# Patient Record
Sex: MALE | Race: OTHER | HISPANIC OR LATINO | URBAN - METROPOLITAN AREA
[De-identification: names, ages, dates, MRNs, and addresses within clinical notes are randomized per-mention and may not be internally consistent; named-entity substitution may affect disease eponyms.]

---

## 2019-01-23 ENCOUNTER — INPATIENT (INPATIENT)
Facility: HOSPITAL | Age: 49
LOS: 26 days | Discharge: NURSING, REHAB OR LONG-TERM CARE | End: 2019-02-19
Attending: HOSPITALIST | Admitting: HOSPITALIST
Payer: COMMERCIAL

## 2019-01-23 VITALS
SYSTOLIC BLOOD PRESSURE: 91 MMHG | TEMPERATURE: 99 F | OXYGEN SATURATION: 98 % | RESPIRATION RATE: 18 BRPM | DIASTOLIC BLOOD PRESSURE: 62 MMHG | HEART RATE: 106 BPM

## 2019-01-23 DIAGNOSIS — K70.31 ALCOHOLIC CIRRHOSIS OF LIVER WITH ASCITES: ICD-10-CM

## 2019-01-23 DIAGNOSIS — A41.9 SEPSIS, UNSPECIFIED ORGANISM: ICD-10-CM

## 2019-01-23 DIAGNOSIS — R55 SYNCOPE AND COLLAPSE: ICD-10-CM

## 2019-01-23 DIAGNOSIS — K92.1 MELENA: ICD-10-CM

## 2019-01-23 DIAGNOSIS — F10.920 ALCOHOL USE, UNSPECIFIED WITH INTOXICATION, UNCOMPLICATED: ICD-10-CM

## 2019-01-23 LAB
ABO RH CONFIRMATION: SIGNIFICANT CHANGE UP
ALBUMIN SERPL ELPH-MCNC: 2.1 G/DL — LOW (ref 3.3–5)
ALP SERPL-CCNC: 799 U/L — HIGH (ref 40–120)
ALT FLD-CCNC: 55 U/L — SIGNIFICANT CHANGE UP (ref 12–78)
AMMONIA BLD-MCNC: 64 UMOL/L — HIGH (ref 11–32)
ANION GAP SERPL CALC-SCNC: 20 MMOL/L — HIGH (ref 5–17)
ANISOCYTOSIS BLD QL: SLIGHT — SIGNIFICANT CHANGE UP
APPEARANCE UR: ABNORMAL
APTT BLD: 34.9 SEC — SIGNIFICANT CHANGE UP (ref 27.5–36.3)
AST SERPL-CCNC: 215 U/L — HIGH (ref 15–37)
BACTERIA # UR AUTO: ABNORMAL
BASE EXCESS BLDA CALC-SCNC: 2.6 MMOL/L — HIGH (ref -2–2)
BASOPHILS # BLD AUTO: 0 K/UL — SIGNIFICANT CHANGE UP (ref 0–0.2)
BASOPHILS NFR BLD AUTO: 0 % — SIGNIFICANT CHANGE UP (ref 0–2)
BILIRUB SERPL-MCNC: 6 MG/DL — HIGH (ref 0.2–1.2)
BILIRUB UR-MCNC: ABNORMAL
BLOOD GAS COMMENTS: SIGNIFICANT CHANGE UP
BLOOD GAS COMMENTS: SIGNIFICANT CHANGE UP
BLOOD GAS SOURCE: SIGNIFICANT CHANGE UP
BUN SERPL-MCNC: 26 MG/DL — HIGH (ref 7–23)
CALCIUM SERPL-MCNC: 7.8 MG/DL — LOW (ref 8.5–10.1)
CHLORIDE SERPL-SCNC: 79 MMOL/L — LOW (ref 96–108)
CK MB BLD-MCNC: 1.5 % — SIGNIFICANT CHANGE UP (ref 0–3.5)
CK MB CFR SERPL CALC: 2.6 NG/ML — SIGNIFICANT CHANGE UP (ref 0.5–3.6)
CK SERPL-CCNC: 168 U/L — SIGNIFICANT CHANGE UP (ref 26–308)
CO2 SERPL-SCNC: 24 MMOL/L — SIGNIFICANT CHANGE UP (ref 22–31)
COLOR SPEC: ABNORMAL
CREAT SERPL-MCNC: 2.05 MG/DL — HIGH (ref 0.5–1.3)
DIFF PNL FLD: ABNORMAL
EOSINOPHIL # BLD AUTO: 0 K/UL — SIGNIFICANT CHANGE UP (ref 0–0.5)
EOSINOPHIL NFR BLD AUTO: 0 % — SIGNIFICANT CHANGE UP (ref 0–6)
ETHANOL SERPL-MCNC: 237 MG/DL — HIGH (ref 0–10)
GLUCOSE BLDC GLUCOMTR-MCNC: 187 MG/DL — HIGH (ref 70–99)
GLUCOSE SERPL-MCNC: 148 MG/DL — HIGH (ref 70–99)
GLUCOSE UR QL: NEGATIVE MG/DL — SIGNIFICANT CHANGE UP
HCO3 BLDA-SCNC: 25 MMOL/L — SIGNIFICANT CHANGE UP (ref 21–29)
HCT VFR BLD CALC: 26.8 % — LOW (ref 39–50)
HGB BLD-MCNC: 9.5 G/DL — LOW (ref 13–17)
HOROWITZ INDEX BLDA+IHG-RTO: 32 — SIGNIFICANT CHANGE UP
HYALINE CASTS # UR AUTO: ABNORMAL /LPF
HYPOCHROMIA BLD QL: SLIGHT — SIGNIFICANT CHANGE UP
INR BLD: 2.05 RATIO — HIGH (ref 0.88–1.16)
KETONES UR-MCNC: ABNORMAL
LACTATE SERPL-SCNC: 9.8 MMOL/L — CRITICAL HIGH (ref 0.7–2)
LEUKOCYTE ESTERASE UR-ACNC: ABNORMAL
LYMPHOCYTES # BLD AUTO: 1.61 K/UL — SIGNIFICANT CHANGE UP (ref 1–3.3)
LYMPHOCYTES # BLD AUTO: 7 % — LOW (ref 13–44)
MACROCYTES BLD QL: SIGNIFICANT CHANGE UP
MAGNESIUM SERPL-MCNC: 2.3 MG/DL — SIGNIFICANT CHANGE UP (ref 1.6–2.6)
MANUAL SMEAR VERIFICATION: SIGNIFICANT CHANGE UP
MCHC RBC-ENTMCNC: 35.4 GM/DL — SIGNIFICANT CHANGE UP (ref 32–36)
MCHC RBC-ENTMCNC: 38 PG — HIGH (ref 27–34)
MCV RBC AUTO: 107.2 FL — HIGH (ref 80–100)
MONOCYTES # BLD AUTO: 1.38 K/UL — HIGH (ref 0–0.9)
MONOCYTES NFR BLD AUTO: 6 % — SIGNIFICANT CHANGE UP (ref 2–14)
NEUTROPHILS # BLD AUTO: 20.06 K/UL — HIGH (ref 1.8–7.4)
NEUTROPHILS NFR BLD AUTO: 84 % — HIGH (ref 43–77)
NEUTS BAND # BLD: 3 % — SIGNIFICANT CHANGE UP (ref 0–8)
NEUTS HYPERSEG # BLD: PRESENT — SIGNIFICANT CHANGE UP
NITRITE UR-MCNC: NEGATIVE — SIGNIFICANT CHANGE UP
NRBC # BLD: 0 /100 — SIGNIFICANT CHANGE UP (ref 0–0)
NRBC # BLD: SIGNIFICANT CHANGE UP /100 WBCS (ref 0–0)
OB PNL STL: POSITIVE
PCO2 BLDA: 30 MMHG — LOW (ref 32–46)
PH BLD: 7.53 — HIGH (ref 7.35–7.45)
PH UR: 5 — SIGNIFICANT CHANGE UP (ref 5–8)
PLAT MORPH BLD: NORMAL — SIGNIFICANT CHANGE UP
PLATELET # BLD AUTO: 282 K/UL — SIGNIFICANT CHANGE UP (ref 150–400)
PO2 BLDA: 91 MMHG — SIGNIFICANT CHANGE UP (ref 74–108)
POTASSIUM SERPL-MCNC: 5.3 MMOL/L — SIGNIFICANT CHANGE UP (ref 3.5–5.3)
POTASSIUM SERPL-SCNC: 5.3 MMOL/L — SIGNIFICANT CHANGE UP (ref 3.5–5.3)
PROT SERPL-MCNC: 6.6 GM/DL — SIGNIFICANT CHANGE UP (ref 6–8.3)
PROT UR-MCNC: 15 MG/DL
PROTHROM AB SERPL-ACNC: 23.5 SEC — HIGH (ref 10–12.9)
RBC # BLD: 2.5 M/UL — LOW (ref 4.2–5.8)
RBC # FLD: 14.4 % — SIGNIFICANT CHANGE UP (ref 10.3–14.5)
RBC BLD AUTO: ABNORMAL
RBC CASTS # UR COMP ASSIST: SIGNIFICANT CHANGE UP /HPF (ref 0–4)
SAO2 % BLDA: 97 % — HIGH (ref 92–96)
SODIUM SERPL-SCNC: 123 MMOL/L — LOW (ref 135–145)
SP GR SPEC: 1.02 — SIGNIFICANT CHANGE UP (ref 1.01–1.02)
TARGETS BLD QL SMEAR: SLIGHT — SIGNIFICANT CHANGE UP
TROPONIN I SERPL-MCNC: <.015 NG/ML — SIGNIFICANT CHANGE UP (ref 0.01–0.04)
UROBILINOGEN FLD QL: 8 MG/DL
WBC # BLD: 23.06 K/UL — HIGH (ref 3.8–10.5)
WBC # FLD AUTO: 23.06 K/UL — HIGH (ref 3.8–10.5)
WBC UR QL: ABNORMAL

## 2019-01-23 PROCEDURE — 99285 EMERGENCY DEPT VISIT HI MDM: CPT

## 2019-01-23 PROCEDURE — 71045 X-RAY EXAM CHEST 1 VIEW: CPT | Mod: 26

## 2019-01-23 PROCEDURE — 70450 CT HEAD/BRAIN W/O DYE: CPT | Mod: 26

## 2019-01-23 PROCEDURE — 93010 ELECTROCARDIOGRAM REPORT: CPT

## 2019-01-23 PROCEDURE — 76700 US EXAM ABDOM COMPLETE: CPT | Mod: 26

## 2019-01-23 PROCEDURE — 99291 CRITICAL CARE FIRST HOUR: CPT

## 2019-01-23 PROCEDURE — 74176 CT ABD & PELVIS W/O CONTRAST: CPT | Mod: 26

## 2019-01-23 PROCEDURE — 99223 1ST HOSP IP/OBS HIGH 75: CPT

## 2019-01-23 RX ORDER — VANCOMYCIN HCL 1 G
1000 VIAL (EA) INTRAVENOUS ONCE
Qty: 0 | Refills: 0 | Status: COMPLETED | OUTPATIENT
Start: 2019-01-23 | End: 2019-01-23

## 2019-01-23 RX ORDER — SODIUM CHLORIDE 9 MG/ML
1000 INJECTION, SOLUTION INTRAVENOUS
Qty: 0 | Refills: 0 | Status: DISCONTINUED | OUTPATIENT
Start: 2019-01-23 | End: 2019-01-23

## 2019-01-23 RX ORDER — PANTOPRAZOLE SODIUM 20 MG/1
8 TABLET, DELAYED RELEASE ORAL
Qty: 80 | Refills: 0 | Status: DISCONTINUED | OUTPATIENT
Start: 2019-01-23 | End: 2019-01-27

## 2019-01-23 RX ORDER — SODIUM CHLORIDE 9 MG/ML
1000 INJECTION INTRAMUSCULAR; INTRAVENOUS; SUBCUTANEOUS
Qty: 0 | Refills: 0 | Status: DISCONTINUED | OUTPATIENT
Start: 2019-01-23 | End: 2019-01-24

## 2019-01-23 RX ORDER — SODIUM CHLORIDE 9 MG/ML
1000 INJECTION, SOLUTION INTRAVENOUS
Qty: 0 | Refills: 0 | Status: COMPLETED | OUTPATIENT
Start: 2019-01-23 | End: 2019-01-23

## 2019-01-23 RX ORDER — SODIUM CHLORIDE 9 MG/ML
1000 INJECTION INTRAMUSCULAR; INTRAVENOUS; SUBCUTANEOUS ONCE
Qty: 0 | Refills: 0 | Status: COMPLETED | OUTPATIENT
Start: 2019-01-23 | End: 2019-01-23

## 2019-01-23 RX ORDER — PIPERACILLIN AND TAZOBACTAM 4; .5 G/20ML; G/20ML
3.38 INJECTION, POWDER, LYOPHILIZED, FOR SOLUTION INTRAVENOUS ONCE
Qty: 0 | Refills: 0 | Status: COMPLETED | OUTPATIENT
Start: 2019-01-23 | End: 2019-01-23

## 2019-01-23 RX ORDER — THIAMINE MONONITRATE (VIT B1) 100 MG
100 TABLET ORAL DAILY
Qty: 0 | Refills: 0 | Status: DISCONTINUED | OUTPATIENT
Start: 2019-01-23 | End: 2019-02-19

## 2019-01-23 RX ORDER — FOLIC ACID 0.8 MG
1 TABLET ORAL DAILY
Qty: 0 | Refills: 0 | Status: DISCONTINUED | OUTPATIENT
Start: 2019-01-23 | End: 2019-02-19

## 2019-01-23 RX ORDER — PANTOPRAZOLE SODIUM 20 MG/1
80 TABLET, DELAYED RELEASE ORAL ONCE
Qty: 0 | Refills: 0 | Status: COMPLETED | OUTPATIENT
Start: 2019-01-23 | End: 2019-01-23

## 2019-01-23 RX ORDER — PIPERACILLIN AND TAZOBACTAM 4; .5 G/20ML; G/20ML
3.38 INJECTION, POWDER, LYOPHILIZED, FOR SOLUTION INTRAVENOUS EVERY 8 HOURS
Qty: 0 | Refills: 0 | Status: DISCONTINUED | OUTPATIENT
Start: 2019-01-23 | End: 2019-01-24

## 2019-01-23 RX ORDER — NICOTINE POLACRILEX 2 MG
1 GUM BUCCAL DAILY
Qty: 0 | Refills: 0 | Status: DISCONTINUED | OUTPATIENT
Start: 2019-01-23 | End: 2019-02-19

## 2019-01-23 RX ORDER — SODIUM CHLORIDE 9 MG/ML
2000 INJECTION INTRAMUSCULAR; INTRAVENOUS; SUBCUTANEOUS ONCE
Qty: 0 | Refills: 0 | Status: COMPLETED | OUTPATIENT
Start: 2019-01-23 | End: 2019-01-23

## 2019-01-23 RX ORDER — MIDODRINE HYDROCHLORIDE 2.5 MG/1
5 TABLET ORAL EVERY 8 HOURS
Qty: 0 | Refills: 0 | Status: DISCONTINUED | OUTPATIENT
Start: 2019-01-23 | End: 2019-01-24

## 2019-01-23 RX ADMIN — SODIUM CHLORIDE 2000 MILLILITER(S): 9 INJECTION INTRAMUSCULAR; INTRAVENOUS; SUBCUTANEOUS at 20:00

## 2019-01-23 RX ADMIN — SODIUM CHLORIDE 125 MILLILITER(S): 9 INJECTION, SOLUTION INTRAVENOUS at 20:01

## 2019-01-23 RX ADMIN — MIDODRINE HYDROCHLORIDE 5 MILLIGRAM(S): 2.5 TABLET ORAL at 22:48

## 2019-01-23 RX ADMIN — SODIUM CHLORIDE 2000 MILLILITER(S): 9 INJECTION INTRAMUSCULAR; INTRAVENOUS; SUBCUTANEOUS at 16:46

## 2019-01-23 RX ADMIN — SODIUM CHLORIDE 1000 MILLILITER(S): 9 INJECTION INTRAMUSCULAR; INTRAVENOUS; SUBCUTANEOUS at 20:00

## 2019-01-23 RX ADMIN — PIPERACILLIN AND TAZOBACTAM 200 GRAM(S): 4; .5 INJECTION, POWDER, LYOPHILIZED, FOR SOLUTION INTRAVENOUS at 20:12

## 2019-01-23 RX ADMIN — PANTOPRAZOLE SODIUM 80 MILLIGRAM(S): 20 TABLET, DELAYED RELEASE ORAL at 22:01

## 2019-01-23 RX ADMIN — Medication 250 MILLIGRAM(S): at 20:12

## 2019-01-23 NOTE — H&P ADULT - REASON FOR ADMISSION
Coffee ground emesis, progressive jaundice. Coffee ground emesis, progressive jaundice, syncopal episodes.

## 2019-01-23 NOTE — ED ADULT NURSE NOTE - NSIMPLEMENTINTERV_GEN_ALL_ED
Implemented All Fall Risk Interventions:  [place] to call system. Call bell, personal items and telephone within reach. Instruct patient to call for assistance. Room bathroom lighting operational. Non-slip footwear when patient is off stretcher. Physically safe environment: no spills, clutter or unnecessary equipment. Stretcher in lowest position, wheels locked, appropriate side rails in place. Provide visual cue, wrist band, yellow gown, etc. Monitor gait and stability. Monitor for mental status changes and reorient to person, place, and time. Review medications for side effects contributing to fall risk. Reinforce activity limits and safety measures with patient and family.

## 2019-01-23 NOTE — ED PROVIDER NOTE - OBJECTIVE STATEMENT
48 years old male by ems with wife c/o pt passed out x 3 today. Pt admits to alcohol drinking vodka daily last drink was 11:00 am this morning. Pt also c/o generalized weakness and being yellow for two weeks. Pt denies headache, trauma to head, neck/back pain, dizziness, blurred visions, light sensitivities, cough, sob, chest pain, nausea, vomiting, fever, chills, abd pain, dysuria, or irregular bowel movements.

## 2019-01-23 NOTE — H&P ADULT - NSHPLABSRESULTS_GEN_ALL_CORE
LABS:                        9.5    . )-----------( 282      ( 2019 16:55 )             26.8         123<L>  |  79<L>  |  26<H>  ----------------------------<  148<H>  5.3   |  24  |  2.05<H>    Ca    7.8<L>      2019 16:55  Mg     2.3         TPro  6.6  /  Alb  2.1<L>  /  TBili  6.0<H>  /  DBili  x   /  AST  215<H>  /  ALT  55  /  AlkPhos  799<H>      PT/INR - ( 2019 16:55 )   PT: 23.5 sec;   INR: 2.05 ratio         PTT - ( 2019 16:55 )  PTT:34.9 sec  Urinalysis Basic - ( 2019 20:58 )    Color: Rosalind / Appearance: Slightly Turbid / S.020 / pH: x  Gluc: x / Ketone: Trace  / Bili: Moderate / Urobili: 8 mg/dL   Blood: x / Protein: 15 mg/dL / Nitrite: Negative   Leuk Esterase: Trace / RBC: 0-2 /HPF / WBC 6-10   Sq Epi: x / Non Sq Epi: x / Bacteria: Many      CAPILLARY BLOOD GLUCOSE      POCT Blood Glucose.: 187 mg/dL (2019 15:33)          RADIOLOGY & ADDITIONAL TESTS:  < from: CT Abdomen and Pelvis No Cont (19 @ 18:06) >    1. Diffuse ascites.  2. Hepatomegaly with cirrhotic changes and fatty infiltration.   Heterogeneity within the liver, and tumor/hepatocellular carcinoma cannot   be excluded.  3. Soft tissue thickening and/or edematous changes throughout the   mesentery and omental fat. Omental tumor versus omental edema is within   the differential.  < from: CT Head No Cont (19 @ 18:09) >      HEMISPHERES:  No mass or space occupying lesion.  No acute ischemic   changes or hemorrhagic foci are suggested.  VENTRICLES:  Midline and normal in size.  POSTERIOR FOSSA:  There is a retrocerebellar arachnoid cyst. Brainstem   and cerebellum are otherwise unremarkable.  EXTRACEREBRAL SPACES:  No subdural or epidural collections are noted.  SKULL BASE AND CALVARIUM:  Appears intact.  No fracture or destructive   lesion is identified.  SINUSES AND MASTOIDS:  Clear.  MISCELLANEOUS:  No orbital or suprasellar abnormality noted.    < from: US Abdomen Complete (19 @ 18:33) >    Moderate diffuse ascites with hepatomegaly and cirrhotic changes of the   liver. Possible right liver mass.    Diffusely thickened gallbladder filled with sludge.     < end of copied text >              Imaging Personally Reviewed:  [ ] YES  [ ] NO LABS:                        9.5    . )-----------( 282      ( 2019 16:55 )             26.8         123<L>  |  79<L>  |  26<H>  ----------------------------<  148<H>  5.3   |  24  |  2.05<H>    Ca    7.8<L>      2019 16:55  Mg     2.3         TPro  6.6  /  Alb  2.1<L>  /  TBili  6.0<H>  /  DBili  x   /  AST  215<H>  /  ALT  55  /  AlkPhos  799<H>      PT/INR - ( 2019 16:55 )   PT: 23.5 sec;   INR: 2.05 ratio         PTT - ( 2019 16:55 )  PTT:34.9 sec  Urinalysis Basic - ( 2019 20:58 )    Color: Rosalind / Appearance: Slightly Turbid / S.020 / pH: x  Gluc: x / Ketone: Trace  / Bili: Moderate / Urobili: 8 mg/dL   Blood: x / Protein: 15 mg/dL / Nitrite: Negative   Leuk Esterase: Trace / RBC: 0-2 /HPF / WBC 6-10   Sq Epi: x / Non Sq Epi: x / Bacteria: Many      CAPILLARY BLOOD GLUCOSE      POCT Blood Glucose.: 187 mg/dL (2019 15:33)  Lactate, Blood: 9.8:  mmol/L (19 @ 19:04)  Troponin I, Serum: <.015:  ng/mL (19 @ 16:55)          RADIOLOGY & ADDITIONAL TESTS:  < from: CT Abdomen and Pelvis No Cont (19 @ 18:06) >    1. Diffuse ascites.  2. Hepatomegaly with cirrhotic changes and fatty infiltration.   Heterogeneity within the liver, and tumor/hepatocellular carcinoma cannot   be excluded.  3. Soft tissue thickening and/or edematous changes throughout the   mesentery and omental fat. Omental tumor versus omental edema is within   the differential.  < from: CT Head No Cont (19 @ 18:09) >      HEMISPHERES:  No mass or space occupying lesion.  No acute ischemic   changes or hemorrhagic foci are suggested.  VENTRICLES:  Midline and normal in size.  POSTERIOR FOSSA:  There is a retrocerebellar arachnoid cyst. Brainstem   and cerebellum are otherwise unremarkable.  EXTRACEREBRAL SPACES:  No subdural or epidural collections are noted.  SKULL BASE AND CALVARIUM:  Appears intact.  No fracture or destructive   lesion is identified.  SINUSES AND MASTOIDS:  Clear.  MISCELLANEOUS:  No orbital or suprasellar abnormality noted.    < from: US Abdomen Complete (19 @ 18:33) >    Moderate diffuse ascites with hepatomegaly and cirrhotic changes of the   liver. Possible right liver mass.    Diffusely thickened gallbladder filled with sludge.     < end of copied text >              Imaging Personally Reviewed:  [ ] YES  [ ] NO

## 2019-01-23 NOTE — CONSULT NOTE ADULT - SUBJECTIVE AND OBJECTIVE BOX
HPI:      24 hr events:      ## ROS:  [ ] unable to obtain  CONSTITUTIONAL: No fever, weight loss, or fatigue  EYES: No eye pain, visual disturbances, or discharge  ENMT:  No difficulty hearing, tinnitus, vertigo; No sinus or throat pain  NECK: No pain or stiffness  RESPIRATORY: No cough, wheezing, chills or hemoptysis; No shortness of breath  CARDIOVASCULAR: No chest pain, palpitations, dizziness, or leg swelling  GASTROINTESTINAL: No abdominal or epigastric pain. No nausea, vomiting, or hematemesis; No diarrhea or constipation. No melena or hematochezia.  GENITOURINARY: No dysuria, frequency, hematuria, or incontinence  NEUROLOGICAL: No headaches, memory loss, loss of strength, numbness, or tremors  SKIN: No itching, burning, rashes, or lesions   LYMPH NODES: No enlarged glands  ENDOCRINE: No heat or cold intolerance; No hair loss  MUSCULOSKELETAL: No joint pain or swelling; No muscle, back, or extremity pain  PSYCHIATRIC: No depression, anxiety, mood swings, or difficulty sleeping  HEME/LYMPH: No easy bruising, or bleeding gums  ALLERGY AND IMMUNOLOGIC: No hives or eczema    ## Labs:  CBC:                        9.5    23.06 )-----------( 282      ( 23 Jan 2019 16:55 )             26.8     Chem:  01-23    123<L>  |  79<L>  |  26<H>  ----------------------------<  148<H>  5.3   |  24  |  2.05<H>    Ca    7.8<L>      23 Jan 2019 16:55  Mg     2.3     01-23    TPro  6.6  /  Alb  2.1<L>  /  TBili  6.0<H>  /  DBili  x   /  AST  215<H>  /  ALT  55  /  AlkPhos  799<H>  01-23    Coags:  PT/INR - ( 23 Jan 2019 16:55 )   PT: 23.5 sec;   INR: 2.05 ratio         PTT - ( 23 Jan 2019 16:55 )  PTT:34.9 sec        ## Imaging:    ## Medications:    midodrine 5 milliGRAM(s) Oral every 8 hours          pantoprazole  Injectable 80 milliGRAM(s) IV Push once        ## Vitals:  T(C): 36.7 (01-23-19 @ 20:15), Max: 37.2 (01-23-19 @ 15:29)  HR: 108 (01-23-19 @ 20:45) (100 - 108)  BP: 115/62 (01-23-19 @ 20:45) (91/62 - 115/62)  BP(mean): --  RR: 28 (01-23-19 @ 20:45) (18 - 30)  SpO2: 99% (01-23-19 @ 20:45) (98% - 100%)  Wt(kg): --  Vent:   ABG: ABG - ( 23 Jan 2019 19:09 )  pH, Arterial: x     pH, Blood: 7.53  /  pCO2: 30    /  pO2: 91    / HCO3: 25    / Base Excess: 2.6   /  SaO2: 97                    01-23 @ 07:01  -  01-23 @ 21:27  --------------------------------------------------------  IN: 2600 mL / OUT: 0 mL / NET: 2600 mL          ## P/E:  Gen: lying comfortably in bed in no apparent distress  HEENT: PERRL, EOMI  Resp: CTA B/L no c/r/w  CVS: S1S2 no m/r/g  Abd: soft NT/ND +BS  Ext: no c/c/e  Neuro: A&Ox3    CENTRAL LINE: [ ] YES [ ] NO  LOCATION:   DATE INSERTED:  REMOVE: [ ] YES [ ] NO      GILLESPIE: [ ] YES [ ] NO    DATE INSERTED:  REMOVE:  [ ] YES [ ] NO      A-LINE:  [ ] YES [ ] NO  LOCATION:   DATE INSERTED:  REMOVE:  [ ] YES [ ] NO  EXPLAIN:    GLOBAL ISSUE/BEST PRACTICE:  Analgesia:  Sedation:  HOB elevation: yes  Stress ulcer prophylaxis:  VTE prophylaxis:  Oral Care:  Glycemic control:  Nutrition:    CODE STATUS: [ ] full code  [ ] DNR  [ ] DNI  [ ] MOLST  Goals of care discussion: [ ] yes HPI:  48M PMH HTN (not on medication), chronic alcohol abuse and dependence x 15 years (1/2 pint of vodka with cranberry juice daily), hx of alcohol withdrawal 2013 hospitalized at [facility] after which he went to inpatient rehab at [facility] for syncope. History obtained from ex wife, mother, and patient. Since pt left rehab in 2013 pt has been drinking daily. Last drink 11AM today. Pt state for past one and a half months he has had progressive weight loss with loss of appetite. He has had in increased abdominal distention for the past 1 month and in the past 1 week he has noticed jaundice and scleral icterus. Pt also reports generalized fatigue. Reports coffee ground emesis x1 episode 3 days ago but none since then with melena for the past 3-4 days. Pt states he has 3 bowel movements a day and now has also been seeing "blood when I pee" also within the last week. No abdominal pain. No CP no SOB. No fevers or chills. Today pt called ex wife after he was unsteady with his gait and "fell". Denies trauma to head. Ex wife reports while attempting to get pt to the car she noticed he was "very weak" and witnessed pt to have had 3 syncopal episodes lasting a few minutes each before regaining consciousness. Ex wife state that in the car, pt had a bowel movement that was black and tarry.     In ED pt noted to be hypotensive SBP 80-90s. 2L IVF given with 3rd and 4th infusing. SBP post IVF resuscitation 100s to one teens. On labs pt with leukocytosis with 3% bands, Na 123, Cr 2.05, INR 2.05, T bili 6, , ALT 55, Alk phos 799      ## ROS:  [ ] unable to obtain  CONSTITUTIONAL: No fever, weight loss, or fatigue  EYES: No eye pain, visual disturbances, or discharge  ENMT:  No difficulty hearing, tinnitus, vertigo; No sinus or throat pain  NECK: No pain or stiffness  RESPIRATORY: No cough, wheezing, chills or hemoptysis; No shortness of breath  CARDIOVASCULAR: No chest pain, palpitations, dizziness, or leg swelling  GASTROINTESTINAL: No abdominal or epigastric pain. No nausea, vomiting, or hematemesis; No diarrhea or constipation. No melena or hematochezia.  GENITOURINARY: No dysuria, frequency, hematuria, or incontinence  NEUROLOGICAL: No headaches, memory loss, loss of strength, numbness, or tremors  SKIN: No itching, burning, rashes, or lesions   LYMPH NODES: No enlarged glands  ENDOCRINE: No heat or cold intolerance; No hair loss  MUSCULOSKELETAL: No joint pain or swelling; No muscle, back, or extremity pain  PSYCHIATRIC: No depression, anxiety, mood swings, or difficulty sleeping  HEME/LYMPH: No easy bruising, or bleeding gums  ALLERGY AND IMMUNOLOGIC: No hives or eczema    ## Labs:  CBC:                        9.5    23.06 )-----------( 282      ( 23 Jan 2019 16:55 )             26.8     Chem:  01-23    123<L>  |  79<L>  |  26<H>  ----------------------------<  148<H>  5.3   |  24  |  2.05<H>    Ca    7.8<L>      23 Jan 2019 16:55  Mg     2.3     01-23    TPro  6.6  /  Alb  2.1<L>  /  TBili  6.0<H>  /  DBili  x   /  AST  215<H>  /  ALT  55  /  AlkPhos  799<H>  01-23    Coags:  PT/INR - ( 23 Jan 2019 16:55 )   PT: 23.5 sec;   INR: 2.05 ratio         PTT - ( 23 Jan 2019 16:55 )  PTT:34.9 sec        ## Imaging:    ## Medications:    midodrine 5 milliGRAM(s) Oral every 8 hours          pantoprazole  Injectable 80 milliGRAM(s) IV Push once        ## Vitals:  T(C): 36.7 (01-23-19 @ 20:15), Max: 37.2 (01-23-19 @ 15:29)  HR: 108 (01-23-19 @ 20:45) (100 - 108)  BP: 115/62 (01-23-19 @ 20:45) (91/62 - 115/62)  BP(mean): --  RR: 28 (01-23-19 @ 20:45) (18 - 30)  SpO2: 99% (01-23-19 @ 20:45) (98% - 100%)  Wt(kg): --  Vent:   ABG: ABG - ( 23 Jan 2019 19:09 )  pH, Arterial: x     pH, Blood: 7.53  /  pCO2: 30    /  pO2: 91    / HCO3: 25    / Base Excess: 2.6   /  SaO2: 97                    01-23 @ 07:01  - 01-23 @ 21:27  --------------------------------------------------------  IN: 2600 mL / OUT: 0 mL / NET: 2600 mL          ## P/E:  Gen: lying comfortably in bed in no apparent distress  HEENT: PERRL, EOMI  Resp: CTA B/L no c/r/w  CVS: S1S2 no m/r/g  Abd: soft NT/ND +BS  Ext: no c/c/e  Neuro: A&Ox3    CENTRAL LINE: [ ] YES [ ] NO  LOCATION:   DATE INSERTED:  REMOVE: [ ] YES [ ] NO      GILLESPIE: [ ] YES [ ] NO    DATE INSERTED:  REMOVE:  [ ] YES [ ] NO      A-LINE:  [ ] YES [ ] NO  LOCATION:   DATE INSERTED:  REMOVE:  [ ] YES [ ] NO  EXPLAIN:    GLOBAL ISSUE/BEST PRACTICE:  Analgesia:  Sedation:  HOB elevation: yes  Stress ulcer prophylaxis:  VTE prophylaxis:  Oral Care:  Glycemic control:  Nutrition:    CODE STATUS: [ ] full code  [ ] DNR  [ ] DNI  [ ] [facility]  Goals of care discussion: [ ] yes HPI:  48M PMH HTN (not on medication), chronic alcohol abuse and dependence x 15 years (1/2 pint of vodka with cranberry juice daily), hx of alcohol withdrawal 2013 hospitalized at [facility] after which he went to inpatient rehab at [facility] for syncope. History obtained from ex wife, mother, and patient. Since pt left rehab in 2013 pt has been drinking daily. Last drink 11AM today. Pt state for past one and a half month he has had progressive weight loss with loss of appetite. He has had increased abdominal distention for the past 1 month and in the past 1 week he has noticed jaundice and scleral icterus. Pt also reports generalized fatigue. Reports coffee ground emesis x1 episode 3 days ago but none since then with melena for the past 3-4 days. Pt states he has 3 bowel movements a day and now has also been seeing "blood when I pee" also within the last week. No abdominal pain. No CP no SOB. No fevers or chills. Today pt called ex wife after he was unsteady with his gait and "fell". Denies trauma to head. Ex wife reports while attempting to get pt to the car she noticed he was "very weak" and witnessed pt to have had 3 syncopal episodes lasting a few minutes each before regaining consciousness. Ex wife state that in the car, pt had a bowel movement that was black and tarry. No hx of ascites or paracentesis in past. Has not seen doctor over 3 years per family.     In ED pt noted to be hypotensive SBP 80-90s. 2L IVF given with 3rd and 4th infusing. SBP post IVF resuscitation 100s to one teens. On labs pt with leukocytosis with 3% bands, Na 123, Cr 2.05, INR 2.05, T bili 6, , ALT 55, Alk phos 799, lactate 9.8.       ## ROS:  CONSTITUTIONAL: No fever, no chills, + weight loss + fatigue  EYES: No eye pain, no visual disturbances + scleral icterus  ENMT:  No difficulty hearing,  No sinus or throat pain  NECK: No pain or stiffness  RESPIRATORY: No cough, no wheezing, no hemoptysis; No shortness of breath  CARDIOVASCULAR: No chest pain, no palpitations, no dizziness  GASTROINTESTINAL: No abdominal or epigastric pain. coffee ground emesis 3 days ago x1 episode. + melena no hematochezia  GENITOURINARY: No dysuria, no frequency, reports hematuria  NEUROLOGICAL: No headaches, no dizziness  SKIN: No itching, burning, rashes, or lesions   MUSCULOSKELETAL: + back pain in ED with "laying on stretcher"    ## FAMILY HX:  hx of HTN, CAD, CVA on mother side  cirrhosis from alcohol maternal aunt  father's hx is unclear per pt and pt's mother      ## SOCIAL HX:  + smoker 10 cigarettes a day   + daily alcohol intake 1/2 pint vodka, drinking x15 years, 2013 alcohol rehab but started drinking after leaving rehab  works as superintendent in building    lives alone  denies drug use        ## Labs:  CBC:                        9.5    23.06 )-----------( 282      ( 23 Jan 2019 16:55 )             26.8     Chem:  01-23    123<L>  |  79<L>  |  26<H>  ----------------------------<  148<H>  5.3   |  24  |  2.05<H>    Ca    7.8<L>      23 Jan 2019 16:55  Mg     2.3     01-23    TPro  6.6  /  Alb  2.1<L>  /  TBili  6.0<H>  /  AST  215<H>  /  ALT  55  /  AlkPhos  799<H>  01-23    Coags:  PT/INR - ( 23 Jan 2019 16:55 )   PT: 23.5 sec;   INR: 2.05 ratio    PTT - ( 23 Jan 2019 16:55 )  PTT:34.9 sec    Lactate, Blood (01.23.19 @ 19:04)    Lactate, Blood: 9.8 mmol/L      ## Imaging:  CXR < from: Xray Chest 1 View AP/PA. (01.23.19 @ 16:38) >  Basilar atelectases right greater than left    CT abdomen < from: CT Abdomen and Pelvis No Cont (01.23.19 @ 18:06) >  1. Diffuse ascites.  2. Hepatomegaly with cirrhotic changes and fatty infiltration.   Heterogeneity within the liver, and tumor/hepatocellular carcinoma cannot be excluded.  3. Soft tissue thickening and/or edematous changes throughout the mesentery and omental fat. Omental tumor versus omental edema is within the differential.      ## HOME Medications:  multivitamins  calcium  omega 3      ## ED Medications:  vancomycin 1g IV  Zosyn 3.375g IV    ## Vitals:  T(C): 36.7 (01-23-19 @ 20:15), Max: 37.2 (01-23-19 @ 15:29)  HR: 108 (01-23-19 @ 20:45) (100 - 108)  BP: 115/62 (01-23-19 @ 20:45) (91/62 - 115/62)  RR: 28 (01-23-19 @ 20:45) (18 - 30)  SpO2: 99% (01-23-19 @ 20:45) (98% - 100%)  ABG: ABG - ( 23 Jan 2019 19:09 )  pH, Arterial:   pH, Blood: 7.53  /  pCO2: 30    /  pO2: 91    / HCO3: 25    / Base Excess: 2.6   /  SaO2: 97              01-23 @ 07:01  -  01-23 @ 21:27  --------------------------------------------------------  IN: 2600 mL / OUT: 0 mL / NET: 2600 mL          ## P/E:  Gen: lying comfortably in bed in no apparent distress  HEENT: PERRL, EOMI, + scleral icterus bilaterally   Resp: CTA B/L no wheeze, no rales, no rhonchi  CVS: S1S2 RRR  Abd: distended, non tender, no guarding, no rebound, + BS  Ext: no cyanosis, mild right > left ankle edema  Neuro: A&Ox3, follows commands

## 2019-01-23 NOTE — H&P ADULT - ASSESSMENT
47 y/o male PMH HTN (not on medication), chronic alcohol abuse and dependence x 15 years (1/2 pint of vodka with cranberry juice daily), hx of alcohol withdrawal 2013 hospitalized at [facility] after which he went to inpatient rehab at [facility] presents for syncope and melanotic stools. History obtained from ex wife, mother, and patient. Since pt left rehab in 2013 pt has been drinking daily. Last drink 11AM today. Pt state for past one and a half months he has had progressive weight loss with loss of appetite. He has had in increased abdominal distention for the past 1 month and in the past 1 week he has noticed jaundice and scleral icterus. Pt also reports generalized fatigue. Reports coffee ground emesis x1 episode 3 days ago but none since then with melena for about a week. Pt states he has 3 bowel movements a day and now has also been seeing "blood when I pee" also within the last week. No abdominal pain. No CP no SOB. No fevers or chills. Today pt called ex wife after he was unsteady with his gait and "fell", he denies LOC. Denies trauma to head. Ex wife reports while attempting to get pt to the car she noticed he was "very weak" and witnessed pt to have had 3 syncopal episodes lasting a few minutes each before regaining consciousness, but no seizures. Ex wife state that in the car, pt had a bowel movement that was black and tarry.   In ED pt noted to be hypotensive SBP 80-90s. 2L IVF given with 3rd and 4th infusing. SBP post IVF resuscitation 100s to one teens. On labs pt with leukocytosis with 3% bands, Na 123, Cr 2.05, INR 2.05, T bili 6, , ALT 55, Alk phos 799.  Pt seen by critical care and not felt to be ICU candidate. 47 y/o male PMH HTN (not on medication), chronic alcohol abuse and dependence x 15 years (1/2 pint of vodka with cranberry juice daily), hx of alcohol withdrawal 2013 hospitalized at [facility] after which he went to inpatient rehab at [facility] presents for syncope and melanotic stools. History obtained from ex wife, mother, and patient. Since pt left rehab in 2013 pt has been drinking daily. Last drink 11AM today. Pt state for past one and a half months he has had progressive weight loss with loss of appetite. He has had in increased abdominal distention for the past 1 month and in the past 1 week he has noticed jaundice and scleral icterus. Pt also reports generalized fatigue. Reports coffee ground emesis x1 episode 3 days ago but none since then with melena for about a week. Pt states he has 3 bowel movements a day and now has also been seeing "blood when I pee" also within the last week. No abdominal pain. No CP no SOB. No fevers or chills. Today pt called ex wife after he was unsteady with his gait and "fell", he denies LOC. Denies trauma to head. Ex wife reports while attempting to get pt to the car she noticed he was "very weak" and witnessed pt to have had 3 syncopal episodes lasting a few minutes each before regaining consciousness, but no seizures. Ex wife state that in the car, pt had a bowel movement that was black and tarry.   In ED pt noted to be hypotensive SBP 80-90s. 2L IVF given with 3rd and 4th infusing. SBP post IVF resuscitation 100s to one teens. On labs pt with leukocytosis with 3% bands, Na 123, Cr 2.05, INR 2.05, T bili 6, , ALT 55, Alk phos 799.  Pt seen by critical care and not felt to be ICU candidate. Pt admitted to telemetry, cultured and started on broad spectrum antibiotics, type and screen ordered and to be transfused FFP, PPI drip started. Discussed with GI Dr Sheffield, agrees with antibiotics and FFP, wants HIDA scan.  IMPROVE VTE Individual Risk Assessment          RISK                                                          Points    [  ] Previous VTE                                                3    [  ] Thrombophilia                                             2    [  ] Lower limb paralysis                                    2        (unable to hold up >15 seconds)      [  ] Current Cancer                                             2         (within 6 months)    [  ] Immobilization > 24 hrs                              1    [  ] ICU/CCU stay > 24 hours                            1    [  ] Age > 60                                                    1    IMPROVE VTE Score _____0____

## 2019-01-23 NOTE — H&P ADULT - NSHPREVIEWOFSYSTEMS_GEN_ALL_CORE
REVIEW OF SYSTEMS:  CONSTITUTIONAL: No fever, weight loss, or fatigue  EYES: No eye pain, visual disturbances, or discharge  ENMT:  No difficulty hearing, tinnitus, vertigo; No sinus or throat pain  NECK: No pain or stiffness  RESPIRATORY: No cough, wheezing, chills or hemoptysis; No shortness of breath  CARDIOVASCULAR: No chest pain, palpitations, dizziness, + leg swelling  GASTROINTESTINAL: No abdominal or epigastric pain. No nausea, vomiting, + hematemesis; No diarrhea or constipation. +melena no hematochezia.  GENITOURINARY: No dysuria, frequency, +hematuria, or incontinence  NEUROLOGICAL: No headaches, memory loss, loss of strength, numbness, + tremors  SKIN: No itching, burning, rashes, or lesions   LYMPH NODES: No enlarged glands  ENDOCRINE: No heat or cold intolerance; No hair loss  MUSCULOSKELETAL: No joint pain or swelling; No muscle, back, or extremity pain  PSYCHIATRIC: No depression, anxiety, mood swings, or difficulty sleeping  HEME/LYMPH: No easy bruising, or bleeding gums  ALLERY AND IMMUNOLOGIC: No hives or eczema

## 2019-01-23 NOTE — ED PROVIDER NOTE - CARE PLAN
Principal Discharge DX:	Syncope  Secondary Diagnosis:	Hypotension  Secondary Diagnosis:	Jaundice  Secondary Diagnosis:	Alcohol intoxication Principal Discharge DX:	Syncope  Secondary Diagnosis:	Hypotension  Secondary Diagnosis:	Jaundice  Secondary Diagnosis:	Alcohol intoxication  Secondary Diagnosis:	Sepsis Principal Discharge DX:	Syncope  Secondary Diagnosis:	Hypotension  Secondary Diagnosis:	Jaundice  Secondary Diagnosis:	Alcohol intoxication  Secondary Diagnosis:	Sepsis  Secondary Diagnosis:	GI bleed

## 2019-01-23 NOTE — ED PROVIDER NOTE - ENMT, MLM
Airway patent, Nasal mucosa clear. Mouth with normal mucosa. Throat has no vesicles, no oropharyngeal exudates and uvula is midline. No hematoma in the scalp

## 2019-01-23 NOTE — ED ADULT NURSE NOTE - ED STAT RN HANDOFF DETAILS 2
Report endorsed to ED hold RN. Safety checks compld this shift/Safety rounds completed hourly.  IV sites checked Q2+remains WDL. Meds given as ord with no s/s of adverse RXNs. Fall +skin precs in place. Any issues endorsed to oncoming RN for follow up.

## 2019-01-23 NOTE — H&P ADULT - HISTORY OF PRESENT ILLNESS
47 y/o male PMH HTN (not on medication), chronic alcohol abuse and dependence x 15 years (1/2 pint of vodka with cranberry juice daily), hx of alcohol withdrawal 2013 hospitalized at [facility] after which he went to inpatient rehab at [facility] presents for syncope and melanotic stools. History obtained from ex wife, mother, and patient. Since pt left rehab in 2013 pt has been drinking daily. Last drink 11AM today. Pt state for past one and a half months he has had progressive weight loss with loss of appetite. He has had in increased abdominal distention for the past 1 month and in the past 1 week he has noticed jaundice and scleral icterus. Pt also reports generalized fatigue. Reports coffee ground emesis x1 episode 3 days ago but none since then with melena for about a week. Pt states he has 3 bowel movements a day and now has also been seeing "blood when I pee" also within the last week. No abdominal pain. No CP no SOB. No fevers or chills. Today pt called ex wife after he was unsteady with his gait and "fell", he denies LOC. Denies trauma to head. Ex wife reports while attempting to get pt to the car she noticed he was "very weak" and witnessed pt to have had 3 syncopal episodes lasting a few minutes each before regaining consciousness, but no seizures. Ex wife state that in the car, pt had a bowel movement that was black and tarry.     In ED pt noted to be hypotensive SBP 80-90s. 2L IVF given with 3rd and 4th infusing. SBP post IVF resuscitation 100s to one teens. On labs pt with leukocytosis with 3% bands, Na 123, Cr 2.05, INR 2.05, T bili 6, , ALT 55, Alk phos 799.  Pt seen by critical care and not felt to be ICU candidate.

## 2019-01-23 NOTE — H&P ADULT - NSHPPHYSICALEXAM_GEN_ALL_CORE
T(C): 36.7 (23 Jan 2019 20:15), Max: 37.2 (23 Jan 2019 15:29)  T(F): 98.1 (23 Jan 2019 20:15), Max: 98.9 (23 Jan 2019 15:29)  HR: 108 (23 Jan 2019 20:45) (100 - 108)  BP: 115/62 (23 Jan 2019 20:45) (91/62 - 115/62)  BP(mean): --  RR: 28 (23 Jan 2019 20:45) (18 - 30)  SpO2: 99% (23 Jan 2019 20:45) (98% - 100%)    PHYSICAL EXAM:  GENERAL: NAD, well-groomed, well-developed  HEAD:  Atraumatic, Normocephalic  EYES: EOMI, PERRLA, conjunctiva and sclera clear  ENMT: No tonsillar erythema, exudates, or enlargement; Moist mucous membranes, Good dentition, No lesions  NECK: Supple, No JVD, Normal thyroid  NERVOUS SYSTEM:  Alert & Oriented X3, Good concentration; Motor Strength 5/5 B/L upper and lower extremities; DTRs 2+ intact and symmetric  CHEST/LUNG: Clear to percussion bilaterally; No rales, rhonchi, wheezing, or rubs  HEART: Regular rate and rhythm; No murmurs, rubs, or gallops  ABDOMEN: Soft, Nontender, Nondistended; Bowel sounds present  EXTREMITIES:  2+ Peripheral Pulses, No clubbing, cyanosis, or edema  LYMPH: No lymphadenopathy noted  SKIN: No rashes or lesions T(C): 36.7 (23 Jan 2019 20:15), Max: 37.2 (23 Jan 2019 15:29)  T(F): 98.1 (23 Jan 2019 20:15), Max: 98.9 (23 Jan 2019 15:29)  HR: 108 (23 Jan 2019 20:45) (100 - 108)  BP: 115/62 (23 Jan 2019 20:45) (91/62 - 115/62)  BP(mean): --  RR: 28 (23 Jan 2019 20:45) (18 - 30)  SpO2: 99% (23 Jan 2019 20:45) (98% - 100%)    PHYSICAL EXAM:  GENERAL: NAD, well-groomed, well-developed  HEAD:  Atraumatic, Normocephalic  EYES: EOMI, PERRLA, conjunctiva and sclera jaundiced  ENMT: No tonsillar erythema, exudates, or enlargement; Moist mucous membranes, No lesions  NECK: Supple, No JVD, Normal thyroid  NERVOUS SYSTEM:  Alert & Oriented X3,CN 2-12 intact; no focal deficits, + tremor, no asterixis  CHEST/LUNG: Clear to percussion bilaterally; No rales, rhonchi, wheezing, or rubs  HEART: Regular rate and rhythm; No murmurs, rubs, or gallops  ABDOMEN: Soft, Nontender, distended, + ascites; Bowel sounds present  EXTREMITIES:  + Peripheral Pulses, No clubbing, cyanosis, + edema  LYMPH: No lymphadenopathy noted  SKIN: No rashes or lesions

## 2019-01-23 NOTE — ED ADULT NURSE NOTE - ED STAT RN HANDOFF DETAILS
Report received from RN at 7pm. Assessment available on KB. will continue to monitor. Family at bedside. Jaundice prominent in eyes with severe ascites noted. ICU consult at bedside

## 2019-01-23 NOTE — ED PROVIDER NOTE - NONTENDER LOCATION
left upper quadrant/right upper quadrant/suprapubic/right costovertebral angle/left lower quadrant/right lower quadrant/left costovertebral angle/periumbilical/umbilical

## 2019-01-23 NOTE — ED ADULT TRIAGE NOTE - CHIEF COMPLAINT QUOTE
pt complaining of  weakness, vomiting, urinating blood, distended abdomen and jaundice times 1 week. ex wife states pt passed out 2 times today. history of alcohol abuse. last drink at noon today. fs 187 at triage.

## 2019-01-24 LAB
ALBUMIN SERPL ELPH-MCNC: 2 G/DL — LOW (ref 3.3–5)
ALP SERPL-CCNC: 685 U/L — HIGH (ref 40–120)
ALT FLD-CCNC: 55 U/L — SIGNIFICANT CHANGE UP (ref 12–78)
ANION GAP SERPL CALC-SCNC: 17 MMOL/L — SIGNIFICANT CHANGE UP (ref 5–17)
AST SERPL-CCNC: 304 U/L — HIGH (ref 15–37)
BILIRUB DIRECT SERPL-MCNC: 6.02 MG/DL — HIGH (ref 0.05–0.2)
BILIRUB INDIRECT FLD-MCNC: 1.3 MG/DL — HIGH (ref 0.2–1)
BILIRUB SERPL-MCNC: 7.3 MG/DL — HIGH (ref 0.2–1.2)
BLD GP AB SCN SERPL QL: SIGNIFICANT CHANGE UP
BUN SERPL-MCNC: 32 MG/DL — HIGH (ref 7–23)
CALCIUM SERPL-MCNC: 7.3 MG/DL — LOW (ref 8.5–10.1)
CHLORIDE SERPL-SCNC: 89 MMOL/L — LOW (ref 96–108)
CO2 SERPL-SCNC: 20 MMOL/L — LOW (ref 22–31)
CREAT SERPL-MCNC: 1.76 MG/DL — HIGH (ref 0.5–1.3)
CULTURE RESULTS: NO GROWTH — SIGNIFICANT CHANGE UP
GLUCOSE SERPL-MCNC: 129 MG/DL — HIGH (ref 70–99)
HCT VFR BLD CALC: 23.1 % — LOW (ref 39–50)
HCT VFR BLD CALC: 24.3 % — LOW (ref 39–50)
HCT VFR BLD CALC: 24.4 % — LOW (ref 39–50)
HGB BLD-MCNC: 8.1 G/DL — LOW (ref 13–17)
HGB BLD-MCNC: 8.5 G/DL — LOW (ref 13–17)
HGB BLD-MCNC: 8.6 G/DL — LOW (ref 13–17)
INR BLD: 1.88 RATIO — HIGH (ref 0.88–1.16)
LACTATE SERPL-SCNC: 4.2 MMOL/L — CRITICAL HIGH (ref 0.7–2)
LACTATE SERPL-SCNC: 5.7 MMOL/L — CRITICAL HIGH (ref 0.7–2)
MAGNESIUM SERPL-MCNC: 2.2 MG/DL — SIGNIFICANT CHANGE UP (ref 1.6–2.6)
MCHC RBC-ENTMCNC: 35 GM/DL — SIGNIFICANT CHANGE UP (ref 32–36)
MCHC RBC-ENTMCNC: 35.1 GM/DL — SIGNIFICANT CHANGE UP (ref 32–36)
MCHC RBC-ENTMCNC: 35.2 GM/DL — SIGNIFICANT CHANGE UP (ref 32–36)
MCHC RBC-ENTMCNC: 37.2 PG — HIGH (ref 27–34)
MCHC RBC-ENTMCNC: 37.4 PG — HIGH (ref 27–34)
MCHC RBC-ENTMCNC: 37.9 PG — HIGH (ref 27–34)
MCV RBC AUTO: 106 FL — HIGH (ref 80–100)
MCV RBC AUTO: 107 FL — HIGH (ref 80–100)
MCV RBC AUTO: 107.5 FL — HIGH (ref 80–100)
NRBC # BLD: 0 /100 WBCS — SIGNIFICANT CHANGE UP (ref 0–0)
PHOSPHATE SERPL-MCNC: 2.6 MG/DL — SIGNIFICANT CHANGE UP (ref 2.5–4.5)
PLATELET # BLD AUTO: 232 K/UL — SIGNIFICANT CHANGE UP (ref 150–400)
PLATELET # BLD AUTO: 237 K/UL — SIGNIFICANT CHANGE UP (ref 150–400)
PLATELET # BLD AUTO: 259 K/UL — SIGNIFICANT CHANGE UP (ref 150–400)
POTASSIUM SERPL-MCNC: 3.8 MMOL/L — SIGNIFICANT CHANGE UP (ref 3.5–5.3)
POTASSIUM SERPL-SCNC: 3.8 MMOL/L — SIGNIFICANT CHANGE UP (ref 3.5–5.3)
PROT SERPL-MCNC: 6.2 GM/DL — SIGNIFICANT CHANGE UP (ref 6–8.3)
PROTHROM AB SERPL-ACNC: 21.5 SEC — HIGH (ref 10–12.9)
RBC # BLD: 2.18 M/UL — LOW (ref 4.2–5.8)
RBC # BLD: 2.27 M/UL — LOW (ref 4.2–5.8)
RBC # BLD: 2.27 M/UL — LOW (ref 4.2–5.8)
RBC # FLD: 14.6 % — HIGH (ref 10.3–14.5)
RBC # FLD: 14.6 % — HIGH (ref 10.3–14.5)
RBC # FLD: 14.7 % — HIGH (ref 10.3–14.5)
SODIUM SERPL-SCNC: 126 MMOL/L — LOW (ref 135–145)
SPECIMEN SOURCE: SIGNIFICANT CHANGE UP
WBC # BLD: 24.14 K/UL — HIGH (ref 3.8–10.5)
WBC # BLD: 25.2 K/UL — HIGH (ref 3.8–10.5)
WBC # BLD: 25.76 K/UL — HIGH (ref 3.8–10.5)
WBC # FLD AUTO: 24.14 K/UL — HIGH (ref 3.8–10.5)
WBC # FLD AUTO: 25.2 K/UL — HIGH (ref 3.8–10.5)
WBC # FLD AUTO: 25.76 K/UL — HIGH (ref 3.8–10.5)

## 2019-01-24 PROCEDURE — 99233 SBSQ HOSP IP/OBS HIGH 50: CPT

## 2019-01-24 PROCEDURE — 99231 SBSQ HOSP IP/OBS SF/LOW 25: CPT

## 2019-01-24 PROCEDURE — 99253 IP/OBS CNSLTJ NEW/EST LOW 45: CPT

## 2019-01-24 PROCEDURE — 78226 HEPATOBILIARY SYSTEM IMAGING: CPT | Mod: 26

## 2019-01-24 RX ORDER — MORPHINE SULFATE 50 MG/1
2 CAPSULE, EXTENDED RELEASE ORAL ONCE
Qty: 0 | Refills: 0 | Status: DISCONTINUED | OUTPATIENT
Start: 2019-01-24 | End: 2019-01-24

## 2019-01-24 RX ORDER — SENNA PLUS 8.6 MG/1
2 TABLET ORAL AT BEDTIME
Qty: 0 | Refills: 0 | Status: DISCONTINUED | OUTPATIENT
Start: 2019-01-24 | End: 2019-01-25

## 2019-01-24 RX ORDER — PHYTONADIONE (VIT K1) 5 MG
5 TABLET ORAL ONCE
Qty: 0 | Refills: 0 | Status: COMPLETED | OUTPATIENT
Start: 2019-01-24 | End: 2019-01-24

## 2019-01-24 RX ORDER — MEROPENEM 1 G/30ML
1000 INJECTION INTRAVENOUS EVERY 8 HOURS
Qty: 0 | Refills: 0 | Status: DISCONTINUED | OUTPATIENT
Start: 2019-01-24 | End: 2019-01-28

## 2019-01-24 RX ORDER — SODIUM CHLORIDE 9 MG/ML
1000 INJECTION, SOLUTION INTRAVENOUS
Qty: 0 | Refills: 0 | Status: DISCONTINUED | OUTPATIENT
Start: 2019-01-24 | End: 2019-01-26

## 2019-01-24 RX ADMIN — PANTOPRAZOLE SODIUM 10 MG/HR: 20 TABLET, DELAYED RELEASE ORAL at 12:53

## 2019-01-24 RX ADMIN — SODIUM CHLORIDE 60 MILLILITER(S): 9 INJECTION, SOLUTION INTRAVENOUS at 18:41

## 2019-01-24 RX ADMIN — Medication 1 MILLIGRAM(S): at 12:51

## 2019-01-24 RX ADMIN — Medication 1 PATCH: at 19:32

## 2019-01-24 RX ADMIN — Medication 1 TABLET(S): at 12:51

## 2019-01-24 RX ADMIN — Medication 1 PATCH: at 12:51

## 2019-01-24 RX ADMIN — Medication 100 MILLIGRAM(S): at 12:51

## 2019-01-24 RX ADMIN — Medication 5 MILLIGRAM(S): at 17:45

## 2019-01-24 RX ADMIN — Medication 2 MILLIGRAM(S): at 01:55

## 2019-01-24 RX ADMIN — SENNA PLUS 2 TABLET(S): 8.6 TABLET ORAL at 17:53

## 2019-01-24 RX ADMIN — MEROPENEM 100 MILLIGRAM(S): 1 INJECTION INTRAVENOUS at 22:26

## 2019-01-24 RX ADMIN — PANTOPRAZOLE SODIUM 10 MG/HR: 20 TABLET, DELAYED RELEASE ORAL at 00:20

## 2019-01-24 RX ADMIN — SODIUM CHLORIDE 80 MILLILITER(S): 9 INJECTION INTRAMUSCULAR; INTRAVENOUS; SUBCUTANEOUS at 00:20

## 2019-01-24 RX ADMIN — SODIUM CHLORIDE 80 MILLILITER(S): 9 INJECTION INTRAMUSCULAR; INTRAVENOUS; SUBCUTANEOUS at 12:51

## 2019-01-24 RX ADMIN — PANTOPRAZOLE SODIUM 10 MG/HR: 20 TABLET, DELAYED RELEASE ORAL at 22:26

## 2019-01-24 RX ADMIN — MIDODRINE HYDROCHLORIDE 5 MILLIGRAM(S): 2.5 TABLET ORAL at 07:32

## 2019-01-24 RX ADMIN — Medication 2 MILLIGRAM(S): at 07:31

## 2019-01-24 RX ADMIN — Medication 1 MILLIGRAM(S): at 22:43

## 2019-01-24 RX ADMIN — PIPERACILLIN AND TAZOBACTAM 25 GRAM(S): 4; .5 INJECTION, POWDER, LYOPHILIZED, FOR SOLUTION INTRAVENOUS at 10:05

## 2019-01-24 RX ADMIN — MORPHINE SULFATE 2 MILLIGRAM(S): 50 CAPSULE, EXTENDED RELEASE ORAL at 10:34

## 2019-01-24 NOTE — PROGRESS NOTE ADULT - SUBJECTIVE AND OBJECTIVE BOX
HPI:  47 y/o male PMH HTN (not on medication), chronic alcohol abuse and dependence x 15 years (1/2 pint of vodka with cranberry juice daily), hx of alcohol withdrawal 2013 hospitalized at [facility] after which he went to inpatient rehab at [facility] for syncope and melanotic stools. History obtained from ex wife, mother, and patient. Since pt left rehab in  pt has been drinking daily. Last drink 11AM today. Pt state for past one and a half months he has had progressive weight loss with loss of appetite. He has had in increased abdominal distention for the past 1 month and in the past 1 week he has noticed jaundice and scleral icterus. Pt also reports generalized fatigue. Reports coffee ground emesis x1 episode 3 days ago but none since then with melena for about a week. Pt states he has 3 bowel movements a day and now has also been seeing "blood when I pee" also within the last week. No abdominal pain. No CP no SOB. No fevers or chills. Today pt called ex wife after he was unsteady with his gait and "fell", he denies LOC. Denies trauma to head. Ex wife reports while attempting to get pt to the car she noticed he was "very weak" and witnessed pt to have had 3 syncopal episodes lasting a few minutes each before regaining consciousness, but no seizures. Ex wife state that in the car, pt had a bowel movement that was black and tarry.     In ED pt noted to be hypotensive SBP 80-90s. 2L IVF given with 3rd and 4th infusing. SBP post IVF resuscitation 100s to one teens. On labs pt with leukocytosis with 3% bands, Na 123, Cr 2.05, INR 2.05, T bili 6, , ALT 55, Alk phos 799.  Pt seen by critical care and not felt to be ICU candidate. (2019 22:33)      24 hr events:    ## ROS:  [ ] unable to obtain  CONSTITUTIONAL: No fever, weight loss, or fatigue  EYES: No eye pain, visual disturbances, or discharge  ENMT:  No difficulty hearing, tinnitus, vertigo; No sinus or throat pain  NECK: No pain or stiffness  RESPIRATORY: No cough, wheezing, chills or hemoptysis; No shortness of breath  CARDIOVASCULAR: No chest pain, palpitations, dizziness, or leg swelling  GASTROINTESTINAL: No abdominal or epigastric pain. No nausea, vomiting, or hematemesis; No diarrhea or constipation. No melena or hematochezia.  GENITOURINARY: No dysuria, frequency, hematuria, or incontinence  NEUROLOGICAL: No headaches, memory loss, loss of strength, numbness, or tremors  SKIN: No itching, burning, rashes, or lesions   LYMPH NODES: No enlarged glands  ENDOCRINE: No heat or cold intolerance; No hair loss  MUSCULOSKELETAL: No joint pain or swelling; No muscle, back, or extremity pain  PSYCHIATRIC: No depression, anxiety, mood swings, or difficulty sleeping  HEME/LYMPH: No easy bruising, or bleeding gums  ALLERGY AND IMMUNOLOGIC: No hives or eczema    ## Vitals  ICU Vital Signs Last 24 Hrs  T(C): 36.9 (2019 13:18), Max: 37.3 (2019 04:30)  T(F): 98.4 (2019 13:18), Max: 99.1 (2019 04:30)  HR: 115 (2019 13:18) (100 - 125)  BP: 114/75 (2019 13:18) (91/62 - 118/87)  BP(mean): --  ABP: --  ABP(mean): --  RR: 18 (2019 13:18) (18 - 30)  SpO2: 97% (2019 13:18) (93% - 100%)      ## Physical Exam:  Gen:  HEENT:  Resp:  CV:  Abd:  Ext:  Neuro:    ## Vent Data      ## Labs:  Chem:      126<L>  |  89<L>  |  32<H>  ----------------------------<  129<H>  3.8   |  20<L>  |  1.76<H>    Ca    7.3<L>      2019 08:11  Phos  2.6       Mg     2.2         TPro  6.2  /  Alb  2.0<L>  /  TBili  7.3<H>  /  DBili  6.02<H>  /  AST  304<H>  /  ALT  55  /  AlkPhos  685<H>      LIVER FUNCTIONS - ( 2019 08:11 )  Alb: 2.0 g/dL / Pro: 6.2 gm/dL / ALK PHOS: 685 U/L / ALT: 55 U/L / AST: 304 U/L / GGT: x           CBC:                        8.6    25.20 )-----------( 259      ( 2019 11:11 )             24.4     Coags:  PT/INR - ( 2019 12:45 )   PT: 21.5 sec;   INR: 1.88 ratio         PTT - ( 2019 16:55 )  PTT:34.9 sec    Urinalysis Basic - ( 2019 20:58 )    Color: Rosalind / Appearance: Slightly Turbid / S.020 / pH: x  Gluc: x / Ketone: Trace  / Bili: Moderate / Urobili: 8 mg/dL   Blood: x / Protein: 15 mg/dL / Nitrite: Negative   Leuk Esterase: Trace / RBC: 0-2 /HPF / WBC 6-10   Sq Epi: x / Non Sq Epi: x / Bacteria: Many        ## Cardiac  CARDIAC MARKERS ( 2019 16:55 )  <.015 ng/mL / x     / 168 U/L / x     / 2.6 ng/mL        ## Blood Gas  ABG - ( 2019 19:09 )  pH, Arterial: x     pH, Blood: 7.53  /  pCO2: 30    /  pO2: 91    / HCO3: 25    / Base Excess: 2.6   /  SaO2: 97                  #I/Os  I&O's Detail    2019 07:01  -  2019 07:00  --------------------------------------------------------  IN:    IV PiggyBack: 350 mL    multivitamin/thiamine/folic acid in sodium chloride 0.9%: 250 mL    Sodium Chloride 0.9% IV Bolus: 2000 mL  Total IN: 2600 mL    OUT:    Voided: 250 mL  Total OUT: 250 mL    Total NET: 2350 mL          ## Imaging:    ## Medications:  MEDICATIONS  (STANDING):  folic acid 1 milliGRAM(s) Oral daily  multivitamin 1 Tablet(s) Oral daily  nicotine -  14 mG/24Hr(s) Patch 1 patch Transdermal daily  pantoprazole Infusion 8 mG/Hr (10 mL/Hr) IV Continuous <Continuous>  piperacillin/tazobactam IVPB. 3.375 Gram(s) IV Intermittent every 8 hours  sodium chloride 0.9%. 1000 milliLiter(s) (80 mL/Hr) IV Continuous <Continuous>  thiamine 100 milliGRAM(s) Oral daily    MEDICATIONS  (PRN):  LORazepam   Injectable 1 milliGRAM(s) IV Push every 4 hours PRN for CIWA greater than 8 HPI:  49 y/o male PMH HTN (not on medication), chronic alcohol abuse and dependence x 15 years (1/2 pint of vodka with cranberry juice daily), hx of alcohol withdrawal 2013 hospitalized at [facility] after which he went to inpatient rehab at [facility] for syncope and melanotic stools. History obtained from ex wife, mother, and patient. Since pt left rehab in  pt has been drinking daily. Last drink 11AM today. Pt state for past one and a half months he has had progressive weight loss with loss of appetite. He has had in increased abdominal distention for the past 1 month and in the past 1 week he has noticed jaundice and scleral icterus. Pt also reports generalized fatigue. Reports coffee ground emesis x1 episode 3 days ago but none since then with melena for about a week. Pt states he has 3 bowel movements a day and now has also been seeing "blood when I pee" also within the last week. No abdominal pain. No CP no SOB. No fevers or chills. Today pt called ex wife after he was unsteady with his gait and "fell", he denies LOC. Denies trauma to head. Ex wife reports while attempting to get pt to the car she noticed he was "very weak" and witnessed pt to have had 3 syncopal episodes lasting a few minutes each before regaining consciousness, but no seizures. Ex wife state that in the car, pt had a bowel movement that was black and tarry.     In ED pt noted to be hypotensive SBP 80-90s. 2L IVF given with 3rd and 4th infusing. SBP post IVF resuscitation 100s to one teens. On labs pt with leukocytosis with 3% bands, Na 123, Cr 2.05, INR 2.05, T bili 6, , ALT 55, Alk phos 799.  Pt seen by critical care and not felt to be ICU candidate. (2019 22:33)      24 hr events: No acute events. Had HIDA scan which was negative for acute cholecystitis. Currently patient reports feeling a little better. No chest pain, dyspnea, abdominal pain, nausea, emesis, diarrhea, cough, fever, or chills.    ## ROS:  See above ROS otherwise negative.    ## Vitals  ICU Vital Signs Last 24 Hrs  T(C): 36.9 (2019 13:18), Max: 37.3 (2019 04:30)  T(F): 98.4 (2019 13:18), Max: 99.1 (2019 04:30)  HR: 115 (2019 13:18) (100 - 125)  BP: 114/75 (2019 13:18) (91/62 - 118/87)  BP(mean): --  ABP: --  ABP(mean): --  RR: 18 (2019 13:18) (18 - 30)  SpO2: 97% (2019 13:18) (93% - 100%)      ## Physical Exam:  Gen: Adult male lying in bed, sleeping, easily arousable, jaundiced  HEENT: NC/AT, sclerae icteric  Resp: No increased WOB, CTAB  CV: S1, S2  Abd: Tense, + ascites, + BS  Ext: + edema  Neuro: Sleepy, but arousable, responds appropriately to questions, follows commands    ## Vent Data      ## Labs:  Chem:      126<L>  |  89<L>  |  32<H>  ----------------------------<  129<H>  3.8   |  20<L>  |  1.76<H>    Ca    7.3<L>      2019 08:11  Phos  2.6       Mg     2.2         TPro  6.2  /  Alb  2.0<L>  /  TBili  7.3<H>  /  DBili  6.02<H>  /  AST  304<H>  /  ALT  55  /  AlkPhos  685<H>      LIVER FUNCTIONS - ( 2019 08:11 )  Alb: 2.0 g/dL / Pro: 6.2 gm/dL / ALK PHOS: 685 U/L / ALT: 55 U/L / AST: 304 U/L / GGT: x           CBC:                        8.6    25.20 )-----------( 259      ( 2019 11:11 )             24.4     Coags:  PT/INR - ( 2019 12:45 )   PT: 21.5 sec;   INR: 1.88 ratio         PTT - ( 2019 16:55 )  PTT:34.9 sec    Urinalysis Basic - ( 2019 20:58 )    Color: Rosalind / Appearance: Slightly Turbid / S.020 / pH: x  Gluc: x / Ketone: Trace  / Bili: Moderate / Urobili: 8 mg/dL   Blood: x / Protein: 15 mg/dL / Nitrite: Negative   Leuk Esterase: Trace / RBC: 0-2 /HPF / WBC 6-10   Sq Epi: x / Non Sq Epi: x / Bacteria: Many        ## Cardiac  CARDIAC MARKERS ( 2019 16:55 )  <.015 ng/mL / x     / 168 U/L / x     / 2.6 ng/mL        ## Blood Gas  ABG - ( 2019 19:09 )  pH, Arterial: x     pH, Blood: 7.53  /  pCO2: 30    /  pO2: 91    / HCO3: 25    / Base Excess: 2.6   /  SaO2: 97                  #I/Os  I&O's Detail    2019 07:01  -  2019 07:00  --------------------------------------------------------  IN:    IV PiggyBack: 350 mL    multivitamin/thiamine/folic acid in sodium chloride 0.9%: 250 mL    Sodium Chloride 0.9% IV Bolus: 2000 mL  Total IN: 2600 mL    OUT:    Voided: 250 mL  Total OUT: 250 mL    Total NET: 2350 mL          ## Imaging:    ## Medications:  MEDICATIONS  (STANDING):  folic acid 1 milliGRAM(s) Oral daily  multivitamin 1 Tablet(s) Oral daily  nicotine -  14 mG/24Hr(s) Patch 1 patch Transdermal daily  pantoprazole Infusion 8 mG/Hr (10 mL/Hr) IV Continuous <Continuous>  piperacillin/tazobactam IVPB. 3.375 Gram(s) IV Intermittent every 8 hours  sodium chloride 0.9%. 1000 milliLiter(s) (80 mL/Hr) IV Continuous <Continuous>  thiamine 100 milliGRAM(s) Oral daily    MEDICATIONS  (PRN):  LORazepam   Injectable 1 milliGRAM(s) IV Push every 4 hours PRN for CIWA greater than 8

## 2019-01-24 NOTE — CHART NOTE - NSCHARTNOTEFT_GEN_A_CORE
House- Medicine NP:    Called by RN to obtain consent for blood transfusion.   Patient is a 48y old  Male who presents with a chief complaint of Coffee ground emesis, progressive jaundice, syncopal episodes. (23 Jan 2019 22:33)                          9.5    23.06 )-----------( 282      ( 23 Jan 2019 16:55 )             26.8     Pt is a+o x 4. Discussed with patient regarding the need for blood transfusion. Risk and benefits discussed. Risks including fever, chills/rigors, high or low blood pressure, respiratory distress (wheezing/hypoxia), urticaria/rash/edema, nausea, pain, bleeding, darkened urine, lower back pain, severe allergic reaction and death was discussed. Verbalizes the understanding and consent obtained. Witnessed by Pierre PEREZ.

## 2019-01-24 NOTE — PROGRESS NOTE ADULT - SUBJECTIVE AND OBJECTIVE BOX
CHIEF COMPLAINT/INTERVAL HISTORY:    Patient is a 48y old  Male who presents with a chief complaint of Coffee ground emesis, progressive jaundice, syncopal episodes. (2019 22:33)      HPI:  47 y/o male PMH HTN (not on medication), chronic alcohol abuse and dependence x 15 years (1/2 pint of vodka with cranberry juice daily), hx of alcohol withdrawal 2013 hospitalized at [facility] after which he went to inpatient rehab at [facility] presents for syncope and melanotic stools. History obtained from ex wife, mother, and patient. Since pt left rehab in  pt has been drinking daily. Last drink 11AM today. Pt state for past one and a half months he has had progressive weight loss with loss of appetite. He has had in increased abdominal distention for the past 1 month and in the past 1 week he has noticed jaundice and scleral icterus. Pt also reports generalized fatigue. Reports coffee ground emesis x1 episode 3 days ago but none since then with melena for about a week. Pt states he has 3 bowel movements a day and now has also been seeing "blood when I pee" also within the last week. No abdominal pain. No CP no SOB. No fevers or chills. Today pt called ex wife after he was unsteady with his gait and "fell", he denies LOC. Denies trauma to head. Ex wife reports while attempting to get pt to the car she noticed he was "very weak" and witnessed pt to have had 3 syncopal episodes lasting a few minutes each before regaining consciousness, but no seizures. Ex wife state that in the car, pt had a bowel movement that was black and tarry.     In ED pt noted to be hypotensive SBP 80-90s. 2L IVF given with 3rd and 4th infusing. SBP post IVF resuscitation 100s to one teens. On labs pt with leukocytosis with 3% bands, Na 123, Cr 2.05, INR 2.05, T bili 6, , ALT 55, Alk phos 799.  Pt seen by critical care and not felt to be ICU candidate. (2019 22:33)      SUBJECTIVE & OBJECTIVE: Pt seen and examined at bedside. Drowsy/ lethargic,   Poor historian.      Vital Signs Last 24 Hrs  T(C): 36.9 (2019 13:18), Max: 37.3 (2019 04:30)  T(F): 98.4 (2019 13:18), Max: 99.1 (2019 04:30)  HR: 115 (2019 13:18) (100 - 125)  BP: 114/75 (2019 13:18) (91/62 - 118/87)  BP(mean): --  ABP: --  ABP(mean): --  RR: 18 (2019 13:18) (18 - 30)  SpO2: 97% (2019 13:18) (93% - 100%)        MEDICATIONS  (STANDING):  folic acid 1 milliGRAM(s) Oral daily  LORazepam   Injectable   IV Push   LORazepam   Injectable 2 milliGRAM(s) IV Push every 6 hours  multivitamin 1 Tablet(s) Oral daily  nicotine -  14 mG/24Hr(s) Patch 1 patch Transdermal daily  pantoprazole Infusion 8 mG/Hr (10 mL/Hr) IV Continuous <Continuous>  piperacillin/tazobactam IVPB. 3.375 Gram(s) IV Intermittent every 8 hours  sodium chloride 0.9%. 1000 milliLiter(s) (80 mL/Hr) IV Continuous <Continuous>  thiamine 100 milliGRAM(s) Oral daily    MEDICATIONS  (PRN):        PHYSICAL EXAM:    GENERAL: lethargic/ drowsy, but easily arousable on verbal stimulus.  HEAD:  Atraumatic, Normocephalic  EYES: EOMI, PERRLA, conjunctiva and sclera clear, ++ pallor, + icterus  ENMT: dry mucous membranes  NERVOUS SYSTEM: drowsy/ lethargic,   CHEST/LUNG: Clear to auscultation bilaterally; No rales, rhonchi, wheezing, or rubs  HEART: Regular rate and rhythm  ABDOMEN: ++ distention, tender,   EXTREMITIES:  no cyanosis, or edema    LABS:                        8.6    25.20 )-----------( 259      ( 2019 11:11 )             24.4     01-24    126<L>  |  89<L>  |  32<H>  ----------------------------<  129<H>  3.8   |  20<L>  |  1.76<H>    Ca    7.3<L>      2019 08:11  Phos  2.6     01-24  Mg     2.2         TPro  6.2  /  Alb  2.0<L>  /  TBili  7.3<H>  /  DBili  6.02<H>  /  AST  304<H>  /  ALT  55  /  AlkPhos  685<H>      PT/INR - ( 2019 12:45 )   PT: 21.5 sec;   INR: 1.88 ratio         PTT - ( 2019 16:55 )  PTT:34.9 sec  Urinalysis Basic - ( 2019 20:58 )    Color: Rosalind / Appearance: Slightly Turbid / S.020 / pH: x  Gluc: x / Ketone: Trace  / Bili: Moderate / Urobili: 8 mg/dL   Blood: x / Protein: 15 mg/dL / Nitrite: Negative   Leuk Esterase: Trace / RBC: 0-2 /HPF / WBC 6-10   Sq Epi: x / Non Sq Epi: x / Bacteria: Many        CAPILLARY BLOOD GLUCOSE      POCT Blood Glucose.: 187 mg/dL (2019 15:33)

## 2019-01-24 NOTE — PROGRESS NOTE ADULT - ASSESSMENT
49 y/o M w/ETOH abuse, decompensated ETOH cirrhosis p/w severe sepsis with unclear source, elevated direct bili and alk phos (likely cholestasis of sepsis), likely GIB, PAPO, and coagulopathy likely secondary to cirrhosis. Lactate currently improving. Patient maintaining normal BP, with appropriate mental status.    - Continue antibiotics  - Recommend diagnostic paracentesis  - Would stop standing fluids, bolus as needed  - Continue vitamin K  - PPI gtt, recommend GI consult  - Trend Cr, avoid nephrotoxins  - No current indication for ICU level of care 47 y/o M w/ETOH abuse, decompensated ETOH cirrhosis p/w severe sepsis with unclear source, elevated direct bili and alk phos (likely cholestasis of sepsis), likely GIB, PAPO, and coagulopathy likely secondary to cirrhosis. Lactate currently improving. Patient maintaining normal BP, with appropriate mental status.    - Continue antibiotics  - Recommend diagnostic paracentesis  - Would stop standing fluids, bolus as needed  - Continue vitamin K  - PPI gtt, recommend GI consult  - Trend Cr, avoid nephrotoxins  - Free water restriction for hyponatremia  - No current indication for ICU level of care

## 2019-01-24 NOTE — CONSULT NOTE ADULT - ASSESSMENT
48 yr old alcoholic male seen with
HPI:  49 y/o male PMH HTN (not on medication), chronic alcohol abuse and dependence x 15 years (1/2 pint of vodka with cranberry juice daily), hx of alcohol withdrawal 2013 hospitalized at [facility] after which he went to inpatient rehab at [facility] presents for syncope and melanotic stools. History obtained from ex wife, mother, and patient. Since pt left rehab in 2013 pt has been drinking daily. Last drink 11AM today. Pt state for past one and a half months he has had progressive weight loss with loss of appetite. He has had in increased abdominal distention for the past 1 month and in the past 1 week he has noticed jaundice and scleral icterus. Pt also reports generalized fatigue. Reports coffee ground emesis x1 episode 3 days ago but none since then with melena for about a week. Pt states he has 3 bowel movements a day and now has also been seeing "blood when I pee" also within the last week. No abdominal pain. No CP no SOB. No fevers or chills. Today pt called ex wife after he was unsteady with his gait and "fell", he denies LOC. Denies trauma to head. Ex wife reports while attempting to get pt to the car she noticed he was "very weak" and witnessed pt to have had 3 syncopal episodes lasting a few minutes each before regaining consciousness, but no seizures. Ex wife state that in the car, pt had a bowel movement that was black and tarry.     In ED pt noted to be hypotensive SBP 80-90s. 2L IVF given with 3rd and 4th infusing. SBP post IVF resuscitation 100s to one teens. On labs pt with leukocytosis with 3% bands, Na 123, Cr 2.05, INR 2.05, T bili 6, , ALT 55, Alk phos 799.  Pt seen by critical care and not felt to be ICU candidate. (23 Jan 2019 22:33)  ----------------- As Above ------------------------------------------------------  Patient confused. Left message for family member to contact me   ETOH Abuse (+). Coffee ground emesis x 1, melena.   Eleveated LFTs, INR  See labs, CT Scan    Cirrhosis, ascites, fever - 1) Paracentesis 2) ammonia level  3) f/u LFTs 4) treat for impending Dts  - patient does not fit into the criteria for treatment with steroids.
48M PMH HTN, chronic alcohol abuse and dependence x 15 years (1/2 pint of vodka with cranberry juice daily), hx of alcohol withdrawal presents for syncope x3 with generalized weakness, weight loss, loss of appetite, jaundice, melena, coffee ground emesis. Here with hypotension, lactic acidosis, hyponatremia, elevated Cr, jaundice, hyperbilirubinemia, elevated alk phos and AST, melena, coffee ground emesis, elevated INR, cirrhosis with ascites.     1. CV  - hypotension likely multifactorial: dehydration from poor oral intake, volume loss with 3-4 BM a days and recent emesis, and suspected upper GI bleed with reports of melena  - hypotension responsive to IVF resuscitation  - can start midodrine in attempt to maintain BP   - currently BP has been in the 100s to one teens  - r/o infectious cause for hypotension    2. GI  - suspect upper GI bleed  - check stool guaiac  - monitor serial CBC for Hb trend  - protonix  - recommend GI eval  - pt now with evidence of liver cirrhosis from likely alcohol abuse: INR elevated as well as lactic acidosis may partially be from liver disease  - repeat lactate  - can try vit K for elevated INR in setting of suspected GI bleed  - IVF hydration  - significant ascites noted: likely will benefit from a diagnostic as well as therapeutic paracentesis  - there is concern for underlying malignancy based on symptoms as well as CT abdomen findings   - follow up abdominal US results      3. ID  - infectious work up  - check blood cx, urine cx  - would cover with antibiotics that covers gram negative organisms and possible SBP  - no peritoneal sign on exam    4. RENAL  - ARF likely from dehydration, pre renal azotemia  - would monitor Cr closely    5. NEURO  - currently awake, responsive  - monitor CIWA  - ativan for withdrawal symptoms    At present time BP stable s/p IVF resuscitation. Recommend continued medical work up for underlying liver dz, GI bleed, possible malignancy, and infectious work up. Does not require ICU admission at present time. if condition changes please reconsult.

## 2019-01-24 NOTE — CONSULT NOTE ADULT - SUBJECTIVE AND OBJECTIVE BOX
HPI:  47 y/o male PMH HTN (not on medication), chronic alcohol abuse and dependence x 15 years (1/2 pint of vodka with cranberry juice daily), hx of alcohol withdrawal 2013 hospitalized at [facility] after which he went to inpatient rehab at [facility] presents for syncope and melanotic stools. History obtained from ex wife, mother, and patient. Since pt left rehab in  pt has been drinking daily. Last drink 11AM today. Pt state for past one and a half months he has had progressive weight loss with loss of appetite. He has had in increased abdominal distention for the past 1 month and in the past 1 week he has noticed jaundice and scleral icterus. Pt also reports generalized fatigue. Reports coffee ground emesis x1 episode 3 days ago but none since then with melena for about a week. Pt states he has 3 bowel movements a day and now has also been seeing "blood when I pee" also within the last week. No abdominal pain. No CP no SOB. No fevers or chills. Today pt called ex wife after he was unsteady with his gait and "fell", he denies LOC. Denies trauma to head. Ex wife reports while attempting to get pt to the car she noticed he was "very weak" and witnessed pt to have had 3 syncopal episodes lasting a few minutes each before regaining consciousness, but no seizures. Ex wife state that in the car, pt had a bowel movement that was black and tarry.     In ED pt noted to be hypotensive SBP 80-90s. 2L IVF given with 3rd and 4th infusing. SBP post IVF resuscitation 100s to one teens. On labs pt with leukocytosis with 3% bands, Na 123, Cr 2.05, INR 2.05, T bili 6, , ALT 55, Alk phos 799.  Pt seen by critical care and not felt to be ICU candidate. (2019 22:33)  ----------------- As Above ------------------------------------------------------  Patient confused. Left message for family member to contact me   ETOH Abuse (+). Coffee ground emesis x 1, melena.   Eleveated LFTs, INR  See labs, CT Scan      PAST MEDICAL & SURGICAL HISTORY:  ETOH abuse  Psoriasis  History of hypertension  No significant past surgical history      MEDICATIONS  (STANDING):  folic acid 1 milliGRAM(s) Oral daily  meropenem  IVPB 1000 milliGRAM(s) IV Intermittent every 8 hours  multivitamin 1 Tablet(s) Oral daily  nicotine -  14 mG/24Hr(s) Patch 1 patch Transdermal daily  pantoprazole Infusion 8 mG/Hr (10 mL/Hr) IV Continuous <Continuous>  senna 2 Tablet(s) Oral at bedtime  thiamine 100 milliGRAM(s) Oral daily    MEDICATIONS  (PRN):  LORazepam   Injectable 1 milliGRAM(s) IV Push every 4 hours PRN for CIWA greater than 8      Allergies    No Known Allergies    Intolerances        FAMILY HISTORY:  No pertinent family history in first degree relatives      REVIEW OF SYSTEMS:    CONSTITUTIONAL: No fever, weight loss,   EYES: No eye pain, visual disturbances, or discharge  ENMT:  No difficulty hearing, tinnitus, vertigo; No sinus or throat pain  NECK: No pain or stiffness  BREASTS: No pain, masses, or nipple discharge  RESPIRATORY: No cough, wheezing, chills or hemoptysis; No shortness of breath  CARDIOVASCULAR: No chest pain, palpitations, dizziness, or leg swelling  GASTROINTESTINAL: See above  GENITOURINARY: No dysuria, frequency, hematuria, or incontinence  NEUROLOGICAL: No headaches,  numbness, or tremors  SKIN: No itching, burning, rashes, or lesions   LYMPH NODES: No enlarged glands  ENDOCRINE: No heat or cold intolerance; No hair loss  MUSCULOSKELETAL: No joint pain or swelling; No muscle, back, or extremity pain  PSYCHIATRIC: No depression, anxiety, mood swings, or difficulty sleeping  HEME/LYMPH: No easy bruising, or bleeding gums  ALLERGY AND IMMUNOLOGIC: No hives or eczema          SOCIAL HISTORY:    FAMILY HISTORY:  No pertinent family history in first degree relatives      Vital Signs Last 24 Hrs  T(C): 37 (2019 16:36), Max: 37.3 (2019 04:30)  T(F): 98.6 (2019 16:36), Max: 99.1 (2019 04:30)  HR: 116 (2019 16:36) (100 - 125)  BP: 124/87 (2019 16:36) (92/67 - 124/87)  BP(mean): --  RR: 19 (2019 16:36) (18 - 30)  SpO2: 98% (2019 16:36) (93% - 100%)    PHYSICAL EXAM:    GENERAL: NAD, well-groomed, well-developed  HEAD:  Atraumatic, Normocephalic  EYES: EOMI, PERRLA, conjunctiva and sclera jaundiced  NECK: Supple, No JVD, Normal thyroid  NERVOUS SYSTEM:  Alert but lethargic  CHEST/LUNG: Clear to percussion bilaterally; No rales, rhonchi, wheezing, or rubs  HEART: Regular rate and rhythm; No murmurs, rubs, or gallops  ABDOMEN: Soft, Nontender, Distended ( Ascites ); Bowel sounds present  EXTREMITIES:  2+ Peripheral Pulses, No clubbing, cyanosis,   LYMPH: No lymphadenopathy noted   RECTAL: Deferred   SKIN: No rashes or lesions    LABS:                        8.6    25.20 )-----------( 259      ( 2019 11:11 )             24.4       CBC:   @ 11:11  WBC  25.20  HGB 8.6  HCT 24.4 Plate 259  .5   @ 16:55  WBC  23.06  HGB 9.5  HCT 26.8 Plate 282  .2           2019 08:11    126    |  89     |  32     ----------------------------<  129    3.8     |  20     |  1.76   2019 16:55    123    |  79     |  26     ----------------------------<  148    5.3     |  24     |  2.05     Ca    7.3        2019 08:11  Ca    7.8        2019 16:55  Phos  2.6       2019 08:11  Mg     2.2       2019 08:11  Mg     2.3       2019 16:55    TPro  6.2    /  Alb  2.0    /  TBili  7.3    /  DBili  6.02   /  AST  304    /  ALT  55     /  AlkPhos  685    2019 08:11  TPro  6.6    /  Alb  2.1    /  TBili  6.0    /  DBili  x      /  AST  215    /  ALT  55     /  AlkPhos  799    2019 16:55    PT/INR - ( 2019 12:45 )   PT: 21.5 sec;   INR: 1.88 ratio         PTT - ( 2019 16:55 )  PTT:34.9 sec  Urinalysis Basic - ( 2019 20:58 )    Color: Rosalind / Appearance: Slightly Turbid / S.020 / pH: x  Gluc: x / Ketone: Trace  / Bili: Moderate / Urobili: 8 mg/dL   Blood: x / Protein: 15 mg/dL / Nitrite: Negative   Leuk Esterase: Trace / RBC: 0-2 /HPF / WBC 6-10   Sq Epi: x / Non Sq Epi: x / Bacteria: Many          RADIOLOGY & ADDITIONAL STUDIES: HPI:  49 y/o male PMH HTN (not on medication), chronic alcohol abuse and dependence x 15 years (1/2 pint of vodka with cranberry juice daily), hx of alcohol withdrawal 2013 hospitalized at [facility] after which he went to inpatient rehab at [facility] presents for syncope and melanotic stools. History obtained from ex wife, mother, and patient. Since pt left rehab in  pt has been drinking daily. Last drink 11AM today. Pt state for past one and a half months he has had progressive weight loss with loss of appetite. He has had in increased abdominal distention for the past 1 month and in the past 1 week he has noticed jaundice and scleral icterus. Pt also reports generalized fatigue. Reports coffee ground emesis x1 episode 3 days ago but none since then with melena for about a week. Pt states he has 3 bowel movements a day and now has also been seeing "blood when I pee" also within the last week. No abdominal pain. No CP no SOB. No fevers or chills. Today pt called ex wife after he was unsteady with his gait and "fell", he denies LOC. Denies trauma to head. Ex wife reports while attempting to get pt to the car she noticed he was "very weak" and witnessed pt to have had 3 syncopal episodes lasting a few minutes each before regaining consciousness, but no seizures. Ex wife state that in the car, pt had a bowel movement that was black and tarry.     In ED pt noted to be hypotensive SBP 80-90s. 2L IVF given with 3rd and 4th infusing. SBP post IVF resuscitation 100s to one teens. On labs pt with leukocytosis with 3% bands, Na 123, Cr 2.05, INR 2.05, T bili 6, , ALT 55, Alk phos 799.  Pt seen by critical care and not felt to be ICU candidate. (2019 22:33)  ----------------- As Above ------------------------------------------------------  Patient confused. Left message for family member to contact me   ETOH Abuse (+). Coffee ground emesis x 1, melena.   Eleveated LFTs, INR  See labs, CT Scan      PAST MEDICAL & SURGICAL HISTORY:  ETOH abuse  Psoriasis  History of hypertension  No significant past surgical history      MEDICATIONS  (STANDING):  folic acid 1 milliGRAM(s) Oral daily  meropenem  IVPB 1000 milliGRAM(s) IV Intermittent every 8 hours  multivitamin 1 Tablet(s) Oral daily  nicotine -  14 mG/24Hr(s) Patch 1 patch Transdermal daily  pantoprazole Infusion 8 mG/Hr (10 mL/Hr) IV Continuous <Continuous>  senna 2 Tablet(s) Oral at bedtime  thiamine 100 milliGRAM(s) Oral daily    MEDICATIONS  (PRN):  LORazepam   Injectable 1 milliGRAM(s) IV Push every 4 hours PRN for CIWA greater than 8      Allergies    No Known Allergies    Intolerances        FAMILY HISTORY:  No pertinent family history in first degree relatives      REVIEW OF SYSTEMS:    CONSTITUTIONAL: No fever, weight loss,   EYES: No eye pain, visual disturbances, or discharge  ENMT:  No difficulty hearing, tinnitus, vertigo; No sinus or throat pain  NECK: No pain or stiffness  BREASTS: No pain, masses, or nipple discharge  RESPIRATORY: No cough, wheezing, chills or hemoptysis; No shortness of breath  CARDIOVASCULAR: No chest pain, palpitations, dizziness, or leg swelling  GASTROINTESTINAL: See above  GENITOURINARY: No dysuria, frequency, hematuria, or incontinence  NEUROLOGICAL: No headaches,  numbness, or tremors  SKIN: No itching, burning, rashes, or lesions   LYMPH NODES: No enlarged glands  ENDOCRINE: No heat or cold intolerance; No hair loss  MUSCULOSKELETAL: No joint pain or swelling; No muscle, back, or extremity pain  PSYCHIATRIC: No depression, anxiety, mood swings, or difficulty sleeping  HEME/LYMPH: No easy bruising, or bleeding gums  ALLERGY AND IMMUNOLOGIC: No hives or eczema          SOCIAL HISTORY:    FAMILY HISTORY:  No pertinent family history in first degree relatives      Vital Signs Last 24 Hrs  T(C): 37 (2019 16:36), Max: 37.3 (2019 04:30)  T(F): 98.6 (2019 16:36), Max: 99.1 (2019 04:30)  HR: 116 (2019 16:36) (100 - 125)  BP: 124/87 (2019 16:36) (92/67 - 124/87)  BP(mean): --  RR: 19 (2019 16:36) (18 - 30)  SpO2: 98% (2019 16:36) (93% - 100%)    PHYSICAL EXAM:    GENERAL: NAD, well-groomed, well-developed  HEAD:  Atraumatic, Normocephalic  EYES: EOMI, PERRLA, conjunctiva and sclera jaundiced  NECK: Supple, No JVD, Normal thyroid  NERVOUS SYSTEM:  Alert but lethargic  CHEST/LUNG: Clear to percussion bilaterally; No rales, rhonchi, wheezing, or rubs  HEART: Regular rate and rhythm; No murmurs, rubs, or gallops  ABDOMEN: Soft, Nontender, Distended ( Ascites ); Bowel sounds present  EXTREMITIES:  2+ Peripheral Pulses, No clubbing, cyanosis,   LYMPH: No lymphadenopathy noted   RECTAL: Deferred   SKIN: No rashes or lesions    LABS:                        8.6    25.20 )-----------( 259      ( 2019 11:11 )             24.4       CBC:   @ 11:11  WBC  25.20  HGB 8.6  HCT 24.4 Plate 259  .5   @ 16:55  WBC  23.06  HGB 9.5  HCT 26.8 Plate 282  .2           2019 08:11    126    |  89     |  32     ----------------------------<  129    3.8     |  20     |  1.76   2019 16:55    123    |  79     |  26     ----------------------------<  148    5.3     |  24     |  2.05     Ca    7.3        2019 08:11  Ca    7.8        2019 16:55  Phos  2.6       2019 08:11  Mg     2.2       2019 08:11  Mg     2.3       2019 16:55    TPro  6.2    /  Alb  2.0    /  TBili  7.3    /  DBili  6.02   /  AST  304    /  ALT  55     /  AlkPhos  685    2019 08:11  TPro  6.6    /  Alb  2.1    /  TBili  6.0    /  DBili  x      /  AST  215    /  ALT  55     /  AlkPhos  799    2019 16:55    PT/INR - ( 2019 12:45 )   PT: 21.5 sec;   INR: 1.88 ratio         PTT - ( 2019 16:55 )  PTT:34.9 sec  Urinalysis Basic - ( 2019 20:58 )    Color: Rosalind / Appearance: Slightly Turbid / S.020 / pH: x  Gluc: x / Ketone: Trace  / Bili: Moderate / Urobili: 8 mg/dL   Blood: x / Protein: 15 mg/dL / Nitrite: Negative   Leuk Esterase: Trace / RBC: 0-2 /HPF / WBC 6-10   Sq Epi: x / Non Sq Epi: x / Bacteria: Many          RADIOLOGY & ADDITIONAL STUDIES:  < from: CT Abdomen and Pelvis No Cont (19 @ 18:06) >    EXAM:  CT ABDOMEN AND PELVIS                            PROCEDURE DATE:  2019          INTERPRETATION:  HISTORY:   Abdominal distention  TECHNIQUE:  Sections were obtained from the diaphragm to the pubic   symphysis without oral or intravenous contrast.  reformations and/or   3D/MIP images were obtained.    FINDINGS:      There is a diffuse ascites.    The liver is diffusely enlarged and very heterogeneous. There is   extensive fatty infiltration, with heterogeneity. An underlying mass   cannot be totally excluded. This may be further assessed with MR.    There are multiple areas of stranding and soft tissue thickening within   peritoneal fat. A differential includes omental tumor and omental edema.   There is diffuse thickening of the anterior abdominal wall. Again   differential is between tumor and edematous change.    No small bowel obstruction is noted.    The gallbladder is thickened and filled with internal debris/sludge.    Pancreas is unremarkable.    Kidneys are somewhatatrophic without calculi or hydronephrosis.    Aorta is unremarkable in size.    The osseous structures appear to be intact.    IMPRESSION:       1. Diffuse ascites.  2. Hepatomegaly with cirrhotic changes and fatty infiltration.   Heterogeneity within the liver, and tumor/hepatocellular carcinoma cannot   be excluded.  3. Soft tissue thickening and/or edematous changes throughout the   mesentery and omental fat. Omental tumor versus omental edema is within   the differential.    Follow-up MR imaging of the abdomen and pelvis is recommended for further   assessment.                EDITH PEREZ M.D., ATTENDING RADIOLOGIST  This document has been electronically signed. 2019  7:04PM    < end of copied text >  < from: US Abdomen Complete (19 @ 18:33) >    EXAM:  US ABDOMINAL COMPLETE                            PROCEDURE DATE:  2019          INTERPRETATION:  INDICATION: Jaundice. Abdominal pain.    TECHNIQUE:  Real-time ultrasonography was conducted.    FINDINGS :    GALLBLADDER: Filled with sludge and echogenic debris with a diffuse wall   thickening.   COMMON BILE DUCT: Measures 3 to 4 mm  INTRAHEPATIC DUCTS: Not dilated  LIVER:  There is diffuse hepatomegaly with a right lobe mass. Cirrhotic   changes are noted. The liver measures 20 cm.  PANCREAS: Could not be imaged  AORTA:  Could not be imaged.  SPLEEN:  Homogeneous  KIDNEYS: No calculi or hydronephrosis noted.    There is moderate diffuse ascites.    The study was technically limited by echogenic soft tissue thickening   along the anterior abdominal wall.    IMPRESSION:    Moderate diffuse ascites with hepatomegaly and cirrhotic changes of the   liver. Possible right liver mass.    Diffusely thickened gallbladder filled with sludge.                 EDITH PEREZ M.D., ATTENDINGRADIOLOGIST  This document has been electronically signed. 2019  6:52PM                < end of copied text >

## 2019-01-24 NOTE — PROGRESS NOTE ADULT - ASSESSMENT
49 y/o male PMH HTN (not on medication), chronic alcohol abuse and dependence x 15 years (1/2 pint of vodka with cranberry juice daily), hx of alcohol withdrawal. Pt with chronic heavy daily etoh abuse, last drink at 11 am yesterday, also recent hx weight loss, with  worsening abdominal distention over last one month,   p/w jaundice, fatigue and  coffee ground emesis x1 episode 3 days ago, and with melena x a week. Pt also c/o hematuria now. In ED pt hypotensive SBP 80-90s. received IVF resuscitation,  SBP improved s/p IVF resuscitation . Pt with leukocytosis, severe lactic acidosis, hyponatremia, coagulopathy, elevated LFTs, pt admitted for decompensated cirrhosis/ UGIB and etoh withdrawal.    Hypotension/ lactic acidosis/ leukocytosis/ ?sepsis LA could be due to alcohol intoxication and decreased hepatic clearance.  sec to dehydration, alcohol intoxication (ETOH 237 POA)  and diarrhea/ gi volume loss.  s/p IVF resuscitation  c/w IVF  follow cultures  IV zosyn- ID eval    Liver mass per sono  check AFP  GI eval.    Decompensated cirrhosis/ coagulopathy/ UGIB/ melena  GI eval/ Dr. Nettie MATOS neg for acute candy  PPI gtt  monitor cbc  Vit K  will check with GI for recomm for ?IR guided paarcentesis    PAPO  dehydration from poor oral intake, volume loss  IVF    Acute metabolic encephalopathy sec to hepatic encephalopathy vs ?infection/SBP vs alcohol intoxication vs ativan  will give ativan only PRN for ciwa >8 )will avoid scheduled ativan given AMS.    etoh abuse/ risk for DT/ etoh withdrawal  vit B1, FA, ativan prn (will avoid scheduled ativan given AMS and decreased clearance from liver given cirrhosis).      ICU re eval requested  given worsening AMS, d/w ICU attending Dr. Saini.    Dx , findings and RX plan d/w sister by bed side in detail. All questions answered.

## 2019-01-24 NOTE — CONSULT NOTE ADULT - SUBJECTIVE AND OBJECTIVE BOX
Infectious Diseases - Attending at Dr. Washburn    HPI:  47 y/o male PMH HTN (not on medication), chronic alcohol abuse and dependence x 15 years (1/2 pint of vodka with cranberry juice daily), hx of alcohol withdrawal 2013 hospitalized at [facility] after which he went to inpatient rehab at [facility] for syncope and melanotic stools. History obtained from ex wife, mother, and patient. Since pt left rehab in  pt has been drinking daily. Last drink 11AM today. Pt state for past one and a half months he has had progressive weight loss with loss of appetite. He has had in increased abdominal distention for the past 1 month and in the past 1 week he has noticed jaundice and scleral icterus. Pt also reports generalized fatigue. Reports coffee ground emesis x1 episode 3 days ago but none since then with melena for about a week. Pt states he has 3 bowel movements a day and now has also been seeing "blood when I pee" also within the last week. No abdominal pain. No CP no SOB. No fevers or chills. Today pt called ex wife after he was unsteady with his gait and "fell", he denies LOC. Denies trauma to head. Ex wife reports while attempting to get pt to the car she noticed he was "very weak" and witnessed pt to have had 3 syncopal episodes lasting a few minutes each before regaining consciousness, but no seizures. Ex wife state that in the car, pt had a bowel movement that was black and tarry.     In ED pt noted to be hypotensive SBP 80-90s. 2L IVF given with 3rd and 4th infusing. SBP post IVF resuscitation 100s to one teens. On labs pt with leukocytosis with 3% bands, Na 123, Cr 2.05, INR 2.05, T bili 6, , ALT 55, Alk phos 799.  Pt seen by critical care and not felt to be ICU candidate. (2019 22:33)  ON zosyn for possible infection       PAST MEDICAL & SURGICAL HISTORY:  ETOH abuse  Psoriasis  History of hypertension  No significant past surgical history      Allergies    No Known Allergies    Intolerances        FAMILY HISTORY:  No pertinent family history in first degree relatives      SOCIAL HISTORY: non smoker    REVIEW OF SYSTEMS:    very lethargic  history from chart ,very lethargic  denies dysuria, admits to abdo pain ,no fever or chills or shortness of breath     MEDICATIONS  (STANDING):  folic acid 1 milliGRAM(s) Oral daily  multivitamin 1 Tablet(s) Oral daily  nicotine -  14 mG/24Hr(s) Patch 1 patch Transdermal daily  pantoprazole Infusion 8 mG/Hr (10 mL/Hr) IV Continuous <Continuous>  phytonadione   Solution 5 milliGRAM(s) Oral once  piperacillin/tazobactam IVPB. 3.375 Gram(s) IV Intermittent every 8 hours  thiamine 100 milliGRAM(s) Oral daily    MEDICATIONS  (PRN):  LORazepam   Injectable 1 milliGRAM(s) IV Push every 4 hours PRN for CIWA greater than 8      Vital Signs Last 24 Hrs  T(C): 36.9 (2019 13:18), Max: 37.3 (2019 04:30)  T(F): 98.4 (2019 13:18), Max: 99.1 (2019 04:30)  HR: 115 (2019 13:18) (100 - 125)  BP: 114/75 (2019 13:18) (92/67 - 118/87)  BP(mean): --  RR: 18 (2019 13:18) (18 - 30)  SpO2: 97% (2019 13:18) (93% - 100%)    PHYSICAL EXAM:    Constitutional: NAD, well-groomed, well-developed  HEENT: PERRLA, jaundice  Neck: No LAD, No JVD  Back: Normal spine flexure, No CVA tenderness  Respiratory: CTAB/L  Cardiovascular: S1 and S2, RRR, no M/G/R  Gastrointestinal: BS+, distended ? tenderness  Extremities: No peripheral edema  Vascular: 2+ peripheral pulses  Neurological: A/O x 3, no focal deficits  Skin: No rashes      LABS:                        8.6    25.20 )-----------( 259      ( 2019 11:11 )             24.4         126<L>  |  89<L>  |  32<H>  ----------------------------<  129<H>  3.8   |  20<L>  |  1.76<H>    Ca    7.3<L>      2019 08:11  Phos  2.6       Mg     2.2         TPro  6.2  /  Alb  2.0<L>  /  TBili  7.3<H>  /  DBili  6.02<H>  /  AST  304<H>  /  ALT  55  /  AlkPhos  685<H>      PT/INR - ( 2019 12:45 )   PT: 21.5 sec;   INR: 1.88 ratio         PTT - ( 2019 16:55 )  PTT:34.9 sec  Urinalysis Basic - ( 2019 20:58 )    Color: Rosalind / Appearance: Slightly Turbid / S.020 / pH: x  Gluc: x / Ketone: Trace  / Bili: Moderate / Urobili: 8 mg/dL   Blood: x / Protein: 15 mg/dL / Nitrite: Negative   Leuk Esterase: Trace / RBC: 0-2 /HPF / WBC 6-10   Sq Epi: x / Non Sq Epi: x / Bacteria: Many        MICROBIOLOGY:  RECENT CULTURES:        RADIOLOGY & ADDITIONAL STUDIES:  < from: CT Abdomen and Pelvis No Cont (19 @ 18:06) >    IMPRESSION:       1. Diffuse ascites.  2. Hepatomegaly with cirrhotic changes and fatty infiltration.   Heterogeneity within the liver, and tumor/hepatocellular carcinoma cannot   be excluded.  3. Soft tissue thickening and/or edematous changes throughout the   mesentery and omental fat. Omental tumor versus omental edema is within   the differential.    < end of copied text >

## 2019-01-25 ENCOUNTER — RESULT REVIEW (OUTPATIENT)
Age: 49
End: 2019-01-25

## 2019-01-25 DIAGNOSIS — K92.2 GASTROINTESTINAL HEMORRHAGE, UNSPECIFIED: ICD-10-CM

## 2019-01-25 DIAGNOSIS — K70.11 ALCOHOLIC HEPATITIS WITH ASCITES: ICD-10-CM

## 2019-01-25 LAB
AFP-TM SERPL-MCNC: 3.5 NG/ML — SIGNIFICANT CHANGE UP
ALBUMIN SERPL ELPH-MCNC: 2.2 G/DL — LOW (ref 3.3–5)
ALP SERPL-CCNC: 608 U/L — HIGH (ref 40–120)
ALT FLD-CCNC: 48 U/L — SIGNIFICANT CHANGE UP (ref 12–78)
AMMONIA BLD-MCNC: 59 UMOL/L — HIGH (ref 11–32)
ANION GAP SERPL CALC-SCNC: 12 MMOL/L — SIGNIFICANT CHANGE UP (ref 5–17)
AST SERPL-CCNC: 205 U/L — HIGH (ref 15–37)
B PERT IGG+IGM PNL SER: CLEAR — SIGNIFICANT CHANGE UP
BILIRUB SERPL-MCNC: 8.3 MG/DL — HIGH (ref 0.2–1.2)
BUN SERPL-MCNC: 49 MG/DL — HIGH (ref 7–23)
CALCIUM SERPL-MCNC: 7.7 MG/DL — LOW (ref 8.5–10.1)
CHLORIDE SERPL-SCNC: 91 MMOL/L — LOW (ref 96–108)
CO2 SERPL-SCNC: 27 MMOL/L — SIGNIFICANT CHANGE UP (ref 22–31)
COLOR FLD: YELLOW — SIGNIFICANT CHANGE UP
CREAT SERPL-MCNC: 1.61 MG/DL — HIGH (ref 0.5–1.3)
FLUID INTAKE SUBSTANCE CLASS: SIGNIFICANT CHANGE UP
GLUCOSE SERPL-MCNC: 153 MG/DL — HIGH (ref 70–99)
GRAM STN FLD: SIGNIFICANT CHANGE UP
HCT VFR BLD CALC: 23.8 % — LOW (ref 39–50)
HGB BLD-MCNC: 8.3 G/DL — LOW (ref 13–17)
INR BLD: 1.77 RATIO — HIGH (ref 0.88–1.16)
MAGNESIUM SERPL-MCNC: 2.5 MG/DL — SIGNIFICANT CHANGE UP (ref 1.6–2.6)
MCHC RBC-ENTMCNC: 34.9 GM/DL — SIGNIFICANT CHANGE UP (ref 32–36)
MCHC RBC-ENTMCNC: 37.2 PG — HIGH (ref 27–34)
MCV RBC AUTO: 106.7 FL — HIGH (ref 80–100)
NRBC # BLD: 0 /100 WBCS — SIGNIFICANT CHANGE UP (ref 0–0)
PHOSPHATE SERPL-MCNC: 1.8 MG/DL — LOW (ref 2.5–4.5)
PLATELET # BLD AUTO: 250 K/UL — SIGNIFICANT CHANGE UP (ref 150–400)
POTASSIUM SERPL-MCNC: 3.5 MMOL/L — SIGNIFICANT CHANGE UP (ref 3.5–5.3)
POTASSIUM SERPL-SCNC: 3.5 MMOL/L — SIGNIFICANT CHANGE UP (ref 3.5–5.3)
PROT SERPL-MCNC: 6.6 GM/DL — SIGNIFICANT CHANGE UP (ref 6–8.3)
PROTHROM AB SERPL-ACNC: 20.2 SEC — HIGH (ref 10–12.9)
RBC # BLD: 2.23 M/UL — LOW (ref 4.2–5.8)
RBC # FLD: 14.8 % — HIGH (ref 10.3–14.5)
RCV VOL RI: 48 /UL — HIGH (ref 0–5)
SODIUM SERPL-SCNC: 130 MMOL/L — LOW (ref 135–145)
SPECIMEN SOURCE FLD: SIGNIFICANT CHANGE UP
SPECIMEN SOURCE: SIGNIFICANT CHANGE UP
TOTAL NUCLEATED CELL COUNT, BODY FLUID: 160 /UL — HIGH (ref 0–5)
TUBE TYPE: SIGNIFICANT CHANGE UP
WBC # BLD: 21.76 K/UL — HIGH (ref 3.8–10.5)
WBC # FLD AUTO: 21.76 K/UL — HIGH (ref 3.8–10.5)

## 2019-01-25 PROCEDURE — 88112 CYTOPATH CELL ENHANCE TECH: CPT | Mod: 26

## 2019-01-25 PROCEDURE — 99233 SBSQ HOSP IP/OBS HIGH 50: CPT

## 2019-01-25 PROCEDURE — 49083 ABD PARACENTESIS W/IMAGING: CPT

## 2019-01-25 PROCEDURE — 88305 TISSUE EXAM BY PATHOLOGIST: CPT | Mod: 26

## 2019-01-25 RX ORDER — LACTULOSE 10 G/15ML
10 SOLUTION ORAL
Qty: 0 | Refills: 0 | Status: DISCONTINUED | OUTPATIENT
Start: 2019-01-25 | End: 2019-02-06

## 2019-01-25 RX ORDER — ALBUMIN HUMAN 25 %
50 VIAL (ML) INTRAVENOUS ONCE
Qty: 0 | Refills: 0 | Status: COMPLETED | OUTPATIENT
Start: 2019-01-25 | End: 2019-01-25

## 2019-01-25 RX ORDER — POTASSIUM PHOSPHATE, MONOBASIC POTASSIUM PHOSPHATE, DIBASIC 236; 224 MG/ML; MG/ML
15 INJECTION, SOLUTION INTRAVENOUS ONCE
Qty: 0 | Refills: 0 | Status: COMPLETED | OUTPATIENT
Start: 2019-01-25 | End: 2019-01-25

## 2019-01-25 RX ADMIN — Medication 100 MILLIGRAM(S): at 13:41

## 2019-01-25 RX ADMIN — Medication 1 MILLIGRAM(S): at 06:32

## 2019-01-25 RX ADMIN — MEROPENEM 100 MILLIGRAM(S): 1 INJECTION INTRAVENOUS at 05:07

## 2019-01-25 RX ADMIN — POTASSIUM PHOSPHATE, MONOBASIC POTASSIUM PHOSPHATE, DIBASIC 63.75 MILLIMOLE(S): 236; 224 INJECTION, SOLUTION INTRAVENOUS at 17:02

## 2019-01-25 RX ADMIN — MEROPENEM 100 MILLIGRAM(S): 1 INJECTION INTRAVENOUS at 13:43

## 2019-01-25 RX ADMIN — Medication 50 MILLILITER(S): at 17:02

## 2019-01-25 RX ADMIN — Medication 1 TABLET(S): at 13:40

## 2019-01-25 RX ADMIN — Medication 1 MILLIGRAM(S): at 13:40

## 2019-01-25 RX ADMIN — PANTOPRAZOLE SODIUM 10 MG/HR: 20 TABLET, DELAYED RELEASE ORAL at 13:43

## 2019-01-25 RX ADMIN — Medication 1 PATCH: at 13:43

## 2019-01-25 RX ADMIN — MEROPENEM 100 MILLIGRAM(S): 1 INJECTION INTRAVENOUS at 21:49

## 2019-01-25 RX ADMIN — PANTOPRAZOLE SODIUM 10 MG/HR: 20 TABLET, DELAYED RELEASE ORAL at 20:38

## 2019-01-25 RX ADMIN — Medication 1 PATCH: at 13:44

## 2019-01-25 RX ADMIN — LACTULOSE 10 GRAM(S): 10 SOLUTION ORAL at 17:03

## 2019-01-25 RX ADMIN — Medication 1 PATCH: at 19:57

## 2019-01-25 RX ADMIN — SODIUM CHLORIDE 60 MILLILITER(S): 9 INJECTION, SOLUTION INTRAVENOUS at 22:36

## 2019-01-25 NOTE — PROGRESS NOTE ADULT - ASSESSMENT
47 y/o male PMH HTN (not on medication), chronic alcohol abuse and dependence x 15 years (1/2 pint of vodka with cranberry juice daily), hx of alcohol withdrawal. Pt with chronic heavy daily etoh abuse, last drink at 11 am yesterday, also recent hx weight loss, with  worsening abdominal distention over last one month,   p/w jaundice, fatigue and coffee ground emesis x 1 episode 3 days ago, and with melena x a week. Pt also c/o hematuria now. In ED pt hypotensive SBP 80-90s. received IVF resuscitation,  SBP improved s/p IVF resuscitation . Pt with leukocytosis, severe lactic acidosis, hyponatremia, coagulopathy, elevated LFTs, pt admitted for decompensated cirrhosis/ UGIB and etoh withdrawal.    Hypotension/ lactic acidosis/ leukocytosis/ ?sepsis LA could be due to alcohol intoxication and decreased hepatic clearance.  sec to dehydration, alcohol intoxication (ETOH 237 POA)  and recent hx diarrhea/ gi volume loss.  s/p IVF resuscitation in ed, now BP improved  c/w IVF , will avoid too much  ivf as pt already with severe ascites,   follow cultures, BCx ngtd  ID following- abx changed to Merrem to broaden coverage, follow wbc trend    Liver mass per sono   AFP 3.5  GI eval noted-     Decompensated cirrhosis/ coagulopathy/ UGIB/ melena  GI eval/ Dr. Benson appreciated  HIDA neg for acute candy  PPI gtt  lactulose  monitor cbc- hb stable  s/p Vit K  IR guided para today, check fluid cytology.  CLD, will follow with gi for advancing diet  follow ammonia trend, down trending    PAPO  dehydration from poor oral intake, volume loss  IVF but gentle hydration given severe ascites/ fluid overload.    Acute metabolic encephalopathy sec to hepatic encephalopathy vs ?infection/SBP vs alcohol intoxication vs ativan  ativan PRN for ciwa >8 , will avoid scheduled ativan given AMS.  Mental status better today,    etoh abuse/ risk for DT/ etoh withdrawal  vit B1, FA, ativan prn (will avoid scheduled ativan given AMS and decreased clearance from liver given cirrhosis).    prognosis Guarded.    ICU eval x 2 since admission, not accepted- monitor pt closely as he is at risk for rapid decompensation given comorbidities.    ICD for dvt ppx.

## 2019-01-25 NOTE — PROGRESS NOTE ADULT - SUBJECTIVE AND OBJECTIVE BOX
Patient is a 48y old  Male who presents with a chief complaint of Coffee ground emesis, progressive jaundice, syncopal episodes. (2019 17:52)      HPI:  47 y/o male PMH HTN (not on medication), chronic alcohol abuse and dependence x 15 years (1/2 pint of vodka with cranberry juice daily), hx of alcohol withdrawal 2013 hospitalized at [facility] after which he went to inpatient rehab at [facility] for syncope and melanotic stools. History obtained from ex wife, mother, and patient. Since pt left rehab in  pt has been drinking daily. Last drink 11AM today. Pt state for past one and a half months he has had progressive weight loss with loss of appetite. He has had in increased abdominal distention for the past 1 month and in the past 1 week he has noticed jaundice and scleral icterus. Pt also reports generalized fatigue. Reports coffee ground emesis x1 episode 3 days ago but none since then with melena for about a week. Pt states he has 3 bowel movements a day and now has also been seeing "blood when I pee" also within the last week. No abdominal pain. No CP no SOB. No fevers or chills. Today pt called ex wife after he was unsteady with his gait and "fell", he denies LOC. Denies trauma to head. Ex wife reports while attempting to get pt to the car she noticed he was "very weak" and witnessed pt to have had 3 syncopal episodes lasting a few minutes each before regaining consciousness, but no seizures. Ex wife state that in the car, pt had a bowel movement that was black and tarry.     In ED pt noted to be hypotensive SBP 80-90s. 2L IVF given with 3rd and 4th infusing. SBP post IVF resuscitation 100s to one teens. On labs pt with leukocytosis with 3% bands, Na 123, Cr 2.05, INR 2.05, T bili 6, , ALT 55, Alk phos 799.  Pt seen by critical care and not felt to be ICU candidate. (2019 22:33)      INTERVAL HPI/OVERNIGHT EVENTS:  Seen earlier today. Is now at radiology  Lethargic, slightly better. Denies all GI symptoms.     MEDICATIONS  (STANDING):  dextrose 5% + sodium chloride 0.9%. 1000 milliLiter(s) (60 mL/Hr) IV Continuous <Continuous>  folic acid 1 milliGRAM(s) Oral daily  lactulose Syrup 10 Gram(s) Oral two times a day  meropenem  IVPB 1000 milliGRAM(s) IV Intermittent every 8 hours  multivitamin 1 Tablet(s) Oral daily  nicotine -  14 mG/24Hr(s) Patch 1 patch Transdermal daily  pantoprazole Infusion 8 mG/Hr (10 mL/Hr) IV Continuous <Continuous>  potassium phosphate IVPB 15 milliMole(s) IV Intermittent once  thiamine 100 milliGRAM(s) Oral daily    MEDICATIONS  (PRN):  LORazepam   Injectable 1 milliGRAM(s) IV Push every 4 hours PRN for CIWA greater than 8      FAMILY HISTORY:  No pertinent family history in first degree relatives      Allergies    No Known Allergies    Intolerances        PMH/PSH:  ETOH abuse  Psoriasis  History of hypertension  No significant past surgical history        REVIEW OF SYSTEMS:  CONSTITUTIONAL: No fever, weight loss, or fatigue  EYES: No eye pain, visual disturbances, or discharge  ENMT:  No difficulty hearing, tinnitus, vertigo; No sinus or throat pain  NECK: No pain or stiffness  BREASTS: No pain, masses, or nipple discharge  RESPIRATORY: No cough, wheezing, chills or hemoptysis; No shortness of breath  CARDIOVASCULAR: No chest pain, palpitations, dizziness, or leg swelling  GASTROINTESTINAL: see above  GENITOURINARY: No dysuria, frequency, hematuria, or incontinence  NEUROLOGICAL: No headaches, memory loss, loss of strength, numbness, or tremors  SKIN: No itching, burning, rashes, or lesions   LYMPH NODES: No enlarged glands  ENDOCRINE: No heat or cold intolerance; No hair loss  MUSCULOSKELETAL: No joint pain or swelling; No muscle, back, or extremity pain  PSYCHIATRIC: No depression, anxiety, mood swings, or difficulty sleeping  HEME/LYMPH: No easy bruising, or bleeding gums  ALLERGY AND IMMUNOLOGIC: No hives or eczema    Vital Signs Last 24 Hrs  T(C): 37.6 (2019 11:49), Max: 37.6 (2019 04:48)  T(F): 99.6 (2019 11:49), Max: 99.6 (2019 04:48)  HR: 125 (2019 11:49) (116 - 125)  BP: 112/79 (2019 11:49) (110/80 - 124/87)  BP(mean): --  RR: 18 (2019 11:49) (18 - 19)  SpO2: 96% (2019 11:49) (92% - 98%)    PHYSICAL EXAM:  GENERAL: NAD, well-groomed, well-developed  HEAD:  Atraumatic, Normocephalic  EYES: EOMI, PERRLA, conjunctiva and sclera clear  NECK: Supple, No JVD, Normal thyroid  NERVOUS SYSTEM:  Alert but lethargic. No asterixis   CHEST/LUNG: Clear to percussion bilaterally; No rales, rhonchi, wheezing, or rubs  HEART: Regular rate and rhythm; No murmurs, rubs, or gallops  ABDOMEN: Soft, Nontender, Distended; Bowel sounds present  EXTREMITIES:  2+ Peripheral Pulses, No clubbing, cyanosis, or edema  LYMPH: No lymphadenopathy noted  SKIN: No rashes or lesions    LAB                          8.3    21.76 )-----------( 250      ( 2019 08:43 )             23.8       CBC:   @ 08:43  WBC 21.76   Hgb 8.3   Hct 23.8   Plts 250  .7   @ 23:33  WBC 24.14   Hgb 8.1   Hct 23.1   Plts 232  .0   @ 19:37  WBC 25.76   Hgb 8.5   Hct 24.3   Plts 237  .0   @ 11:11  WBC 25.20   Hgb 8.6   Hct 24.4   Plts 259  .5   @ 16:55  WBC 23.06   Hgb 9.5   Hct 26.8   Plts 282  .2      Chemistry:   @ 08:43  Na+ 130  K+ 3.5  Cl- 91  CO2 27  BUN 49  Cr 1.61      @ 08:11  Na+ 126  K+ 3.8  Cl- 89  CO2 20  BUN 32  Cr 1.76      @ 16:55  Na+ 123  K+ 5.3  Cl- 79  CO2 24  BUN 26  Cr 2.05         Glucose, Serum: 153 mg/dL ( @ 08:43)  Glucose, Serum: 129 mg/dL ( @ 08:11)  Glucose, Serum: 148 mg/dL ( @ 16:55)      2019 08:43    130    |  91     |  49     ----------------------------<  153    3.5     |  27     |  1.61   2019 08:11    126    |  89     |  32     ----------------------------<  129    3.8     |  20     |  1.76   2019 16:55    123    |  79     |  26     ----------------------------<  148    5.3     |  24     |  2.05     Ca    7.7        2019 08:43  Ca    7.3        2019 08:11  Ca    7.8        2019 16:55  Phos  1.8       2019 08:43  Phos  2.6       2019 08:11  Mg     2.5       2019 08:43  Mg     2.2       2019 08:11  Mg     2.3       2019 16:55    TPro  6.6    /  Alb  2.2    /  TBili  8.3    /  DBili  x      /  AST  205    /  ALT  48     /  AlkPhos  608    2019 08:43  TPro  6.2    /  Alb  2.0    /  TBili  7.3    /  DBili  6.02   /  AST  304    /  ALT  55     /  AlkPhos  685    2019 08:11  TPro  6.6    /  Alb  2.1    /  TBili  6.0    /  DBili  x      /  AST  215    /  ALT  55     /  AlkPhos  799    2019 16:55      PT/INR - ( 2019 08:43 )   PT: 20.2 sec;   INR: 1.77 ratio         PTT - ( 2019 16:55 )  PTT:34.9 sec    Urinalysis Basic - ( 2019 20:58 )    Color: Rosalind / Appearance: Slightly Turbid / S.020 / pH: x  Gluc: x / Ketone: Trace  / Bili: Moderate / Urobili: 8 mg/dL   Blood: x / Protein: 15 mg/dL / Nitrite: Negative   Leuk Esterase: Trace / RBC: 0-2 /HPF / WBC 6-10   Sq Epi: x / Non Sq Epi: x / Bacteria: Many        CAPILLARY BLOOD GLUCOSE      POCT Blood Glucose.: 193 mg/dL (2019 22:17)          RADIOLOGY & ADDITIONAL TESTS:    Imaging Personally Reviewed:  [ ] YES  [ ] NO    Consultant(s) Notes Reviewed:  [ ] YES  [ ] NO    Care Discussed with Consultants/Other Providers [ ] YES  [ ] NO

## 2019-01-25 NOTE — PROGRESS NOTE ADULT - ASSESSMENT
HPI:  49 y/o male PMH HTN (not on medication), chronic alcohol abuse and dependence x 15 years (1/2 pint of vodka with cranberry juice daily), hx of alcohol withdrawal 2013 hospitalized at [facility] after which he went to inpatient rehab at [facility] presents for syncope and melanotic stools. History obtained from ex wife, mother, and patient. Since pt left rehab in 2013 pt has been drinking daily. Last drink 11AM today. Pt state for past one and a half months he has had progressive weight loss with loss of appetite. He has had in increased abdominal distention for the past 1 month and in the past 1 week he has noticed jaundice and scleral icterus. Pt also reports generalized fatigue. Reports coffee ground emesis x1 episode 3 days ago but none since then with melena for about a week. Pt states he has 3 bowel movements a day and now has also been seeing "blood when I pee" also within the last week. No abdominal pain. No CP no SOB. No fevers or chills. Today pt called ex wife after he was unsteady with his gait and "fell", he denies LOC. Denies trauma to head. Ex wife reports while attempting to get pt to the car she noticed he was "very weak" and witnessed pt to have had 3 syncopal episodes lasting a few minutes each before regaining consciousness, but no seizures. Ex wife state that in the car, pt had a bowel movement that was black and tarry.     In ED pt noted to be hypotensive SBP 80-90s. 2L IVF given with 3rd and 4th infusing. SBP post IVF resuscitation 100s to one teens. On labs pt with leukocytosis with 3% bands, Na 123, Cr 2.05, INR 2.05, T bili 6, , ALT 55, Alk phos 799.  Pt seen by critical care and not felt to be ICU candidate. (23 Jan 2019 22:33)  ----------------- As Above ------------------------------------------------------  Patient confused. Left message for family member to contact me   ETOH Abuse (+). Coffee ground emesis x 1, melena.   Eleveated LFTs, INR  See labs, CT Scan    Cirrhosis, ascites, fever - 1) Paracentesis 2) ammonia level  3) f/u LFTs 4) treat for impending Dts  - patient does not fit into the criteria for treatment with steroids.

## 2019-01-25 NOTE — PROGRESS NOTE ADULT - SUBJECTIVE AND OBJECTIVE BOX
Patient is a 48y old  Male who presents with a chief complaint of Coffee ground emesis, progressive jaundice, syncopal episodes. (26 Jan 2019 15:04)      INTERVAL HPI / OVERNIGHT EVENTS:    MEDICATIONS  (STANDING):  folic acid 1 milliGRAM(s) Oral daily  lactulose Syrup 10 Gram(s) Oral two times a day  meropenem  IVPB 1000 milliGRAM(s) IV Intermittent every 8 hours  multivitamin 1 Tablet(s) Oral daily  nicotine -  14 mG/24Hr(s) Patch 1 patch Transdermal daily  pantoprazole Infusion 8 mG/Hr (10 mL/Hr) IV Continuous <Continuous>  thiamine 100 milliGRAM(s) Oral daily    MEDICATIONS  (PRN):  LORazepam   Injectable 1 milliGRAM(s) IV Push every 4 hours PRN for CIWA greater than 8      Vital Signs Last 24 Hrs  T(C): 36.6 (26 Jan 2019 16:41), Max: 37.3 (26 Jan 2019 00:30)  T(F): 97.8 (26 Jan 2019 16:41), Max: 99.2 (26 Jan 2019 00:30)  HR: 107 (26 Jan 2019 16:41) (107 - 114)  BP: 94/63 (26 Jan 2019 16:41) (93/61 - 96/57)  BP(mean): --  RR: 17 (26 Jan 2019 16:41) (17 - 18)  SpO2: 94% (26 Jan 2019 11:38) (94% - 98%)    Review of systems:  General : no fever /chills,fatigue  CVS : no chest pain, palpitations  Lungs : no shortness of breath, cough  GI : no abdominal pain,vomiting, diarrhea   : no dysuria,hematuria        PHYSICAL EXAM:  General :NAD  Constitutional:  well-groomed, well-developed  Respiratory: CTAB/L  Cardiovascular: S1 and S2, RRR, no M/G/R  Gastrointestinal: BS+, soft, NT/ND  Extremities: No peripheral edema  Vascular: 2+ peripheral pulses  Skin: No rashes      LABS:                        8.6    26.74 )-----------( 268      ( 26 Jan 2019 08:11 )             24.8     01-26    133<L>  |  96  |  57<H>  ----------------------------<  186<H>  3.7   |  27  |  1.51<H>    Ca    8.0<L>      26 Jan 2019 08:11  Phos  1.3     01-26  Mg     2.5     01-26    TPro  6.0  /  Alb  2.1<L>  /  TBili  7.2<H>  /  DBili  6.30<H>  /  AST  160<H>  /  ALT  44  /  AlkPhos  521<H>  01-26          MICROBIOLOGY:  RECENT CULTURES:  01-25 .Body Fluid ascites XXXX   polymorphonuclear leukocytes seen  No organisms seen  by cytocentrifuge   No growth    01-24 .Blood Blood-Peripheral XXXX XXXX   No growth to date.    01-23 .Urine Clean Catch (Midstream) XXXX XXXX   No growth          RADIOLOGY & ADDITIONAL STUDIES:

## 2019-01-25 NOTE — PROGRESS NOTE ADULT - SUBJECTIVE AND OBJECTIVE BOX
CHIEF COMPLAINT/INTERVAL HISTORY:    Patient is a 48y old  Male who presents with a chief complaint of Coffee ground emesis, progressive jaundice, syncopal episodes. (2019 17:52)      HPI:  49 y/o male PMH HTN (not on medication), chronic alcohol abuse and dependence x 15 years (1/2 pint of vodka with cranberry juice daily), hx of alcohol withdrawal 2013 hospitalized at [facility] after which he went to inpatient rehab at [facility] presents for syncope and melanotic stools. History obtained from ex wife, mother, and patient. Since pt left rehab in  pt has been drinking daily. Last drink 11AM today. Pt state for past one and a half months he has had progressive weight loss with loss of appetite. He has had in increased abdominal distention for the past 1 month and in the past 1 week he has noticed jaundice and scleral icterus. Pt also reports generalized fatigue. Reports coffee ground emesis x1 episode 3 days ago but none since then with melena for about a week. Pt states he has 3 bowel movements a day and now has also been seeing "blood when I pee" also within the last week. No abdominal pain. No CP no SOB. No fevers or chills. Today pt called ex wife after he was unsteady with his gait and "fell", he denies LOC. Denies trauma to head. Ex wife reports while attempting to get pt to the car she noticed he was "very weak" and witnessed pt to have had 3 syncopal episodes lasting a few minutes each before regaining consciousness, but no seizures. Ex wife state that in the car, pt had a bowel movement that was black and tarry.     In ED pt noted to be hypotensive SBP 80-90s. 2L IVF given with 3rd and 4th infusing. SBP post IVF resuscitation 100s to one teens. On labs pt with leukocytosis with 3% bands, Na 123, Cr 2.05, INR 2.05, T bili 6, , ALT 55, Alk phos 799.  Pt seen by critical care and not felt to be ICU candidate. (2019 22:33)      SUBJECTIVE & OBJECTIVE: Pt seen and examined at bedside.   More alert/ awake today, c/o abdominal pain and distention.  Poor oral intake per nursing staff.    Vital Signs Last 24 Hrs  T(C): 37.6 (2019 11:49), Max: 37.6 (2019 04:48)  T(F): 99.6 (2019 11:49), Max: 99.6 (2019 04:48)  HR: 125 (2019 11:49) (116 - 125)  BP: 112/79 (2019 11:49) (110/80 - 124/87)  BP(mean): --  ABP: --  ABP(mean): --  RR: 18 (2019 11:49) (18 - 19)  SpO2: 96% (2019 11:49) (92% - 98%)        MEDICATIONS  (STANDING):  dextrose 5% + sodium chloride 0.9%. 1000 milliLiter(s) (60 mL/Hr) IV Continuous <Continuous>  folic acid 1 milliGRAM(s) Oral daily  meropenem  IVPB 1000 milliGRAM(s) IV Intermittent every 8 hours  multivitamin 1 Tablet(s) Oral daily  nicotine -  14 mG/24Hr(s) Patch 1 patch Transdermal daily  pantoprazole Infusion 8 mG/Hr (10 mL/Hr) IV Continuous <Continuous>  potassium phosphate IVPB 15 milliMole(s) IV Intermittent once  senna 2 Tablet(s) Oral at bedtime  thiamine 100 milliGRAM(s) Oral daily    MEDICATIONS  (PRN):  LORazepam   Injectable 1 milliGRAM(s) IV Push every 4 hours PRN for CIWA greater than 8        PHYSICAL EXAM:    GENERAL: NAD, ill looking.  HEAD:  Atraumatic, Normocephalic  EYES: EOMI, PERRLA, + pallor, + icterus  ENMT: Moist mucous membranes  NECK: Supple, No JVD  NERVOUS SYSTEM:  Alert , awake, normal speech.  CHEST/LUNG: Clear to auscultation bilaterally; No rales, rhonchi, wheezing, or rubs  HEART: Regular rate and rhythm;   ABDOMEN: tense ascites ++, + tenderness.  EXTREMITIES:  no cyanosis, or edema    LABS:                        8.3    21.76 )-----------( 250      ( 2019 08:43 )             23.8     01-25    130<L>  |  91<L>  |  49<H>  ----------------------------<  153<H>  3.5   |  27  |  1.61<H>    Ca    7.7<L>      2019 08:43  Phos  1.8       Mg     2.5         TPro  6.6  /  Alb  2.2<L>  /  TBili  8.3<H>  /  DBili  x   /  AST  205<H>  /  ALT  48  /  AlkPhos  608<H>      PT/INR - ( 2019 08:43 )   PT: 20.2 sec;   INR: 1.77 ratio         PTT - ( 2019 16:55 )  PTT:34.9 sec  Urinalysis Basic - ( 2019 20:58 )    Color: Rosalind / Appearance: Slightly Turbid / S.020 / pH: x  Gluc: x / Ketone: Trace  / Bili: Moderate / Urobili: 8 mg/dL   Blood: x / Protein: 15 mg/dL / Nitrite: Negative   Leuk Esterase: Trace / RBC: 0-2 /HPF / WBC 6-10   Sq Epi: x / Non Sq Epi: x / Bacteria: Many        CAPILLARY BLOOD GLUCOSE      POCT Blood Glucose.: 193 mg/dL (2019 22:17)    < from: NM Hepatobiliary Imaging (19 @ 11:58) >  IMPRESSION: Morphine-augmented hepatobiliary scan demonstrates:    No radionuclide evidence of acute cholecystitis.    Reduced hepatic uptake with heterogeneity, compatible with impaired   hepatic function/cirrhosis.    NOEL OLIVAS M.D., NUCLEAR MEDICINE ATTENDING  This document has been electronically signed. 2019 12:05PM        < end of copied text >

## 2019-01-25 NOTE — PROCEDURE NOTE - ADDITIONAL PROCEDURE DETAILS
pt s/p large volume therapeutic/diagnostic paracentesis with 7000 mLs of serous fluid removed and sent to lab for analysis.  Pt has a h/o ETOH cirrhosis and other pathology seen on imaging.  Hemostasis achieved.  Pt tolerated procedure well.  Albumin ordered  -ETOH cessation  -f/u ascitic fluid study results  - pt s/p large volume therapeutic/diagnostic paracentesis with 7000 mLs of serous fluid removed and sent to lab for analysis.  Pt has a h/o ETOH cirrhosis and other pathology seen on imaging.  Hemostasis achieved.  Pt tolerated procedure well.  Albumin ordered  -ETOH cessation  -Please infuse 25% 50 mLs of albumin ASAP  -f/u ascitic fluid study results  -

## 2019-01-25 NOTE — PROGRESS NOTE ADULT - PROBLEM SELECTOR PLAN 2
Bili baum higher while all other labs improving. Not a candidate for steroids. Awaiting paracentesis

## 2019-01-26 DIAGNOSIS — N39.0 URINARY TRACT INFECTION, SITE NOT SPECIFIED: ICD-10-CM

## 2019-01-26 DIAGNOSIS — K65.2 SPONTANEOUS BACTERIAL PERITONITIS: ICD-10-CM

## 2019-01-26 DIAGNOSIS — K92.2 GASTROINTESTINAL HEMORRHAGE, UNSPECIFIED: ICD-10-CM

## 2019-01-26 LAB
ALBUMIN FLD-MCNC: 0.6 G/DL — SIGNIFICANT CHANGE UP
ALBUMIN SERPL ELPH-MCNC: 2.1 G/DL — LOW (ref 3.3–5)
ALP SERPL-CCNC: 521 U/L — HIGH (ref 40–120)
ALT FLD-CCNC: 44 U/L — SIGNIFICANT CHANGE UP (ref 12–78)
AMMONIA BLD-MCNC: 50 UMOL/L — HIGH (ref 11–32)
ANION GAP SERPL CALC-SCNC: 10 MMOL/L — SIGNIFICANT CHANGE UP (ref 5–17)
AST SERPL-CCNC: 160 U/L — HIGH (ref 15–37)
BILIRUB SERPL-MCNC: 7.2 MG/DL — HIGH (ref 0.2–1.2)
BUN SERPL-MCNC: 57 MG/DL — HIGH (ref 7–23)
CALCIUM SERPL-MCNC: 8 MG/DL — LOW (ref 8.5–10.1)
CHLORIDE SERPL-SCNC: 96 MMOL/L — SIGNIFICANT CHANGE UP (ref 96–108)
CO2 SERPL-SCNC: 27 MMOL/L — SIGNIFICANT CHANGE UP (ref 22–31)
CREAT SERPL-MCNC: 1.51 MG/DL — HIGH (ref 0.5–1.3)
FLUID SEGMENTED GRANULOCYTES: 20 % — SIGNIFICANT CHANGE UP
GLUCOSE FLD-MCNC: 173 MG/DL — SIGNIFICANT CHANGE UP
GLUCOSE SERPL-MCNC: 186 MG/DL — HIGH (ref 70–99)
HCT VFR BLD CALC: 24.8 % — LOW (ref 39–50)
HGB BLD-MCNC: 8.6 G/DL — LOW (ref 13–17)
LDH SERPL L TO P-CCNC: 83 U/L — SIGNIFICANT CHANGE UP
LYMPHOCYTES # FLD: 9 % — SIGNIFICANT CHANGE UP
MAGNESIUM SERPL-MCNC: 2.5 MG/DL — SIGNIFICANT CHANGE UP (ref 1.6–2.6)
MCHC RBC-ENTMCNC: 34.7 GM/DL — SIGNIFICANT CHANGE UP (ref 32–36)
MCHC RBC-ENTMCNC: 37.1 PG — HIGH (ref 27–34)
MCV RBC AUTO: 106.9 FL — HIGH (ref 80–100)
MESOTHL CELL # FLD: 3 % — SIGNIFICANT CHANGE UP
MONOS+MACROS # FLD: 68 % — SIGNIFICANT CHANGE UP
NRBC # BLD: 0 /100 WBCS — SIGNIFICANT CHANGE UP (ref 0–0)
PHOSPHATE SERPL-MCNC: 1.3 MG/DL — LOW (ref 2.5–4.5)
PLATELET # BLD AUTO: 268 K/UL — SIGNIFICANT CHANGE UP (ref 150–400)
POTASSIUM SERPL-MCNC: 3.7 MMOL/L — SIGNIFICANT CHANGE UP (ref 3.5–5.3)
POTASSIUM SERPL-SCNC: 3.7 MMOL/L — SIGNIFICANT CHANGE UP (ref 3.5–5.3)
PROT FLD-MCNC: 1.6 G/DL — SIGNIFICANT CHANGE UP
PROT SERPL-MCNC: 6 GM/DL — SIGNIFICANT CHANGE UP (ref 6–8.3)
RBC # BLD: 2.32 M/UL — LOW (ref 4.2–5.8)
RBC # FLD: 14.6 % — HIGH (ref 10.3–14.5)
SODIUM SERPL-SCNC: 133 MMOL/L — LOW (ref 135–145)
SPECIMEN SOURCE FLD: SIGNIFICANT CHANGE UP
WBC # BLD: 26.74 K/UL — HIGH (ref 3.8–10.5)
WBC # FLD AUTO: 26.74 K/UL — HIGH (ref 3.8–10.5)

## 2019-01-26 PROCEDURE — 99233 SBSQ HOSP IP/OBS HIGH 50: CPT

## 2019-01-26 RX ORDER — SODIUM,POTASSIUM PHOSPHATES 278-250MG
1 POWDER IN PACKET (EA) ORAL ONCE
Qty: 0 | Refills: 0 | Status: COMPLETED | OUTPATIENT
Start: 2019-01-26 | End: 2019-01-26

## 2019-01-26 RX ADMIN — Medication 1 PATCH: at 15:13

## 2019-01-26 RX ADMIN — MEROPENEM 100 MILLIGRAM(S): 1 INJECTION INTRAVENOUS at 21:33

## 2019-01-26 RX ADMIN — Medication 1 PATCH: at 15:26

## 2019-01-26 RX ADMIN — Medication 1 MILLIGRAM(S): at 12:27

## 2019-01-26 RX ADMIN — LACTULOSE 10 GRAM(S): 10 SOLUTION ORAL at 18:21

## 2019-01-26 RX ADMIN — Medication 1 MILLIGRAM(S): at 07:40

## 2019-01-26 RX ADMIN — MEROPENEM 100 MILLIGRAM(S): 1 INJECTION INTRAVENOUS at 15:14

## 2019-01-26 RX ADMIN — PANTOPRAZOLE SODIUM 10 MG/HR: 20 TABLET, DELAYED RELEASE ORAL at 06:01

## 2019-01-26 RX ADMIN — Medication 1 TABLET(S): at 12:27

## 2019-01-26 RX ADMIN — PANTOPRAZOLE SODIUM 10 MG/HR: 20 TABLET, DELAYED RELEASE ORAL at 18:20

## 2019-01-26 RX ADMIN — Medication 100 MILLIGRAM(S): at 15:13

## 2019-01-26 RX ADMIN — Medication 1 PATCH: at 19:00

## 2019-01-26 RX ADMIN — MEROPENEM 100 MILLIGRAM(S): 1 INJECTION INTRAVENOUS at 06:06

## 2019-01-26 RX ADMIN — Medication 1 PACKET(S): at 21:33

## 2019-01-26 NOTE — PROGRESS NOTE ADULT - ASSESSMENT
47 y/o male PMH HTN (not on medication), chronic alcohol abuse and dependence x 15 years (1/2 pint of vodka with cranberry juice daily), hx of alcohol withdrawal. Pt with chronic heavy daily etoh abuse, last drink at 11 am yesterday, also recent hx weight loss, with  worsening abdominal distention over last one month,   p/w jaundice, fatigue and coffee ground emesis x 1 episode 3 days ago, and with melena x a week. Pt also c/o hematuria now. In ED pt hypotensive SBP 80-90s. received IVF resuscitation,  SBP improved s/p IVF resuscitation . Pt with leukocytosis, severe lactic acidosis, hyponatremia, coagulopathy, elevated LFTs, pt admitted for decompensated cirrhosis/ UGIB and etoh withdrawal.    Hypotension/ lactic acidosis/ leukocytosis/ ?sepsis LA could be due to alcohol intoxication and decreased hepatic clearance.  sec to dehydration, alcohol intoxication (ETOH 237 POA)  and recent hx diarrhea/ gi volume loss.  s/p IVF resuscitation in ed, now BP improved  c/w IVF  follow cultures, BCx ngtd  ID following- abx changed to Merrem to broaden coverage, follow wbc trend    Liver mass per sono  AFP 3.5  GI following     Decompensated cirrhosis/ coagulopathy/ UGIB/ melena  GI Dr. Marcelino MATOS neg for acute candy  PPI gtt  lactulose  monitor cbc- hb stable  s/p Vit K  s/p IR guided para, check fluid cytology.  CLD, will follow with gi for advancing diet  follow ammonia trend, down trending    PAPO  dehydration from poor oral intake, volume loss  IVF but gentle hydration given severe ascites/ fluid overload.    Acute hepatic encephalopathy sec to hepatic encephalopathy vs ?infection/SBP vs alcohol intoxication vs ativan  ativan PRN for ciwa >8 , will avoid scheduled ativan given AMS.  Mental status better     ETOH abuse/ risk for DT/ETOH withdrawal  vit B1, FA, ativan prn (will avoid scheduled ativan given AMS and decreased clearance from liver given cirrhosis).    prognosis Guarded.    ICU eval x 2 since admission, not accepted- monitor pt closely as he is at risk for rapid decompensation given comorbidities.    ICD for dvt ppx.

## 2019-01-26 NOTE — PROGRESS NOTE ADULT - SUBJECTIVE AND OBJECTIVE BOX
Patient is a 48y old  Male who presents with a chief complaint of Coffee ground emesis, progressive jaundice, syncopal episodes. (25 Jan 2019 15:04)      OVERNIGHT EVENTS: none     REVIEW OF SYSTEMS: no CP/SOB, n/v, +some diffuse abd pain    MEDICATIONS  (STANDING):  dextrose 5% + sodium chloride 0.9%. 1000 milliLiter(s) (60 mL/Hr) IV Continuous <Continuous>  folic acid 1 milliGRAM(s) Oral daily  lactulose Syrup 10 Gram(s) Oral two times a day  meropenem  IVPB 1000 milliGRAM(s) IV Intermittent every 8 hours  multivitamin 1 Tablet(s) Oral daily  nicotine -  14 mG/24Hr(s) Patch 1 patch Transdermal daily  pantoprazole Infusion 8 mG/Hr (10 mL/Hr) IV Continuous <Continuous>  thiamine 100 milliGRAM(s) Oral daily    MEDICATIONS  (PRN):  LORazepam   Injectable 1 milliGRAM(s) IV Push every 4 hours PRN for CIWA greater than 8      Allergies    No Known Allergies    Intolerances        T(F): 98.3 (01-26-19 @ 11:38), Max: 99.8 (01-25-19 @ 17:03)  HR: 114 (01-26-19 @ 11:38) (113 - 121)  BP: 95/61 (01-26-19 @ 11:38) (93/61 - 101/59)  RR: 18 (01-26-19 @ 11:38) (18 - 20)  SpO2: 94% (01-26-19 @ 11:38) (94% - 98%)  Wt(kg): --    PHYSICAL EXAM:  GENERAL: NAD, ill looking.  HEAD:  Atraumatic, Normocephalic  EYES: EOMI, PERRLA, + pallor, + icterus  ENMT: Moist mucous membranes  NECK: Supple, No JVD  NERVOUS SYSTEM:  Alert, awake, normal speech.  CHEST/LUNG: Clear to auscultation bilaterally; No rales, rhonchi, wheezing, or rubs  HEART: Regular rate and rhythm;   ABDOMEN: tense ascites ++, + tenderness.  EXTREMITIES:  no cyanosis, or edema    LABS:                        8.6    26.74 )-----------( 268      ( 26 Jan 2019 08:11 )             24.8     01-26    133<L>  |  96  |  57<H>  ----------------------------<  186<H>  3.7   |  27  |  1.51<H>    Ca    8.0<L>      26 Jan 2019 08:11  Phos  1.3     01-26  Mg     2.5     01-26    TPro  6.0  /  Alb  2.1<L>  /  TBili  7.2<H>  /  DBili  6.30<H>  /  AST  160<H>  /  ALT  44  /  AlkPhos  521<H>  01-26    PT/INR - ( 25 Jan 2019 08:43 )   PT: 20.2 sec;   INR: 1.77 ratio             Cultures;   CAPILLARY BLOOD GLUCOSE        Lipid panel:           RADIOLOGY & ADDITIONAL TESTS:    Imaging Personally Reviewed:  [ x] YES    < from: CT Abdomen and Pelvis No Cont (01.23.19 @ 18:06) >  1. Diffuse ascites.  2. Hepatomegaly with cirrhotic changes and fatty infiltration.   Heterogeneity within the liver, and tumor/hepatocellular carcinoma cannot   be excluded.  3. Soft tissue thickening and/or edematous changes throughout the   mesentery and omental fat. Omental tumor versus omental edema is within   the differential.    < end of copied text >      Consultant(s) Notes Reviewed:  [x ] YES     Care Discussed with [ x] Consultants [X ] Patient [ ] Family  [x ]    [x ]  Other; RN

## 2019-01-27 LAB
AMMONIA BLD-MCNC: 18 UMOL/L — SIGNIFICANT CHANGE UP (ref 11–32)
ANION GAP SERPL CALC-SCNC: 12 MMOL/L — SIGNIFICANT CHANGE UP (ref 5–17)
APPEARANCE UR: ABNORMAL
BACTERIA # UR AUTO: ABNORMAL
BASE EXCESS BLDA CALC-SCNC: 0.9 MMOL/L — SIGNIFICANT CHANGE UP (ref -2–2)
BILIRUB UR-MCNC: ABNORMAL
BLOOD GAS COMMENTS: SIGNIFICANT CHANGE UP
BLOOD GAS COMMENTS: SIGNIFICANT CHANGE UP
BLOOD GAS SOURCE: SIGNIFICANT CHANGE UP
BUN SERPL-MCNC: 77 MG/DL — HIGH (ref 7–23)
CALCIUM SERPL-MCNC: 8 MG/DL — LOW (ref 8.5–10.1)
CHLORIDE SERPL-SCNC: 95 MMOL/L — LOW (ref 96–108)
CO2 SERPL-SCNC: 25 MMOL/L — SIGNIFICANT CHANGE UP (ref 22–31)
COLOR SPEC: YELLOW — SIGNIFICANT CHANGE UP
CREAT SERPL-MCNC: 2.87 MG/DL — HIGH (ref 0.5–1.3)
DIFF PNL FLD: ABNORMAL
EPI CELLS # UR: SIGNIFICANT CHANGE UP
GLUCOSE BLDC GLUCOMTR-MCNC: 213 MG/DL — HIGH (ref 70–99)
GLUCOSE SERPL-MCNC: 173 MG/DL — HIGH (ref 70–99)
GLUCOSE UR QL: NEGATIVE MG/DL — SIGNIFICANT CHANGE UP
HCO3 BLDA-SCNC: 23 MMOL/L — SIGNIFICANT CHANGE UP (ref 21–29)
HCT VFR BLD CALC: 24.2 % — LOW (ref 39–50)
HGB BLD-MCNC: 8.4 G/DL — LOW (ref 13–17)
HOROWITZ INDEX BLDA+IHG-RTO: 28 — SIGNIFICANT CHANGE UP
KETONES UR-MCNC: ABNORMAL
LEUKOCYTE ESTERASE UR-ACNC: ABNORMAL
MAGNESIUM SERPL-MCNC: 2.4 MG/DL — SIGNIFICANT CHANGE UP (ref 1.6–2.6)
MCHC RBC-ENTMCNC: 34.7 GM/DL — SIGNIFICANT CHANGE UP (ref 32–36)
MCHC RBC-ENTMCNC: 38.4 PG — HIGH (ref 27–34)
MCV RBC AUTO: 110.5 FL — HIGH (ref 80–100)
NITRITE UR-MCNC: POSITIVE
NRBC # BLD: 2 /100 WBCS — HIGH (ref 0–0)
PCO2 BLDA: 27 MMHG — LOW (ref 32–46)
PH BLD: 7.53 — HIGH (ref 7.35–7.45)
PH UR: 5 — SIGNIFICANT CHANGE UP (ref 5–8)
PHOSPHATE SERPL-MCNC: 2.6 MG/DL — SIGNIFICANT CHANGE UP (ref 2.5–4.5)
PLATELET # BLD AUTO: 313 K/UL — SIGNIFICANT CHANGE UP (ref 150–400)
PO2 BLDA: 66 MMHG — LOW (ref 74–108)
POTASSIUM SERPL-MCNC: 4 MMOL/L — SIGNIFICANT CHANGE UP (ref 3.5–5.3)
POTASSIUM SERPL-SCNC: 4 MMOL/L — SIGNIFICANT CHANGE UP (ref 3.5–5.3)
PROT UR-MCNC: 30 MG/DL
RBC # BLD: 2.19 M/UL — LOW (ref 4.2–5.8)
RBC # FLD: 15.9 % — HIGH (ref 10.3–14.5)
RBC CASTS # UR COMP ASSIST: SIGNIFICANT CHANGE UP /HPF (ref 0–4)
SAO2 % BLDA: 93 % — SIGNIFICANT CHANGE UP (ref 92–96)
SODIUM SERPL-SCNC: 132 MMOL/L — LOW (ref 135–145)
SP GR SPEC: 1.02 — SIGNIFICANT CHANGE UP (ref 1.01–1.02)
UROBILINOGEN FLD QL: 4 MG/DL
WBC # BLD: 23.31 K/UL — HIGH (ref 3.8–10.5)
WBC # FLD AUTO: 23.31 K/UL — HIGH (ref 3.8–10.5)
WBC UR QL: ABNORMAL

## 2019-01-27 PROCEDURE — 99233 SBSQ HOSP IP/OBS HIGH 50: CPT

## 2019-01-27 PROCEDURE — 71045 X-RAY EXAM CHEST 1 VIEW: CPT | Mod: 26

## 2019-01-27 RX ORDER — MIDODRINE HYDROCHLORIDE 2.5 MG/1
5 TABLET ORAL ONCE
Qty: 0 | Refills: 0 | Status: COMPLETED | OUTPATIENT
Start: 2019-01-27 | End: 2019-01-27

## 2019-01-27 RX ORDER — ALBUMIN HUMAN 25 %
100 VIAL (ML) INTRAVENOUS
Qty: 0 | Refills: 0 | Status: COMPLETED | OUTPATIENT
Start: 2019-01-27 | End: 2019-01-27

## 2019-01-27 RX ORDER — ALBUMIN HUMAN 25 %
250 VIAL (ML) INTRAVENOUS ONCE
Qty: 0 | Refills: 0 | Status: DISCONTINUED | OUTPATIENT
Start: 2019-01-27 | End: 2019-01-27

## 2019-01-27 RX ORDER — CHLORHEXIDINE GLUCONATE 213 G/1000ML
1 SOLUTION TOPICAL
Qty: 0 | Refills: 0 | Status: DISCONTINUED | OUTPATIENT
Start: 2019-01-27 | End: 2019-02-04

## 2019-01-27 RX ORDER — MIDODRINE HYDROCHLORIDE 2.5 MG/1
10 TABLET ORAL EVERY 8 HOURS
Qty: 0 | Refills: 0 | Status: DISCONTINUED | OUTPATIENT
Start: 2019-01-27 | End: 2019-01-28

## 2019-01-27 RX ORDER — OCTREOTIDE ACETATE 200 UG/ML
100 INJECTION, SOLUTION INTRAVENOUS; SUBCUTANEOUS EVERY 8 HOURS
Qty: 0 | Refills: 0 | Status: DISCONTINUED | OUTPATIENT
Start: 2019-01-27 | End: 2019-02-13

## 2019-01-27 RX ORDER — ALBUMIN HUMAN 25 %
100 VIAL (ML) INTRAVENOUS
Qty: 0 | Refills: 0 | Status: DISCONTINUED | OUTPATIENT
Start: 2019-01-27 | End: 2019-01-27

## 2019-01-27 RX ORDER — ALBUMIN HUMAN 25 %
50 VIAL (ML) INTRAVENOUS ONCE
Qty: 0 | Refills: 0 | Status: COMPLETED | OUTPATIENT
Start: 2019-01-27 | End: 2019-01-27

## 2019-01-27 RX ORDER — PANTOPRAZOLE SODIUM 20 MG/1
40 TABLET, DELAYED RELEASE ORAL
Qty: 0 | Refills: 0 | Status: DISCONTINUED | OUTPATIENT
Start: 2019-01-27 | End: 2019-01-28

## 2019-01-27 RX ADMIN — LACTULOSE 10 GRAM(S): 10 SOLUTION ORAL at 17:22

## 2019-01-27 RX ADMIN — Medication 1 MILLIGRAM(S): at 11:22

## 2019-01-27 RX ADMIN — PANTOPRAZOLE SODIUM 40 MILLIGRAM(S): 20 TABLET, DELAYED RELEASE ORAL at 20:23

## 2019-01-27 RX ADMIN — Medication 100 MILLIGRAM(S): at 11:22

## 2019-01-27 RX ADMIN — MEROPENEM 100 MILLIGRAM(S): 1 INJECTION INTRAVENOUS at 05:21

## 2019-01-27 RX ADMIN — Medication 1 PATCH: at 21:36

## 2019-01-27 RX ADMIN — LACTULOSE 10 GRAM(S): 10 SOLUTION ORAL at 05:20

## 2019-01-27 RX ADMIN — Medication 100 MILLILITER(S): at 20:22

## 2019-01-27 RX ADMIN — Medication 1 TABLET(S): at 11:22

## 2019-01-27 RX ADMIN — MEROPENEM 100 MILLIGRAM(S): 1 INJECTION INTRAVENOUS at 23:03

## 2019-01-27 RX ADMIN — MIDODRINE HYDROCHLORIDE 5 MILLIGRAM(S): 2.5 TABLET ORAL at 17:22

## 2019-01-27 RX ADMIN — CHLORHEXIDINE GLUCONATE 1 APPLICATION(S): 213 SOLUTION TOPICAL at 20:24

## 2019-01-27 RX ADMIN — Medication 1 MILLIGRAM(S): at 05:21

## 2019-01-27 RX ADMIN — Medication 100 MILLILITER(S): at 21:41

## 2019-01-27 RX ADMIN — Medication 100 MILLILITER(S): at 23:02

## 2019-01-27 RX ADMIN — Medication 1 PATCH: at 11:22

## 2019-01-27 RX ADMIN — PANTOPRAZOLE SODIUM 10 MG/HR: 20 TABLET, DELAYED RELEASE ORAL at 05:21

## 2019-01-27 RX ADMIN — OCTREOTIDE ACETATE 100 MICROGRAM(S): 200 INJECTION, SOLUTION INTRAVENOUS; SUBCUTANEOUS at 23:02

## 2019-01-27 RX ADMIN — MIDODRINE HYDROCHLORIDE 10 MILLIGRAM(S): 2.5 TABLET ORAL at 23:01

## 2019-01-27 RX ADMIN — Medication 50 MILLILITER(S): at 17:28

## 2019-01-27 RX ADMIN — MEROPENEM 100 MILLIGRAM(S): 1 INJECTION INTRAVENOUS at 16:56

## 2019-01-27 NOTE — CHART NOTE - NSCHARTNOTEFT_GEN_A_CORE
Upon Nutritional Assessment by the Registered Dietitian your patient was determined to meet criteria / has evidence of the following diagnosis/diagnoses:          [ ]  Mild Protein Calorie Malnutrition        [ ]  Moderate Protein Calorie Malnutrition        [ x ] Severe Protein Calorie Malnutrition        [ ] Unspecified Protein Calorie Malnutrition        [ ] Underweight / BMI <19        [ ] Morbid Obesity / BMI > 40      Findings as based on:  •  Comprehensive nutrition assessment and consultation  •  Calorie counts (nutrient intake analysis)  •  Food acceptance and intake status from observations by staff  •  Follow up  •  Patient education  •  Intervention secondary to interdisciplinary rounds  •   concerns      Treatment:    The following diet has been recommended: Advance diet as medically feasible and as tolerated.      PROVIDER Section:     By signing this assessment you are acknowledging and agree with the diagnosis/diagnoses assigned by the Registered Dietitian    Comments: Upon Nutritional Assessment by the Registered Dietitian your patient was determined to meet criteria / has evidence of the following diagnosis/diagnoses:          [ ]  Mild Protein Calorie Malnutrition        [ ]  Moderate Protein Calorie Malnutrition        [ x ] Severe Protein Calorie Malnutrition        [ ] Unspecified Protein Calorie Malnutrition        [ ] Underweight / BMI <19        [ ] Morbid Obesity / BMI > 40      Findings as based on:  •  Comprehensive nutrition assessment and consultation  •  Calorie counts (nutrient intake analysis)  •  Food acceptance and intake status from observations by staff  •  Follow up  •  Patient education  •  Intervention secondary to interdisciplinary rounds  •   concerns      Treatment:    The following diet has been recommended: Advance diet as medically feasible and as tolerated.  Recommend advance po diet when medically feasible full liquids to Low fiber/ Ensure Enlive 2 x day(700kcal & 40gm protein)    PROVIDER Section:     By signing this assessment you are acknowledging and agree with the diagnosis/diagnoses assigned by the Registered Dietitian    Comments:

## 2019-01-27 NOTE — PROGRESS NOTE ADULT - SUBJECTIVE AND OBJECTIVE BOX
INTERVAL HPI/OVERNIGHT EVENTS:  Pt seen and examined at bedside.     Allergies/Intolerance: No Known Allergies      MEDICATIONS  (STANDING):  folic acid 1 milliGRAM(s) Oral daily  lactulose Syrup 10 Gram(s) Oral two times a day  meropenem  IVPB 1000 milliGRAM(s) IV Intermittent every 8 hours  multivitamin 1 Tablet(s) Oral daily  nicotine -  14 mG/24Hr(s) Patch 1 patch Transdermal daily  pantoprazole Infusion 8 mG/Hr (10 mL/Hr) IV Continuous <Continuous>  thiamine 100 milliGRAM(s) Oral daily    MEDICATIONS  (PRN):  LORazepam   Injectable 1 milliGRAM(s) IV Push every 4 hours PRN for CIWA greater than 8        ROS: all systems reviewed and wnl      PHYSICAL EXAMINATION:  Vital Signs Last 24 Hrs  T(C): 35.7 (27 Jan 2019 04:58), Max: 36.6 (26 Jan 2019 16:41)  T(F): 96.2 (27 Jan 2019 04:58), Max: 97.8 (26 Jan 2019 16:41)  HR: 117 (27 Jan 2019 04:58) (107 - 117)  BP: 100/70 (27 Jan 2019 04:58) (94/63 - 100/70)  BP(mean): --  RR: 16 (27 Jan 2019 04:58) (16 - 18)  SpO2: 95% (27 Jan 2019 04:58) (95% - 96%)  CAPILLARY BLOOD GLUCOSE          01-26 @ 07:01  -  01-27 @ 07:00  --------------------------------------------------------  IN: 410 mL / OUT: 0 mL / NET: 410 mL        GENERAL:   NECK: supple, No JVD  CHEST/LUNG: clear to auscultation bilaterally; no rales, rhonchi, or wheezing b/l  HEART: normal S1, S2  ABDOMEN: BS+, soft, ND, NT   EXTREMITIES:  pulses palpable; no clubbing, cyanosis, or edema b/l LEs  SKIN: no rashes or lesions      LABS:                        8.4    23.31 )-----------( 313      ( 27 Jan 2019 09:56 )             24.2     01-27    132<L>  |  95<L>  |  77<H>  ----------------------------<  173<H>  4.0   |  25  |  2.87<H>    Ca    8.0<L>      27 Jan 2019 09:56  Phos  2.6     01-27  Mg     2.4     01-27    TPro  6.0  /  Alb  2.1<L>  /  TBili  7.2<H>  /  DBili  6.30<H>  /  AST  160<H>  /  ALT  44  /  AlkPhos  521<H>  01-26 INTERVAL HPI/OVERNIGHT EVENTS:  Pt seen and examined at bedside.     Allergies/Intolerance: No Known Allergies      MEDICATIONS  (STANDING):  folic acid 1 milliGRAM(s) Oral daily  lactulose Syrup 10 Gram(s) Oral two times a day  meropenem  IVPB 1000 milliGRAM(s) IV Intermittent every 8 hours  multivitamin 1 Tablet(s) Oral daily  nicotine -  14 mG/24Hr(s) Patch 1 patch Transdermal daily  pantoprazole Infusion 8 mG/Hr (10 mL/Hr) IV Continuous <Continuous>  thiamine 100 milliGRAM(s) Oral daily    MEDICATIONS  (PRN):  LORazepam   Injectable 1 milliGRAM(s) IV Push every 4 hours PRN for CIWA greater than 8        ROS: all systems reviewed and wnl      PHYSICAL EXAMINATION:  Vital Signs Last 24 Hrs  T(C): 35.7 (27 Jan 2019 04:58), Max: 36.6 (26 Jan 2019 16:41)  T(F): 96.2 (27 Jan 2019 04:58), Max: 97.8 (26 Jan 2019 16:41)  HR: 117 (27 Jan 2019 04:58) (107 - 117)  BP: 100/70 (27 Jan 2019 04:58) (94/63 - 100/70)  BP(mean): --  RR: 16 (27 Jan 2019 04:58) (16 - 18)  SpO2: 95% (27 Jan 2019 04:58) (95% - 96%)  CAPILLARY BLOOD GLUCOSE          01-26 @ 07:01  -  01-27 @ 07:00  --------------------------------------------------------  IN: 410 mL / OUT: 0 mL / NET: 410 mL        GENERAL: NAD, family and friends at bedside, no abdominal pain or SOB  NECK: supple, No JVD  CHEST/LUNG: clear to auscultation bilaterally; no rales, rhonchi, or wheezing b/l  HEART: normal S1, S2  ABDOMEN: BS+, soft, NT, distended(marked) from ascites   EXTREMITIES:  pulses palpable; no clubbing, cyanosis, trace edema b/l LEs  SKIN: no rashes or lesions      LABS:                        8.4    23.31 )-----------( 313      ( 27 Jan 2019 09:56 )             24.2     01-27    132<L>  |  95<L>  |  77<H>  ----------------------------<  173<H>  4.0   |  25  |  2.87<H>    Ca    8.0<L>      27 Jan 2019 09:56  Phos  2.6     01-27  Mg     2.4     01-27    TPro  6.0  /  Alb  2.1<L>  /  TBili  7.2<H>  /  DBili  6.30<H>  /  AST  160<H>  /  ALT  44  /  AlkPhos  521<H>  01-26

## 2019-01-27 NOTE — DIETITIAN INITIAL EVALUATION ADULT. - OTHER INFO
Pt seen today for NPO x 4 days. Pt lives alone, does shopping and cooking, and is independent with ADL.  Pt denied any N/V/C/D at this time and with no difficulty chewing/swallowing. Pt with ETOH dependent x 15 years. Pt reported weight loss of ~10-15#, but at the same time stomach got bigger (ascites). S/p paracentesis (01-25) Pt reported poor PO intake x 3 weeks PTA and blood in stool x 3 weeks. Pt reported hematemesis x1 PTA.  Discussed with pt clear liquid precaution until GI MD advances diet.

## 2019-01-27 NOTE — DIETITIAN INITIAL EVALUATION ADULT. - NS AS NUTRI DX NUTRIENT
Malnutrition/Malnutrition in the context of chronic illness Malnutrition/Severe malnutrition in the context of chronic illness & social circumstances

## 2019-01-27 NOTE — PROGRESS NOTE ADULT - ASSESSMENT
49 y/o male PMH HTN (not on medication), chronic alcohol abuse and dependence x 15 years (1/2 pint of vodka with cranberry juice daily), hx of alcohol withdrawal. Pt with chronic heavy daily etoh abuse, last drink at 11 am yesterday, also recent hx weight loss, with  worsening abdominal distention over last one month,   p/w jaundice, fatigue and coffee ground emesis x 1 episode 3 days ago, and with melena x a week. Pt also c/o hematuria now. In ED pt hypotensive SBP 80-90s. received IVF resuscitation,  SBP improved s/p IVF resuscitation . Pt with leukocytosis, severe lactic acidosis, hyponatremia, coagulopathy, elevated LFTs, pt admitted for decompensated cirrhosis/ UGIB and etoh withdrawal.    Hypotension/ lactic acidosis/ leukocytosis/ ?sepsis LA could be due to alcohol intoxication and decreased hepatic clearance.  sec to dehydration, alcohol intoxication (ETOH 237 POA)  and recent hx diarrhea/ gi volume loss.  s/p IVF resuscitation in ed, now BP improved. IVF stopped. follow cultures, BCx ngtd  ID following, abx changed to Merrem to broaden coverage, follow wbc trend, Ascitec fluid 160 WBC, not   consistent with SBP and culture negative so far.     Liver mass per sono, AFP 3.5, GI following     Decompensated cirrhosis/ coagulopathy/ UGIB/ melena  GI Dr. Benson following. HIDA neg for acute candy. PPI gtt  lactulose bid. Advance to full liquid diet today. HGB mostly stable 8.5. Monitor cbc- hb stable  s/p Vit K, s/p IR guided para, check fluid cytology. 7,000 ml of ascitic fluid removed.   CLD, will follow with gi for advancing diet. follow ammonia trend, down trending to 18 recently.     PAPO  dehydration from poor oral intake, volume loss  IVF stopped given severe ascites/ fluid overload.    Acute hepatic encephalopathy sec to hepatic encephalopathy vs ?infection/SBP vs alcohol intoxication vs ativan  ativan PRN for ciwa >8 , will avoid scheduled ativan given AMS.  Mental status better     ETOH abuse/ risk for DT/ETOH withdrawal  vit B1, FA, ativan prn (will avoid scheduled ativan given AMS and decreased clearance from liver given cirrhosis).    prognosis guarded.  May need hepatology consult.

## 2019-01-27 NOTE — DIETITIAN INITIAL EVALUATION ADULT. - SIGNS/SYMPTOMS
13# (8.8%) wt loss x 3 wks; severe fat loss & muscle wasting; <75% est. energy requirements >1month 9% wt loss x 3 wks; severe fat loss & muscle wasting; <75% est. energy requirements >1month

## 2019-01-27 NOTE — CHART NOTE - NSCHARTNOTEFT_GEN_A_CORE
RRT called for hypotension.    On arrival, pt BP 87/57.  Pt was A & O x3 and answering all questions appropriately.  Pt appeared tachypneic but denies SOB.   Pt likely with reaccumulated ascites causing tachypnea.  Will plan for paracentesis.      Pt transferred to CCU.  Pt now lethargic and minimally responsive.  Will check stat ABG to r/o CO2 narcosis.  Pt may need bipap vs intubation.       Renal consult noted and appreciated.

## 2019-01-27 NOTE — DIETITIAN INITIAL EVALUATION ADULT. - PERTINENT LABORATORY DATA
01-27 Na 132 mmol/L<L> Glu 173 mg/dL<H> K+ 4.0 mmol/L Cr  2.87 mg/dL<H> BUN 77 mg/dL<H> Phos 2.6 mg/dL Alb n/a   PAB n/a   Hgb 8.4 g/dL<L> Hct 24.2 %<L>

## 2019-01-27 NOTE — DIETITIAN INITIAL EVALUATION ADULT. - NS FNS REASON FOR WEIGHT CHANG
decreased po intake/fluid loss/fluid retention/decreased PO intake ~3 weeks, unable to assess dry weight loss with ascites.

## 2019-01-27 NOTE — DIETITIAN INITIAL EVALUATION ADULT. - ENERGY NEEDS
Ht (cm): 165.1  Wt (kg): 68.5  BMI: 25.1    IBW: 61.6        %IBW: 111%        UBW:  unable to assess

## 2019-01-27 NOTE — PROVIDER CONTACT NOTE (EICU) - RECOMMENDATIONS
- Admit to ICU  - Midodrine. Will consider to increase dose if hypotension persist.  - Continue octreotide  - If tachypnea persist then consider paracentesis  - Once hypotension resolves, would consider lasix and spironolactone  - Continue Meropenem

## 2019-01-27 NOTE — DIETITIAN INITIAL EVALUATION ADULT. - ETIOLOGY
decreased energy&protein intake in the setting of worsening ETOH dependence x 15 years; Lack of nutrition dense foods with excessive ETOH intake; GI & liver damage related to excessive ETOH intake Inadequate energy & protein intake in the setting of worsening ETOH dependence x 15 years; Lack of nutrition dense foods with excessive ETOH intake 1/2 pint vodka daily

## 2019-01-27 NOTE — DIETITIAN INITIAL EVALUATION ADULT. - NS AS NUTRI INTERV MEALS SNACK
General/healthful diet/Recommend advancing the diet to full liquids then to low fiber then to regular diet as medically feasible/as tolerated

## 2019-01-27 NOTE — DIETITIAN INITIAL EVALUATION ADULT. - PERTINENT MEDS FT
MEDICATIONS  (STANDING):  folic acid 1 milliGRAM(s) Oral daily  lactulose Syrup 10 Gram(s) Oral two times a day  meropenem  IVPB 1000 milliGRAM(s) IV Intermittent every 8 hours  multivitamin 1 Tablet(s) Oral daily  nicotine -  14 mG/24Hr(s) Patch 1 patch Transdermal daily  pantoprazole Infusion 8 mG/Hr (10 mL/Hr) IV Continuous <Continuous>  thiamine 100 milliGRAM(s) Oral daily    MEDICATIONS  (PRN):  LORazepam   Injectable 1 milliGRAM(s) IV Push every 4 hours PRN for CIWA greater than 8

## 2019-01-27 NOTE — DIETITIAN INITIAL EVALUATION ADULT. - PHYSICAL APPEARANCE
well nourished/BMI 25.1; 2+ bilateral foot and leg edema noted; Nutrition focused physical exam conducted:  Subcutaneous fat loss: [ severe ] Orbital fat pads region, [ moderate ]Buccal fat region, [ severe ]Triceps region,  [ moderate ]Ribs region.  Muscle wasting: [ severe ]Temples region, [ severe ]Clavicle region, [moderate ]Shoulder region, [unable ]Scapula region, [ left hand WDL, right hand unable to access ]Interosseous region,  [ severe ]thigh region, [ severe ]Calf region

## 2019-01-27 NOTE — CONSULT NOTE ADULT - SUBJECTIVE AND OBJECTIVE BOX
Information from chart:  "Patient is a 48y old  Male who presents with a chief complaint of Coffee ground emesis, progressive jaundice, syncopal episodes. (2019 12:04)    HPI:  47 y/o male PMH HTN (not on medication), chronic alcohol abuse and dependence x 15 years (1/2 pint of vodka with cranberry juice daily), hx of alcohol withdrawal 2013 hospitalized at [facility] after which he went to inpatient rehab at [facility] presents for syncope and melanotic stools. History obtained from ex wife, mother, and patient. Since pt left rehab in  pt has been drinking daily. Last drink 11AM today. Pt state for past one and a half months he has had progressive weight loss with loss of appetite. He has had in increased abdominal distention for the past 1 month and in the past 1 week he has noticed jaundice and scleral icterus. Pt also reports generalized fatigue. Reports coffee ground emesis x1 episode 3 days ago but none since then with melena for about a week. Pt states he has 3 bowel movements a day and now has also been seeing "blood when I pee" also within the last week. No abdominal pain. No CP no SOB. No fevers or chills. Today pt called ex wife after he was unsteady with his gait and "fell", he denies LOC. Denies trauma to head. Ex wife reports while attempting to get pt to the car she noticed he was "very weak" and witnessed pt to have had 3 syncopal episodes lasting a few minutes each before regaining consciousness, but no seizures. Ex wife state that in the car, pt had a bowel movement that was black and tarry.     In ED pt noted to be hypotensive SBP 80-90s. 2L IVF given with 3rd and 4th infusing. SBP post IVF resuscitation 100s to one teens. On labs pt with leukocytosis with 3% bands, Na 123, Cr 2.05, INR 2.05, T bili 6, , ALT 55, Alk phos 799.  Pt seen by critical care and not felt to be ICU candidate. (2019 22:33)   "      Patient course with IV abx; paracentesis 7 liters;  Low MAP; increasing lethargy;     PAST MEDICAL & SURGICAL HISTORY:  ETOH abuse  Psoriasis  History of hypertension  No significant past surgical history    FAMILY HISTORY:  No pertinent family history in first degree relatives    Allergies    No Known Allergies    Intolerances      Home Medications:    MEDICATIONS  (STANDING):  folic acid 1 milliGRAM(s) Oral daily  lactulose Syrup 10 Gram(s) Oral two times a day  meropenem  IVPB 1000 milliGRAM(s) IV Intermittent every 8 hours  multivitamin 1 Tablet(s) Oral daily  nicotine -  14 mG/24Hr(s) Patch 1 patch Transdermal daily  pantoprazole    Tablet 40 milliGRAM(s) Oral before breakfast  thiamine 100 milliGRAM(s) Oral daily    MEDICATIONS  (PRN):  LORazepam   Injectable 1 milliGRAM(s) IV Push every 4 hours PRN for CIWA greater than 8    Vital Signs Last 24 Hrs  T(C): 36.9 (2019 16:45), Max: 36.9 (2019 16:45)  T(F): 98.5 (2019 16:45), Max: 98.5 (2019 16:45)  HR: 118 (2019 16:45) (107 - 118)  BP: 87/557 (2019 16:45) (87/557 - 100/70)  BP(mean): --  RR: 17 (2019 16:45) (16 - 18)  SpO2: 95% (2019 16:45) (92% - 96%)    Daily     Daily Weight in k.6 (2019 10:42)    19 @ 07:01  -  19 @ 07:00  --------------------------------------------------------  IN: 410 mL / OUT: 0 mL / NET: 410 mL      CAPILLARY BLOOD GLUCOSE      POCT Blood Glucose.: 213 mg/dL (2019 16:51)    PHYSICAL EXAM:      T(C): 36.9 (19 @ 16:45), Max: 36.9 (19 @ 16:45)  HR: 118 (19 @ 16:45) (107 - 118)  BP: 87/557 (19 @ 16:45) (87/557 - 100/70)  RR: 17 (19 @ 16:45) (16 - 18)  SpO2: 95% (19 @ 16:45) (92% - 96%)  Wt(kg): --  Respiratory: clear anteriorly, decreased BS at bases  Cardiovascular: S1 S2  Gastrointestinal:  distended, tense; +BS  Extremities:  1 edema                  132<L>  |  95<L>  |  77<H>  ----------------------------<  173<H>  4.0   |  25  |  2.87<H>    Ca    8.0<L>      2019 09:56  Phos  2.6       Mg     2.4         TPro  6.0  /  Alb  2.1<L>  /  TBili  7.2<H>  /  DBili  6.30<H>  /  AST  160<H>  /  ALT  44  /  AlkPhos  521<H>                            8.4    23.31 )-----------( 313      ( 2019 09:56 )             24.2       Assessment and Plan    PAPO, CKD degree unclear; pre renal azotemia; severe sepsis, SBP; ischemic ATN,  risk for HRS type 2 to 1;   Agree with SPA/ Midodrine or pressor support, allow MAP > 65;   Add Octreotide;   UA micro; FeNa;   Romero, obtain bladder pressure r/o IAH;   Prognosis is guarded, discussed with family;

## 2019-01-27 NOTE — CHART NOTE - NSCHARTNOTEFT_GEN_A_CORE
RR was called for low BP.  Pt hypotensive at baseline.  Cirrhosis with ascites.  Now with worsening PAPO.  Possibly HRS  Recommend albumin/ midodrine  Renal eval.  ICU transfer.

## 2019-01-28 LAB
ALBUMIN SERPL ELPH-MCNC: 3.4 G/DL — SIGNIFICANT CHANGE UP (ref 3.3–5)
ALP SERPL-CCNC: 396 U/L — HIGH (ref 40–120)
ALT FLD-CCNC: 39 U/L — SIGNIFICANT CHANGE UP (ref 12–78)
AMMONIA BLD-MCNC: 47 UMOL/L — HIGH (ref 11–32)
ANION GAP SERPL CALC-SCNC: 13 MMOL/L — SIGNIFICANT CHANGE UP (ref 5–17)
APTT BLD: 32.6 SEC — SIGNIFICANT CHANGE UP (ref 27.5–36.3)
AST SERPL-CCNC: 116 U/L — HIGH (ref 15–37)
BILIRUB DIRECT SERPL-MCNC: 6.07 MG/DL — HIGH (ref 0.05–0.2)
BILIRUB INDIRECT FLD-MCNC: 1.6 MG/DL — HIGH (ref 0.2–1)
BILIRUB SERPL-MCNC: 7.7 MG/DL — HIGH (ref 0.2–1.2)
BLD GP AB SCN SERPL QL: SIGNIFICANT CHANGE UP
BUN SERPL-MCNC: 88 MG/DL — HIGH (ref 7–23)
CALCIUM SERPL-MCNC: 8.3 MG/DL — LOW (ref 8.5–10.1)
CHLORIDE SERPL-SCNC: 94 MMOL/L — LOW (ref 96–108)
CO2 SERPL-SCNC: 25 MMOL/L — SIGNIFICANT CHANGE UP (ref 22–31)
CREAT ?TM UR-MCNC: 199 MG/DL — SIGNIFICANT CHANGE UP
CREAT SERPL-MCNC: 4.1 MG/DL — HIGH (ref 0.5–1.3)
GLUCOSE SERPL-MCNC: 106 MG/DL — HIGH (ref 70–99)
HCT VFR BLD CALC: 19.7 % — CRITICAL LOW (ref 39–50)
HCT VFR BLD CALC: 21.8 % — LOW (ref 39–50)
HGB BLD-MCNC: 6.8 G/DL — CRITICAL LOW (ref 13–17)
HGB BLD-MCNC: 7.5 G/DL — LOW (ref 13–17)
INR BLD: 1.78 RATIO — HIGH (ref 0.88–1.16)
MAGNESIUM SERPL-MCNC: 2.7 MG/DL — HIGH (ref 1.6–2.6)
MCHC RBC-ENTMCNC: 34.4 GM/DL — SIGNIFICANT CHANGE UP (ref 32–36)
MCHC RBC-ENTMCNC: 34.5 GM/DL — SIGNIFICANT CHANGE UP (ref 32–36)
MCHC RBC-ENTMCNC: 38.9 PG — HIGH (ref 27–34)
MCHC RBC-ENTMCNC: 39.1 PG — HIGH (ref 27–34)
MCV RBC AUTO: 113 FL — HIGH (ref 80–100)
MCV RBC AUTO: 113.2 FL — HIGH (ref 80–100)
NRBC # BLD: 0 /100 WBCS — SIGNIFICANT CHANGE UP (ref 0–0)
NRBC # BLD: 2 /100 WBCS — HIGH (ref 0–0)
PHOSPHATE SERPL-MCNC: 4.5 MG/DL — SIGNIFICANT CHANGE UP (ref 2.5–4.5)
PLATELET # BLD AUTO: 236 K/UL — SIGNIFICANT CHANGE UP (ref 150–400)
PLATELET # BLD AUTO: 258 K/UL — SIGNIFICANT CHANGE UP (ref 150–400)
POTASSIUM SERPL-MCNC: 4.3 MMOL/L — SIGNIFICANT CHANGE UP (ref 3.5–5.3)
POTASSIUM SERPL-SCNC: 4.3 MMOL/L — SIGNIFICANT CHANGE UP (ref 3.5–5.3)
PROT SERPL-MCNC: 6.8 GM/DL — SIGNIFICANT CHANGE UP (ref 6–8.3)
PROTHROM AB SERPL-ACNC: 20.3 SEC — HIGH (ref 10–12.9)
RBC # BLD: 1.74 M/UL — LOW (ref 4.2–5.8)
RBC # BLD: 1.93 M/UL — LOW (ref 4.2–5.8)
RBC # FLD: 17.4 % — HIGH (ref 10.3–14.5)
RBC # FLD: 18.4 % — HIGH (ref 10.3–14.5)
SODIUM SERPL-SCNC: 132 MMOL/L — LOW (ref 135–145)
SODIUM UR-SCNC: <20 MMOL/L — SIGNIFICANT CHANGE UP
WBC # BLD: 19.63 K/UL — HIGH (ref 3.8–10.5)
WBC # BLD: 21.9 K/UL — HIGH (ref 3.8–10.5)
WBC # FLD AUTO: 19.63 K/UL — HIGH (ref 3.8–10.5)
WBC # FLD AUTO: 21.9 K/UL — HIGH (ref 3.8–10.5)

## 2019-01-28 PROCEDURE — 99233 SBSQ HOSP IP/OBS HIGH 50: CPT

## 2019-01-28 PROCEDURE — 99291 CRITICAL CARE FIRST HOUR: CPT

## 2019-01-28 RX ORDER — PANTOPRAZOLE SODIUM 20 MG/1
8 TABLET, DELAYED RELEASE ORAL
Qty: 80 | Refills: 0 | Status: DISCONTINUED | OUTPATIENT
Start: 2019-01-28 | End: 2019-01-29

## 2019-01-28 RX ORDER — SUCRALFATE 1 G
1 TABLET ORAL
Qty: 0 | Refills: 0 | Status: COMPLETED | OUTPATIENT
Start: 2019-01-28 | End: 2019-01-28

## 2019-01-28 RX ORDER — NOREPINEPHRINE BITARTRATE/D5W 8 MG/250ML
0.05 PLASTIC BAG, INJECTION (ML) INTRAVENOUS
Qty: 8 | Refills: 0 | Status: DISCONTINUED | OUTPATIENT
Start: 2019-01-28 | End: 2019-01-30

## 2019-01-28 RX ORDER — SUCRALFATE 1 G
1 TABLET ORAL
Qty: 0 | Refills: 0 | Status: DISCONTINUED | OUTPATIENT
Start: 2019-01-28 | End: 2019-01-28

## 2019-01-28 RX ORDER — MEROPENEM 1 G/30ML
500 INJECTION INTRAVENOUS EVERY 12 HOURS
Qty: 0 | Refills: 0 | Status: DISCONTINUED | OUTPATIENT
Start: 2019-01-28 | End: 2019-01-30

## 2019-01-28 RX ORDER — HEPARIN SODIUM 5000 [USP'U]/ML
5000 INJECTION INTRAVENOUS; SUBCUTANEOUS EVERY 12 HOURS
Qty: 0 | Refills: 0 | Status: DISCONTINUED | OUTPATIENT
Start: 2019-01-28 | End: 2019-02-09

## 2019-01-28 RX ORDER — ALBUMIN HUMAN 25 %
50 VIAL (ML) INTRAVENOUS EVERY 6 HOURS
Qty: 0 | Refills: 0 | Status: DISCONTINUED | OUTPATIENT
Start: 2019-01-28 | End: 2019-01-28

## 2019-01-28 RX ORDER — MIDODRINE HYDROCHLORIDE 2.5 MG/1
15 TABLET ORAL EVERY 8 HOURS
Qty: 0 | Refills: 0 | Status: DISCONTINUED | OUTPATIENT
Start: 2019-01-28 | End: 2019-01-30

## 2019-01-28 RX ORDER — PANTOPRAZOLE SODIUM 20 MG/1
40 TABLET, DELAYED RELEASE ORAL EVERY 12 HOURS
Qty: 0 | Refills: 0 | Status: DISCONTINUED | OUTPATIENT
Start: 2019-01-28 | End: 2019-01-28

## 2019-01-28 RX ORDER — ALBUMIN HUMAN 25 %
100 VIAL (ML) INTRAVENOUS
Qty: 0 | Refills: 0 | Status: COMPLETED | OUTPATIENT
Start: 2019-01-28 | End: 2019-01-28

## 2019-01-28 RX ADMIN — Medication 50 MILLILITER(S): at 11:14

## 2019-01-28 RX ADMIN — MIDODRINE HYDROCHLORIDE 15 MILLIGRAM(S): 2.5 TABLET ORAL at 22:26

## 2019-01-28 RX ADMIN — Medication 1 PATCH: at 12:28

## 2019-01-28 RX ADMIN — OCTREOTIDE ACETATE 100 MICROGRAM(S): 200 INJECTION, SOLUTION INTRAVENOUS; SUBCUTANEOUS at 05:52

## 2019-01-28 RX ADMIN — Medication 100 MILLILITER(S): at 13:12

## 2019-01-28 RX ADMIN — MEROPENEM 100 MILLIGRAM(S): 1 INJECTION INTRAVENOUS at 18:13

## 2019-01-28 RX ADMIN — HEPARIN SODIUM 5000 UNIT(S): 5000 INJECTION INTRAVENOUS; SUBCUTANEOUS at 18:13

## 2019-01-28 RX ADMIN — PANTOPRAZOLE SODIUM 40 MILLIGRAM(S): 20 TABLET, DELAYED RELEASE ORAL at 06:55

## 2019-01-28 RX ADMIN — Medication 1 MILLIGRAM(S): at 11:13

## 2019-01-28 RX ADMIN — Medication 100 MILLILITER(S): at 15:06

## 2019-01-28 RX ADMIN — CHLORHEXIDINE GLUCONATE 1 APPLICATION(S): 213 SOLUTION TOPICAL at 05:50

## 2019-01-28 RX ADMIN — Medication 1 TABLET(S): at 11:13

## 2019-01-28 RX ADMIN — Medication 100 MILLILITER(S): at 12:54

## 2019-01-28 RX ADMIN — MEROPENEM 100 MILLIGRAM(S): 1 INJECTION INTRAVENOUS at 05:52

## 2019-01-28 RX ADMIN — Medication 1 PATCH: at 06:35

## 2019-01-28 RX ADMIN — OCTREOTIDE ACETATE 100 MICROGRAM(S): 200 INJECTION, SOLUTION INTRAVENOUS; SUBCUTANEOUS at 22:27

## 2019-01-28 RX ADMIN — LACTULOSE 10 GRAM(S): 10 SOLUTION ORAL at 05:51

## 2019-01-28 RX ADMIN — PANTOPRAZOLE SODIUM 10 MG/HR: 20 TABLET, DELAYED RELEASE ORAL at 11:14

## 2019-01-28 RX ADMIN — OCTREOTIDE ACETATE 100 MICROGRAM(S): 200 INJECTION, SOLUTION INTRAVENOUS; SUBCUTANEOUS at 15:07

## 2019-01-28 RX ADMIN — PANTOPRAZOLE SODIUM 10 MG/HR: 20 TABLET, DELAYED RELEASE ORAL at 22:27

## 2019-01-28 RX ADMIN — Medication 6.81 MICROGRAM(S)/KG/MIN: at 13:05

## 2019-01-28 RX ADMIN — Medication 1 GRAM(S): at 11:18

## 2019-01-28 RX ADMIN — MIDODRINE HYDROCHLORIDE 10 MILLIGRAM(S): 2.5 TABLET ORAL at 11:13

## 2019-01-28 RX ADMIN — Medication 1 PATCH: at 19:15

## 2019-01-28 RX ADMIN — Medication 100 MILLIGRAM(S): at 11:18

## 2019-01-28 NOTE — PROVIDER CONTACT NOTE (EICU) - SITUATION
I was called ICU provider for MICU admission.
pt with hypotension, bedside team requesting norepinephrine.     order placed in scm
request type and screen for transfusion

## 2019-01-28 NOTE — PROVIDER CONTACT NOTE (CRITICAL VALUE NOTIFICATION) - ACTION/TREATMENT ORDERED:
Transfused 2 unit PRBC.
continue with fluid
No new orders at this time. Pt on IVF 0.45 NaCl at 80ml/hr. Pt in no distress

## 2019-01-28 NOTE — PROGRESS NOTE ADULT - ASSESSMENT
HPI:  49 y/o male PMH HTN (not on medication), chronic alcohol abuse and dependence x 15 years (1/2 pint of vodka with cranberry juice daily), hx of alcohol withdrawal 2013 hospitalized at [facility] after which he went to inpatient rehab at [facility] presents for syncope and melanotic stools. History obtained from ex wife, mother, and patient. Since pt left rehab in 2013 pt has been drinking daily. Last drink 11AM today. Pt state for past one and a half months he has had progressive weight loss with loss of appetite. He has had in increased abdominal distention for the past 1 month and in the past 1 week he has noticed jaundice and scleral icterus. Pt also reports generalized fatigue. Reports coffee ground emesis x1 episode 3 days ago but none since then with melena for about a week. Pt states he has 3 bowel movements a day and now has also been seeing "blood when I pee" also within the last week. No abdominal pain. No CP no SOB. No fevers or chills. Today pt called ex wife after he was unsteady with his gait and "fell", he denies LOC. Denies trauma to head. Ex wife reports while attempting to get pt to the car she noticed he was "very weak" and witnessed pt to have had 3 syncopal episodes lasting a few minutes each before regaining consciousness, but no seizures. Ex wife state that in the car, pt had a bowel movement that was black and tarry.     In ED pt noted to be hypotensive SBP 80-90s. 2L IVF given with 3rd and 4th infusing. SBP post IVF resuscitation 100s to one teens. On labs pt with leukocytosis with 3% bands, Na 123, Cr 2.05, INR 2.05, T bili 6, , ALT 55, Alk phos 799.  Pt seen by critical care and not felt to be ICU candidate. (23 Jan 2019 22:33)  ----------------- As Above ------------------------------------------------------  Patient confused. Left message for family member to contact me   ETOH Abuse (+). Coffee ground emesis x 1, melena.   Eleveated LFTs, INR  See labs, CT Scan    Cirrhosis, ascites, fever - 1) Paracentesis 2) ammonia level  3) f/u LFTs 4) treat for impending Dts  - patient does not fit into the criteria for treatment with steroids.

## 2019-01-28 NOTE — PROGRESS NOTE ADULT - SUBJECTIVE AND OBJECTIVE BOX
Patient lethargic, weak    MEDICATIONS  (STANDING):  albumin human 25% IVPB 100 milliLiter(s) IV Intermittent every 1 hour  chlorhexidine 4% Liquid 1 Application(s) Topical <User Schedule>  folic acid 1 milliGRAM(s) Oral daily  heparin  Injectable 5000 Unit(s) SubCutaneous every 12 hours  lactulose Syrup 10 Gram(s) Oral two times a day  meropenem  IVPB 1000 milliGRAM(s) IV Intermittent every 8 hours  midodrine 15 milliGRAM(s) Oral every 8 hours  multivitamin 1 Tablet(s) Oral daily  nicotine -  14 mG/24Hr(s) Patch 1 patch Transdermal daily  octreotide  Injectable 100 MICROGram(s) SubCutaneous every 8 hours  pantoprazole Infusion 8 mG/Hr (10 mL/Hr) IV Continuous <Continuous>  sucralfate 1 Gram(s) Oral four times a day  thiamine 100 milliGRAM(s) Oral daily    MEDICATIONS  (PRN):  LORazepam   Injectable 1 milliGRAM(s) IV Push every 4 hours PRN for CIWA greater than 8      01-27-19 @ 07:01  -  01-28-19 @ 07:00  --------------------------------------------------------  IN: 350 mL / OUT: 40 mL / NET: 310 mL    01-28-19 @ 07:01  -  01-28-19 @ 12:07  --------------------------------------------------------  IN: 0 mL / OUT: 35 mL / NET: -35 mL      PHYSICAL EXAM:      T(C): 36.1 (01-28-19 @ 11:00), Max: 38 (01-27-19 @ 18:35)  HR: 81 (01-28-19 @ 11:00) (74 - 118)  BP: 78/56 (01-28-19 @ 10:00) (78/56 - 97/73)  RR: 21 (01-28-19 @ 11:00) (16 - 33)  SpO2: 99% (01-28-19 @ 11:00) (92% - 100%)  Wt(kg): --  Respiratory: clear anteriorly, decreased BS at bases  Cardiovascular: S1 S2  Gastrointestinal: soft severe distention +BS  Extremities:   1 edema                                    7.5    19.63 )-----------( 258      ( 28 Jan 2019 04:20 )             21.8     01-28    132<L>  |  94<L>  |  88<H>  ----------------------------<  106<H>  4.3   |  25  |  4.10<H>    Ca    8.3<L>      28 Jan 2019 04:20  Phos  4.5     01-28  Mg     2.7     01-28    TPro  6.8  /  Alb  3.4  /  TBili  7.7<H>  /  DBili  6.07<H>  /  AST  116<H>  /  ALT  39  /  AlkPhos  396<H>  01-28    ABG - ( 27 Jan 2019 18:56 )  pH, Arterial: x     pH, Blood: 7.53  /  pCO2: 27    /  pO2: 66    / HCO3: 23    / Base Excess: 0.9   /  SaO2: 93                LIVER FUNCTIONS - ( 28 Jan 2019 04:20 )  Alb: 3.4 g/dL / Pro: 6.8 gm/dL / ALK PHOS: 396 U/L / ALT: 39 U/L / AST: 116 U/L / GGT: x             Assessment and Plan:    PAPO, CKD degree unclear; pre renal azotemia; severe sepsis, SBP; ischemic ATN,  risk for HRS type 2 to 1;   Agree with SPA/ Midodrine or pressor support, allow MAP > 65;   Add Octreotide;   UA micro; FeNa;   Romero, obtain bladder pressure r/o IAH;   Dose meds CrCl < 20 ml min   Prognosis is poor; discussed with family;

## 2019-01-28 NOTE — PROVIDER CONTACT NOTE (EICU) - BACKGROUND
48 year old male with ETOH abuse with liver cirrhosis with PAPO, CKD degree with; severe sepsis, SBP with hepatorenal syndrome. RRT was called today. Patient was found to be hypotensive (SBP 88 systolic) along with tachypnea and abdominal distention.
multiorgan failure, concerns for GI bleeding, liver failure

## 2019-01-28 NOTE — PROGRESS NOTE ADULT - ASSESSMENT
47 y/o M w/ ETOH abuse p/w decompensated ETOH cirrhosis, UGIB and PAPO.     Neuro  -lethargic from ? uremia vs HE  -cont lactulose    Pulm  -pt tachypneic but oxygenating and ventilating well    Renal  -PAPO seems likely from HRS given Milagros but poss ATN  f/u Renal reccs  -cont octreotide, midodrine and albumin    CVS  now in shock from worsening cirrhosis vs sepsis vs GI bleed  started on levophed       GI  cont abx for empiric sepsis and also for UGIB in cirrhotics  - ascites fluid negative for SBP  - hb decreasing -tx prbc today  -cont ppi and octreotide  - f/u GI possible EGD     Critical Care Time 45min 47 y/o M w/ ETOH abuse p/w decompensated ETOH cirrhosis, UGIB and PAPO. Overall condition is deteriorating and had GOC d/w family regarding prognosis and they are considering options     Neuro  -lethargic from ? uremia vs HE  -cont lactulose    Pulm  -pt tachypneic but oxygenating and ventilating well    Renal  -PAPO seems likely from HRS given Milagros but poss ATN  f/u Renal reccs  -cont octreotide, midodrine and albumin    CVS  now in shock from worsening cirrhosis vs sepsis vs GI bleed  started on levophed       GI  cont abx for empiric sepsis and also for UGIB in cirrhotics  - ascites fluid negative for SBP  - hb decreasing -tx prbc today  -cont ppi and octreotide  - f/u GI possible EGD   -? omental mass vs edema - no workup yet    Critical Care Time 45min

## 2019-01-28 NOTE — PROGRESS NOTE ADULT - SUBJECTIVE AND OBJECTIVE BOX
Patient is a 48y old  Male who presents with a chief complaint of Coffee ground emesis, progressive jaundice, syncopal episodes. (2019 13:55)      HPI:  47 y/o male PMH HTN (not on medication), chronic alcohol abuse and dependence x 15 years (1/2 pint of vodka with cranberry juice daily), hx of alcohol withdrawal 2013 hospitalized at [facility] after which he went to inpatient rehab at [facility] for syncope and melanotic stools. History obtained from ex wife, mother, and patient. Since pt left rehab in  pt has been drinking daily. Last drink 11AM today. Pt state for past one and a half months he has had progressive weight loss with loss of appetite. He has had in increased abdominal distention for the past 1 month and in the past 1 week he has noticed jaundice and scleral icterus. Pt also reports generalized fatigue. Reports coffee ground emesis x1 episode 3 days ago but none since then with melena for about a week. Pt states he has 3 bowel movements a day and now has also been seeing "blood when I pee" also within the last week. No abdominal pain. No CP no SOB. No fevers or chills. Today pt called ex wife after he was unsteady with his gait and "fell", he denies LOC. Denies trauma to head. Ex wife reports while attempting to get pt to the car she noticed he was "very weak" and witnessed pt to have had 3 syncopal episodes lasting a few minutes each before regaining consciousness, but no seizures. Ex wife state that in the car, pt had a bowel movement that was black and tarry.     In ED pt noted to be hypotensive SBP 80-90s. 2L IVF given with 3rd and 4th infusing. SBP post IVF resuscitation 100s to one teens. On labs pt with leukocytosis with 3% bands, Na 123, Cr 2.05, INR 2.05, T bili 6, , ALT 55, Alk phos 799.  Pt seen by critical care and not felt to be ICU candidate. (2019 22:33)      INTERVAL HPI/OVERNIGHT EVENTS:  Patient without any c/o. Had melena last night    MEDICATIONS  (STANDING):  chlorhexidine 4% Liquid 1 Application(s) Topical <User Schedule>  folic acid 1 milliGRAM(s) Oral daily  heparin  Injectable 5000 Unit(s) SubCutaneous every 12 hours  lactulose Syrup 10 Gram(s) Oral two times a day  meropenem  IVPB 500 milliGRAM(s) IV Intermittent every 12 hours  midodrine 15 milliGRAM(s) Oral every 8 hours  multivitamin 1 Tablet(s) Oral daily  nicotine -  14 mG/24Hr(s) Patch 1 patch Transdermal daily  norepinephrine Infusion 0.05 MICROgram(s)/kG/Min (6.806 mL/Hr) IV Continuous <Continuous>  octreotide  Injectable 100 MICROGram(s) SubCutaneous every 8 hours  pantoprazole Infusion 8 mG/Hr (10 mL/Hr) IV Continuous <Continuous>  thiamine 100 milliGRAM(s) Oral daily    MEDICATIONS  (PRN):  LORazepam   Injectable 1 milliGRAM(s) IV Push every 4 hours PRN for CIWA greater than 8      FAMILY HISTORY:  No pertinent family history in first degree relatives      Allergies    No Known Allergies    Intolerances        PMH/PSH:  ETOH abuse  Psoriasis  History of hypertension  No significant past surgical history        REVIEW OF SYSTEMS:  CONSTITUTIONAL: No fever, weight loss, or fatigue  EYES: No eye pain, visual disturbances, or discharge  ENMT:  No difficulty hearing, tinnitus, vertigo; No sinus or throat pain  NECK: No pain or stiffness  BREASTS: No pain, masses, or nipple discharge  RESPIRATORY: No cough, wheezing, chills or hemoptysis; No shortness of breath  CARDIOVASCULAR: No chest pain, palpitations, dizziness, or leg swelling  GASTROINTESTINAL: See above  GENITOURINARY: No dysuria, frequency, hematuria, or incontinence  NEUROLOGICAL: No headaches, , numbness, or tremors  SKIN: No itching, burning, rashes, or lesions   LYMPH NODES: No enlarged glands  ENDOCRINE: No heat or cold intolerance; No hair loss  MUSCULOSKELETAL: No joint pain or swelling; No muscle, back, or extremity pain  PSYCHIATRIC: No depression, anxiety, mood swings, or difficulty sleeping  HEME/LYMPH: No easy bruising, or bleeding gums  ALLERGY AND IMMUNOLOGIC: No hives or eczema    Vital Signs Last 24 Hrs  T(C): 37.1 (2019 16:30), Max: 37.3 (2019 23:00)  T(F): 98.8 (2019 16:30), Max: 99.1 (2019 23:00)  HR: 68 (2019 18:00) (65 - 112)  BP: 105/67 (2019 18:00) (76/55 - 114/79)  BP(mean): 79 (2019 18:00) (62 - 91)  RR: 16 (2019 18:00) (15 - 33)  SpO2: 99% (2019 18:00) (96% - 100%)    PHYSICAL EXAM:  GENERAL: NAD, well-groomed, well-developed  HEAD:  Atraumatic, Normocephalic  EYES: EOMI, PERRLA, conjunctiva and sclera clear  NECK: Supple, No JVD, Normal thyroid  NERVOUS SYSTEM:  Alert but confused.  CHEST/LUNG: Clear to percussion bilaterally; No rales, rhonchi, wheezing, or rubs  HEART: Regular rate and rhythm; No murmurs, rubs, or gallops  ABDOMEN: Soft, Nontender, Nondistended; Bowel sounds present  EXTREMITIES:  2+ Peripheral Pulses, No clubbing, cyanosis, or edema  LYMPH: No lymphadenopathy noted  SKIN: No rashes or lesions    LAB                          6.8    21.90 )-----------( 236      ( 2019 12:29 )             19.7       CBC:   @ 12:29  WBC 21.90   Hgb 6.8   Hct 19.7   Plts 236  .2   @ 04:20  WBC 19.63   Hgb 7.5   Hct 21.8   Plts 258  .0   @ 09:56  WBC 23.31   Hgb 8.4   Hct 24.2   Plts 313  .5   @ 08:11  WBC 26.74   Hgb 8.6   Hct 24.8   Plts 268  .9   @ 08:43  WBC 21.76   Hgb 8.3   Hct 23.8   Plts 250  .7   @ 23:33  WBC 24.14   Hgb 8.1   Hct 23.1   Plts 232  .0   @ 19:37  WBC 25.76   Hgb 8.5   Hct 24.3   Plts 237  .0   @ 11:11  WBC 25.20   Hgb 8.6   Hct 24.4   Plts 259  .5   @ 16:55  WBC 23.06   Hgb 9.5   Hct 26.8   Plts 282  .2      Chemistry:   @ 04:20  Na+ 132  K+ 4.3  Cl- 94  CO2 25  BUN 88  Cr 4.10      @ 09:56  Na+ 132  K+ 4.0  Cl- 95  CO2 25  BUN 77  Cr 2.87      @ 08:11  Na+ 133  K+ 3.7  Cl- 96  CO2 27  BUN 57  Cr 1.51      @ 08:43  Na+ 130  K+ 3.5  Cl- 91  CO2 27  BUN 49  Cr 1.61      @ 08:11  Na+ 126  K+ 3.8  Cl- 89  CO2 20  BUN 32  Cr 1.76      @ 16:55  Na+ 123  K+ 5.3  Cl- 79  CO2 24  BUN 26  Cr 2.05         Glucose, Serum: 106 mg/dL ( @ 04:20)  Glucose, Serum: 173 mg/dL ( @ 09:56)  Glucose, Serum: 186 mg/dL ( @ 08:11)  Glucose, Serum: 153 mg/dL ( @ 08:43)  Glucose, Serum: 129 mg/dL ( @ 08:11)  Glucose, Serum: 148 mg/dL ( @ 16:55)      2019 04:20    132    |  94     |  88     ----------------------------<  106    4.3     |  25     |  4.10   2019 09:56    132    |  95     |  77     ----------------------------<  173    4.0     |  25     |  2.87   2019 08:11    133    |  96     |  57     ----------------------------<  186    3.7     |  27     |  1.51   2019 08:43    130    |  91     |  49     ----------------------------<  153    3.5     |  27     |  1.61   2019 08:11    126    |  89     |  32     ----------------------------<  129    3.8     |  20     |  1.76   2019 16:55    123    |  79     |  26     ----------------------------<  148    5.3     |  24     |  2.05     Ca    8.3        2019 04:20  Ca    8.0        2019 09:56  Ca    8.0        2019 08:11  Ca    7.7        2019 08:43  Ca    7.3        2019 08:11  Ca    7.8        2019 16:55  Phos  4.5       2019 04:20  Phos  2.6       2019 09:56  Phos  1.3       2019 08:11  Phos  1.8       2019 08:43  Phos  2.6       2019 08:11  Mg     2.7       2019 04:20  Mg     2.4       2019 09:56  Mg     2.5       2019 08:11  Mg     2.5       2019 08:43  Mg     2.2       2019 08:11  Mg     2.3       2019 16:55    TPro  6.8    /  Alb  3.4    /  TBili  7.7    /  DBili  6.07   /  AST  116    /  ALT  39     /  AlkPhos  396    2019 04:20  TPro  6.0    /  Alb  2.1    /  TBili  7.2    /  DBili  6.30   /  AST  160    /  ALT  44     /  AlkPhos  521    2019 08:11  TPro  6.6    /  Alb  2.2    /  TBili  8.3    /  DBili  x      /  AST  205    /  ALT  48     /  AlkPhos  608    2019 08:43  TPro  6.2    /  Alb  2.0    /  TBili  7.3    /  DBili  6.02   /  AST  304    /  ALT  55     /  AlkPhos  685    2019 08:11  TPro  6.6    /  Alb  2.1    /  TBili  6.0    /  DBili  x      /  AST  215    /  ALT  55     /  AlkPhos  799    2019 16:55      PT/INR - ( 2019 04:20 )   PT: 20.3 sec;   INR: 1.78 ratio         PTT - ( 2019 04:20 )  PTT:32.6 sec    Urinalysis Basic - ( 2019 22:45 )    Color: Yellow / Appearance: Slightly Turbid / S.020 / pH: x  Gluc: x / Ketone: Small  / Bili: Moderate / Urobili: 4 mg/dL   Blood: x / Protein: 30 mg/dL / Nitrite: Positive   Leuk Esterase: Small / RBC: 0-2 /HPF / WBC 6-10   Sq Epi: x / Non Sq Epi: Few / Bacteria: Many        CAPILLARY BLOOD GLUCOSE              RADIOLOGY & ADDITIONAL TESTS:    Imaging Personally Reviewed:  [ ] YES  [ ] NO    Consultant(s) Notes Reviewed:  [ ] YES  [ ] NO    Care Discussed with Consultants/Other Providers [ ] YES  [ ] NO

## 2019-01-28 NOTE — PROGRESS NOTE ADULT - SUBJECTIVE AND OBJECTIVE BOX
HPI:  49 y/o male PMH HTN (not on medication), chronic alcohol abuse and dependence x 15 years (1/2 pint of vodka with cranberry juice daily), hx of alcohol withdrawal 2013 hospitalized at [facility] after which he went to inpatient rehab at [facility] for syncope and melanotic stools. History obtained from ex wife, mother, and patient. Since pt left rehab in 2013 pt has been drinking daily. Last drink 11AM today. Pt state for past one and a half months he has had progressive weight loss with loss of appetite. He has had in increased abdominal distention for the past 1 month and in the past 1 week he has noticed jaundice and scleral icterus. Pt also reports generalized fatigue. Reports coffee ground emesis x1 episode 3 days ago but none since then with melena for about a week. Pt states he has 3 bowel movements a day and now has also been seeing "blood when I pee" also within the last week. No abdominal pain. No CP no SOB. No fevers or chills. Today pt called ex wife after he was unsteady with his gait and "fell", he denies LOC. Denies trauma to head. Ex wife reports while attempting to get pt to the car she noticed he was "very weak" and witnessed pt to have had 3 syncopal episodes lasting a few minutes each before regaining consciousness, but no seizures. Ex wife state that in the car, pt had a bowel movement that was black and tarry.     24 hr events: No acute events.   Pt with minimal UO and rapidly inc Cr.  BP decreased and started on levophed.   Lethargic and confused      ## ROS:  Unable to answer due to confusion    ICU Vital Signs Last 24 Hrs  T(C): 36.1 (28 Jan 2019 11:00), Max: 38 (27 Jan 2019 18:35)  T(F): 97 (28 Jan 2019 11:00), Max: 100.4 (27 Jan 2019 18:35)  HR: 66 (28 Jan 2019 13:30) (66 - 118)  BP: 85/63 (28 Jan 2019 13:30) (76/55 - 97/73)  BP(mean): 71 (28 Jan 2019 13:30) (62 - 82)  ABP: --  ABP(mean): --  RR: 17 (28 Jan 2019 13:30) (16 - 33)  SpO2: 100% (28 Jan 2019 13:30) (95% - 100%)      ## Physical Exam:  Gen: Adult male lying in bed, sleeping, easily arousable, jaundiced  HEENT: NC/AT, sclerae icteric  Resp: tachypneic but cta  CV: S1, S2 no murmur  Abd:  distended but non tender, + ascites, + BS  Ext: + edema  Neuro: Sleepy, but arousable, confused but following commands, moving all extremities    MEDICATIONS  (STANDING):  albumin human 25% IVPB 100 milliLiter(s) IV Intermittent every 1 hour  chlorhexidine 4% Liquid 1 Application(s) Topical <User Schedule>  folic acid 1 milliGRAM(s) Oral daily  heparin  Injectable 5000 Unit(s) SubCutaneous every 12 hours  lactulose Syrup 10 Gram(s) Oral two times a day  meropenem  IVPB 500 milliGRAM(s) IV Intermittent every 12 hours  midodrine 15 milliGRAM(s) Oral every 8 hours  multivitamin 1 Tablet(s) Oral daily  nicotine -  14 mG/24Hr(s) Patch 1 patch Transdermal daily  norepinephrine Infusion 0.05 MICROgram(s)/kG/Min (6.806 mL/Hr) IV Continuous <Continuous>  octreotide  Injectable 100 MICROGram(s) SubCutaneous every 8 hours  pantoprazole Infusion 8 mG/Hr (10 mL/Hr) IV Continuous <Continuous>  sucralfate 1 Gram(s) Oral four times a day  thiamine 100 milliGRAM(s) Oral daily                          6.8    21.90 )-----------( 236      ( 28 Jan 2019 12:29 )             19.7   01-28    132<L>  |  94<L>  |  88<H>  ----------------------------<  106<H>  4.3   |  25  |  4.10<H>    Ca    8.3<L>      28 Jan 2019 04:20  Phos  4.5     01-28  Mg     2.7     01-28    TPro  6.8  /  Alb  3.4  /  TBili  7.7<H>  /  DBili  6.07<H>  /  AST  116<H>  /  ALT  39  /  AlkPhos  396<H>  01-28

## 2019-01-28 NOTE — PROGRESS NOTE ADULT - PROBLEM SELECTOR PLAN 2
Bili baum higher while all other labs improving. Not a candidate for steroids. See paracentesis results. 7.7 / 115 / 58 / 395

## 2019-01-28 NOTE — PROGRESS NOTE ADULT - SUBJECTIVE AND OBJECTIVE BOX
Patient is a 48y old  Male who presents with a chief complaint of Coffee ground emesis, progressive jaundice, syncopal episodes. (2019 18:48)      INTERVAL HPI / OVERNIGHT EVENTS:    MEDICATIONS  (STANDING):  chlorhexidine 4% Liquid 1 Application(s) Topical <User Schedule>  folic acid 1 milliGRAM(s) Oral daily  heparin  Injectable 5000 Unit(s) SubCutaneous every 12 hours  lactulose Syrup 10 Gram(s) Oral two times a day  meropenem  IVPB 500 milliGRAM(s) IV Intermittent every 12 hours  midodrine 15 milliGRAM(s) Oral every 8 hours  multivitamin 1 Tablet(s) Oral daily  nicotine -  14 mG/24Hr(s) Patch 1 patch Transdermal daily  norepinephrine Infusion 0.05 MICROgram(s)/kG/Min (6.806 mL/Hr) IV Continuous <Continuous>  octreotide  Injectable 100 MICROGram(s) SubCutaneous every 8 hours  pantoprazole Infusion 8 mG/Hr (10 mL/Hr) IV Continuous <Continuous>  thiamine 100 milliGRAM(s) Oral daily    MEDICATIONS  (PRN):  LORazepam   Injectable 1 milliGRAM(s) IV Push every 4 hours PRN for CIWA greater than 8      Vital Signs Last 24 Hrs  T(C): 36.4 (2019 19:22), Max: 37.3 (2019 23:00)  T(F): 97.6 (2019 19:22), Max: 99.1 (2019 23:00)  HR: 70 (2019 20:00) (65 - 102)  BP: 111/75 (2019 20:00) (76/55 - 114/79)  BP(mean): 87 (2019 20:00) (62 - 91)  RR: 23 (2019 20:00) (15 - 30)  SpO2: 100% (2019 20:00) (96% - 100%)    Review of systems:  General : no fever /chills,fatigue  CVS : no chest pain, palpitations  Lungs : no shortness of breath, cough  GI : no abdominal pain,vomiting, diarrhea   : no dysuria,hematuria        PHYSICAL EXAM:  General :NAD  Constitutional:  well-groomed, well-developed  Respiratory: CTAB/L  Cardiovascular: S1 and S2, RRR, no M/G/R  Gastrointestinal: BS+, soft, NT/ND  Extremities: No peripheral edema  Vascular: 2+ peripheral pulses  Skin: No rashes      LABS:                        6.8    21.90 )-----------( 236      ( 2019 12:29 )             19.7         132<L>  |  94<L>  |  88<H>  ----------------------------<  106<H>  4.3   |  25  |  4.10<H>    Ca    8.3<L>      2019 04:20  Phos  4.5       Mg     2.7         TPro  6.8  /  Alb  3.4  /  TBili  7.7<H>  /  DBili  6.07<H>  /  AST  116<H>  /  ALT  39  /  AlkPhos  396<H>      Urinalysis Basic - ( 2019 22:45 )    Color: Yellow / Appearance: Slightly Turbid / S.020 / pH: x  Gluc: x / Ketone: Small  / Bili: Moderate / Urobili: 4 mg/dL   Blood: x / Protein: 30 mg/dL / Nitrite: Positive   Leuk Esterase: Small / RBC: 0-2 /HPF / WBC 6-10   Sq Epi: x / Non Sq Epi: Few / Bacteria: Many          MICROBIOLOGY:  RECENT CULTURES:   .Body Fluid ascites XXXX   polymorphonuclear leukocytes seen  No organisms seen  by cytocentrifuge   No growth     .Blood Blood-Peripheral XXXX XXXX   No growth to date.     .Urine Clean Catch (Midstream) XXXX XXXX   No growth          RADIOLOGY & ADDITIONAL STUDIES: Patient is a 48y old  Male who presents with a chief complaint of Coffee ground emesis, progressive jaundice, syncopal episodes. (2019 18:48)      INTERVAL HPI / OVERNIGHT EVENTS: very Lethargic    MEDICATIONS  (STANDING):  chlorhexidine 4% Liquid 1 Application(s) Topical <User Schedule>  folic acid 1 milliGRAM(s) Oral daily  heparin  Injectable 5000 Unit(s) SubCutaneous every 12 hours  lactulose Syrup 10 Gram(s) Oral two times a day  meropenem  IVPB 500 milliGRAM(s) IV Intermittent every 12 hours  midodrine 15 milliGRAM(s) Oral every 8 hours  multivitamin 1 Tablet(s) Oral daily  nicotine -  14 mG/24Hr(s) Patch 1 patch Transdermal daily  norepinephrine Infusion 0.05 MICROgram(s)/kG/Min (6.806 mL/Hr) IV Continuous <Continuous>  octreotide  Injectable 100 MICROGram(s) SubCutaneous every 8 hours  pantoprazole Infusion 8 mG/Hr (10 mL/Hr) IV Continuous <Continuous>  thiamine 100 milliGRAM(s) Oral daily    MEDICATIONS  (PRN):  LORazepam   Injectable 1 milliGRAM(s) IV Push every 4 hours PRN for CIWA greater than 8      Vital Signs Last 24 Hrs  T(C): 36.4 (2019 19:22), Max: 37.3 (2019 23:00)  T(F): 97.6 (2019 19:22), Max: 99.1 (2019 23:00)  HR: 70 (2019 20:00) (65 - 102)  BP: 111/75 (2019 20:00) (76/55 - 114/79)  BP(mean): 87 (2019 20:00) (62 - 91)  RR: 23 (2019 20:00) (15 - 30)  SpO2: 100% (2019 20:00) (96% - 100%)    Review of systems:  very lethargic so cant be review of system         PHYSICAL EXAM:  General :NAD, jaundiced  Constitutional: well-groomed, well-developed  Respiratory: CTAB/L   Cardiovascular: S1 and S2, RRR, no M/G/R  Gastrointestinal: BS+, distended+  Extremities: No peripheral edema  Vascular: 2+ peripheral pulses  Skin: + rashes      LABS:                        6.8    21.90 )-----------( 236      ( 2019 12:29 )             19.7         132<L>  |  94<L>  |  88<H>  ----------------------------<  106<H>  4.3   |  25  |  4.10<H>    Ca    8.3<L>      2019 04:20  Phos  4.5       Mg     2.7         TPro  6.8  /  Alb  3.4  /  TBili  7.7<H>  /  DBili  6.07<H>  /  AST  116<H>  /  ALT  39  /  AlkPhos  396<H>      Urinalysis Basic - ( 2019 22:45 )    Color: Yellow / Appearance: Slightly Turbid / S.020 / pH: x  Gluc: x / Ketone: Small  / Bili: Moderate / Urobili: 4 mg/dL   Blood: x / Protein: 30 mg/dL / Nitrite: Positive   Leuk Esterase: Small / RBC: 0-2 /HPF / WBC 6-10   Sq Epi: x / Non Sq Epi: Few / Bacteria: Many          MICROBIOLOGY:  RECENT CULTURES:   .Body Fluid ascites XXXX   polymorphonuclear leukocytes seen  No organisms seen  by cytocentrifuge   No growth     .Blood Blood-Peripheral XXXX XXXX   No growth to date.     .Urine Clean Catch (Midstream) XXXX XXXX   No growth          RADIOLOGY & ADDITIONAL STUDIES:      CT abdomen and pelvis:  IMPRESSION:       1. Diffuse ascites.  2. Hepatomegaly with cirrhotic changes and fatty infiltration.   Heterogeneity within the liver, and tumor/hepatocellular carcinoma cannot   be excluded.  3. Soft tissue thickening and/or edematous changes throughout the   mesentery and omental fat. Omental tumor versus omental edema is within   the differential.

## 2019-01-29 DIAGNOSIS — N17.9 ACUTE KIDNEY FAILURE, UNSPECIFIED: ICD-10-CM

## 2019-01-29 LAB
ALBUMIN SERPL ELPH-MCNC: 3.8 G/DL — SIGNIFICANT CHANGE UP (ref 3.3–5)
ALP SERPL-CCNC: 403 U/L — HIGH (ref 40–120)
ALT FLD-CCNC: 37 U/L — SIGNIFICANT CHANGE UP (ref 12–78)
AMMONIA BLD-MCNC: 44 UMOL/L — HIGH (ref 11–32)
ANION GAP SERPL CALC-SCNC: 15 MMOL/L — SIGNIFICANT CHANGE UP (ref 5–17)
AST SERPL-CCNC: 132 U/L — HIGH (ref 15–37)
BILIRUB DIRECT SERPL-MCNC: 8.34 MG/DL — HIGH (ref 0.05–0.2)
BILIRUB INDIRECT FLD-MCNC: 2.9 MG/DL — HIGH (ref 0.2–1)
BILIRUB SERPL-MCNC: 11.2 MG/DL — HIGH (ref 0.2–1.2)
BUN SERPL-MCNC: 110 MG/DL — HIGH (ref 7–23)
CALCIUM SERPL-MCNC: 8.2 MG/DL — LOW (ref 8.5–10.1)
CHLORIDE SERPL-SCNC: 97 MMOL/L — SIGNIFICANT CHANGE UP (ref 96–108)
CO2 SERPL-SCNC: 21 MMOL/L — LOW (ref 22–31)
CREAT SERPL-MCNC: 5.12 MG/DL — HIGH (ref 0.5–1.3)
CULTURE RESULTS: NO GROWTH — SIGNIFICANT CHANGE UP
CULTURE RESULTS: SIGNIFICANT CHANGE UP
CULTURE RESULTS: SIGNIFICANT CHANGE UP
GLUCOSE SERPL-MCNC: 71 MG/DL — SIGNIFICANT CHANGE UP (ref 70–99)
HCT VFR BLD CALC: 29.8 % — LOW (ref 39–50)
HCT VFR BLD CALC: 31.6 % — LOW (ref 39–50)
HGB BLD-MCNC: 10.5 G/DL — LOW (ref 13–17)
HGB BLD-MCNC: 10.8 G/DL — LOW (ref 13–17)
MAGNESIUM SERPL-MCNC: 3 MG/DL — HIGH (ref 1.6–2.6)
MCHC RBC-ENTMCNC: 34.2 GM/DL — SIGNIFICANT CHANGE UP (ref 32–36)
MCHC RBC-ENTMCNC: 35 PG — HIGH (ref 27–34)
MCHC RBC-ENTMCNC: 35.2 GM/DL — SIGNIFICANT CHANGE UP (ref 32–36)
MCHC RBC-ENTMCNC: 35.6 PG — HIGH (ref 27–34)
MCV RBC AUTO: 101 FL — HIGH (ref 80–100)
MCV RBC AUTO: 102.3 FL — HIGH (ref 80–100)
NRBC # BLD: 0 /100 WBCS — SIGNIFICANT CHANGE UP (ref 0–0)
NRBC # BLD: 1 /100 WBCS — HIGH (ref 0–0)
PHOSPHATE SERPL-MCNC: 5.8 MG/DL — HIGH (ref 2.5–4.5)
PLATELET # BLD AUTO: 297 K/UL — SIGNIFICANT CHANGE UP (ref 150–400)
PLATELET # BLD AUTO: 302 K/UL — SIGNIFICANT CHANGE UP (ref 150–400)
POTASSIUM SERPL-MCNC: 4.2 MMOL/L — SIGNIFICANT CHANGE UP (ref 3.5–5.3)
POTASSIUM SERPL-SCNC: 4.2 MMOL/L — SIGNIFICANT CHANGE UP (ref 3.5–5.3)
PROT SERPL-MCNC: 7 GM/DL — SIGNIFICANT CHANGE UP (ref 6–8.3)
RBC # BLD: 2.95 M/UL — LOW (ref 4.2–5.8)
RBC # BLD: 3.09 M/UL — LOW (ref 4.2–5.8)
RBC # FLD: 24.3 % — HIGH (ref 10.3–14.5)
RBC # FLD: 24.7 % — HIGH (ref 10.3–14.5)
SODIUM SERPL-SCNC: 133 MMOL/L — LOW (ref 135–145)
SPECIMEN SOURCE: SIGNIFICANT CHANGE UP
WBC # BLD: 23.35 K/UL — HIGH (ref 3.8–10.5)
WBC # BLD: 28.92 K/UL — HIGH (ref 3.8–10.5)
WBC # FLD AUTO: 23.35 K/UL — HIGH (ref 3.8–10.5)
WBC # FLD AUTO: 28.92 K/UL — HIGH (ref 3.8–10.5)

## 2019-01-29 PROCEDURE — 99291 CRITICAL CARE FIRST HOUR: CPT

## 2019-01-29 RX ORDER — SODIUM CHLORIDE 9 MG/ML
1000 INJECTION, SOLUTION INTRAVENOUS ONCE
Qty: 0 | Refills: 0 | Status: COMPLETED | OUTPATIENT
Start: 2019-01-29 | End: 2019-01-29

## 2019-01-29 RX ORDER — ALBUMIN HUMAN 25 %
50 VIAL (ML) INTRAVENOUS EVERY 24 HOURS
Qty: 0 | Refills: 0 | Status: DISCONTINUED | OUTPATIENT
Start: 2019-01-29 | End: 2019-02-04

## 2019-01-29 RX ORDER — PANTOPRAZOLE SODIUM 20 MG/1
40 TABLET, DELAYED RELEASE ORAL EVERY 12 HOURS
Qty: 0 | Refills: 0 | Status: DISCONTINUED | OUTPATIENT
Start: 2019-01-29 | End: 2019-02-16

## 2019-01-29 RX ADMIN — Medication 1 MILLIGRAM(S): at 22:41

## 2019-01-29 RX ADMIN — HEPARIN SODIUM 5000 UNIT(S): 5000 INJECTION INTRAVENOUS; SUBCUTANEOUS at 17:37

## 2019-01-29 RX ADMIN — Medication 1 TABLET(S): at 12:41

## 2019-01-29 RX ADMIN — MIDODRINE HYDROCHLORIDE 15 MILLIGRAM(S): 2.5 TABLET ORAL at 06:21

## 2019-01-29 RX ADMIN — Medication 50 MILLILITER(S): at 12:43

## 2019-01-29 RX ADMIN — CHLORHEXIDINE GLUCONATE 1 APPLICATION(S): 213 SOLUTION TOPICAL at 06:21

## 2019-01-29 RX ADMIN — SODIUM CHLORIDE 4000 MILLILITER(S): 9 INJECTION, SOLUTION INTRAVENOUS at 10:19

## 2019-01-29 RX ADMIN — OCTREOTIDE ACETATE 100 MICROGRAM(S): 200 INJECTION, SOLUTION INTRAVENOUS; SUBCUTANEOUS at 15:09

## 2019-01-29 RX ADMIN — MEROPENEM 100 MILLIGRAM(S): 1 INJECTION INTRAVENOUS at 06:20

## 2019-01-29 RX ADMIN — MEROPENEM 100 MILLIGRAM(S): 1 INJECTION INTRAVENOUS at 17:37

## 2019-01-29 RX ADMIN — Medication 1 PATCH: at 22:45

## 2019-01-29 RX ADMIN — OCTREOTIDE ACETATE 100 MICROGRAM(S): 200 INJECTION, SOLUTION INTRAVENOUS; SUBCUTANEOUS at 06:23

## 2019-01-29 RX ADMIN — OCTREOTIDE ACETATE 100 MICROGRAM(S): 200 INJECTION, SOLUTION INTRAVENOUS; SUBCUTANEOUS at 22:37

## 2019-01-29 RX ADMIN — MIDODRINE HYDROCHLORIDE 15 MILLIGRAM(S): 2.5 TABLET ORAL at 22:37

## 2019-01-29 RX ADMIN — Medication 1 MILLIGRAM(S): at 12:41

## 2019-01-29 RX ADMIN — Medication 6.81 MICROGRAM(S)/KG/MIN: at 02:57

## 2019-01-29 RX ADMIN — Medication 1 PATCH: at 12:42

## 2019-01-29 RX ADMIN — MIDODRINE HYDROCHLORIDE 15 MILLIGRAM(S): 2.5 TABLET ORAL at 13:06

## 2019-01-29 RX ADMIN — Medication 100 MILLIGRAM(S): at 12:41

## 2019-01-29 RX ADMIN — Medication 1 PATCH: at 06:23

## 2019-01-29 RX ADMIN — PANTOPRAZOLE SODIUM 10 MG/HR: 20 TABLET, DELAYED RELEASE ORAL at 08:21

## 2019-01-29 RX ADMIN — PANTOPRAZOLE SODIUM 40 MILLIGRAM(S): 20 TABLET, DELAYED RELEASE ORAL at 17:38

## 2019-01-29 RX ADMIN — HEPARIN SODIUM 5000 UNIT(S): 5000 INJECTION INTRAVENOUS; SUBCUTANEOUS at 06:21

## 2019-01-29 NOTE — PROGRESS NOTE ADULT - SUBJECTIVE AND OBJECTIVE BOX
Patient is a 48y old  Male who presents with a chief complaint of Coffee ground emesis, progressive jaundice, syncopal episodes. (2019 18:02)      HPI:  49 y/o male PMH HTN (not on medication), chronic alcohol abuse and dependence x 15 years (1/2 pint of vodka with cranberry juice daily), hx of alcohol withdrawal 2013 hospitalized at [facility] after which he went to inpatient rehab at [facility] for syncope and melanotic stools. History obtained from ex wife, mother, and patient. Since pt left rehab in  pt has been drinking daily. Last drink 11AM today. Pt state for past one and a half months he has had progressive weight loss with loss of appetite. He has had in increased abdominal distention for the past 1 month and in the past 1 week he has noticed jaundice and scleral icterus. Pt also reports generalized fatigue. Reports coffee ground emesis x1 episode 3 days ago but none since then with melena for about a week. Pt states he has 3 bowel movements a day and now has also been seeing "blood when I pee" also within the last week. No abdominal pain. No CP no SOB. No fevers or chills. Today pt called ex wife after he was unsteady with his gait and "fell", he denies LOC. Denies trauma to head. Ex wife reports while attempting to get pt to the car she noticed he was "very weak" and witnessed pt to have had 3 syncopal episodes lasting a few minutes each before regaining consciousness, but no seizures. Ex wife state that in the car, pt had a bowel movement that was black and tarry.     In ED pt noted to be hypotensive SBP 80-90s. 2L IVF given with 3rd and 4th infusing. SBP post IVF resuscitation 100s to one teens. On labs pt with leukocytosis with 3% bands, Na 123, Cr 2.05, INR 2.05, T bili 6, , ALT 55, Alk phos 799.  Pt seen by critical care and not felt to be ICU candidate. (2019 22:33)      INTERVAL HPI/OVERNIGHT EVENTS:  Patient seen earlier today.  Lethargic. s/p blood transfusions. HGB 10.8    MEDICATIONS  (STANDING):  albumin human 25% IVPB 50 milliLiter(s) IV Intermittent every 24 hours  chlorhexidine 4% Liquid 1 Application(s) Topical <User Schedule>  folic acid 1 milliGRAM(s) Oral daily  heparin  Injectable 5000 Unit(s) SubCutaneous every 12 hours  lactulose Syrup 10 Gram(s) Oral two times a day  meropenem  IVPB 500 milliGRAM(s) IV Intermittent every 12 hours  midodrine 15 milliGRAM(s) Oral every 8 hours  multivitamin 1 Tablet(s) Oral daily  nicotine -  14 mG/24Hr(s) Patch 1 patch Transdermal daily  norepinephrine Infusion 0.05 MICROgram(s)/kG/Min (6.806 mL/Hr) IV Continuous <Continuous>  octreotide  Injectable 100 MICROGram(s) SubCutaneous every 8 hours  pantoprazole  Injectable 40 milliGRAM(s) IV Push every 12 hours  thiamine 100 milliGRAM(s) Oral daily    MEDICATIONS  (PRN):  LORazepam   Injectable 1 milliGRAM(s) IV Push every 4 hours PRN for CIWA greater than 8      FAMILY HISTORY:  No pertinent family history in first degree relatives      Allergies    No Known Allergies    Intolerances        PMH/PSH:  ETOH abuse  Psoriasis  History of hypertension  No significant past surgical history        REVIEW OF SYSTEMS:  CONSTITUTIONAL: No fever, weight loss,   EYES: No eye pain, visual disturbances, or discharge  ENMT:  No difficulty hearing, tinnitus, vertigo; No sinus or throat pain  NECK: No pain or stiffness  BREASTS: No pain, masses, or nipple discharge  RESPIRATORY: No cough, wheezing, chills or hemoptysis; No shortness of breath  CARDIOVASCULAR: No chest pain, palpitations, dizziness, or leg swelling  GASTROINTESTINAL: See above  GENITOURINARY: No dysuria, frequency, hematuria, or incontinence  NEUROLOGICAL: No headaches,  numbness, or tremors  SKIN: No itching, burning, rashes, or lesions   LYMPH NODES: No enlarged glands  ENDOCRINE: No heat or cold intolerance; No hair loss  MUSCULOSKELETAL: No joint pain or swelling; No muscle, back, or extremity pain  PSYCHIATRIC: No depression, anxiety, mood swings, or difficulty sleeping  HEME/LYMPH: No easy bruising, or bleeding gums  ALLERGY AND IMMUNOLOGIC: No hives or eczema    Vital Signs Last 24 Hrs  T(C): 36.9 (2019 19:04), Max: 37 (2019 16:01)  T(F): 98.4 (2019 19:04), Max: 98.6 (2019 16:01)  HR: 85 (2019 18:00) (67 - 89)  BP: 105/74 (2019 18:00) (100/66 - 130/84)  BP(mean): 82 (2019 18:00) (78 - 103)  RR: 26 (2019 18:00) (17 - 28)  SpO2: 91% (2019 18:00) (91% - 100%)    PHYSICAL EXAM:  GENERAL: NAD, well-groomed, well-developed  HEAD:  Atraumatic, Normocephalic  EYES: EOMI, PERRLA, conjunctiva and sclera clear  ENMT: No tonsillar erythema, exudates, or enlargement; Moist mucous membranes, Good dentition, No lesions  NECK: Supple, No JVD, Normal thyroid  NERVOUS SYSTEM:  Alert but lethargic, no asterixis  CHEST/LUNG: Clear to percussion bilaterally; No rales, rhonchi, wheezing, or rubs  HEART: Regular rate and rhythm; No murmurs, rubs, or gallops  ABDOMEN: Soft, Nontender, Nondistended; Bowel sounds present  EXTREMITIES:  2+ Peripheral Pulses, No clubbing, cyanosis, or edema  LYMPH: No lymphadenopathy noted  SKIN: No rashes or lesions    LAB                          10.5   28.92 )-----------( 297      ( 2019 16:48 )             29.8       CBC:   @ 16:48  WBC 28.92   Hgb 10.5   Hct 29.8   Plts 297  .0   @ 04:09  WBC 23.35   Hgb 10.8   Hct 31.6   Plts 302  .3   @ 12:29  WBC 21.90   Hgb 6.8   Hct 19.7   Plts 236  .2   @ 04:20  WBC 19.63   Hgb 7.5   Hct 21.8   Plts 258  .0   @ 09:56  WBC 23.31   Hgb 8.4   Hct 24.2   Plts 313  .5   @ 08:11  WBC 26.74   Hgb 8.6   Hct 24.8   Plts 268  .9   @ 08:43  WBC 21.76   Hgb 8.3   Hct 23.8   Plts 250  .7   @ 23:33  WBC 24.14   Hgb 8.1   Hct 23.1   Plts 232  .0   19:37  WBC 25.76   Hgb 8.5   Hct 24.3   Plts 237  .0   @ 11:11  WBC 25.20   Hgb 8.6   Hct 24.4   Plts 259  .5      Chemistry:   @ 04:09  Na+ 133  K+ 4.2  Cl- 97  CO2 21    Cr 5.12      @ 04:20  Na+ 132  K+ 4.3  Cl- 94  CO2 25  BUN 88  Cr 4.10      09:56  Na+ 132  K+ 4.0  Cl- 95  CO2 25  BUN 77  Cr 2.87      @ 08:11  Na+ 133  K+ 3.7  Cl- 96  CO2 27  BUN 57  Cr 1.51      @ 08:43  Na+ 130  K+ 3.5  Cl- 91  CO2 27  BUN 49  Cr 1.61      @ 08:11  Na+ 126  K+ 3.8  Cl- 89  CO2 20  BUN 32  Cr 1.76      @ 16:55  Na+ 123  K+ 5.3  Cl- 79  CO2 24  BUN 26  Cr 2.05         Glucose, Serum: 71 mg/dL ( @ 04:09)  Glucose, Serum: 106 mg/dL ( @ 04:20)  Glucose, Serum: 173 mg/dL ( @ 09:56)  Glucose, Serum: 186 mg/dL ( @ 08:11)  Glucose, Serum: 153 mg/dL ( @ 08:43)  Glucose, Serum: 129 mg/dL ( @ 08:11)  Glucose, Serum: 148 mg/dL ( @ 16:55)      2019 04:09    133    |  97     |  110    ----------------------------<  71     4.2     |  21     |  5.12   2019 04:20    132    |  94     |  88     ----------------------------<  106    4.3     |  25     |  4.10   2019 09:56    132    |  95     |  77     ----------------------------<  173    4.0     |  25     |  2.87   2019 08:11    133    |  96     |  57     ----------------------------<  186    3.7     |  27     |  1.51   2019 08:43    130    |  91     |  49     ----------------------------<  153    3.5     |  27     |  1.61   2019 08:11    126    |  89     |  32     ----------------------------<  129    3.8     |  20     |  1.76   2019 16:55    123    |  79     |  26     ----------------------------<  148    5.3     |  24     |  2.05     Ca    8.2        2019 04:09  Ca    8.3        2019 04:20  Ca    8.0        2019 09:56  Ca    8.0        2019 08:11  Ca    7.7        2019 08:43  Ca    7.3        2019 08:11  Ca    7.8        2019 16:55  Phos  5.8       2019 04:09  Phos  4.5       2019 04:20  Phos  2.6       2019 09:56  Phos  1.3       2019 08:11  Phos  1.8       2019 08:43  Phos  2.6       2019 08:11  Mg     3.0       2019 04:09  Mg     2.7       2019 04:20  Mg     2.4       2019 09:56  Mg     2.5       2019 08:11  Mg     2.5       2019 08:43  Mg     2.2       2019 08:11  Mg     2.3       2019 16:55    TPro  7.0    /  Alb  3.8    /  TBili  11.2   /  DBili  8.34   /  AST  132    /  ALT  37     /  AlkPhos  403    2019 04:09  TPro  6.8    /  Alb  3.4    /  TBili  7.7    /  DBili  6.07   /  AST  116    /  ALT  39     /  AlkPhos  396    2019 04:20  TPro  6.0    /  Alb  2.1    /  TBili  7.2    /  DBili  6.30   /  AST  160    /  ALT  44     /  AlkPhos  521    2019 08:11  TPro  6.6    /  Alb  2.2    /  TBili  8.3    /  DBili  x      /  AST  205    /  ALT  48     /  AlkPhos  608    2019 08:43  TPro  6.2    /  Alb  2.0    /  TBili  7.3    /  DBili  6.02   /  AST  304    /  ALT  55     /  AlkPhos  685    2019 08:11  TPro  6.6    /  Alb  2.1    /  TBili  6.0    /  DBili  x      /  AST  215    /  ALT  55     /  AlkPhos  799    2019 16:55      PT/INR - ( 2019 04:20 )   PT: 20.3 sec;   INR: 1.78 ratio         PTT - ( 2019 04:20 )  PTT:32.6 sec    Urinalysis Basic - ( 2019 22:45 )    Color: Yellow / Appearance: Slightly Turbid / S.020 / pH: x  Gluc: x / Ketone: Small  / Bili: Moderate / Urobili: 4 mg/dL   Blood: x / Protein: 30 mg/dL / Nitrite: Positive   Leuk Esterase: Small / RBC: 0-2 /HPF / WBC 6-10   Sq Epi: x / Non Sq Epi: Few / Bacteria: Many        CAPILLARY BLOOD GLUCOSE              RADIOLOGY & ADDITIONAL TESTS:    Imaging Personally Reviewed:  [ ] YES  [ ] NO    Consultant(s) Notes Reviewed:  [ ] YES  [ ] NO    Care Discussed with Consultants/Other Providers [ ] YES  [ ] NO

## 2019-01-29 NOTE — PROGRESS NOTE ADULT - ASSESSMENT
HPI:  47 y/o male PMH HTN (not on medication), chronic alcohol abuse and dependence x 15 years (1/2 pint of vodka with cranberry juice daily), hx of alcohol withdrawal 2013 hospitalized at [facility] after which he went to inpatient rehab at [facility] presents for syncope and melanotic stools. History obtained from ex wife, mother, and patient. Since pt left rehab in 2013 pt has been drinking daily. Last drink 11AM today. Pt state for past one and a half months he has had progressive weight loss with loss of appetite. He has had in increased abdominal distention for the past 1 month and in the past 1 week he has noticed jaundice and scleral icterus. Pt also reports generalized fatigue. Reports coffee ground emesis x1 episode 3 days ago but none since then with melena for about a week. Pt states he has 3 bowel movements a day and now has also been seeing "blood when I pee" also within the last week. No abdominal pain. No CP no SOB. No fevers or chills. Today pt called ex wife after he was unsteady with his gait and "fell", he denies LOC. Denies trauma to head. Ex wife reports while attempting to get pt to the car she noticed he was "very weak" and witnessed pt to have had 3 syncopal episodes lasting a few minutes each before regaining consciousness, but no seizures. Ex wife state that in the car, pt had a bowel movement that was black and tarry.     In ED pt noted to be hypotensive SBP 80-90s. 2L IVF given with 3rd and 4th infusing. SBP post IVF resuscitation 100s to one teens. On labs pt with leukocytosis with 3% bands, Na 123, Cr 2.05, INR 2.05, T bili 6, , ALT 55, Alk phos 799.  Pt seen by critical care and not felt to be ICU candidate. (23 Jan 2019 22:33)  ----------------- As Above ------------------------------------------------------  Patient confused. Left message for family member to contact me   ETOH Abuse (+). Coffee ground emesis x 1, melena.   Eleveated LFTs, INR  See labs, CT Scan    Cirrhosis, ascites, fever - 1) Paracentesis 2) ammonia level  3) f/u LFTs 4) treat for impending Dts  - patient does not fit into the criteria for treatment with steroids.

## 2019-01-29 NOTE — PROGRESS NOTE ADULT - PROBLEM SELECTOR PLAN 2
LFTs worse. Not a candidate for steroids. See paracentesis results. 11.2 / 132 / 137 / 403. prognosis poor

## 2019-01-29 NOTE — PROGRESS NOTE ADULT - SUBJECTIVE AND OBJECTIVE BOX
Overnight events: S/p 2 U pRBCs yesterday with good response. Family agreed to make patient DNR, decided to hold off on central line placement. Pt denies pain, dyspnea, fevers, chills, n/v/d. Remains on levophed for BP support. Cr worsening with decreased UO.     ROS:  CONSTITUTIONAL: No weakness, fevers or chills  EYES/ENT: No visual changes;  No vertigo or throat pain   NECK: No pain or stiffness  RESPIRATORY: No cough, wheezing, hemoptysis; No shortness of breath  CARDIOVASCULAR: No chest pain or palpitations  GASTROINTESTINAL: No abdominal or epigastric pain. No nausea, vomiting, or hematemesis; No diarrhea or constipation. No melena or hematochezia.  GENITOURINARY: No dysuria, frequency or hematuria  NEUROLOGICAL: No numbness or weakness  SKIN: No itching, rashes    ICU Vital Signs Last 24 Hrs  Vital Signs Last 24 Hrs  T(C): 36.3 (29 Jan 2019 07:16), Max: 37.1 (28 Jan 2019 16:30)  T(F): 97.3 (29 Jan 2019 07:16), Max: 98.8 (28 Jan 2019 16:30)  HR: 79 (29 Jan 2019 07:00) (65 - 83)  BP: 119/88 (29 Jan 2019 07:00) (76/55 - 130/84)  BP(mean): 98 (29 Jan 2019 07:00) (63 - 103)  RR: 20 (29 Jan 2019 07:00) (15 - 26)  SpO2: 100% (29 Jan 2019 07:00) (97% - 100%)      ## Physical Exam:  Gen: No acute distress, lying in bed. Eyes open, answers questions.  Neuro: A&Ox2 (could not name year), no asterixis. Moving all extremities, following commands.   HEENT: +jaundice  Respiratory: tachypnea, clear bs b/l  CV: S1/S2, no murmurs rubs or gallops  Abd: +fluid wave, +BS  Extremities: +pitting edema up to mid-shin      MEDICATIONS  (STANDING):  MEDICATIONS  (STANDING):  chlorhexidine 4% Liquid 1 Application(s) Topical <User Schedule>  folic acid 1 milliGRAM(s) Oral daily  heparin  Injectable 5000 Unit(s) SubCutaneous every 12 hours  lactulose Syrup 10 Gram(s) Oral two times a day  meropenem  IVPB 500 milliGRAM(s) IV Intermittent every 12 hours  midodrine 15 milliGRAM(s) Oral every 8 hours  multivitamin 1 Tablet(s) Oral daily  nicotine -  14 mG/24Hr(s) Patch 1 patch Transdermal daily  norepinephrine Infusion 0.05 MICROgram(s)/kG/Min (6.806 mL/Hr) IV Continuous <Continuous>  octreotide  Injectable 100 MICROGram(s) SubCutaneous every 8 hours  pantoprazole Infusion 8 mG/Hr (10 mL/Hr) IV Continuous <Continuous>  thiamine 100 milliGRAM(s) Oral daily    MEDICATIONS  (PRN):  LORazepam   Injectable 1 milliGRAM(s) IV Push every 4 hours PRN for CIWA greater than 8      01-29    133<L>  |  97  |  110<H>  ----------------------------<  71  4.2   |  21<L>  |  5.12<H>    Ca    8.2<L>      29 Jan 2019 04:09  Phos  5.8     01-29  Mg     3.0     01-29    TPro  7.0  /  Alb  3.8  /  TBili  11.2<H>  /  DBili  8.34<H>  /  AST  132<H>  /  ALT  37  /  AlkPhos  403<H>  01-29

## 2019-01-29 NOTE — PROGRESS NOTE ADULT - ASSESSMENT
47 y/o M w/ ETOH abuse and HTN who presents with UGIB and PAPO in context of decompensated cirrhosis with worsening renal and hepatic function. Family is aware of poor prognosis and patient is now DNR.     Neuro  - con't lactulose and titrate to 3BM/day for HE  - Mental status slightly improved from yesterday    Pulm  - Pt tachypnic but saturating well overnight on minimal supplemental O2  - ABG (01/27): 7.53/27/66/23  - Con't to monitor oxygen saturation    Renal  - Worsening renal function (Cr 5.12<-4.1)   - Renal consult yesterday, likely prerenal vs ischemic vs HRS  - Urine Na < 20, Urine Cr 199  - Con't octreotide, midodrine, albumin    CV  - BP stable in 's overnight.  - Con't levo to maintain MAP     GI  - s/p 2U pRBCs w/ good response in Hb  - Con't to monitor serial Hb, active type and screen  - Con't IV PPI, octreotide  - Hold off EGD given critical condition  - ? omental mass vs edema - no workup yet    ID  - Con't empiric meropenem  - Appreciate ID recs    PPX:  - Con't DVT PPX    Psych:  - CIWA protocol for hx of alcoholism

## 2019-01-29 NOTE — CHART NOTE - NSCHARTNOTEFT_GEN_A_CORE
Assessment: Pt seen for CCU admission & continues chronic severe malnutrition. Pt with s/p coffee ground emesis on admission with progressive jaundice. Pt with past 1 2/2months of decreased po intake & appetite & wt loss due to chronic ETOHA x 15 years. Pt with cirrhosis, ascites & alcoholic hepatitis. S/p paracentesis 1/25 with 7 liters removed. Pending EGD.     Factors impacting intake: [ ] none [ ] nausea  [ ] vomiting [ ] diarrhea [ ] constipation  [ ]chewing problems [ ] swallowing issues  [x ] other: ascites/distended abdomen & gastro intestinal hemorrhage    Diet Prescription: Diet, Full Liquid (01-29-19 @ 08:51)    Intake: Pt NPO/Clear liquids x 5 days. Pt's diet advanced today. Pt consumed 25% for lunch today. Pt consumed 100% ice cream. Pt states he would enjoy ice cream & OJ with meals & agrees to drink strawberry Ensure Enlive with meals. Pt with poor appetite & po intake due to ascites & liver cirrhosis.    Current Weight: Weight (kg): Wt=86.8kg(1/28), Wt=77kg(1/24)  % Weight Change  9.8kg wt gain x 4 days    Physical appearance: +2 generalized edema    Pertinent Medications: MEDICATIONS  (STANDING):  albumin human 25% IVPB 50 milliLiter(s) IV Intermittent every 24 hours  chlorhexidine 4% Liquid 1 Application(s) Topical <User Schedule>  folic acid 1 milliGRAM(s) Oral daily  heparin  Injectable 5000 Unit(s) SubCutaneous every 12 hours  lactulose Syrup 10 Gram(s) Oral two times a day  meropenem  IVPB 500 milliGRAM(s) IV Intermittent every 12 hours  midodrine 15 milliGRAM(s) Oral every 8 hours  multivitamin 1 Tablet(s) Oral daily  nicotine -  14 mG/24Hr(s) Patch 1 patch Transdermal daily  norepinephrine Infusion 0.05 MICROgram(s)/kG/Min (6.806 mL/Hr) IV Continuous <Continuous>  octreotide  Injectable 100 MICROGram(s) SubCutaneous every 8 hours  pantoprazole Infusion 8 mG/Hr (10 mL/Hr) IV Continuous <Continuous>  thiamine 100 milliGRAM(s) Oral daily    MEDICATIONS  (PRN):  LORazepam   Injectable 1 milliGRAM(s) IV Push every 4 hours PRN for CIWA greater than 8    Pertinent Labs: 01-29 Na 133 mmol/L<L> Glu 71 mg/dL K+ 4.2 mmol/L Cr 5.12 mg/dL<H>  mg/dL<H> Phos 5.8 mg/dL<H> Alb 3.8 g/dL PAB n/a   Hgb 10.8 g/dL<L> Hct 31.6 %<L> HgA1C n/a    Glucose, Serum: 71 mg/dL   24Hr FS:  Skin: intact    Estimated Needs:   [ ] no change since previous assessment  [x ] recalculated: energy needs 1900-2200kcal (30-35kcal/kg IBW) & protein needs 75-85gm protein (1.2-1.4gm/kg IBW) & fluid needs 1600ml (25ml/kg IBW)    Previous Nutrition Diagnosis:   [ ] Inadequate Energy Intake [ ]Inadequate Oral Intake [ ] Excessive Energy Intake   [ ] Underweight [ ] Increased Nutrient Needs [ ] Overweight/Obesity   [ ] Altered GI Function [ ] Unintended Weight Loss [ ] Food & Nutrition Related Knowledge Deficit [x ]Chronic severe Malnutrition [ ] moderate malnutrition    Nutrition Diagnosis is [x ] ongoing  [ ] resolved  [ ] improved  [ ] not applicable   Previous Goal: Pt to tolerate po diet without GI distress; met  Pt to consume >75% energy & protein needs; not met    New Nutrition Diagnosis: [ x] not applicable       Interventions:   Recommend  [x ] Continue: Full liquids & provide OJ & Ice cream with meals  [ ] Change Diet To:  [x ] Nutrition Supplement:  Strawberry Ensure Enlive 3 x day(350kcal & 20gm protein)  [ ] Nutrition Support:  [x ] Other: Cater to food preferences    Monitoring and Evaluation:   [x ] PO intake [ x ] Tolerance to diet prescription [ x ] weights [ x ] labs[ x ] follow up per protocol  [ ] other:

## 2019-01-30 LAB
ALBUMIN SERPL ELPH-MCNC: 3 G/DL — LOW (ref 3.3–5)
ALP SERPL-CCNC: 411 U/L — HIGH (ref 40–120)
ALT FLD-CCNC: 37 U/L — SIGNIFICANT CHANGE UP (ref 12–78)
AMMONIA BLD-MCNC: 60 UMOL/L — HIGH (ref 11–32)
ANION GAP SERPL CALC-SCNC: 15 MMOL/L — SIGNIFICANT CHANGE UP (ref 5–17)
AST SERPL-CCNC: 139 U/L — HIGH (ref 15–37)
BASOPHILS # BLD AUTO: 0.12 K/UL — SIGNIFICANT CHANGE UP (ref 0–0.2)
BASOPHILS NFR BLD AUTO: 0.4 % — SIGNIFICANT CHANGE UP (ref 0–2)
BILIRUB DIRECT SERPL-MCNC: 7.14 MG/DL — HIGH (ref 0.05–0.2)
BILIRUB INDIRECT FLD-MCNC: 2.3 MG/DL — HIGH (ref 0.2–1)
BILIRUB SERPL-MCNC: 9.4 MG/DL — HIGH (ref 0.2–1.2)
BUN SERPL-MCNC: 114 MG/DL — HIGH (ref 7–23)
CALCIUM SERPL-MCNC: 7.7 MG/DL — LOW (ref 8.5–10.1)
CHLORIDE SERPL-SCNC: 100 MMOL/L — SIGNIFICANT CHANGE UP (ref 96–108)
CO2 SERPL-SCNC: 18 MMOL/L — LOW (ref 22–31)
CREAT SERPL-MCNC: 5.61 MG/DL — HIGH (ref 0.5–1.3)
CULTURE RESULTS: SIGNIFICANT CHANGE UP
EOSINOPHIL # BLD AUTO: 0.27 K/UL — SIGNIFICANT CHANGE UP (ref 0–0.5)
EOSINOPHIL NFR BLD AUTO: 1 % — SIGNIFICANT CHANGE UP (ref 0–6)
GLUCOSE SERPL-MCNC: 139 MG/DL — HIGH (ref 70–99)
GRAM STN FLD: SIGNIFICANT CHANGE UP
HCT VFR BLD CALC: 29.8 % — LOW (ref 39–50)
HGB BLD-MCNC: 10.4 G/DL — LOW (ref 13–17)
HYPOCHROMIA BLD QL: SLIGHT — SIGNIFICANT CHANGE UP
IMM GRANULOCYTES NFR BLD AUTO: 3.7 % — HIGH (ref 0–1.5)
LYMPHOCYTES # BLD AUTO: 2.2 K/UL — SIGNIFICANT CHANGE UP (ref 1–3.3)
LYMPHOCYTES # BLD AUTO: 8.1 % — LOW (ref 13–44)
MACROCYTES BLD QL: SIGNIFICANT CHANGE UP
MAGNESIUM SERPL-MCNC: 2.9 MG/DL — HIGH (ref 1.6–2.6)
MANUAL SMEAR VERIFICATION: SIGNIFICANT CHANGE UP
MCHC RBC-ENTMCNC: 34.9 GM/DL — SIGNIFICANT CHANGE UP (ref 32–36)
MCHC RBC-ENTMCNC: 35.4 PG — HIGH (ref 27–34)
MCV RBC AUTO: 101.4 FL — HIGH (ref 80–100)
MONOCYTES # BLD AUTO: 2.49 K/UL — HIGH (ref 0–0.9)
MONOCYTES NFR BLD AUTO: 9.2 % — SIGNIFICANT CHANGE UP (ref 2–14)
NEUTROPHILS # BLD AUTO: 21.08 K/UL — HIGH (ref 1.8–7.4)
NEUTROPHILS NFR BLD AUTO: 77.6 % — HIGH (ref 43–77)
NON-GYNECOLOGICAL CYTOLOGY STUDY: SIGNIFICANT CHANGE UP
NRBC # BLD: 0 /100 WBCS — SIGNIFICANT CHANGE UP (ref 0–0)
PHOSPHATE SERPL-MCNC: 5.7 MG/DL — HIGH (ref 2.5–4.5)
PLAT MORPH BLD: NORMAL — SIGNIFICANT CHANGE UP
PLATELET # BLD AUTO: 282 K/UL — SIGNIFICANT CHANGE UP (ref 150–400)
POLYCHROMASIA BLD QL SMEAR: SLIGHT — SIGNIFICANT CHANGE UP
POTASSIUM SERPL-MCNC: 4.3 MMOL/L — SIGNIFICANT CHANGE UP (ref 3.5–5.3)
POTASSIUM SERPL-SCNC: 4.3 MMOL/L — SIGNIFICANT CHANGE UP (ref 3.5–5.3)
PROT SERPL-MCNC: 6.2 GM/DL — SIGNIFICANT CHANGE UP (ref 6–8.3)
RBC # BLD: 2.94 M/UL — LOW (ref 4.2–5.8)
RBC # FLD: 23.8 % — HIGH (ref 10.3–14.5)
RBC BLD AUTO: ABNORMAL
SMUDGE CELLS # BLD: PRESENT — SIGNIFICANT CHANGE UP
SODIUM SERPL-SCNC: 133 MMOL/L — LOW (ref 135–145)
SPECIMEN SOURCE: SIGNIFICANT CHANGE UP
TOXIC GRANULES BLD QL SMEAR: PRESENT — SIGNIFICANT CHANGE UP
WBC # BLD: 27.16 K/UL — HIGH (ref 3.8–10.5)
WBC # FLD AUTO: 27.16 K/UL — HIGH (ref 3.8–10.5)

## 2019-01-30 PROCEDURE — 99291 CRITICAL CARE FIRST HOUR: CPT

## 2019-01-30 RX ORDER — DEXMEDETOMIDINE HYDROCHLORIDE IN 0.9% SODIUM CHLORIDE 4 UG/ML
0.2 INJECTION INTRAVENOUS
Qty: 200 | Refills: 0 | Status: DISCONTINUED | OUTPATIENT
Start: 2019-01-30 | End: 2019-01-30

## 2019-01-30 RX ORDER — MIDODRINE HYDROCHLORIDE 2.5 MG/1
20 TABLET ORAL EVERY 8 HOURS
Qty: 0 | Refills: 0 | Status: DISCONTINUED | OUTPATIENT
Start: 2019-01-30 | End: 2019-02-08

## 2019-01-30 RX ADMIN — PANTOPRAZOLE SODIUM 40 MILLIGRAM(S): 20 TABLET, DELAYED RELEASE ORAL at 06:34

## 2019-01-30 RX ADMIN — OCTREOTIDE ACETATE 100 MICROGRAM(S): 200 INJECTION, SOLUTION INTRAVENOUS; SUBCUTANEOUS at 06:34

## 2019-01-30 RX ADMIN — Medication 100 MILLIGRAM(S): at 11:19

## 2019-01-30 RX ADMIN — OCTREOTIDE ACETATE 100 MICROGRAM(S): 200 INJECTION, SOLUTION INTRAVENOUS; SUBCUTANEOUS at 22:54

## 2019-01-30 RX ADMIN — HEPARIN SODIUM 5000 UNIT(S): 5000 INJECTION INTRAVENOUS; SUBCUTANEOUS at 06:34

## 2019-01-30 RX ADMIN — Medication 1 TABLET(S): at 11:19

## 2019-01-30 RX ADMIN — Medication 1 PATCH: at 11:44

## 2019-01-30 RX ADMIN — LACTULOSE 10 GRAM(S): 10 SOLUTION ORAL at 17:26

## 2019-01-30 RX ADMIN — PANTOPRAZOLE SODIUM 40 MILLIGRAM(S): 20 TABLET, DELAYED RELEASE ORAL at 17:24

## 2019-01-30 RX ADMIN — CHLORHEXIDINE GLUCONATE 1 APPLICATION(S): 213 SOLUTION TOPICAL at 06:35

## 2019-01-30 RX ADMIN — DEXMEDETOMIDINE HYDROCHLORIDE IN 0.9% SODIUM CHLORIDE 3.63 MICROGRAM(S)/KG/HR: 4 INJECTION INTRAVENOUS at 04:00

## 2019-01-30 RX ADMIN — Medication 50 MILLILITER(S): at 11:43

## 2019-01-30 RX ADMIN — MIDODRINE HYDROCHLORIDE 20 MILLIGRAM(S): 2.5 TABLET ORAL at 11:19

## 2019-01-30 RX ADMIN — MIDODRINE HYDROCHLORIDE 20 MILLIGRAM(S): 2.5 TABLET ORAL at 17:26

## 2019-01-30 RX ADMIN — HEPARIN SODIUM 5000 UNIT(S): 5000 INJECTION INTRAVENOUS; SUBCUTANEOUS at 17:25

## 2019-01-30 RX ADMIN — Medication 1 PATCH: at 08:00

## 2019-01-30 RX ADMIN — Medication 1 PATCH: at 11:53

## 2019-01-30 RX ADMIN — Medication 1 MILLIGRAM(S): at 11:19

## 2019-01-30 RX ADMIN — OCTREOTIDE ACETATE 100 MICROGRAM(S): 200 INJECTION, SOLUTION INTRAVENOUS; SUBCUTANEOUS at 14:17

## 2019-01-30 RX ADMIN — MEROPENEM 100 MILLIGRAM(S): 1 INJECTION INTRAVENOUS at 06:34

## 2019-01-30 NOTE — PROGRESS NOTE ADULT - ATTENDING COMMENTS
49 y/o M w/ ETOH abuse p/w decompensated ETOH cirrhosis, UGIB and PAPO. Overall condition is deteriorating and had GOC d/w family regarding prognosis and they are pursuing DNR/DNI but will not withdraw management. Pt w/ rapidly worsening renal fxn likely from HRS. No improvement w/ volume challenge with albumin or prbc yesterday. Cont HRS tx. No further GI bleeding but cont PPI and octerotide. Finish course of abx after 5 days. Pt still in shock on levophed.
49 y/o M w/ ETOH abuse p/w decompensated ETOH cirrhosis, UGIB and PAPO. Overall condition is poor and patient DNR/DNI but will not withdraw management. Pt w/ cont worsening renal fxn likely from HRS. Cont HRS tx. No further GI bleeding but cont PPI and octreotide. Finish course of abx today. Pt out of shock this AM and off levophed but on midodrine. Pt still w/ fluctuating MS due to encephalopathy from HE, uremia. Started on rifaximin for worsening MS earlier today but now is more alert and awake and comfortable.
pneumonia vs sbp   cont meropenem   follow up on the peritoneal fluid c/s and fluid analysis ,planned for paracentesis today

## 2019-01-30 NOTE — PROGRESS NOTE ADULT - PROBLEM SELECTOR PLAN 2
LFTs better. Being treated for hepaorenal. Not a candidate for steroids. See paracentesis results. 11.2 / 132 / 137 / 403.  - 9.4 / 139 / 37 / 411 prognosis poor

## 2019-01-30 NOTE — PROGRESS NOTE ADULT - ASSESSMENT
HPI:  49 y/o male PMH HTN (not on medication), chronic alcohol abuse and dependence x 15 years (1/2 pint of vodka with cranberry juice daily), hx of alcohol withdrawal 2013 hospitalized at [facility] after which he went to inpatient rehab at [facility] presents for syncope and melanotic stools. History obtained from ex wife, mother, and patient. Since pt left rehab in 2013 pt has been drinking daily. Last drink 11AM today. Pt state for past one and a half months he has had progressive weight loss with loss of appetite. He has had in increased abdominal distention for the past 1 month and in the past 1 week he has noticed jaundice and scleral icterus. Pt also reports generalized fatigue. Reports coffee ground emesis x1 episode 3 days ago but none since then with melena for about a week. Pt states he has 3 bowel movements a day and now has also been seeing "blood when I pee" also within the last week. No abdominal pain. No CP no SOB. No fevers or chills. Today pt called ex wife after he was unsteady with his gait and "fell", he denies LOC. Denies trauma to head. Ex wife reports while attempting to get pt to the car she noticed he was "very weak" and witnessed pt to have had 3 syncopal episodes lasting a few minutes each before regaining consciousness, but no seizures. Ex wife state that in the car, pt had a bowel movement that was black and tarry.     In ED pt noted to be hypotensive SBP 80-90s. 2L IVF given with 3rd and 4th infusing. SBP post IVF resuscitation 100s to one teens. On labs pt with leukocytosis with 3% bands, Na 123, Cr 2.05, INR 2.05, T bili 6, , ALT 55, Alk phos 799.  Pt seen by critical care and not felt to be ICU candidate. (23 Jan 2019 22:33)  ----------------- As Above ------------------------------------------------------  Patient confused. Left message for family member to contact me   ETOH Abuse (+). Coffee ground emesis x 1, melena.   Eleveated LFTs, INR  See labs, CT Scan    Cirrhosis, ascites, fever - 1) Paracentesis 2) ammonia level  3) f/u LFTs 4) treat for impending Dts  - patient does not fit into the criteria for treatment with steroids.

## 2019-01-30 NOTE — PROGRESS NOTE ADULT - ASSESSMENT
49 y/o M w/ ETOH abuse and HTN who presents with UGIB and PAPO in context of decompensated cirrhosis with worsening renal function and lethargy. Family is aware of poor prognosis and patient is DNR.     Neuro  - con't lactulose and titrate to 3BM/day for HE  - Ammonia level of 60  - Worsening mental status and lethargy  - Con't rifaximin     Pulm  - Pt tachypnic but saturating well overnight on minimal supplemental O2  - ABG (01/27): 7.53/27/66/23  - Con't to monitor oxygen saturation    Renal  - Worsening renal function (Cr 5.61<-5.12<-4.1)   - Renal consult service following  - Likely etiology is HRS given fluid resuscitation with no improvement in renal fx   - Urine Na < 20, Urine Cr 199  - Con't octreotide, midodrine, albumin    CV  - BP stable in 's overnight.  - Levo weaned off overnight, will con't to monitor SBP    GI  - s/p 2U pRBCs (01/29) w/ stable Hb at this time  - Con't to monitor serial Hb, active type and screen  - Con't IV PPI, octreotide  - Hold off EGD given critical condition  - ? omental mass vs edema - no workup yet    ID  - Con't empiric meropenem  - Appreciate ID recs    PPX:  - Con't DVT PPX    Psych:  - CIWA protocol for hx of alcoholism 47 y/o M w/ ETOH abuse and HTN who presents with UGIB and PAPO in context of decompensated cirrhosis with worsening renal function and lethargy. Family is aware of poor prognosis and patient is DNR.     Neuro  - con't lactulose and titrate to 3BM/day for HE  - Ammonia level of 60  - Worsening mental status and lethargy  - Con't rifaximin     Pulm  - Pt tachypnic but saturating well overnight on minimal supplemental O2  - ABG (01/27): 7.53/27/66/23  - Con't to monitor oxygen saturation    Renal  - Worsening renal function (Cr 5.61<-5.12<-4.1)   - Renal consult service following  - Likely etiology is HRS given fluid resuscitation with no improvement in renal fx   - Urine Na < 20, Urine Cr 199  - Con't octreotide, midodrine, albumin    CV  - BP stable in 's overnight.  - Levo weaned off overnight, will con't to monitor SBP    GI  - s/p 2U pRBCs (01/29) w/ stable Hb at this time  - Con't to monitor serial Hb, active type and screen  - Con't IV PPI, octreotide  - Hold off EGD given critical condition  - ? omental mass vs edema - no workup yet    ID  - D/C meropenem  - Appreciate ID recs    PPX:  - Con't DVT PPX    Psych:  - [place] protocol for hx of alcoholism

## 2019-01-30 NOTE — CHART NOTE - NSCHARTNOTEFT_GEN_A_CORE
48M PMH HTN (not on medication), chronic alcohol abuse and dependence x 15 years (1/2 pint of vodka with cranberry juice daily), hx of alcohol withdrawal, last drink on day of admission, admitted w/ syncope. In ED pt noted to be hypotensive w/ SBP 80-90s. despite 3-4L in ED. On labs pt with leukocytosis with 3% bands, Na 123, Cr 2.05, INR 2.05, T bili 6, , ALT 55, Alk phos 799, lactate 9.8. Pt admitted to ICU w/ for syncope x3 with generalized weakness, weight loss, loss of appetite, jaundice, melena, coffee ground emesis, hypotension, lactic acidosis, hyponatremia, elevated Cr, jaundice, hyperbilirubinemia, elevated alk phos and AST, melena, coffee ground emesis, elevated INR, cirrhosis with ascites. Pt required pressor support, presently on midodrine 20. Pt made DNR/DNI, off IV pressors. Pt stable from ICU standpoint for transfer to medical floor. 48M PMH HTN (not on medication), chronic alcohol abuse and dependence x 15 years (1/2 pint of vodka with cranberry juice daily), hx of alcohol withdrawal, last drink on day of admission, admitted w/ syncope. In ED pt noted to be hypotensive w/ SBP 80-90s. despite 3-4L in ED. On labs pt with leukocytosis with 3% bands, Na 123, Cr 2.05, INR 2.05, T bili 6, , ALT 55, Alk phos 799, lactate 9.8. Pt admitted to ICU w/ for syncope x3 with generalized weakness, weight loss, loss of appetite, jaundice, melena, coffee ground emesis, hypotension, lactic acidosis, hyponatremia, elevated Cr, jaundice, hyperbilirubinemia, elevated alk phos and AST, melena, coffee ground emesis, elevated INR, cirrhosis with ascites (s/p paracentesis with 7000 mLs of serous fluid removed by IR 1/25). Pt required pressor support in ICU, presently on midodrine 20. Pt made DNR/DNI, off IV pressors. Pt stable from ICU standpoint for transfer to medical floor. 48M PMH HTN (not on medication), chronic alcohol abuse and dependence x 15 years (1/2 pint of vodka with cranberry juice daily), hx of alcohol withdrawal, last drink on day of admission, admitted w/ syncope. In ED pt noted to be hypotensive w/ SBP 80-90s. despite 3-4L in ED. On labs pt with leukocytosis with 3% bands, Na 123, Cr 2.05, INR 2.05, T bili 6, , ALT 55, Alk phos 799, lactate 9.8. Pt admitted to ICU w/ for syncope x3 with generalized weakness, weight loss, loss of appetite, jaundice, melena, coffee ground emesis, hypotension, lactic acidosis, hyponatremia, elevated Cr, jaundice, hyperbilirubinemia, elevated alk phos and AST, melena, coffee ground emesis, elevated INR, cirrhosis with ascites (s/p paracentesis with 7000 mLs of serous fluid removed by IR 1/25). Pt required pressor support in ICU, presently on midodrine 20. Pt made DNR/DNI, off IV pressors. Pt stable from ICU standpoint for transfer to medical floor. Pt signed out to Hospitalist Attending. 48M PMH HTN (not on medication), chronic alcohol abuse and dependence x 15 years (1/2 pint of vodka with cranberry juice daily), hx of alcohol withdrawal, last drink on day of admission, admitted w/ syncope. In ED pt noted to be hypotensive w/ SBP 80-90s. despite 3-4L in ED. On labs pt with leukocytosis with 3% bands, Na 123, Cr 2.05, INR 2.05, T bili 6, , ALT 55, Alk phos 799, lactate 9.8. Pt admitted to ICU w/ for syncope x3 with generalized weakness, weight loss, loss of appetite, jaundice, melena, coffee ground emesis, hypotension, lactic acidosis, hyponatremia, elevated Cr, jaundice, hyperbilirubinemia, elevated alk phos and AST, melena, coffee ground emesis, elevated INR, cirrhosis with ascites (s/p paracentesis with 7000 mLs of serous fluid removed by IR 1/25). Pt required pressor support in ICU, presently on midodrine 20. Pt made DNR/DNI, off IV pressors. Pt stable from ICU standpoint for transfer to medical floor. Pt signed out to Hospitalist Attending

## 2019-01-30 NOTE — PROGRESS NOTE ADULT - SUBJECTIVE AND OBJECTIVE BOX
Patient is a 48y old  Male who presents with a chief complaint of Coffee ground emesis, progressive jaundice, syncopal episodes. (30 Jan 2019 08:01)      HPI:  49 y/o male PMH HTN (not on medication), chronic alcohol abuse and dependence x 15 years (1/2 pint of vodka with cranberry juice daily), hx of alcohol withdrawal 2013 hospitalized at [facility] after which he went to inpatient rehab at [facility] for syncope and melanotic stools. History obtained from ex wife, mother, and patient. Since pt left rehab in 2013 pt has been drinking daily. Last drink 11AM today. Pt state for past one and a half months he has had progressive weight loss with loss of appetite. He has had in increased abdominal distention for the past 1 month and in the past 1 week he has noticed jaundice and scleral icterus. Pt also reports generalized fatigue. Reports coffee ground emesis x1 episode 3 days ago but none since then with melena for about a week. Pt states he has 3 bowel movements a day and now has also been seeing "blood when I pee" also within the last week. No abdominal pain. No CP no SOB. No fevers or chills. Today pt called ex wife after he was unsteady with his gait and "fell", he denies LOC. Denies trauma to head. Ex wife reports while attempting to get pt to the car she noticed he was "very weak" and witnessed pt to have had 3 syncopal episodes lasting a few minutes each before regaining consciousness, but no seizures. Ex wife state that in the car, pt had a bowel movement that was black and tarry.     In ED pt noted to be hypotensive SBP 80-90s. 2L IVF given with 3rd and 4th infusing. SBP post IVF resuscitation 100s to one teens. On labs pt with leukocytosis with 3% bands, Na 123, Cr 2.05, INR 2.05, T bili 6, , ALT 55, Alk phos 799.  Pt seen by critical care and not felt to be ICU candidate. (23 Jan 2019 22:33)      INTERVAL HPI/OVERNIGHT EVENTS:  Lethargic but communicative. No further melena, has green BMs. No abdominal pain    MEDICATIONS  (STANDING):  albumin human 25% IVPB 50 milliLiter(s) IV Intermittent every 24 hours  chlorhexidine 4% Liquid 1 Application(s) Topical <User Schedule>  dexmedetomidine Infusion 0.2 MICROgram(s)/kG/Hr (3.63 mL/Hr) IV Continuous <Continuous>  folic acid 1 milliGRAM(s) Oral daily  heparin  Injectable 5000 Unit(s) SubCutaneous every 12 hours  lactulose Syrup 10 Gram(s) Oral two times a day  midodrine 20 milliGRAM(s) Oral every 8 hours  multivitamin 1 Tablet(s) Oral daily  nicotine -  14 mG/24Hr(s) Patch 1 patch Transdermal daily  norepinephrine Infusion 0.05 MICROgram(s)/kG/Min (6.806 mL/Hr) IV Continuous <Continuous>  octreotide  Injectable 100 MICROGram(s) SubCutaneous every 8 hours  pantoprazole  Injectable 40 milliGRAM(s) IV Push every 12 hours  rifaximin 550 milliGRAM(s) Oral two times a day  thiamine 100 milliGRAM(s) Oral daily    MEDICATIONS  (PRN):  LORazepam   Injectable 1 milliGRAM(s) IV Push every 4 hours PRN for CIWA greater than 8      FAMILY HISTORY:  No pertinent family history in first degree relatives      Allergies    No Known Allergies    Intolerances        PMH/PSH:  ETOH abuse  Psoriasis  History of hypertension  No significant past surgical history        REVIEW OF SYSTEMS:  Lethargic  CONSTITUTIONAL: No fever, weight loss, or fatigue  EYES: No eye pain, visual disturbances, or discharge  ENMT:  No difficulty hearing, tinnitus, vertigo; No sinus or throat pain  NECK: No pain or stiffness  BREASTS: No pain, masses, or nipple discharge  RESPIRATORY: No cough, wheezing, chills or hemoptysis; No shortness of breath  CARDIOVASCULAR: No chest pain, palpitations, dizziness, or leg swelling  GASTROINTESTINAL: See above  GENITOURINARY: No dysuria, frequency, hematuria, or incontinence  NEUROLOGICAL: No headaches, numbness, or tremors  SKIN: No itching, burning, rashes, or lesions   LYMPH NODES: No enlarged glands  ENDOCRINE: No heat or cold intolerance; No hair loss  MUSCULOSKELETAL: No joint pain or swelling; No muscle, back, or extremity pain  PSYCHIATRIC: No depression, anxiety, mood swings, or difficulty sleeping  HEME/LYMPH: No easy bruising, or bleeding gums  ALLERGY AND IMMUNOLOGIC: No hives or eczema    Vital Signs Last 24 Hrs  T(C): 36.4 (30 Jan 2019 08:46), Max: 37 (29 Jan 2019 16:01)  T(F): 97.5 (30 Jan 2019 08:46), Max: 98.6 (29 Jan 2019 16:01)  HR: 73 (30 Jan 2019 11:00) (70 - 93)  BP: 87/55 (30 Jan 2019 11:00) (87/55 - 119/86)  BP(mean): 66 (30 Jan 2019 11:00) (66 - 96)  RR: 19 (30 Jan 2019 11:00) (14 - 27)  SpO2: 100% (30 Jan 2019 11:00) (91% - 100%)    PHYSICAL EXAM:  GENERAL: NAD, well-groomed, well-developed  HEAD:  Atraumatic, Normocephalic  EYES: EOMI, PERRLA, conjunctiva and sclera jaundiced  NECK: Supple, No JVD, Normal thyroid  NERVOUS SYSTEM:  Alert but confused. Asterixix (?)  CHEST/LUNG: Clear to percussion bilaterally; No rales, rhonchi, wheezing, or rubs  HEART: Regular rate and rhythm; No murmurs, rubs, or gallops  ABDOMEN: Soft, Nontender, Nondistended; Bowel sounds present  EXTREMITIES:  2+ Peripheral Pulses, No clubbing, cyanosis, or edema  LYMPH: No lymphadenopathy noted  SKIN: No rashes or lesions    LAB                          10.4   27.16 )-----------( 282      ( 30 Jan 2019 04:21 )             29.8       CBC:  01-30 @ 04:21  WBC 27.16   Hgb 10.4   Hct 29.8   Plts 282  .4  01-29 @ 16:48  WBC 28.92   Hgb 10.5   Hct 29.8   Plts 297  .0  01-29 @ 04:09  WBC 23.35   Hgb 10.8   Hct 31.6   Plts 302  .3  01-28 @ 12:29  WBC 21.90   Hgb 6.8   Hct 19.7   Plts 236  .2  01-28 @ 04:20  WBC 19.63   Hgb 7.5   Hct 21.8   Plts 258  .0  01-27 @ 09:56  WBC 23.31   Hgb 8.4   Hct 24.2   Plts 313  .5  01-26 @ 08:11  WBC 26.74   Hgb 8.6   Hct 24.8   Plts 268  .9  01-25 @ 08:43  WBC 21.76   Hgb 8.3   Hct 23.8   Plts 250  .7  01-24 @ 23:33  WBC 24.14   Hgb 8.1   Hct 23.1   Plts 232  .0  01-24 @ 19:37  WBC 25.76   Hgb 8.5   Hct 24.3   Plts 237  .0      Chemistry:  01-30 @ 04:21  Na+ 133  K+ 4.3  Cl- 100  CO2 18    Cr 5.61     01-29 @ 04:09  Na+ 133  K+ 4.2  Cl- 97  CO2 21    Cr 5.12     01-28 @ 04:20  Na+ 132  K+ 4.3  Cl- 94  CO2 25  BUN 88  Cr 4.10     01-27 @ 09:56  Na+ 132  K+ 4.0  Cl- 95  CO2 25  BUN 77  Cr 2.87     01-26 @ 08:11  Na+ 133  K+ 3.7  Cl- 96  CO2 27  BUN 57  Cr 1.51     01-25 @ 08:43  Na+ 130  K+ 3.5  Cl- 91  CO2 27  BUN 49  Cr 1.61     01-24 @ 08:11  Na+ 126  K+ 3.8  Cl- 89  CO2 20  BUN 32  Cr 1.76     01-23 @ 16:55  Na+ 123  K+ 5.3  Cl- 79  CO2 24  BUN 26  Cr 2.05         Glucose, Serum: 139 mg/dL (01-30 @ 04:21)  Glucose, Serum: 71 mg/dL (01-29 @ 04:09)  Glucose, Serum: 106 mg/dL (01-28 @ 04:20)  Glucose, Serum: 173 mg/dL (01-27 @ 09:56)  Glucose, Serum: 186 mg/dL (01-26 @ 08:11)  Glucose, Serum: 153 mg/dL (01-25 @ 08:43)  Glucose, Serum: 129 mg/dL (01-24 @ 08:11)  Glucose, Serum: 148 mg/dL (01-23 @ 16:55)      30 Jan 2019 04:21    133    |  100    |  114    ----------------------------<  139    4.3     |  18     |  5.61   29 Jan 2019 04:09    133    |  97     |  110    ----------------------------<  71     4.2     |  21     |  5.12   28 Jan 2019 04:20    132    |  94     |  88     ----------------------------<  106    4.3     |  25     |  4.10   27 Jan 2019 09:56    132    |  95     |  77     ----------------------------<  173    4.0     |  25     |  2.87   26 Jan 2019 08:11    133    |  96     |  57     ----------------------------<  186    3.7     |  27     |  1.51   25 Jan 2019 08:43    130    |  91     |  49     ----------------------------<  153    3.5     |  27     |  1.61   24 Jan 2019 08:11    126    |  89     |  32     ----------------------------<  129    3.8     |  20     |  1.76     Ca    7.7        30 Jan 2019 04:21  Ca    8.2        29 Jan 2019 04:09  Ca    8.3        28 Jan 2019 04:20  Ca    8.0        27 Jan 2019 09:56  Ca    8.0        26 Jan 2019 08:11  Ca    7.7        25 Jan 2019 08:43  Ca    7.3        24 Jan 2019 08:11  Phos  5.7       30 Jan 2019 04:21  Phos  5.8       29 Jan 2019 04:09  Phos  4.5       28 Jan 2019 04:20  Phos  2.6       27 Jan 2019 09:56  Phos  1.3       26 Jan 2019 08:11  Phos  1.8       25 Jan 2019 08:43  Phos  2.6       24 Jan 2019 08:11  Mg     2.9       30 Jan 2019 04:21  Mg     3.0       29 Jan 2019 04:09  Mg     2.7       28 Jan 2019 04:20  Mg     2.4       27 Jan 2019 09:56  Mg     2.5       26 Jan 2019 08:11  Mg     2.5       25 Jan 2019 08:43  Mg     2.2       24 Jan 2019 08:11    TPro  6.2    /  Alb  3.0    /  TBili  9.4    /  DBili  7.14   /  AST  139    /  ALT  37     /  AlkPhos  411    30 Jan 2019 04:21  TPro  7.0    /  Alb  3.8    /  TBili  11.2   /  DBili  8.34   /  AST  132    /  ALT  37     /  AlkPhos  403    29 Jan 2019 04:09  TPro  6.8    /  Alb  3.4    /  TBili  7.7    /  DBili  6.07   /  AST  116    /  ALT  39     /  AlkPhos  396    28 Jan 2019 04:20  TPro  6.0    /  Alb  2.1    /  TBili  7.2    /  DBili  6.30   /  AST  160    /  ALT  44     /  AlkPhos  521    26 Jan 2019 08:11  TPro  6.6    /  Alb  2.2    /  TBili  8.3    /  DBili  x      /  AST  205    /  ALT  48     /  AlkPhos  608    25 Jan 2019 08:43  TPro  6.2    /  Alb  2.0    /  TBili  7.3    /  DBili  6.02   /  AST  304    /  ALT  55     /  AlkPhos  685    24 Jan 2019 08:11  TPro  6.6    /  Alb  2.1    /  TBili  6.0    /  DBili  x      /  AST  215    /  ALT  55     /  AlkPhos  799    23 Jan 2019 16:55              CAPILLARY BLOOD GLUCOSE              RADIOLOGY & ADDITIONAL TESTS:    Imaging Personally Reviewed:  [ ] YES  [ ] NO    Consultant(s) Notes Reviewed:  [ ] YES  [ ] NO    Care Discussed with Consultants/Other Providers [ ] YES  [ ] NO

## 2019-01-30 NOTE — PROGRESS NOTE ADULT - SUBJECTIVE AND OBJECTIVE BOX
Overnight events: Per family at bedside, pt worse today. More lethargic today, could not obtain ROS from patient. Family has had discussion about GOC but are still unsure. Diet was advanced yesterday, patient tolerated it well. Rifaximin started for worsening mental status.     ROS: Patient denies dyspnea or pain. Could not obtain further ROS due to clinical condition.     ICU Vital Signs Last 24 Hrs  T(C): 37 (30 Jan 2019 05:37), Max: 37 (29 Jan 2019 16:01)  T(F): 98.6 (30 Jan 2019 05:37), Max: 98.6 (29 Jan 2019 16:01)  HR: 72 (30 Jan 2019 07:30) (72 - 93)  BP: 88/58 (30 Jan 2019 07:00) (88/58 - 121/82)  BP(mean): 68 (30 Jan 2019 07:00) (68 - 96)  ABP: --  ABP(mean): --  RR: 16 (30 Jan 2019 07:30) (15 - 27)  SpO2: 96% (30 Jan 2019 07:30) (91% - 100%)    I&O's Detail    29 Jan 2019 07:01  -  30 Jan 2019 07:00  --------------------------------------------------------  IN:    Albumin 25%: 50 mL    dexmedetomidine Infusion: 10.8 mL    IV PiggyBack: 50 mL    Lactated Ringers IV Bolus: 1000 mL    norepinephrine Infusion: 102.9 mL    Oral Fluid: 720 mL    pantoprazole Infusion: 90 mL  Total IN: 2023.7 mL    OUT:    Indwelling Catheter - Urethral: 205 mL    Rectal Tube: 2000 mL  Total OUT: 2205 mL    Total NET: -181.3 mL    ## Physical Exam:  Gen: Lethargic, worsening mental status.  Neuro: A&Ox0, follows rudimentary commands.   HEENT: +jaundice  Respiratory: tachypnic and labored, clear bs b/l  CV: S1/S2, no murmurs rubs or gallops  Abd: +fluid wave, +BS, nontender  Extremities: +pitting edema up to mid-shin    MEDICATIONS  (STANDING):  albumin human 25% IVPB 50 milliLiter(s) IV Intermittent every 24 hours  chlorhexidine 4% Liquid 1 Application(s) Topical <User Schedule>  dexmedetomidine Infusion 0.2 MICROgram(s)/kG/Hr (3.63 mL/Hr) IV Continuous <Continuous>  folic acid 1 milliGRAM(s) Oral daily  heparin  Injectable 5000 Unit(s) SubCutaneous every 12 hours  lactulose Syrup 10 Gram(s) Oral two times a day  meropenem  IVPB 500 milliGRAM(s) IV Intermittent every 12 hours  midodrine 15 milliGRAM(s) Oral every 8 hours  multivitamin 1 Tablet(s) Oral daily  nicotine -  14 mG/24Hr(s) Patch 1 patch Transdermal daily  norepinephrine Infusion 0.05 MICROgram(s)/kG/Min (6.806 mL/Hr) IV Continuous <Continuous>  octreotide  Injectable 100 MICROGram(s) SubCutaneous every 8 hours  pantoprazole  Injectable 40 milliGRAM(s) IV Push every 12 hours  rifaximin 550 milliGRAM(s) Oral two times a day  thiamine 100 milliGRAM(s) Oral daily    MEDICATIONS  (PRN):  LORazepam   Injectable 1 milliGRAM(s) IV Push every 4 hours PRN for CIWA greater than 8                            10.4   27.16 )-----------( 282      ( 30 Jan 2019 04:21 )             29.8       01-30    133<L>  |  100  |  114<H>  ----------------------------<  139<H>  4.3   |  18<L>  |  5.61<H>    Ca    7.7<L>      30 Jan 2019 04:21  Phos  5.7     01-30  Mg     2.9     01-30    TPro  6.2  /  Alb  3.0<L>  /  TBili  9.4<H>  /  DBili  7.14<H>  /  AST  139<H>  /  ALT  37  /  AlkPhos  411<H>  01-30      Ammonia, Serum (01.30.19 @ 04:21)    Ammonia, Serum: 60 umol/L

## 2019-01-30 NOTE — PROGRESS NOTE ADULT - SUBJECTIVE AND OBJECTIVE BOX
Patient lethargic no complaints today.    MEDICATIONS  (STANDING):  albumin human 25% IVPB 50 milliLiter(s) IV Intermittent every 24 hours  chlorhexidine 4% Liquid 1 Application(s) Topical <User Schedule>  dexmedetomidine Infusion 0.2 MICROgram(s)/kG/Hr (3.63 mL/Hr) IV Continuous <Continuous>  folic acid 1 milliGRAM(s) Oral daily  heparin  Injectable 5000 Unit(s) SubCutaneous every 12 hours  lactulose Syrup 10 Gram(s) Oral two times a day  midodrine 20 milliGRAM(s) Oral every 8 hours  multivitamin 1 Tablet(s) Oral daily  nicotine -  14 mG/24Hr(s) Patch 1 patch Transdermal daily  norepinephrine Infusion 0.05 MICROgram(s)/kG/Min (6.806 mL/Hr) IV Continuous <Continuous>  octreotide  Injectable 100 MICROGram(s) SubCutaneous every 8 hours  pantoprazole  Injectable 40 milliGRAM(s) IV Push every 12 hours  rifaximin 550 milliGRAM(s) Oral two times a day  thiamine 100 milliGRAM(s) Oral daily    MEDICATIONS  (PRN):  LORazepam   Injectable 1 milliGRAM(s) IV Push every 4 hours PRN for CIWA greater than 8      01-29-19 @ 07:01  -  01-30-19 @ 07:00  --------------------------------------------------------  IN: 2023.7 mL / OUT: 2205 mL / NET: -181.3 mL    01-30-19 @ 07:01  -  01-30-19 @ 17:21  --------------------------------------------------------  IN: 150 mL / OUT: 395 mL / NET: -245 mL      PHYSICAL EXAM:      T(C): 36.3 (01-30-19 @ 15:51), Max: 37 (01-30-19 @ 05:37)  HR: 69 (01-30-19 @ 16:00) (69 - 93)  BP: 88/64 (01-30-19 @ 16:00) (87/55 - 112/81)  RR: 21 (01-30-19 @ 16:00) (14 - 26)  SpO2: 99% (01-30-19 @ 16:00) (91% - 100%)  Wt(kg): --  Respiratory: clear anteriorly, decreased BS at bases  Cardiovascular: S1 S2  Gastrointestinal:  NT moderate distention  +BS  Extremities:   1 edema                                    10.4   27.16 )-----------( 282      ( 30 Jan 2019 04:21 )             29.8     01-30    133<L>  |  100  |  114<H>  ----------------------------<  139<H>  4.3   |  18<L>  |  5.61<H>    Ca    7.7<L>      30 Jan 2019 04:21  Phos  5.7     01-30  Mg     2.9     01-30    TPro  6.2  /  Alb  3.0<L>  /  TBili  9.4<H>  /  DBili  7.14<H>  /  AST  139<H>  /  ALT  37  /  AlkPhos  411<H>  01-30      LIVER FUNCTIONS - ( 30 Jan 2019 04:21 )  Alb: 3.0 g/dL / Pro: 6.2 gm/dL / ALK PHOS: 411 U/L / ALT: 37 U/L / AST: 139 U/L / GGT: x             Assessment and Plan:  PAPO, CKD degree unclear; pre renal azotemia; severe sepsis, SBP; ischemic ATN,  risk for HRS type 2 to 1;   Agree with SPA/ Midodrine or pressor support, allow MAP > 65;   Urine output better.  Prognosis is poor; discussed with family; DNR/ DNI; no aggressive measures.

## 2019-01-31 DIAGNOSIS — F10.231 ALCOHOL DEPENDENCE WITH WITHDRAWAL DELIRIUM: ICD-10-CM

## 2019-01-31 DIAGNOSIS — N17.9 ACUTE KIDNEY FAILURE, UNSPECIFIED: ICD-10-CM

## 2019-01-31 DIAGNOSIS — K72.00 ACUTE AND SUBACUTE HEPATIC FAILURE WITHOUT COMA: ICD-10-CM

## 2019-01-31 DIAGNOSIS — E86.0 DEHYDRATION: ICD-10-CM

## 2019-01-31 DIAGNOSIS — F10.239 ALCOHOL DEPENDENCE WITH WITHDRAWAL, UNSPECIFIED: ICD-10-CM

## 2019-01-31 LAB
ALBUMIN SERPL ELPH-MCNC: 2.7 G/DL — LOW (ref 3.3–5)
ALP SERPL-CCNC: 397 U/L — HIGH (ref 40–120)
ALT FLD-CCNC: 27 U/L — SIGNIFICANT CHANGE UP (ref 12–78)
ANION GAP SERPL CALC-SCNC: 12 MMOL/L — SIGNIFICANT CHANGE UP (ref 5–17)
ANISOCYTOSIS BLD QL: SIGNIFICANT CHANGE UP
AST SERPL-CCNC: 114 U/L — HIGH (ref 15–37)
BASO STIPL BLD QL SMEAR: PRESENT — SIGNIFICANT CHANGE UP
BASOPHILS # BLD AUTO: 0 K/UL — SIGNIFICANT CHANGE UP (ref 0–0.2)
BASOPHILS NFR BLD AUTO: 0 % — SIGNIFICANT CHANGE UP (ref 0–2)
BILIRUB SERPL-MCNC: 9.1 MG/DL — HIGH (ref 0.2–1.2)
BUN SERPL-MCNC: 121 MG/DL — HIGH (ref 7–23)
CALCIUM SERPL-MCNC: 7.9 MG/DL — LOW (ref 8.5–10.1)
CHLORIDE SERPL-SCNC: 99 MMOL/L — SIGNIFICANT CHANGE UP (ref 96–108)
CO2 SERPL-SCNC: 20 MMOL/L — LOW (ref 22–31)
CREAT SERPL-MCNC: 6.31 MG/DL — HIGH (ref 0.5–1.3)
DOHLE BOD BLD QL SMEAR: PRESENT — SIGNIFICANT CHANGE UP
EOSINOPHIL # BLD AUTO: 0.6 K/UL — HIGH (ref 0–0.5)
EOSINOPHIL NFR BLD AUTO: 2 % — SIGNIFICANT CHANGE UP (ref 0–6)
GLUCOSE SERPL-MCNC: 104 MG/DL — HIGH (ref 70–99)
HCT VFR BLD CALC: 29.8 % — LOW (ref 39–50)
HGB BLD-MCNC: 10.3 G/DL — LOW (ref 13–17)
HYPOCHROMIA BLD QL: SLIGHT — SIGNIFICANT CHANGE UP
LG PLATELETS BLD QL AUTO: SLIGHT — SIGNIFICANT CHANGE UP
LYMPHOCYTES # BLD AUTO: 1.81 K/UL — SIGNIFICANT CHANGE UP (ref 1–3.3)
LYMPHOCYTES # BLD AUTO: 6 % — LOW (ref 13–44)
MACROCYTES BLD QL: SIGNIFICANT CHANGE UP
MAGNESIUM SERPL-MCNC: 2.6 MG/DL — SIGNIFICANT CHANGE UP (ref 1.6–2.6)
MANUAL SMEAR VERIFICATION: SIGNIFICANT CHANGE UP
MCHC RBC-ENTMCNC: 34.6 GM/DL — SIGNIFICANT CHANGE UP (ref 32–36)
MCHC RBC-ENTMCNC: 34.9 PG — HIGH (ref 27–34)
MCV RBC AUTO: 101 FL — HIGH (ref 80–100)
METAMYELOCYTES # FLD: 2 % — HIGH (ref 0–0)
MONOCYTES # BLD AUTO: 1.81 K/UL — HIGH (ref 0–0.9)
MONOCYTES NFR BLD AUTO: 6 % — SIGNIFICANT CHANGE UP (ref 2–14)
MYELOCYTES NFR BLD: 1 % — HIGH (ref 0–0)
NEUTROPHILS # BLD AUTO: 24.4 K/UL — HIGH (ref 1.8–7.4)
NEUTROPHILS NFR BLD AUTO: 72 % — SIGNIFICANT CHANGE UP (ref 43–77)
NEUTS BAND # BLD: 9 % — HIGH (ref 0–8)
NRBC # BLD: 1 /100 — HIGH (ref 0–0)
NRBC # BLD: SIGNIFICANT CHANGE UP /100 WBCS (ref 0–0)
OVALOCYTES BLD QL SMEAR: SLIGHT — SIGNIFICANT CHANGE UP
PHOSPHATE SERPL-MCNC: 5.9 MG/DL — HIGH (ref 2.5–4.5)
PLAT MORPH BLD: ABNORMAL
PLATELET # BLD AUTO: 259 K/UL — SIGNIFICANT CHANGE UP (ref 150–400)
PLATELET CLUMP BLD QL SMEAR: ABNORMAL
PLATELET COUNT - ESTIMATE: NORMAL — SIGNIFICANT CHANGE UP
POIKILOCYTOSIS BLD QL AUTO: SLIGHT — SIGNIFICANT CHANGE UP
POLYCHROMASIA BLD QL SMEAR: SLIGHT — SIGNIFICANT CHANGE UP
POTASSIUM SERPL-MCNC: 3.7 MMOL/L — SIGNIFICANT CHANGE UP (ref 3.5–5.3)
POTASSIUM SERPL-SCNC: 3.7 MMOL/L — SIGNIFICANT CHANGE UP (ref 3.5–5.3)
PROMYELOCYTES # FLD: 2 % — HIGH (ref 0–0)
PROT SERPL-MCNC: 6 GM/DL — SIGNIFICANT CHANGE UP (ref 6–8.3)
RBC # BLD: 2.95 M/UL — LOW (ref 4.2–5.8)
RBC # FLD: 21.9 % — HIGH (ref 10.3–14.5)
RBC BLD AUTO: ABNORMAL
SCHISTOCYTES BLD QL AUTO: SLIGHT — SIGNIFICANT CHANGE UP
SMUDGE CELLS # BLD: PRESENT — SIGNIFICANT CHANGE UP
SODIUM SERPL-SCNC: 131 MMOL/L — LOW (ref 135–145)
TARGETS BLD QL SMEAR: SLIGHT — SIGNIFICANT CHANGE UP
TOXIC GRANULES BLD QL SMEAR: PRESENT — SIGNIFICANT CHANGE UP
WBC # BLD: 30.12 K/UL — HIGH (ref 3.8–10.5)
WBC # FLD AUTO: 30.12 K/UL — HIGH (ref 3.8–10.5)

## 2019-01-31 PROCEDURE — 90792 PSYCH DIAG EVAL W/MED SRVCS: CPT

## 2019-01-31 PROCEDURE — 99233 SBSQ HOSP IP/OBS HIGH 50: CPT

## 2019-01-31 RX ADMIN — CHLORHEXIDINE GLUCONATE 1 APPLICATION(S): 213 SOLUTION TOPICAL at 05:28

## 2019-01-31 RX ADMIN — Medication 1 MILLIGRAM(S): at 12:40

## 2019-01-31 RX ADMIN — PANTOPRAZOLE SODIUM 40 MILLIGRAM(S): 20 TABLET, DELAYED RELEASE ORAL at 18:00

## 2019-01-31 RX ADMIN — OCTREOTIDE ACETATE 100 MICROGRAM(S): 200 INJECTION, SOLUTION INTRAVENOUS; SUBCUTANEOUS at 17:52

## 2019-01-31 RX ADMIN — OCTREOTIDE ACETATE 100 MICROGRAM(S): 200 INJECTION, SOLUTION INTRAVENOUS; SUBCUTANEOUS at 05:28

## 2019-01-31 RX ADMIN — MIDODRINE HYDROCHLORIDE 20 MILLIGRAM(S): 2.5 TABLET ORAL at 12:40

## 2019-01-31 RX ADMIN — HEPARIN SODIUM 5000 UNIT(S): 5000 INJECTION INTRAVENOUS; SUBCUTANEOUS at 05:28

## 2019-01-31 RX ADMIN — MIDODRINE HYDROCHLORIDE 20 MILLIGRAM(S): 2.5 TABLET ORAL at 17:53

## 2019-01-31 RX ADMIN — Medication 1 PATCH: at 12:40

## 2019-01-31 RX ADMIN — Medication 1 PATCH: at 19:45

## 2019-01-31 RX ADMIN — PANTOPRAZOLE SODIUM 40 MILLIGRAM(S): 20 TABLET, DELAYED RELEASE ORAL at 05:28

## 2019-01-31 RX ADMIN — HEPARIN SODIUM 5000 UNIT(S): 5000 INJECTION INTRAVENOUS; SUBCUTANEOUS at 17:48

## 2019-01-31 RX ADMIN — Medication 50 MILLILITER(S): at 12:41

## 2019-01-31 RX ADMIN — MIDODRINE HYDROCHLORIDE 20 MILLIGRAM(S): 2.5 TABLET ORAL at 02:18

## 2019-01-31 RX ADMIN — Medication 1 TABLET(S): at 12:40

## 2019-01-31 RX ADMIN — OCTREOTIDE ACETATE 100 MICROGRAM(S): 200 INJECTION, SOLUTION INTRAVENOUS; SUBCUTANEOUS at 23:00

## 2019-01-31 RX ADMIN — LACTULOSE 10 GRAM(S): 10 SOLUTION ORAL at 05:28

## 2019-01-31 RX ADMIN — Medication 100 MILLIGRAM(S): at 12:40

## 2019-01-31 NOTE — BEHAVIORAL HEALTH ASSESSMENT NOTE - NSBHCHARTREVIEWVS_PSY_A_CORE FT
Vital Signs Last 24 Hrs  T(C): 36.6 (31 Jan 2019 11:39), Max: 36.6 (31 Jan 2019 11:39)  T(F): 97.9 (31 Jan 2019 11:39), Max: 97.9 (31 Jan 2019 11:39)  HR: 79 (31 Jan 2019 11:39) (69 - 79)  BP: 102/58 (31 Jan 2019 11:39) (83/65 - 102/58)  BP(mean): 70 (30 Jan 2019 20:00) (70 - 76)  RR: 19 (31 Jan 2019 11:39) (17 - 25)  SpO2: 99% (31 Jan 2019 11:39) (95% - 100%)

## 2019-01-31 NOTE — PROGRESS NOTE ADULT - SUBJECTIVE AND OBJECTIVE BOX
Patient is a 48y old  Male who presents with a chief complaint of Coffee ground emesis, progressive jaundice, syncopal episodes. (31 Jan 2019 09:49)      OVERNIGHT EVENTS: none     REVIEW OF SYSTEMS: denies chest pain/SOB, diaphoresis, no F/C, cough, dizziness, headache, blurry vision, nausea, vomiting, abdominal pain. All others review of systems negative     MEDICATIONS  (STANDING):  albumin human 25% IVPB 50 milliLiter(s) IV Intermittent every 24 hours  chlorhexidine 4% Liquid 1 Application(s) Topical <User Schedule>  folic acid 1 milliGRAM(s) Oral daily  heparin  Injectable 5000 Unit(s) SubCutaneous every 12 hours  lactulose Syrup 10 Gram(s) Oral two times a day  midodrine 20 milliGRAM(s) Oral every 8 hours  multivitamin 1 Tablet(s) Oral daily  nicotine -  14 mG/24Hr(s) Patch 1 patch Transdermal daily  octreotide  Injectable 100 MICROGram(s) SubCutaneous every 8 hours  pantoprazole  Injectable 40 milliGRAM(s) IV Push every 12 hours  rifaximin 550 milliGRAM(s) Oral two times a day  thiamine 100 milliGRAM(s) Oral daily    MEDICATIONS  (PRN):  LORazepam   Injectable 1 milliGRAM(s) IV Push every 4 hours PRN for CIWA greater than 8      Allergies    No Known Allergies    Intolerances        T(F): 97.9 (01-31-19 @ 11:39), Max: 97.9 (01-31-19 @ 11:39)  HR: 79 (01-31-19 @ 11:39) (69 - 79)  BP: 102/58 (01-31-19 @ 11:39) (83/65 - 102/58)  RR: 19 (01-31-19 @ 11:39) (17 - 25)  SpO2: 99% (01-31-19 @ 11:39) (95% - 100%)  Wt(kg): --    PHYSICAL EXAM:  GENERAL: NAD, well-groomed, well-developed, chronically ill looking   HEAD:  Atraumatic, Normocephalic  EYES: PERRLA, jaundice  ENMT:  Moist mucous membranes  NECK: Supple, No JVD, Normal thyroid  NERVOUS SYSTEM:  Alert; Motor Strength 4/5 B/L upper and lower extremities; DTRs 2+ intact and symmetric  CHEST/LUNG: decreased BS bilaterally;  HEART: Regular rate and rhythm; No murmurs, rubs, or gallops  ABDOMEN: Soft, Nontender, Nondistended; Bowel sounds present,  dressing in RLQ  EXTREMITIES:  trace Peripheral edema BL LE  LYMPH: No lymphadenopathy noted  SKIN: No rashes or lesions    LABS:                        10.3   30.12 )-----------( 259      ( 31 Jan 2019 07:28 )             29.8     01-31    131<L>  |  99  |  121<H>  ----------------------------<  104<H>  3.7   |  20<L>  |  6.31<H>    Ca    7.9<L>      31 Jan 2019 07:28  Phos  5.9     01-31  Mg     2.6     01-31    TPro  6.0  /  Alb  2.7<L>  /  TBili  9.1<H>  /  DBili  x   /  AST  114<H>  /  ALT  27  /  AlkPhos  397<H>  01-31        Cultures;   CAPILLARY BLOOD GLUCOSE        Lipid panel:           RADIOLOGY & ADDITIONAL TESTS:    Imaging Personally Reviewed:  [ x] YES    < from: CT Abdomen and Pelvis No Cont (01.23.19 @ 18:06) >  1. Diffuse ascites.  2. Hepatomegaly with cirrhotic changes and fatty infiltration.   Heterogeneity within the liver, and tumor/hepatocellular carcinoma cannot   be excluded.  3. Soft tissue thickening and/or edematous changes throughout the   mesentery and omental fat. Omental tumor versus omental edema is within   the differential.    < from: US Abdomen Complete (01.23.19 @ 18:33) >  Moderate diffuse ascites with hepatomegaly and cirrhotic changes of the   liver. Possible right liver mass.    < end of copied text >      Consultant(s) Notes Reviewed:  [x ] YES     Care Discussed with [x ] Consultants [X ] Patient [ ] Family  [x ]    [x ]  Other; RN

## 2019-01-31 NOTE — PROGRESS NOTE ADULT - ASSESSMENT
HPI:  49 y/o male PMH HTN (not on medication), chronic alcohol abuse and dependence x 15 years (1/2 pint of vodka with cranberry juice daily), hx of alcohol withdrawal 2013 hospitalized at [facility] after which he went to inpatient rehab at [facility] presents for syncope and melanotic stools. History obtained from ex wife, mother, and patient. Since pt left rehab in 2013 pt has been drinking daily. Last drink 11AM today. Pt state for past one and a half months he has had progressive weight loss with loss of appetite. He has had in increased abdominal distention for the past 1 month and in the past 1 week he has noticed jaundice and scleral icterus. Pt also reports generalized fatigue. Reports coffee ground emesis x1 episode 3 days ago but none since then with melena for about a week. Pt states he has 3 bowel movements a day and now has also been seeing "blood when I pee" also within the last week. No abdominal pain. No CP no SOB. No fevers or chills. Today pt called ex wife after he was unsteady with his gait and "fell", he denies LOC. Denies trauma to head. Ex wife reports while attempting to get pt to the car she noticed he was "very weak" and witnessed pt to have had 3 syncopal episodes lasting a few minutes each before regaining consciousness, but no seizures. Ex wife state that in the car, pt had a bowel movement that was black and tarry.     In ED pt noted to be hypotensive SBP 80-90s. 2L IVF given with 3rd and 4th infusing. SBP post IVF resuscitation 100s to one teens. On labs pt with leukocytosis with 3% bands, Na 123, Cr 2.05, INR 2.05, T bili 6, , ALT 55, Alk phos 799.  Pt seen by critical care and not felt to be ICU candidate. (23 Jan 2019 22:33)  ----------------- As Above ------------------------------------------------------  Patient confused. Left message for family member to contact me   ETOH Abuse (+). Coffee ground emesis x 1, melena.   Eleveated LFTs, INR  See labs, CT Scan    Cirrhosis, ascites, fever - 1) Paracentesis 2) ammonia level  3) f/u LFTs 4) treat for impending Dts  - patient does not fit into the criteria for treatment with steroids.

## 2019-01-31 NOTE — PROGRESS NOTE ADULT - ASSESSMENT
48M PMH HTN, chronic alcohol abuse and dependence x 15 years (1/2 pint of vodka with cranberry juice daily), hx of alcohol withdrawal presents for syncope x3 with generalized weakness, weight loss, loss of appetite, jaundice, melena, coffee ground emesis. Here with hypotension, lactic acidosis, hyponatremia, elevated Cr, jaundice, hyperbilirubinemia, elevated alk phos and AST, melena, coffee ground emesis, elevated INR, cirrhosis with ascites.

## 2019-01-31 NOTE — PROGRESS NOTE ADULT - PROBLEM SELECTOR PLAN 1
HGB 10.3 , no signs of gi bleeding. PPI BID Will arrange for EGD once patient is stable off pressors.

## 2019-01-31 NOTE — PROGRESS NOTE ADULT - PROBLEM SELECTOR PLAN 8
- IVF hydration  - significant ascites noted: s/p therapeutic paracentesis  - there is concern for underlying malignancy based on symptoms as well as CT abdomen findings

## 2019-01-31 NOTE — PROGRESS NOTE ADULT - SUBJECTIVE AND OBJECTIVE BOX
Patient is a 48y old  Male who presents with a chief complaint of Coffee ground emesis, progressive jaundice, syncopal episodes. (31 Jan 2019 16:38)      HPI:  47 y/o male PMH HTN (not on medication), chronic alcohol abuse and dependence x 15 years (1/2 pint of vodka with cranberry juice daily), hx of alcohol withdrawal 2013 hospitalized at [facility] after which he went to inpatient rehab at [facility] for syncope and melanotic stools. History obtained from ex wife, mother, and patient. Since pt left rehab in 2013 pt has been drinking daily. Last drink 11AM today. Pt state for past one and a half months he has had progressive weight loss with loss of appetite. He has had in increased abdominal distention for the past 1 month and in the past 1 week he has noticed jaundice and scleral icterus. Pt also reports generalized fatigue. Reports coffee ground emesis x1 episode 3 days ago but none since then with melena for about a week. Pt states he has 3 bowel movements a day and now has also been seeing "blood when I pee" also within the last week. No abdominal pain. No CP no SOB. No fevers or chills. Today pt called ex wife after he was unsteady with his gait and "fell", he denies LOC. Denies trauma to head. Ex wife reports while attempting to get pt to the car she noticed he was "very weak" and witnessed pt to have had 3 syncopal episodes lasting a few minutes each before regaining consciousness, but no seizures. Ex wife state that in the car, pt had a bowel movement that was black and tarry.     In ED pt noted to be hypotensive SBP 80-90s. 2L IVF given with 3rd and 4th infusing. SBP post IVF resuscitation 100s to one teens. On labs pt with leukocytosis with 3% bands, Na 123, Cr 2.05, INR 2.05, T bili 6, , ALT 55, Alk phos 799.  Pt seen by critical care and not felt to be ICU candidate. (23 Jan 2019 22:33)      INTERVAL HPI/OVERNIGHT EVENTS: Patient seen earlier  MS slightly better. The patient denies melena, hematochezia, hematemesis, nausea, vomiting, abdominal pain, constipation,  or change in bowel movements Diarrhea (+)      MEDICATIONS  (STANDING):  albumin human 25% IVPB 50 milliLiter(s) IV Intermittent every 24 hours  chlorhexidine 4% Liquid 1 Application(s) Topical <User Schedule>  folic acid 1 milliGRAM(s) Oral daily  heparin  Injectable 5000 Unit(s) SubCutaneous every 12 hours  lactulose Syrup 10 Gram(s) Oral two times a day  midodrine 20 milliGRAM(s) Oral every 8 hours  multivitamin 1 Tablet(s) Oral daily  nicotine -  14 mG/24Hr(s) Patch 1 patch Transdermal daily  octreotide  Injectable 100 MICROGram(s) SubCutaneous every 8 hours  pantoprazole  Injectable 40 milliGRAM(s) IV Push every 12 hours  rifaximin 550 milliGRAM(s) Oral two times a day  thiamine 100 milliGRAM(s) Oral daily    MEDICATIONS  (PRN):  LORazepam   Injectable 1 milliGRAM(s) IV Push every 4 hours PRN for CIWA greater than 8      FAMILY HISTORY:  No pertinent family history in first degree relatives      Allergies    No Known Allergies    Intolerances        PMH/PSH:  ETOH abuse  Psoriasis  History of hypertension  No significant past surgical history        REVIEW OF SYSTEMS:  CONSTITUTIONAL: No fever, weight loss, or fatigue  EYES: No eye pain, visual disturbances, or discharge  ENMT:  No difficulty hearing, tinnitus, vertigo; No sinus or throat pain  NECK: No pain or stiffness  BREASTS: No pain, masses, or nipple discharge  RESPIRATORY: No cough, wheezing, chills or hemoptysis; No shortness of breath  CARDIOVASCULAR: No chest pain, palpitations, dizziness, or leg swelling  GASTROINTESTINAL: See above  GENITOURINARY: No dysuria, frequency, hematuria, or incontinence  NEUROLOGICAL: No headaches, memory loss, loss of strength, numbness, or tremors  SKIN: No itching, burning, rashes, or lesions   LYMPH NODES: No enlarged glands  ENDOCRINE: No heat or cold intolerance; No hair loss  MUSCULOSKELETAL: No joint pain or swelling; No muscle, back, or extremity pain  PSYCHIATRIC: No depression, anxiety, mood swings, or difficulty sleeping  HEME/LYMPH: No easy bruising, or bleeding gums  ALLERGY AND IMMUNOLOGIC: No hives or eczema    Vital Signs Last 24 Hrs  T(C): 36.4 (31 Jan 2019 17:42), Max: 36.6 (31 Jan 2019 11:39)  T(F): 97.5 (31 Jan 2019 17:42), Max: 97.9 (31 Jan 2019 11:39)  HR: 73 (31 Jan 2019 17:42) (72 - 79)  BP: 93/57 (31 Jan 2019 17:42) (93/57 - 102/58)  BP(mean): --  RR: 17 (31 Jan 2019 17:42) (17 - 20)  SpO2: 97% (31 Jan 2019 17:42) (95% - 100%)    PHYSICAL EXAM:  GENERAL: NAD, well-groomed, well-developed  HEAD:  Atraumatic, Normocephalic  EYES: EOMI, PERRLA, conjunctiva and sclera clear  NECK: Supple, No JVD, Normal thyroid  NERVOUS SYSTEM:  Alert & Oriented X1 Fair concentration;   CHEST/LUNG: Clear to percussion bilaterally; No rales, rhonchi, wheezing, or rubs  HEART: Regular rate and rhythm; No murmurs, rubs, or gallops  ABDOMEN: Soft, Nontender, Nondistended; Bowel sounds present  EXTREMITIES:  2+ Peripheral Pulses, No clubbing, cyanosis, or edema  LYMPH: No lymphadenopathy noted  SKIN: No rashes or lesions    LAB                          10.3   30.12 )-----------( 259      ( 31 Jan 2019 07:28 )             29.8       CBC:  01-31 @ 07:28  WBC 30.12   Hgb 10.3   Hct 29.8   Plts 259  .0  01-30 @ 04:21  WBC 27.16   Hgb 10.4   Hct 29.8   Plts 282  .4  01-29 @ 16:48  WBC 28.92   Hgb 10.5   Hct 29.8   Plts 297  .0  01-29 @ 04:09  WBC 23.35   Hgb 10.8   Hct 31.6   Plts 302  .3  01-28 @ 12:29  WBC 21.90   Hgb 6.8   Hct 19.7   Plts 236  .2  01-28 @ 04:20  WBC 19.63   Hgb 7.5   Hct 21.8   Plts 258  .0  01-27 @ 09:56  WBC 23.31   Hgb 8.4   Hct 24.2   Plts 313  .5  01-26 @ 08:11  WBC 26.74   Hgb 8.6   Hct 24.8   Plts 268  .9  01-25 @ 08:43  WBC 21.76   Hgb 8.3   Hct 23.8   Plts 250  .7  01-24 @ 23:33  WBC 24.14   Hgb 8.1   Hct 23.1   Plts 232  .0      Chemistry:  01-31 @ 07:28  Na+ 131  K+ 3.7  Cl- 99  CO2 20    Cr 6.31     01-30 @ 04:21  Na+ 133  K+ 4.3  Cl- 100  CO2 18    Cr 5.61     01-29 @ 04:09  Na+ 133  K+ 4.2  Cl- 97  CO2 21    Cr 5.12     01-28 @ 04:20  Na+ 132  K+ 4.3  Cl- 94  CO2 25  BUN 88  Cr 4.10     01-27 @ 09:56  Na+ 132  K+ 4.0  Cl- 95  CO2 25  BUN 77  Cr 2.87     01-26 @ 08:11  Na+ 133  K+ 3.7  Cl- 96  CO2 27  BUN 57  Cr 1.51     01-25 @ 08:43  Na+ 130  K+ 3.5  Cl- 91  CO2 27  BUN 49  Cr 1.61         Glucose, Serum: 104 mg/dL (01-31 @ 07:28)  Glucose, Serum: 139 mg/dL (01-30 @ 04:21)  Glucose, Serum: 71 mg/dL (01-29 @ 04:09)  Glucose, Serum: 106 mg/dL (01-28 @ 04:20)  Glucose, Serum: 173 mg/dL (01-27 @ 09:56)  Glucose, Serum: 186 mg/dL (01-26 @ 08:11)  Glucose, Serum: 153 mg/dL (01-25 @ 08:43)      31 Jan 2019 07:28    131    |  99     |  121    ----------------------------<  104    3.7     |  20     |  6.31   30 Jan 2019 04:21    133    |  100    |  114    ----------------------------<  139    4.3     |  18     |  5.61   29 Jan 2019 04:09    133    |  97     |  110    ----------------------------<  71     4.2     |  21     |  5.12   28 Jan 2019 04:20    132    |  94     |  88     ----------------------------<  106    4.3     |  25     |  4.10   27 Jan 2019 09:56    132    |  95     |  77     ----------------------------<  173    4.0     |  25     |  2.87   26 Jan 2019 08:11    133    |  96     |  57     ----------------------------<  186    3.7     |  27     |  1.51   25 Jan 2019 08:43    130    |  91     |  49     ----------------------------<  153    3.5     |  27     |  1.61     Ca    7.9        31 Jan 2019 07:28  Ca    7.7        30 Jan 2019 04:21  Ca    8.2        29 Jan 2019 04:09  Ca    8.3        28 Jan 2019 04:20  Ca    8.0        27 Jan 2019 09:56  Ca    8.0        26 Jan 2019 08:11  Ca    7.7        25 Jan 2019 08:43  Phos  5.9       31 Jan 2019 07:28  Phos  5.7       30 Jan 2019 04:21  Phos  5.8       29 Jan 2019 04:09  Phos  4.5       28 Jan 2019 04:20  Phos  2.6       27 Jan 2019 09:56  Phos  1.3       26 Jan 2019 08:11  Phos  1.8       25 Jan 2019 08:43  Mg     2.6       31 Jan 2019 07:28  Mg     2.9       30 Jan 2019 04:21  Mg     3.0       29 Jan 2019 04:09  Mg     2.7       28 Jan 2019 04:20  Mg     2.4       27 Jan 2019 09:56  Mg     2.5       26 Jan 2019 08:11  Mg     2.5       25 Jan 2019 08:43    TPro  6.0    /  Alb  2.7    /  TBili  9.1    /  DBili  x      /  AST  114    /  ALT  27     /  AlkPhos  397    31 Jan 2019 07:28  TPro  6.2    /  Alb  3.0    /  TBili  9.4    /  DBili  7.14   /  AST  139    /  ALT  37     /  AlkPhos  411    30 Jan 2019 04:21  TPro  7.0    /  Alb  3.8    /  TBili  11.2   /  DBili  8.34   /  AST  132    /  ALT  37     /  AlkPhos  403    29 Jan 2019 04:09  TPro  6.8    /  Alb  3.4    /  TBili  7.7    /  DBili  6.07   /  AST  116    /  ALT  39     /  AlkPhos  396    28 Jan 2019 04:20  TPro  6.0    /  Alb  2.1    /  TBili  7.2    /  DBili  6.30   /  AST  160    /  ALT  44     /  AlkPhos  521    26 Jan 2019 08:11  TPro  6.6    /  Alb  2.2    /  TBili  8.3    /  DBili  x      /  AST  205    /  ALT  48     /  AlkPhos  608    25 Jan 2019 08:43              CAPILLARY BLOOD GLUCOSE              RADIOLOGY & ADDITIONAL TESTS:    Imaging Personally Reviewed:  [ ] YES  [ ] NO    Consultant(s) Notes Reviewed:  [ ] YES  [ ] NO    Care Discussed with Consultants/Other Providers [ ] YES  [ ] NO

## 2019-01-31 NOTE — PROGRESS NOTE ADULT - SUBJECTIVE AND OBJECTIVE BOX
Subjective: awake, feels "better". C/o L lower rib pain.       MEDICATIONS  (STANDING):  albumin human 25% IVPB 50 milliLiter(s) IV Intermittent every 24 hours  chlorhexidine 4% Liquid 1 Application(s) Topical <User Schedule>  folic acid 1 milliGRAM(s) Oral daily  heparin  Injectable 5000 Unit(s) SubCutaneous every 12 hours  lactulose Syrup 10 Gram(s) Oral two times a day  midodrine 20 milliGRAM(s) Oral every 8 hours  multivitamin 1 Tablet(s) Oral daily  nicotine -  14 mG/24Hr(s) Patch 1 patch Transdermal daily  octreotide  Injectable 100 MICROGram(s) SubCutaneous every 8 hours  pantoprazole  Injectable 40 milliGRAM(s) IV Push every 12 hours  rifaximin 550 milliGRAM(s) Oral two times a day  thiamine 100 milliGRAM(s) Oral daily    MEDICATIONS  (PRN):  LORazepam   Injectable 1 milliGRAM(s) IV Push every 4 hours PRN for CIWA greater than 8          T(C): 36.2 (01-31-19 @ 05:20), Max: 36.3 (01-30-19 @ 15:51)  HR: 75 (01-31-19 @ 05:20) (69 - 76)  BP: 95/61 (01-31-19 @ 05:20) (83/65 - 96/66)  RR: 20 (01-31-19 @ 05:20) (15 - 25)  SpO2: 95% (01-31-19 @ 05:20) (95% - 100%)  Wt(kg): --        I&O's Detail    30 Jan 2019 07:01  -  31 Jan 2019 07:00  --------------------------------------------------------  IN:    Albumin 25%: 50 mL    Oral Fluid: 350 mL  Total IN: 400 mL    OUT:    Indwelling Catheter - Urethral: 745 mL  Total OUT: 745 mL    Total NET: -345 mL               PHYSICAL EXAM:    GENERAL: jaundice  EYES: EOMI, PERRLA, icteric sclera  NECK: Supple, no inc in JVP  CHEST/LUNG: dec BS  HEART: S1S2  ABDOMEN: tensely distended, large absorbable dressing in RLQ  EXTREMITIES:  min edema  NEURO: no asterixis      LABS:  CBC Full  -  ( 31 Jan 2019 07:28 )  WBC Count : 30.12 K/uL  Hemoglobin : 10.3 g/dL  Hematocrit : 29.8 %  Platelet Count - Automated : 259 K/uL  Mean Cell Volume : 101.0 fl  Mean Cell Hemoglobin : 34.9 pg  Mean Cell Hemoglobin Concentration : 34.6 gm/dL  Auto Neutrophil # : x  Auto Lymphocyte # : x  Auto Monocyte # : x  Auto Eosinophil # : x  Auto Basophil # : x  Auto Neutrophil % : x  Auto Lymphocyte % : x  Auto Monocyte % : x  Auto Eosinophil % : x  Auto Basophil % : x    01-31    131<L>  |  99  |  121<H>  ----------------------------<  104<H>  3.7   |  20<L>  |  6.31<H>    Ca    7.9<L>      31 Jan 2019 07:28  Phos  5.9     01-31  Mg     2.6     01-31    TPro  6.0  /  Alb  2.7<L>  /  TBili  9.1<H>  /  DBili  x   /  AST  114<H>  /  ALT  27  /  AlkPhos  397<H>  01-31        Culture Results:   No growth to date. (01-28 @ 10:42)  Culture Results:   No growth (01-28 @ 09:59)      Impression:  * PAPO -- ATN vs Type 1 HRS  * Alcoholic hepatitis  * GI bleed.     Recommendations:   * Cont supportive care  * Cont HR protocol  * Not a candidate for HD  * CrCl < 10cc/min  * DNR/I

## 2019-01-31 NOTE — BEHAVIORAL HEALTH ASSESSMENT NOTE - DETAILS
has had passive thoughts but nothing while in the hospital father drank but quit weak jaundice ascites

## 2019-01-31 NOTE — BEHAVIORAL HEALTH ASSESSMENT NOTE - NS ED BHA ALCOHOL
Yes 1. I was told the name of the doctor(s) who took care of my child while in the hospital.    2. I have been told about any important findings on my child's plan of care.    3. The doctor clearly explained my child's diagnosis and other possible diagnoses that were considered.    4. My child's doctor explained all the tests that were done and their results (if available). I understand that some of the test results may not be ready before we go home and I was told how I can get these results. I understand that a summary of my child's hospitalization and important test results will be shared with my child's outpatient doctor.    5. My child's doctor talked to me about what I need to do when we go home.    6. I understand what signs and symptoms to watch for. I understand what symptoms I would need to call my doctor for and/or return to the hospital.    7. I have the phone number to call the hospital for results and/or questions after I leave the hospital.

## 2019-01-31 NOTE — PROGRESS NOTE ADULT - PROBLEM SELECTOR PLAN 2
LFTs better. Being treated for hepaorenal. Not a candidate for steroids. See paracentesis results. 11.2 / 132 / 137 / 403.  - 9.4 / 139 / 37 / 411 - 9.1/114/27/397   nh4 60 prognosis poor

## 2019-02-01 DIAGNOSIS — F10.239 ALCOHOL DEPENDENCE WITH WITHDRAWAL, UNSPECIFIED: ICD-10-CM

## 2019-02-01 DIAGNOSIS — F10.20 ALCOHOL DEPENDENCE, UNCOMPLICATED: ICD-10-CM

## 2019-02-01 LAB
ALBUMIN SERPL ELPH-MCNC: 2.6 G/DL — LOW (ref 3.3–5)
ALP SERPL-CCNC: 371 U/L — HIGH (ref 40–120)
ALT FLD-CCNC: 23 U/L — SIGNIFICANT CHANGE UP (ref 12–78)
ANION GAP SERPL CALC-SCNC: 13 MMOL/L — SIGNIFICANT CHANGE UP (ref 5–17)
AST SERPL-CCNC: 90 U/L — HIGH (ref 15–37)
BILIRUB SERPL-MCNC: 9.7 MG/DL — HIGH (ref 0.2–1.2)
BUN SERPL-MCNC: 128 MG/DL — HIGH (ref 7–23)
CALCIUM SERPL-MCNC: 8.4 MG/DL — LOW (ref 8.5–10.1)
CHLORIDE SERPL-SCNC: 99 MMOL/L — SIGNIFICANT CHANGE UP (ref 96–108)
CO2 SERPL-SCNC: 20 MMOL/L — LOW (ref 22–31)
CREAT SERPL-MCNC: 6.54 MG/DL — HIGH (ref 0.5–1.3)
GLUCOSE SERPL-MCNC: 70 MG/DL — SIGNIFICANT CHANGE UP (ref 70–99)
HCT VFR BLD CALC: 30.2 % — LOW (ref 39–50)
HGB BLD-MCNC: 10.5 G/DL — LOW (ref 13–17)
MCHC RBC-ENTMCNC: 34.8 GM/DL — SIGNIFICANT CHANGE UP (ref 32–36)
MCHC RBC-ENTMCNC: 35.4 PG — HIGH (ref 27–34)
MCV RBC AUTO: 101.7 FL — HIGH (ref 80–100)
NRBC # BLD: 0 /100 WBCS — SIGNIFICANT CHANGE UP (ref 0–0)
PLATELET # BLD AUTO: 271 K/UL — SIGNIFICANT CHANGE UP (ref 150–400)
POTASSIUM SERPL-MCNC: 3.5 MMOL/L — SIGNIFICANT CHANGE UP (ref 3.5–5.3)
POTASSIUM SERPL-SCNC: 3.5 MMOL/L — SIGNIFICANT CHANGE UP (ref 3.5–5.3)
PROT SERPL-MCNC: 6 GM/DL — SIGNIFICANT CHANGE UP (ref 6–8.3)
RBC # BLD: 2.97 M/UL — LOW (ref 4.2–5.8)
RBC # FLD: 21.2 % — HIGH (ref 10.3–14.5)
SODIUM SERPL-SCNC: 132 MMOL/L — LOW (ref 135–145)
WBC # BLD: 34.23 K/UL — HIGH (ref 3.8–10.5)
WBC # FLD AUTO: 34.23 K/UL — HIGH (ref 3.8–10.5)

## 2019-02-01 PROCEDURE — 99233 SBSQ HOSP IP/OBS HIGH 50: CPT

## 2019-02-01 PROCEDURE — 76705 ECHO EXAM OF ABDOMEN: CPT | Mod: 26

## 2019-02-01 PROCEDURE — 99232 SBSQ HOSP IP/OBS MODERATE 35: CPT

## 2019-02-01 PROCEDURE — 49083 ABD PARACENTESIS W/IMAGING: CPT

## 2019-02-01 RX ORDER — ALBUMIN HUMAN 25 %
50 VIAL (ML) INTRAVENOUS ONCE
Qty: 0 | Refills: 0 | Status: COMPLETED | OUTPATIENT
Start: 2019-02-01 | End: 2019-02-01

## 2019-02-01 RX ORDER — MORPHINE SULFATE 50 MG/1
1 CAPSULE, EXTENDED RELEASE ORAL EVERY 6 HOURS
Qty: 0 | Refills: 0 | Status: DISCONTINUED | OUTPATIENT
Start: 2019-02-01 | End: 2019-02-03

## 2019-02-01 RX ORDER — SODIUM CHLORIDE 9 MG/ML
2 INJECTION INTRAMUSCULAR; INTRAVENOUS; SUBCUTANEOUS ONCE
Qty: 0 | Refills: 0 | Status: COMPLETED | OUTPATIENT
Start: 2019-02-01 | End: 2019-02-01

## 2019-02-01 RX ADMIN — SODIUM CHLORIDE 2 GRAM(S): 9 INJECTION INTRAMUSCULAR; INTRAVENOUS; SUBCUTANEOUS at 13:34

## 2019-02-01 RX ADMIN — Medication 50 MILLILITER(S): at 13:36

## 2019-02-01 RX ADMIN — MIDODRINE HYDROCHLORIDE 20 MILLIGRAM(S): 2.5 TABLET ORAL at 13:34

## 2019-02-01 RX ADMIN — MORPHINE SULFATE 1 MILLIGRAM(S): 50 CAPSULE, EXTENDED RELEASE ORAL at 14:35

## 2019-02-01 RX ADMIN — PANTOPRAZOLE SODIUM 40 MILLIGRAM(S): 20 TABLET, DELAYED RELEASE ORAL at 06:06

## 2019-02-01 RX ADMIN — MORPHINE SULFATE 1 MILLIGRAM(S): 50 CAPSULE, EXTENDED RELEASE ORAL at 14:20

## 2019-02-01 RX ADMIN — Medication 1 MILLIGRAM(S): at 13:35

## 2019-02-01 RX ADMIN — Medication 1 PATCH: at 19:03

## 2019-02-01 RX ADMIN — HEPARIN SODIUM 5000 UNIT(S): 5000 INJECTION INTRAVENOUS; SUBCUTANEOUS at 17:20

## 2019-02-01 RX ADMIN — MIDODRINE HYDROCHLORIDE 20 MILLIGRAM(S): 2.5 TABLET ORAL at 17:20

## 2019-02-01 RX ADMIN — Medication 1 PATCH: at 13:35

## 2019-02-01 RX ADMIN — MIDODRINE HYDROCHLORIDE 20 MILLIGRAM(S): 2.5 TABLET ORAL at 02:52

## 2019-02-01 RX ADMIN — Medication 100 MILLIGRAM(S): at 13:33

## 2019-02-01 RX ADMIN — PANTOPRAZOLE SODIUM 40 MILLIGRAM(S): 20 TABLET, DELAYED RELEASE ORAL at 17:20

## 2019-02-01 RX ADMIN — CHLORHEXIDINE GLUCONATE 1 APPLICATION(S): 213 SOLUTION TOPICAL at 06:08

## 2019-02-01 RX ADMIN — OCTREOTIDE ACETATE 100 MICROGRAM(S): 200 INJECTION, SOLUTION INTRAVENOUS; SUBCUTANEOUS at 06:05

## 2019-02-01 RX ADMIN — HEPARIN SODIUM 5000 UNIT(S): 5000 INJECTION INTRAVENOUS; SUBCUTANEOUS at 06:06

## 2019-02-01 RX ADMIN — OCTREOTIDE ACETATE 100 MICROGRAM(S): 200 INJECTION, SOLUTION INTRAVENOUS; SUBCUTANEOUS at 13:35

## 2019-02-01 RX ADMIN — Medication 1 PATCH: at 13:26

## 2019-02-01 RX ADMIN — Medication 1 TABLET(S): at 13:34

## 2019-02-01 RX ADMIN — Medication 50 MILLILITER(S): at 17:20

## 2019-02-01 RX ADMIN — OCTREOTIDE ACETATE 100 MICROGRAM(S): 200 INJECTION, SOLUTION INTRAVENOUS; SUBCUTANEOUS at 22:53

## 2019-02-01 NOTE — PROGRESS NOTE ADULT - ASSESSMENT
HPI:  47 y/o male PMH HTN (not on medication), chronic alcohol abuse and dependence x 15 years (1/2 pint of vodka with cranberry juice daily), hx of alcohol withdrawal 2013 hospitalized at [facility] after which he went to inpatient rehab at [facility] presents for syncope and melanotic stools. History obtained from ex wife, mother, and patient. Since pt left rehab in 2013 pt has been drinking daily. Last drink 11AM today. Pt state for past one and a half months he has had progressive weight loss with loss of appetite. He has had in increased abdominal distention for the past 1 month and in the past 1 week he has noticed jaundice and scleral icterus. Pt also reports generalized fatigue. Reports coffee ground emesis x1 episode 3 days ago but none since then with melena for about a week. Pt states he has 3 bowel movements a day and now has also been seeing "blood when I pee" also within the last week. No abdominal pain. No CP no SOB. No fevers or chills. Today pt called ex wife after he was unsteady with his gait and "fell", he denies LOC. Denies trauma to head. Ex wife reports while attempting to get pt to the car she noticed he was "very weak" and witnessed pt to have had 3 syncopal episodes lasting a few minutes each before regaining consciousness, but no seizures. Ex wife state that in the car, pt had a bowel movement that was black and tarry.     In ED pt noted to be hypotensive SBP 80-90s. 2L IVF given with 3rd and 4th infusing. SBP post IVF resuscitation 100s to one teens. On labs pt with leukocytosis with 3% bands, Na 123, Cr 2.05, INR 2.05, T bili 6, , ALT 55, Alk phos 799.  Pt seen by critical care and not felt to be ICU candidate. (23 Jan 2019 22:33)  ----------------- As Above ------------------------------------------------------  Patient confused. Left message for family member to contact me   ETOH Abuse (+). Coffee ground emesis x 1, melena.   Eleveated LFTs, INR  See labs, CT Scan    Cirrhosis, ascites, fever - 1) Paracentesis 2) ammonia level  3) f/u LFTs 4) treat for impending Dts  - patient does not fit into the criteria for treatment with steroids.

## 2019-02-01 NOTE — PROGRESS NOTE ADULT - SUBJECTIVE AND OBJECTIVE BOX
Patient is a 48y old  Male who presents with a chief complaint of Coffee ground emesis, progressive jaundice, syncopal episodes. (31 Jan 2019 20:59)      OVERNIGHT EVENTS:  none    REVIEW OF SYSTEMS: denies chest pain/SOB, diaphoresis, no F/C, cough, dizziness, headache, blurry vision, nausea, vomiting, abdominal pain. All others review of systems negative     MEDICATIONS  (STANDING):  albumin human 25% IVPB 50 milliLiter(s) IV Intermittent every 24 hours  chlorhexidine 4% Liquid 1 Application(s) Topical <User Schedule>  folic acid 1 milliGRAM(s) Oral daily  heparin  Injectable 5000 Unit(s) SubCutaneous every 12 hours  lactulose Syrup 10 Gram(s) Oral two times a day  midodrine 20 milliGRAM(s) Oral every 8 hours  multivitamin 1 Tablet(s) Oral daily  nicotine -  14 mG/24Hr(s) Patch 1 patch Transdermal daily  octreotide  Injectable 100 MICROGram(s) SubCutaneous every 8 hours  pantoprazole  Injectable 40 milliGRAM(s) IV Push every 12 hours  rifaximin 550 milliGRAM(s) Oral two times a day  thiamine 100 milliGRAM(s) Oral daily    MEDICATIONS  (PRN):  morphine  - Injectable 1 milliGRAM(s) IV Push every 6 hours PRN Moderate Pain (4 - 6)      Allergies    No Known Allergies    Intolerances        T(F): 98.2 (02-01-19 @ 13:27), Max: 98.2 (02-01-19 @ 13:27)  HR: 73 (02-01-19 @ 13:27) (65 - 73)  BP: 104/68 (02-01-19 @ 13:27) (93/57 - 104/68)  RR: 19 (02-01-19 @ 13:27) (17 - 21)  SpO2: 100% (02-01-19 @ 13:27) (95% - 100%)  Wt(kg): --    PHYSICAL EXAM:  GENERAL: NAD, well-groomed, well-developed, chronically ill looking   HEAD:  Atraumatic, Normocephalic  EYES: EOMI, PERRLA, conjunctiva and sclera jaundiced  ENMT: No tonsillar erythema, exudates, or enlargement; Moist mucous membranes  NECK: Supple, No JVD, Normal thyroid  NERVOUS SYSTEM:  Alert & Oriented X3, Good concentration; Motor Strength 4/5 B/L upper and lower extremities; DTRs 2+ intact and symmetric  CHEST/LUNG: decrease BS bilaterally;   HEART: Regular rate and rhythm; No murmurs, rubs, or gallops  ABDOMEN: Soft, Nontender, diffusely distended; Bowel sounds distant   EXTREMITIES:  2+ Peripheral Pulses, No clubbing, cyanosis, or edema BL LE  LYMPH: No lymphadenopathy noted  SKIN: yellow tinged     LABS:                        10.5   34.23 )-----------( 271      ( 01 Feb 2019 08:11 )             30.2     02-01    132<L>  |  99  |  128<H>  ----------------------------<  70  3.5   |  20<L>  |  6.54<H>    Ca    8.4<L>      01 Feb 2019 08:11  Phos  5.9     01-31  Mg     2.6     01-31    TPro  6.0  /  Alb  2.6<L>  /  TBili  9.7<H>  /  DBili  x   /  AST  90<H>  /  ALT  23  /  AlkPhos  371<H>  02-01        Cultures;   CAPILLARY BLOOD GLUCOSE      RADIOLOGY & ADDITIONAL TESTS:    Imaging Personally Reviewed:  [ x] YES    < from: CT Abdomen and Pelvis No Cont (01.23.19 @ 18:06) >  1. Diffuse ascites.  2. Hepatomegaly with cirrhotic changes and fatty infiltration.   Heterogeneity within the liver, and tumor/hepatocellular carcinoma cannot   be excluded.  3. Soft tissue thickening and/or edematous changes throughout the   mesentery and omental fat. Omental tumor versus omental edema is within   the differential.    < from: US Abdomen Complete (01.23.19 @ 18:33) >  Moderate diffuse ascites with hepatomegaly and cirrhotic changes of the   liver. Possible right liver mass.    < end of copied text >      Consultant(s) Notes Reviewed:  [x ] YES     Care Discussed with [x ] Consultants [X ] Patient [x ] Family; 2 brothers, 1 sister  [x ]    [x ]  Other; RN

## 2019-02-01 NOTE — CHART NOTE - NSCHARTNOTEFT_GEN_A_CORE
Pt c alcoholic hepatitis; cirrhosis c recurrent ascites;; possible underlying malignancy based on abdominal CT scan. Per nephology note, not a candidate for HD tx.;   Paracentesis performed (1/25) 7000 ml fluid removed; pt scheduled for 2nd paracentesis today.  Pt DNR status; hospice referral pending.     Factors impacting intake: [ ] none [ ] nausea  [ ] vomiting [ ] diarrhea [ ] constipation  [ ]chewing problems [ ] swallowing issues  [X ] other: pt not feeling well; early satiety due to recurrent ascites    Diet Prescription: Diet, Full Liquid:   Supplement Feeding Modality:  Oral  Ensure Enlive Cans or Servings Per Day:  1       Frequency:  Three Times a day (01-29-19 @ 13:01)    Intake: poor PO intake continues;  < 50% most meals; drinking most of supplement    Current Weight: 65.8 kg (2/1); previous wt 68 kg (1/27); admission wt 77 kg (1/24)  % Weight Change: not appropriate measure of nutritional status as pt c recurrent ascites    Nutrition focused physical exam conducted:    Subcutaneous fat loss: [ ] Orbital fat pads region, [ ]Buccal fat region, [ ]Triceps region,  [ ]Ribs region.   Muscle wasting: [ ]Temples region, [ ]Clavicle region, [ ]Shoulder region, [ ]Scapula region, [ ]Interosseous region,  [ ]thigh region, [ ]Calf region    Physical Appearance:  remains at 2+ generalized edema    Pertinent Medications: MEDICATIONS  (STANDING):  albumin human 25% IVPB 50 milliLiter(s) IV Intermittent every 24 hours  chlorhexidine 4% Liquid 1 Application(s) Topical <User Schedule>  folic acid 1 milliGRAM(s) Oral daily  heparin  Injectable 5000 Unit(s) SubCutaneous every 12 hours  lactulose Syrup 10 Gram(s) Oral two times a day  midodrine 20 milliGRAM(s) Oral every 8 hours  multivitamin 1 Tablet(s) Oral daily  nicotine -  14 mG/24Hr(s) Patch 1 patch Transdermal daily  octreotide  Injectable 100 MICROGram(s) SubCutaneous every 8 hours  pantoprazole  Injectable 40 milliGRAM(s) IV Push every 12 hours  rifaximin 550 milliGRAM(s) Oral two times a day  thiamine 100 milliGRAM(s) Oral daily    MEDICATIONS  (PRN):  morphine  - Injectable 1 milliGRAM(s) IV Push every 6 hours PRN Moderate Pain (4 - 6)    Pertinent Labs: 02-01 Na132 mmol/L<L> Glu 70 mg/dL K+ 3.5 mmol/L Cr  6.54 mg/dL<H>  mg/dL<H> 01-31 Phos 5.9 mg/dL<H> 02-01 Alb 2.6 g/dL<L>    Skin: WDL    Estimated Needs:   [X ] no change since previous assessment (1/29 follow-up chart note)  [ ] recalculated:     Previous Nutrition Diagnosis:   [ ] Inadequate Energy Intake [ ]Inadequate Oral Intake [ ] Excessive Energy Intake   [ ] Underweight [ ] Increased Nutrient Needs [ ] Overweight/Obesity   [ ] Altered GI Function [ ] Unintended Weight Loss [ ] Food & Nutrition Related Knowledge Deficit   [X] Severe Malnutrition - chronic      Nutrition Diagnosis is [X ] ongoing  [ ] resolved [ ] not applicable     GOAL: Pt to tolerate PO diet without GI distress - met; pt to consume > 75% energy/protein needs - not met    New Nutrition Diagnosis: [X ] not applicable    Interventions: continue current diet rx as noted  Recommend  [ ] Change Diet To:  [ ] Nutrition Supplement  [ ] Nutrition Support  [ X] Other:  continue to cater to food preferences when appropriate    Monitoring and Evaluation:   [ X] PO intake [ x ] Tolerance to diet prescription [ x ] weights [ x ] labs[ x ] follow up per protocol  [ ] other: Pt c alcoholic hepatitis; cirrhosis c recurrent ascites;; possible underlying malignancy based on abdominal CT scan. Per nephology note, not a candidate for HD tx.;   Paracentesis performed (1/25) 7000 ml fluid removed; pt scheduled for 2nd paracentesis today.  Pt DNR status; hospice referral pending.     Factors impacting intake: [ ] none [ ] nausea  [ ] vomiting [ ] diarrhea [ ] constipation  [ ]chewing problems [ ] swallowing issues  [X ] other: pt not feeling well; early satiety due to recurrent ascites    Diet Prescription: Diet, Full Liquid:   Supplement Feeding Modality:  Oral  Ensure Enlive Cans or Servings Per Day:  1       Frequency:  Three Times a day (01-29-19 @ 13:01)    Intake: poor PO intake continues;  < 50% most meals; drinking most of supplement    Current Weight: 65.8 kg (2/1); previous wt 68 kg (1/27); admission wt 77 kg (1/24)  % Weight Change: not appropriate measure of nutritional status as pt c recurrent ascites    Nutrition focused physical exam conducted:    Subcutaneous fat loss: [severe ] Orbital fat pads region, [moderate ]Buccal fat region, [severe ]Triceps region,  [unable - distended due to ascites ]Ribs region.   Muscle wasting: [severe ]Temples region, [severe ]Clavicle region, [moderate ]Shoulder region, [unable ]Scapula region, [mild ]Interosseous region,  [severe ]thigh region, [severe ]Calf region    Physical Appearance:  remains at 2+ generalized edema    Pertinent Medications: MEDICATIONS  (STANDING):  albumin human 25% IVPB 50 milliLiter(s) IV Intermittent every 24 hours  chlorhexidine 4% Liquid 1 Application(s) Topical <User Schedule>  folic acid 1 milliGRAM(s) Oral daily  heparin  Injectable 5000 Unit(s) SubCutaneous every 12 hours  lactulose Syrup 10 Gram(s) Oral two times a day  midodrine 20 milliGRAM(s) Oral every 8 hours  multivitamin 1 Tablet(s) Oral daily  nicotine -  14 mG/24Hr(s) Patch 1 patch Transdermal daily  octreotide  Injectable 100 MICROGram(s) SubCutaneous every 8 hours  pantoprazole  Injectable 40 milliGRAM(s) IV Push every 12 hours  rifaximin 550 milliGRAM(s) Oral two times a day  thiamine 100 milliGRAM(s) Oral daily    MEDICATIONS  (PRN):  morphine  - Injectable 1 milliGRAM(s) IV Push every 6 hours PRN Moderate Pain (4 - 6)    Pertinent Labs: 02-01 Na132 mmol/L<L> Glu 70 mg/dL K+ 3.5 mmol/L Cr  6.54 mg/dL<H>  mg/dL<H> 01-31 Phos 5.9 mg/dL<H> 02-01 Alb 2.6 g/dL<L>    Skin: WDL    Estimated Needs:   [X ] no change since previous assessment (1/29 follow-up chart note)  [ ] recalculated:     Previous Nutrition Diagnosis:   [ ] Inadequate Energy Intake [ ]Inadequate Oral Intake [ ] Excessive Energy Intake   [ ] Underweight [ ] Increased Nutrient Needs [ ] Overweight/Obesity   [ ] Altered GI Function [ ] Unintended Weight Loss [ ] Food & Nutrition Related Knowledge Deficit   [X] Severe Malnutrition - chronic      Nutrition Diagnosis is [X ] ongoing  [ ] resolved [ ] not applicable     GOAL: Pt to tolerate PO diet without GI distress - met; pt to consume > 75% energy/protein needs - not met    New Nutrition Diagnosis: [X ] not applicable    Interventions: continue current diet rx as noted  Recommend  [ ] Change Diet To:  [ ] Nutrition Supplement  [ ] Nutrition Support  [ X] Other:  continue to cater to food preferences when appropriate    Monitoring and Evaluation:   [ X] PO intake [ x ] Tolerance to diet prescription [ x ] weights [ x ] labs[ x ] follow up per protocol  [ ] other:

## 2019-02-01 NOTE — PROGRESS NOTE ADULT - ASSESSMENT
48M PMH HTN, chronic alcohol abuse and dependence x 15 years (1/2 pint of vodka with cranberry juice daily), hx of alcohol withdrawal presents for syncope x3 with generalized weakness, weight loss, loss of appetite, jaundice, melena, coffee ground emesis. Here with hypotension, lactic acidosis, hyponatremia, elevated Cr, jaundice, hyperbilirubinemia, elevated alk phos and AST, melena, coffee ground emesis, elevated INR, cirrhosis with ascites. < from: US Abdomen Limited (02.01.19 @ 12:45) >here is a moderate to large amount of ascites seen in all 4 quadrants.

## 2019-02-01 NOTE — PROGRESS NOTE ADULT - PROBLEM SELECTOR PLAN 2
LFTs better. Being treated for hepaorenal. Not a candidate for steroids. See paracentesis results. 11.2 / 132 / 137 / 403.  - 9.4 / 139 / 37 / 411 - 9.1/114/27/397   nh4 ? prognosis poor

## 2019-02-01 NOTE — PROGRESS NOTE ADULT - SUBJECTIVE AND OBJECTIVE BOX
Patient more alert; no distress;      MEDICATIONS  (STANDING):  albumin human 25% IVPB 50 milliLiter(s) IV Intermittent every 24 hours  chlorhexidine 4% Liquid 1 Application(s) Topical <User Schedule>  folic acid 1 milliGRAM(s) Oral daily  heparin  Injectable 5000 Unit(s) SubCutaneous every 12 hours  lactulose Syrup 10 Gram(s) Oral two times a day  midodrine 20 milliGRAM(s) Oral every 8 hours  multivitamin 1 Tablet(s) Oral daily  nicotine -  14 mG/24Hr(s) Patch 1 patch Transdermal daily  octreotide  Injectable 100 MICROGram(s) SubCutaneous every 8 hours  pantoprazole  Injectable 40 milliGRAM(s) IV Push every 12 hours  rifaximin 550 milliGRAM(s) Oral two times a day  thiamine 100 milliGRAM(s) Oral daily    MEDICATIONS  (PRN):  morphine  - Injectable 1 milliGRAM(s) IV Push every 6 hours PRN Moderate Pain (4 - 6)      01-31-19 @ 07:01  -  02-01-19 @ 07:00  --------------------------------------------------------  IN: 0 mL / OUT: 2450 mL / NET: -2450 mL    02-01-19 @ 07:01  -  02-01-19 @ 18:14  --------------------------------------------------------  IN: 560 mL / OUT: 0 mL / NET: 560 mL      PHYSICAL EXAM:      T(C): 36.8 (02-01-19 @ 16:43), Max: 36.8 (02-01-19 @ 13:27)  HR: 68 (02-01-19 @ 16:43) (65 - 73)  BP: 96/58 (02-01-19 @ 16:43) (93/57 - 104/68)  RR: 20 (02-01-19 @ 16:43) (18 - 21)  SpO2: 100% (02-01-19 @ 16:43) (95% - 100%)  Wt(kg): --  Respiratory: clear anteriorly, decreased BS at bases  Cardiovascular: S1 S2  Gastrointestinal: soft NT mild distention  +BS  Extremities:   1 edema                                    10.5   34.23 )-----------( 271      ( 01 Feb 2019 08:11 )             30.2     02-01    132<L>  |  99  |  128<H>  ----------------------------<  70  3.5   |  20<L>  |  6.54<H>    Ca    8.4<L>      01 Feb 2019 08:11  Phos  5.9     01-31  Mg     2.6     01-31    TPro  6.0  /  Alb  2.6<L>  /  TBili  9.7<H>  /  DBili  x   /  AST  90<H>  /  ALT  23  /  AlkPhos  371<H>  02-01      LIVER FUNCTIONS - ( 01 Feb 2019 08:11 )  Alb: 2.6 g/dL / Pro: 6.0 gm/dL / ALK PHOS: 371 U/L / ALT: 23 U/L / AST: 90 U/L / GGT: x             Assessment and Plan:  PAPO, CKD degree unclear; pre renal azotemia; severe sepsis, SBP; ischemic ATN,  risk for HRS type 2 to 1  Continue SPA; Octreo and Midodrine for now;   Supportive care; Creatinine slowly plateauing;

## 2019-02-01 NOTE — PROGRESS NOTE ADULT - PROBLEM SELECTOR PLAN 1
HGB 10.5 , no signs of gi bleeding. PPI BID Will arrange for EGD once patient is stable off pressors.

## 2019-02-01 NOTE — PROGRESS NOTE ADULT - SUBJECTIVE AND OBJECTIVE BOX
Patient is a 48y old  Male who presents with a chief complaint of Coffee ground emesis, progressive jaundice, syncopal episodes. (01 Feb 2019 14:34)      HPI:  49 y/o male PMH HTN (not on medication), chronic alcohol abuse and dependence x 15 years (1/2 pint of vodka with cranberry juice daily), hx of alcohol withdrawal 2013 hospitalized at [facility] after which he went to inpatient rehab at [facility] for syncope and melanotic stools. History obtained from ex wife, mother, and patient. Since pt left rehab in 2013 pt has been drinking daily. Last drink 11AM today. Pt state for past one and a half months he has had progressive weight loss with loss of appetite. He has had in increased abdominal distention for the past 1 month and in the past 1 week he has noticed jaundice and scleral icterus. Pt also reports generalized fatigue. Reports coffee ground emesis x1 episode 3 days ago but none since then with melena for about a week. Pt states he has 3 bowel movements a day and now has also been seeing "blood when I pee" also within the last week. No abdominal pain. No CP no SOB. No fevers or chills. Today pt called ex wife after he was unsteady with his gait and "fell", he denies LOC. Denies trauma to head. Ex wife reports while attempting to get pt to the car she noticed he was "very weak" and witnessed pt to have had 3 syncopal episodes lasting a few minutes each before regaining consciousness, but no seizures. Ex wife state that in the car, pt had a bowel movement that was black and tarry.     In ED pt noted to be hypotensive SBP 80-90s. 2L IVF given with 3rd and 4th infusing. SBP post IVF resuscitation 100s to one teens. On labs pt with leukocytosis with 3% bands, Na 123, Cr 2.05, INR 2.05, T bili 6, , ALT 55, Alk phos 799.  Pt seen by critical care and not felt to be ICU candidate. (23 Jan 2019 22:33)      INTERVAL HPI/OVERNIGHT EVENTS:  Worsening renal function.  WBC rising. Lethargic. 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, hematochezia, hematemesis, nausea, vomiting, abdominal pain, constipation,      MEDICATIONS  (STANDING):  albumin human 25% IVPB 50 milliLiter(s) IV Intermittent every 24 hours  chlorhexidine 4% Liquid 1 Application(s) Topical <User Schedule>  folic acid 1 milliGRAM(s) Oral daily  heparin  Injectable 5000 Unit(s) SubCutaneous every 12 hours  lactulose Syrup 10 Gram(s) Oral two times a day  midodrine 20 milliGRAM(s) Oral every 8 hours  multivitamin 1 Tablet(s) Oral daily  nicotine -  14 mG/24Hr(s) Patch 1 patch Transdermal daily  octreotide  Injectable 100 MICROGram(s) SubCutaneous every 8 hours  pantoprazole  Injectable 40 milliGRAM(s) IV Push every 12 hours  rifaximin 550 milliGRAM(s) Oral two times a day  thiamine 100 milliGRAM(s) Oral daily    MEDICATIONS  (PRN):  morphine  - Injectable 1 milliGRAM(s) IV Push every 6 hours PRN Moderate Pain (4 - 6)      FAMILY HISTORY:  No pertinent family history in first degree relatives      Allergies    No Known Allergies    Intolerances        PMH/PSH:  ETOH abuse  Psoriasis  History of hypertension  No significant past surgical history        REVIEW OF SYSTEMS:  CONSTITUTIONAL: No fever, weight loss, or fatigue  EYES: No eye pain, visual disturbances, or discharge  ENMT:  No difficulty hearing, tinnitus, vertigo; No sinus or throat pain  NECK: No pain or stiffness  BREASTS: No pain, masses, or nipple discharge  RESPIRATORY: No cough, wheezing, chills or hemoptysis; No shortness of breath  CARDIOVASCULAR: No chest pain, palpitations, dizziness, or leg swelling  GASTROINTESTINAL: See above  GENITOURINARY: No dysuria, frequency, hematuria, or incontinence  NEUROLOGICAL: No headaches,, numbness, or tremors  SKIN: No itching, burning, rashes, or lesions   LYMPH NODES: No enlarged glands  ENDOCRINE: No heat or cold intolerance; No hair loss  MUSCULOSKELETAL: No joint pain or swelling; No muscle, back, or extremity pain  PSYCHIATRIC: No depression, anxiety, mood swings, or difficulty sleeping  HEME/LYMPH: No easy bruising, or bleeding gums  ALLERGY AND IMMUNOLOGIC: No hives or eczema    Vital Signs Last 24 Hrs  T(C): 36.8 (01 Feb 2019 13:27), Max: 36.8 (01 Feb 2019 13:27)  T(F): 98.2 (01 Feb 2019 13:27), Max: 98.2 (01 Feb 2019 13:27)  HR: 73 (01 Feb 2019 13:27) (65 - 73)  BP: 104/68 (01 Feb 2019 13:27) (93/57 - 104/68)  BP(mean): --  RR: 19 (01 Feb 2019 13:27) (17 - 21)  SpO2: 100% (01 Feb 2019 13:27) (95% - 100%)    PHYSICAL EXAM:  GENERAL: NAD, well-groomed, well-developed  HEAD:  Atraumatic, Normocephalic  EYES: EOMI, PERRLA, conjunctiva and sclera jaundiced  NECK: Supple, No JVD, Normal thyroid  NERVOUS SYSTEM:  Alert but lethargic  CHEST/LUNG: Clear to percussion bilaterally; No rales, rhonchi, wheezing, or rubs  HEART: Regular rate and rhythm; No murmurs, rubs, or gallops  ABDOMEN: Soft, Nontender, Distended; Bowel sounds present  EXTREMITIES:  2+ Peripheral Pulses, No clubbing, cyanosis,   LYMPH: No lymphadenopathy noted  SKIN: No rashes or lesions    LAB                          10.5   34.23 )-----------( 271      ( 01 Feb 2019 08:11 )             30.2       CBC:  02-01 @ 08:11  WBC 34.23   Hgb 10.5   Hct 30.2   Plts 271  .7  01-31 @ 07:28  WBC 30.12   Hgb 10.3   Hct 29.8   Plts 259  .0  01-30 @ 04:21  WBC 27.16   Hgb 10.4   Hct 29.8   Plts 282  .4  01-29 @ 16:48  WBC 28.92   Hgb 10.5   Hct 29.8   Plts 297  .0  01-29 @ 04:09  WBC 23.35   Hgb 10.8   Hct 31.6   Plts 302  .3  01-28 @ 12:29  WBC 21.90   Hgb 6.8   Hct 19.7   Plts 236  .2  01-28 @ 04:20  WBC 19.63   Hgb 7.5   Hct 21.8   Plts 258  .0  01-27 @ 09:56  WBC 23.31   Hgb 8.4   Hct 24.2   Plts 313  .5  01-26 @ 08:11  WBC 26.74   Hgb 8.6   Hct 24.8   Plts 268  .9      Chemistry:  02-01 @ 08:11  Na+ 132  K+ 3.5  Cl- 99  CO2 20    Cr 6.54     01-31 @ 07:28  Na+ 131  K+ 3.7  Cl- 99  CO2 20    Cr 6.31     01-30 @ 04:21  Na+ 133  K+ 4.3  Cl- 100  CO2 18    Cr 5.61     01-29 @ 04:09  Na+ 133  K+ 4.2  Cl- 97  CO2 21    Cr 5.12     01-28 @ 04:20  Na+ 132  K+ 4.3  Cl- 94  CO2 25  BUN 88  Cr 4.10     01-27 @ 09:56  Na+ 132  K+ 4.0  Cl- 95  CO2 25  BUN 77  Cr 2.87     01-26 @ 08:11  Na+ 133  K+ 3.7  Cl- 96  CO2 27  BUN 57  Cr 1.51         Glucose, Serum: 70 mg/dL (02-01 @ 08:11)  Glucose, Serum: 104 mg/dL (01-31 @ 07:28)  Glucose, Serum: 139 mg/dL (01-30 @ 04:21)  Glucose, Serum: 71 mg/dL (01-29 @ 04:09)  Glucose, Serum: 106 mg/dL (01-28 @ 04:20)  Glucose, Serum: 173 mg/dL (01-27 @ 09:56)  Glucose, Serum: 186 mg/dL (01-26 @ 08:11)      01 Feb 2019 08:11    132    |  99     |  128    ----------------------------<  70     3.5     |  20     |  6.54   31 Jan 2019 07:28    131    |  99     |  121    ----------------------------<  104    3.7     |  20     |  6.31   30 Jan 2019 04:21    133    |  100    |  114    ----------------------------<  139    4.3     |  18     |  5.61   29 Jan 2019 04:09    133    |  97     |  110    ----------------------------<  71     4.2     |  21     |  5.12   28 Jan 2019 04:20    132    |  94     |  88     ----------------------------<  106    4.3     |  25     |  4.10   27 Jan 2019 09:56    132    |  95     |  77     ----------------------------<  173    4.0     |  25     |  2.87   26 Jan 2019 08:11    133    |  96     |  57     ----------------------------<  186    3.7     |  27     |  1.51     Ca    8.4        01 Feb 2019 08:11  Ca    7.9        31 Jan 2019 07:28  Ca    7.7        30 Jan 2019 04:21  Ca    8.2        29 Jan 2019 04:09  Ca    8.3        28 Jan 2019 04:20  Ca    8.0        27 Jan 2019 09:56  Ca    8.0        26 Jan 2019 08:11  Phos  5.9       31 Jan 2019 07:28  Phos  5.7       30 Jan 2019 04:21  Phos  5.8       29 Jan 2019 04:09  Phos  4.5       28 Jan 2019 04:20  Phos  2.6       27 Jan 2019 09:56  Phos  1.3       26 Jan 2019 08:11  Mg     2.6       31 Jan 2019 07:28  Mg     2.9       30 Jan 2019 04:21  Mg     3.0       29 Jan 2019 04:09  Mg     2.7       28 Jan 2019 04:20  Mg     2.4       27 Jan 2019 09:56  Mg     2.5       26 Jan 2019 08:11    TPro  6.0    /  Alb  2.6    /  TBili  9.7    /  DBili  x      /  AST  90     /  ALT  23     /  AlkPhos  371    01 Feb 2019 08:11  TPro  6.0    /  Alb  2.7    /  TBili  9.1    /  DBili  x      /  AST  114    /  ALT  27     /  AlkPhos  397    31 Jan 2019 07:28  TPro  6.2    /  Alb  3.0    /  TBili  9.4    /  DBili  7.14   /  AST  139    /  ALT  37     /  AlkPhos  411    30 Jan 2019 04:21  TPro  7.0    /  Alb  3.8    /  TBili  11.2   /  DBili  8.34   /  AST  132    /  ALT  37     /  AlkPhos  403    29 Jan 2019 04:09  TPro  6.8    /  Alb  3.4    /  TBili  7.7    /  DBili  6.07   /  AST  116    /  ALT  39     /  AlkPhos  396    28 Jan 2019 04:20  TPro  6.0    /  Alb  2.1    /  TBili  7.2    /  DBili  6.30   /  AST  160    /  ALT  44     /  AlkPhos  521    26 Jan 2019 08:11              CAPILLARY BLOOD GLUCOSE              RADIOLOGY & ADDITIONAL TESTS:    Imaging Personally Reviewed:  [ ] YES  [ ] NO    Consultant(s) Notes Reviewed:  [ ] YES  [ ] NO    Care Discussed with Consultants/Other Providers [ ] YES  [ ] NO

## 2019-02-01 NOTE — PROGRESS NOTE ADULT - PROBLEM SELECTOR PLAN 7
- albumin   - significant ascites noted: s/p therapeutic paracentesis  - there is concern for underlying malignancy based on symptoms as well as CT abdomen findings

## 2019-02-02 ENCOUNTER — TRANSCRIPTION ENCOUNTER (OUTPATIENT)
Age: 49
End: 2019-02-02

## 2019-02-02 DIAGNOSIS — K72.90 HEPATIC FAILURE, UNSPECIFIED WITHOUT COMA: ICD-10-CM

## 2019-02-02 DIAGNOSIS — E43 UNSPECIFIED SEVERE PROTEIN-CALORIE MALNUTRITION: ICD-10-CM

## 2019-02-02 LAB
ALBUMIN SERPL ELPH-MCNC: 2.7 G/DL — LOW (ref 3.3–5)
ALP SERPL-CCNC: 356 U/L — HIGH (ref 40–120)
ALT FLD-CCNC: 21 U/L — SIGNIFICANT CHANGE UP (ref 12–78)
AMMONIA BLD-MCNC: 16 UMOL/L — SIGNIFICANT CHANGE UP (ref 11–32)
ANION GAP SERPL CALC-SCNC: 11 MMOL/L — SIGNIFICANT CHANGE UP (ref 5–17)
AST SERPL-CCNC: 80 U/L — HIGH (ref 15–37)
BILIRUB SERPL-MCNC: 9.8 MG/DL — HIGH (ref 0.2–1.2)
BUN SERPL-MCNC: 125 MG/DL — HIGH (ref 7–23)
CALCIUM SERPL-MCNC: 8.4 MG/DL — LOW (ref 8.5–10.1)
CHLORIDE SERPL-SCNC: 102 MMOL/L — SIGNIFICANT CHANGE UP (ref 96–108)
CO2 SERPL-SCNC: 21 MMOL/L — LOW (ref 22–31)
CREAT SERPL-MCNC: 6.22 MG/DL — HIGH (ref 0.5–1.3)
CULTURE RESULTS: SIGNIFICANT CHANGE UP
GLUCOSE SERPL-MCNC: 121 MG/DL — HIGH (ref 70–99)
POTASSIUM SERPL-MCNC: 3.5 MMOL/L — SIGNIFICANT CHANGE UP (ref 3.5–5.3)
POTASSIUM SERPL-SCNC: 3.5 MMOL/L — SIGNIFICANT CHANGE UP (ref 3.5–5.3)
PROT SERPL-MCNC: 6.1 GM/DL — SIGNIFICANT CHANGE UP (ref 6–8.3)
SODIUM SERPL-SCNC: 134 MMOL/L — LOW (ref 135–145)
SPECIMEN SOURCE: SIGNIFICANT CHANGE UP

## 2019-02-02 PROCEDURE — 99233 SBSQ HOSP IP/OBS HIGH 50: CPT

## 2019-02-02 RX ADMIN — Medication 1 PATCH: at 07:44

## 2019-02-02 RX ADMIN — HEPARIN SODIUM 5000 UNIT(S): 5000 INJECTION INTRAVENOUS; SUBCUTANEOUS at 17:13

## 2019-02-02 RX ADMIN — OCTREOTIDE ACETATE 100 MICROGRAM(S): 200 INJECTION, SOLUTION INTRAVENOUS; SUBCUTANEOUS at 13:39

## 2019-02-02 RX ADMIN — MIDODRINE HYDROCHLORIDE 20 MILLIGRAM(S): 2.5 TABLET ORAL at 10:04

## 2019-02-02 RX ADMIN — OCTREOTIDE ACETATE 100 MICROGRAM(S): 200 INJECTION, SOLUTION INTRAVENOUS; SUBCUTANEOUS at 05:55

## 2019-02-02 RX ADMIN — Medication 1 MILLIGRAM(S): at 11:44

## 2019-02-02 RX ADMIN — Medication 1 PATCH: at 19:31

## 2019-02-02 RX ADMIN — Medication 100 MILLIGRAM(S): at 11:44

## 2019-02-02 RX ADMIN — MIDODRINE HYDROCHLORIDE 20 MILLIGRAM(S): 2.5 TABLET ORAL at 01:50

## 2019-02-02 RX ADMIN — MIDODRINE HYDROCHLORIDE 20 MILLIGRAM(S): 2.5 TABLET ORAL at 17:14

## 2019-02-02 RX ADMIN — Medication 1 PATCH: at 12:30

## 2019-02-02 RX ADMIN — Medication 1 TABLET(S): at 11:44

## 2019-02-02 RX ADMIN — LACTULOSE 10 GRAM(S): 10 SOLUTION ORAL at 05:49

## 2019-02-02 RX ADMIN — HEPARIN SODIUM 5000 UNIT(S): 5000 INJECTION INTRAVENOUS; SUBCUTANEOUS at 05:48

## 2019-02-02 RX ADMIN — LACTULOSE 10 GRAM(S): 10 SOLUTION ORAL at 17:13

## 2019-02-02 RX ADMIN — Medication 1 PATCH: at 11:44

## 2019-02-02 RX ADMIN — OCTREOTIDE ACETATE 100 MICROGRAM(S): 200 INJECTION, SOLUTION INTRAVENOUS; SUBCUTANEOUS at 23:06

## 2019-02-02 RX ADMIN — Medication 50 MILLILITER(S): at 11:45

## 2019-02-02 RX ADMIN — PANTOPRAZOLE SODIUM 40 MILLIGRAM(S): 20 TABLET, DELAYED RELEASE ORAL at 05:48

## 2019-02-02 RX ADMIN — PANTOPRAZOLE SODIUM 40 MILLIGRAM(S): 20 TABLET, DELAYED RELEASE ORAL at 17:12

## 2019-02-02 NOTE — DISCHARGE NOTE ADULT - CARE PROVIDERS DIRECT ADDRESSES
,[email or web address]

## 2019-02-02 NOTE — DISCHARGE NOTE ADULT - CARE PROVIDER_API CALL
Joshua Hernandez)  Internal Medicine  [address], [address]  [place] [address]  Phone: [phone]  Fax: [phone]  Follow Up Time: 1 week    Leandro Sheffield)  Medicine  [address] [place] [address]  Phone: [phone]  Fax: [phone]  Follow Up Time: 1 week    Coy Cruz)  Internal Medicine; Nephrology  [address] [facility] [facility], [address] [place] [id number]  Phone: [phone]  Fax: [phone]  Follow Up Time: 2 weeks

## 2019-02-02 NOTE — DISCHARGE NOTE ADULT - PROVIDER TOKENS
PROVIDER:[TOKEN:[[id number]],FOLLOWUP:[1 week]],PROVIDER:[TOKEN:[[id number]],FOLLOWUP:[1 week]],PROVIDER:[TOKEN:[[id number]],FOLLOWUP:[2 weeks]]

## 2019-02-02 NOTE — DISCHARGE NOTE ADULT - HOSPITAL COURSE
48M PMH HTN, chronic alcohol abuse and dependence x 15 years (1/2 pint of vodka with cranberry juice daily), hx of alcohol withdrawal presents for syncope x3 with generalized weakness, weight loss, loss of appetite, jaundice, melena, coffee ground emesis. Here with hypotension, lactic acidosis, hyponatremia, elevated Cr, jaundice, hyperbilirubinemia, elevated alk phos and AST, melena, coffee ground emesis, elevated INR, cirrhosis with ascites. < from: US Abdomen Limited (02.01.19 @ 12:45) >here is a moderate to large amount of ascites seen in all 4 quadrants. s/p paracetesis on 2/1-700 ml fluid removed. Octreo and Midodrine for now; PHYSICAL EXAM:  GENERAL: NADHEAD:  Atraumatic, Normocephalic  EYES:  conjunctiva and icteric scleraENMT: No tonsillar erythema, exudates, or enlargement; Moist mucous membranes, Good dentition, No lesions  NECK: Supple, No JVD, Normal thyroid  NERVOUS SYSTEM:  Alert & Oriented X3, Good concentration; Motor Strength 5/5 B/L upper and lower extremities; DTRs 2+ intact and symmetric  CHEST/LUNG: Clear to percussion bilaterally; No rales, rhonchi, wheezing, or rubs  HEART: Regular rate and rhythm; No murmurs, rubs, or gallops  ABDOMEN: Soft,distended EXTREMITIES:  2+ Peripheral Pulses, No clubbing, cyanosis, or edema  LYMPH: No lymphadenopathy noted  SKIN: No rashes or lesions  48M PMH HTN, chronic alcohol abuse and dependence x 15 years (1/2 pint of vodka with cranberry juice daily), hx of alcohol withdrawal presents for syncope x3 with generalized weakness, weight loss, loss of appetite, jaundice, melena, coffee ground emesis. Here with hypotension, lactic acidosis, hyponatremia, elevated Cr, jaundice, hyperbilirubinemia, elevated alk phos and AST, melena, coffee ground emesis, elevated INR, cirrhosis with ascites. < from: US Abdomen Limited (02.01.19 @ 12:45) >here is a moderate to large amount of ascites seen in all 4 quadrants. s/p paracetesis on 2/1-700 ml fluid removed. Octreo and Midodrine for now;   Albumin restarted on abx repeat paracentesis denied long discussion with family and patient about prognosis but optimistic about patient being stable enogh for physical rehab will get hematology consult as well for leukcytosis s/p tap  2/11/19  Problem/Plan - 1:  ·  Problem: Hepatorenal syndrome.  Plan: slowly improving with midodrine, off of octreotide and albumin  tapering  GI following.  Renal.      Problem/Plan - 2:  ·  Problem: Sepsis, due to unspecified organism.  Plan: -resolving, persistent leukocytosis, improved  from yesterday.   - patient afebrile.    - Meropenem completed  on rifaximin  ID following.      Problem/Plan - 3:  ·  Problem: Acute hepatic encephalopathy.  Plan: resolved, patient AA0 x 3.      Problem/Plan - 4:  ·  Problem: Acute GI bleeding.  Plan: - protonix  - GI on board.      Problem/Plan - 5:  ·  Problem: PAPO (acute kidney injury).  Plan: -resolving, ARF likely from dehydration, pre renal azotemia in the setting of Hepatorenal syndrome.   - would monitor Cr closelyin rehab      Problem/Plan - 6:  Problem: Alcohol withdrawal delirium, acute, hypoactive. Plan resolved    Problem/Plan - 7:  ·  Problem: Alcoholic cirrhosis of liver with ascites.  Plan: - albumin, AFP 3.5  - significant ascites noted: s/p therapeutic paracentesis x 2 .   will need follow up with GI and MUST abstain  from drinking for at least 6 months for consideration for liver transplant      Problem/Plan - 8:  ·  Problem: Severe protein-calorie malnutrition.  Plan: dietician following, o ensure supplement.      Problem/Plan - 9:  ·  Problem: Dysuria.  Plan: pyridium.      Problem/Plan - 10:  Problem: Hyponatremia. Plan; fluid restriction.    Attending Attestation:   55 minutes spent on total discharge more than 50% of the visit was spent counseling and/or coordinating care by the attending physician.

## 2019-02-02 NOTE — PROGRESS NOTE ADULT - ASSESSMENT
47y/o male With Alcoholic Liver Cirrhosis with Ascites, Coffee ground emesis, S/P IR Paracentesis 2/1/19 5 liters removed

## 2019-02-02 NOTE — PROGRESS NOTE ADULT - SUBJECTIVE AND OBJECTIVE BOX
Patient is a 48y old  Male who presents with a chief complaint of Coffee ground emesis, progressive jaundice, syncopal episodes. (01 Feb 2019 18:14)      OVERNIGHT EVENTS: none     REVIEW OF SYSTEMS: denies chest pain/SOB, diaphoresis, no F/C, cough, dizziness, headache, blurry vision, nausea, vomiting, abdominal pain. All others review of systems negative     MEDICATIONS  (STANDING):  albumin human 25% IVPB 50 milliLiter(s) IV Intermittent every 24 hours  chlorhexidine 4% Liquid 1 Application(s) Topical <User Schedule>  folic acid 1 milliGRAM(s) Oral daily  heparin  Injectable 5000 Unit(s) SubCutaneous every 12 hours  lactulose Syrup 10 Gram(s) Oral two times a day  midodrine 20 milliGRAM(s) Oral every 8 hours  multivitamin 1 Tablet(s) Oral daily  nicotine -  14 mG/24Hr(s) Patch 1 patch Transdermal daily  octreotide  Injectable 100 MICROGram(s) SubCutaneous every 8 hours  pantoprazole  Injectable 40 milliGRAM(s) IV Push every 12 hours  rifaximin 550 milliGRAM(s) Oral two times a day  thiamine 100 milliGRAM(s) Oral daily    MEDICATIONS  (PRN):  morphine  - Injectable 1 milliGRAM(s) IV Push every 6 hours PRN Moderate Pain (4 - 6)      Allergies    No Known Allergies    Intolerances        T(F): 97.6 (02-02-19 @ 05:15), Max: 98.2 (02-01-19 @ 13:27)  HR: 59 (02-02-19 @ 05:15) (59 - 73)  BP: 96/59 (02-02-19 @ 05:15) (96/58 - 104/68)  RR: 19 (02-02-19 @ 05:15) (19 - 20)  SpO2: 100% (02-02-19 @ 05:15) (100% - 100%)  Wt(kg): --    PHYSICAL EXAM:  GENERAL: NAD, well-groomed, well-developed, chronically ill looking   HEAD:  Atraumatic, Normocephalic  EYES: EOMI, PERRLA, conjunctiva and sclera jaundiced  ENMT: No tonsillar erythema, exudates, or enlargement; Moist mucous membranes  NECK: Supple, No JVD, Normal thyroid  NERVOUS SYSTEM:  Alert & Oriented X3, Good concentration; Motor Strength 4/5 B/L upper and lower extremities; DTRs 2+ intact and symmetric  CHEST/LUNG: decrease BS bilaterally;   HEART: Regular rate and rhythm; No murmurs, rubs, or gallops  ABDOMEN: Soft, Nontender, diffusely distended; Bowel sounds distant   EXTREMITIES:  2+ Peripheral Pulses, No clubbing, cyanosis, or edema BL LE  LYMPH: No lymphadenopathy noted  SKIN: yellow tinged       LABS:                        10.5   34.23 )-----------( 271      ( 01 Feb 2019 08:11 )             30.2     02-02    134<L>  |  102  |  125<H>  ----------------------------<  121<H>  3.5   |  21<L>  |  6.22<H>    Ca    8.4<L>      02 Feb 2019 07:27    TPro  6.1  /  Alb  2.7<L>  /  TBili  9.8<H>  /  DBili  x   /  AST  80<H>  /  ALT  21  /  AlkPhos  356<H>  02-02        Cultures;   CAPILLARY BLOOD GLUCOSE      RADIOLOGY & ADDITIONAL TESTS:    Imaging Personally Reviewed:  [ x] YES    < from: CT Abdomen and Pelvis No Cont (01.23.19 @ 18:06) >  1. Diffuse ascites.  2. Hepatomegaly with cirrhotic changes and fatty infiltration.   Heterogeneity within the liver, and tumor/hepatocellular carcinoma cannot   be excluded.  3. Soft tissue thickening and/or edematous changes throughout the   mesentery and omental fat. Omental tumor versus omental edema is within   the differential.    < from: US Abdomen Complete (01.23.19 @ 18:33) >  Moderate diffuse ascites with hepatomegaly and cirrhotic changes of the   liver. Possible right liver mass.    < end of copied text >      Consultant(s) Notes Reviewed:  [x ] YES     Care Discussed with [x ] Consultants [X ] Patient [x ] Family; 2 brothers, 1 sister  [x ]    [x ]  Other; RN

## 2019-02-02 NOTE — DISCHARGE NOTE ADULT - PLAN OF CARE
Completed Ativan taper AVOID ALCOHOL  Follow up with drug rehab 2' to liver failure  Bleeding resolved  H/H Stable  Follow up with GI as outpatient s/p paracentesis  Follow up with GI as outpatient  Reeval for possible diuresis if kidney function improved Improving  Continue to trend labs as outpatient  Follow up with GI, nephrologist Complete ABX Encourage PO intake  Supplements Continue Midodrine and reeval for tapering

## 2019-02-02 NOTE — DISCHARGE NOTE ADULT - PATIENT PORTAL LINK FT
You can access the [facility] Patient Portal, offered by [facility], by registering with the following website: [email or web address]

## 2019-02-02 NOTE — DISCHARGE NOTE ADULT - MEDICATION SUMMARY - MEDICATIONS TO TAKE
I will START or STAY ON the medications listed below when I get home from the hospital:    hydrOXYzine hydrochloride 25 mg oral tablet  -- 1 tab(s) by mouth every 6 hours, As needed, Itching  -- Indication: For Alcoholic cirrhosis of liver with ascites    hydrocortisone 1% topical ointment  -- 1 application on skin 2 times a day  -- Indication: For Alcoholic cirrhosis of liver with ascites    zinc oxide 40% topical ointment  -- 1 application on skin every 8 hours, As needed, rash  -- Indication: For Alcoholic cirrhosis of liver with ascites    rifAXIMin 550 mg oral tablet  -- 1 tab(s) by mouth 2 times a day  -- Indication: For Acute hepatic encephalopathy    midodrine 5 mg oral tablet  -- 1 tab(s) by mouth every 8 hours  -- Indication: For Alcoholic cirrhosis of liver with ascites    Protonix 40 mg oral delayed release tablet  -- 1 tab(s) by mouth once a day   -- It is very important that you take or use this exactly as directed.  Do not skip doses or discontinue unless directed by your doctor.  Obtain medical advice before taking any non-prescription drugs as some may affect the action of this medication.  Swallow whole.  Do not crush.    -- Indication: For Acute GI bleeding    nicotine 14 mg/24 hr transdermal film, extended release  --  by transdermal patch   -- Indication: For tobacco abuse     Multiple Vitamins oral tablet  -- 1 tab(s) by mouth once a day  -- Indication: For Alcohol dependence with withdrawal with complication    folic acid 1 mg oral tablet  -- 1 tab(s) by mouth once a day  -- Indication: For Alcohol dependence with withdrawal with complication    thiamine 100 mg oral tablet  -- 1 tab(s) by mouth once a day  -- Indication: For Alcohol dependence

## 2019-02-02 NOTE — PROGRESS NOTE ADULT - SUBJECTIVE AND OBJECTIVE BOX
Gastroenterology coverage for Dr Sheffield  Patient seen and examined bedside resting comfortably.  No complaints offered. Abdominal pain is well controlled.  Denies nausea and vomiting. Tolerating diet.  Normal flatus/BM.     T(F): 97.5 (02-02-19 @ 13:48), Max: 98.2 (02-01-19 @ 16:43)  HR: 71 (02-02-19 @ 13:48) (59 - 71)  BP: 87/52 (02-02-19 @ 13:48) (87/52 - 97/62)  RR: 18 (02-02-19 @ 13:48) (18 - 20)  SpO2: 98% (02-02-19 @ 13:48) (98% - 100%)  Wt(kg): --  CAPILLARY BLOOD GLUCOSE          PHYSICAL EXAM:  General: NAD, WDWN.   Neuro:  Alert & responsive  HEENT: NCAT, EOMI, Icteric  CV: +S1+S2 regular rate and rhythm  Lung: clear to ausculation bilaterally, respirations nonlabored, good inspiratory effort  Abdomen: soft, Non tender, No Distension. Normal active BS  Extremities: no pedal edema or calf tenderness noted     LABS:                        10.5   34.23 )-----------( 271      ( 01 Feb 2019 08:11 )             30.2     02-02    134<L>  |  102  |  125<H>  ----------------------------<  121<H>  3.5   |  21<L>  |  6.22<H>    Ca    8.4<L>      02 Feb 2019 07:27    TPro  6.1  /  Alb  2.7<L>  /  TBili  9.8<H>  /  DBili  x   /  AST  80<H>  /  ALT  21  /  AlkPhos  356<H>  02-02    LIVER FUNCTIONS - ( 02 Feb 2019 07:27 )  Alb: 2.7 g/dL / Pro: 6.1 gm/dL / ALK PHOS: 356 U/L / ALT: 21 U/L / AST: 80 U/L / GGT: x             I&O's Detail    01 Feb 2019 07:01  -  02 Feb 2019 07:00  --------------------------------------------------------  IN:    Oral Fluid: 880 mL  Total IN: 880 mL    OUT:    Rectal Tube: 2800 mL    Voided: 400 mL  Total OUT: 3200 mL    Total NET: -2320 mL      02 Feb 2019 07:01  -  02 Feb 2019 14:27  --------------------------------------------------------  IN:    Albumin 25%: 50 mL  Total IN: 50 mL    OUT:  Total OUT: 0 mL    Total NET: 50 mL

## 2019-02-02 NOTE — PROGRESS NOTE ADULT - ASSESSMENT
47 y/o male PMH HTN, chronic alcohol abuse and dependence x 15 years now admitted with hepatic encephalopathy.   PAPO but the renal indices are now stabilizing.   will follow closely at this time and watch for HRS. Will follow.

## 2019-02-02 NOTE — PROGRESS NOTE ADULT - SUBJECTIVE AND OBJECTIVE BOX
SCOTT RUIZ  48y  Male    Patient is a 48y old  Male who presents with a chief complaint of Coffee ground emesis, progressive jaundice, syncopal episodes. (02 Feb 2019 14:27)    comfortable today.   appetite is better      PAST MEDICAL & SURGICAL HISTORY:  ETOH abuse  Psoriasis  History of hypertension  No significant past surgical history      PHYSICAL EXAM:    T(C): 36.6 (02-02-19 @ 17:00), Max: 36.7 (02-02-19 @ 00:44)  HR: 72 (02-02-19 @ 17:00) (59 - 72)  BP: 102/67 (02-02-19 @ 17:00) (87/52 - 102/67)  RR: 17 (02-02-19 @ 17:00) (17 - 19)  SpO2: 100% (02-02-19 @ 17:00) (98% - 100%)  Wt(kg): --    I&O's Detail    01 Feb 2019 07:01  -  02 Feb 2019 07:00  --------------------------------------------------------  IN:    Oral Fluid: 880 mL  Total IN: 880 mL    OUT:    Rectal Tube: 2800 mL    Voided: 400 mL  Total OUT: 3200 mL    Total NET: -2320 mL      02 Feb 2019 07:01  -  02 Feb 2019 18:17  --------------------------------------------------------  IN:    Albumin 25%: 50 mL    Oral Fluid: 230 mL  Total IN: 280 mL    OUT:    Voided: 450 mL  Total OUT: 450 mL    Total NET: -170 mL      Respiratory: clear anteriorly, decreased BS at bases  Cardiovascular: S1 S2  Gastrointestinal: soft NT ND +BS  Extremities: one plus edema   Neuro: Awake and alert    MEDICATIONS  (STANDING):  albumin human 25% IVPB 50 milliLiter(s) IV Intermittent every 24 hours  chlorhexidine 4% Liquid 1 Application(s) Topical <User Schedule>  folic acid 1 milliGRAM(s) Oral daily  heparin  Injectable 5000 Unit(s) SubCutaneous every 12 hours  lactulose Syrup 10 Gram(s) Oral two times a day  midodrine 20 milliGRAM(s) Oral every 8 hours  multivitamin 1 Tablet(s) Oral daily  nicotine -  14 mG/24Hr(s) Patch 1 patch Transdermal daily  octreotide  Injectable 100 MICROGram(s) SubCutaneous every 8 hours  pantoprazole  Injectable 40 milliGRAM(s) IV Push every 12 hours  rifaximin 550 milliGRAM(s) Oral two times a day  thiamine 100 milliGRAM(s) Oral daily    MEDICATIONS  (PRN):  morphine  - Injectable 1 milliGRAM(s) IV Push every 6 hours PRN Moderate Pain (4 - 6)                            10.5   34.23 )-----------( 271      ( 01 Feb 2019 08:11 )             30.2       02-02    134<L>  |  102  |  125<H>  ----------------------------<  121<H>  3.5   |  21<L>  |  6.22<H>    Ca    8.4<L>      02 Feb 2019 07:27    TPro  6.1  /  Alb  2.7<L>  /  TBili  9.8<H>  /  DBili  x   /  AST  80<H>  /  ALT  21  /  AlkPhos  356<H>  02-02

## 2019-02-02 NOTE — PROGRESS NOTE ADULT - ASSESSMENT
48M PMH HTN, chronic alcohol abuse and dependence x 15 years (1/2 pint of vodka with cranberry juice daily), hx of alcohol withdrawal presents for syncope x3 with generalized weakness, weight loss, loss of appetite, jaundice, melena, coffee ground emesis. Here with hypotension, lactic acidosis, hyponatremia, elevated Cr, jaundice, hyperbilirubinemia, elevated alk phos and AST, melena, coffee ground emesis, elevated INR, cirrhosis with ascites. < from: US Abdomen Limited (02.01.19 @ 12:45) >here is a moderate to large amount of ascites seen in all 4 quadrants. s/p paracetesis on 2/1-700 ml fluid removed. Octreo and Midodrine for now;

## 2019-02-02 NOTE — DISCHARGE NOTE ADULT - CARE PLAN
Principal Discharge DX:	Alcohol withdrawal delirium, acute, hypoactive  Goal:	Completed Ativan taper  Assessment and plan of treatment:	AVOID ALCOHOL  Follow up with drug rehab  Secondary Diagnosis:	Gastrointestinal hemorrhage, unspecified gastrointestinal hemorrhage type  Assessment and plan of treatment:	2' to liver failure  Bleeding resolved  H/H Stable  Follow up with GI as outpatient  Secondary Diagnosis:	Alcoholic cirrhosis of liver with ascites  Assessment and plan of treatment:	s/p paracentesis  Follow up with GI as outpatient  Reeval for possible diuresis if kidney function improved  Secondary Diagnosis:	Hepatorenal syndrome  Assessment and plan of treatment:	Improving  Continue to trend labs as outpatient  Follow up with GI, nephrologist  Secondary Diagnosis:	SBP (spontaneous bacterial peritonitis)  Assessment and plan of treatment:	Complete ABX  Secondary Diagnosis:	Severe protein-calorie malnutrition  Assessment and plan of treatment:	Encourage PO intake  Supplements  Secondary Diagnosis:	Hypotension  Assessment and plan of treatment:	Continue Midodrine and reeval for tapering

## 2019-02-02 NOTE — DISCHARGE NOTE ADULT - SECONDARY DIAGNOSIS.
Gastrointestinal hemorrhage, unspecified gastrointestinal hemorrhage type Alcoholic cirrhosis of liver with ascites Hepatorenal syndrome SBP (spontaneous bacterial peritonitis) Severe protein-calorie malnutrition Hypotension

## 2019-02-03 DIAGNOSIS — R30.0 DYSURIA: ICD-10-CM

## 2019-02-03 LAB
ALBUMIN SERPL ELPH-MCNC: 2.7 G/DL — LOW (ref 3.3–5)
ALP SERPL-CCNC: 366 U/L — HIGH (ref 40–120)
ALT FLD-CCNC: 23 U/L — SIGNIFICANT CHANGE UP (ref 12–78)
AMMONIA BLD-MCNC: 18 UMOL/L — SIGNIFICANT CHANGE UP (ref 11–32)
ANION GAP SERPL CALC-SCNC: 9 MMOL/L — SIGNIFICANT CHANGE UP (ref 5–17)
AST SERPL-CCNC: 89 U/L — HIGH (ref 15–37)
BILIRUB SERPL-MCNC: 10.8 MG/DL — HIGH (ref 0.2–1.2)
BUN SERPL-MCNC: 125 MG/DL — HIGH (ref 7–23)
CALCIUM SERPL-MCNC: 8.4 MG/DL — LOW (ref 8.5–10.1)
CHLORIDE SERPL-SCNC: 105 MMOL/L — SIGNIFICANT CHANGE UP (ref 96–108)
CO2 SERPL-SCNC: 21 MMOL/L — LOW (ref 22–31)
CREAT SERPL-MCNC: 6.21 MG/DL — HIGH (ref 0.5–1.3)
GLUCOSE SERPL-MCNC: 119 MG/DL — HIGH (ref 70–99)
POTASSIUM SERPL-MCNC: 3.6 MMOL/L — SIGNIFICANT CHANGE UP (ref 3.5–5.3)
POTASSIUM SERPL-SCNC: 3.6 MMOL/L — SIGNIFICANT CHANGE UP (ref 3.5–5.3)
PROT SERPL-MCNC: 6.6 GM/DL — SIGNIFICANT CHANGE UP (ref 6–8.3)
SODIUM SERPL-SCNC: 135 MMOL/L — SIGNIFICANT CHANGE UP (ref 135–145)

## 2019-02-03 PROCEDURE — 99233 SBSQ HOSP IP/OBS HIGH 50: CPT

## 2019-02-03 RX ORDER — OXYCODONE HYDROCHLORIDE 5 MG/1
5 TABLET ORAL EVERY 6 HOURS
Qty: 0 | Refills: 0 | Status: DISCONTINUED | OUTPATIENT
Start: 2019-02-03 | End: 2019-02-03

## 2019-02-03 RX ORDER — PHENAZOPYRIDINE HCL 100 MG
100 TABLET ORAL EVERY 8 HOURS
Qty: 0 | Refills: 0 | Status: COMPLETED | OUTPATIENT
Start: 2019-02-03 | End: 2019-02-05

## 2019-02-03 RX ADMIN — Medication 100 MILLIGRAM(S): at 14:08

## 2019-02-03 RX ADMIN — MIDODRINE HYDROCHLORIDE 20 MILLIGRAM(S): 2.5 TABLET ORAL at 10:19

## 2019-02-03 RX ADMIN — MIDODRINE HYDROCHLORIDE 20 MILLIGRAM(S): 2.5 TABLET ORAL at 18:46

## 2019-02-03 RX ADMIN — Medication 1 PATCH: at 14:08

## 2019-02-03 RX ADMIN — LACTULOSE 10 GRAM(S): 10 SOLUTION ORAL at 18:46

## 2019-02-03 RX ADMIN — PANTOPRAZOLE SODIUM 40 MILLIGRAM(S): 20 TABLET, DELAYED RELEASE ORAL at 06:26

## 2019-02-03 RX ADMIN — MIDODRINE HYDROCHLORIDE 20 MILLIGRAM(S): 2.5 TABLET ORAL at 02:50

## 2019-02-03 RX ADMIN — OCTREOTIDE ACETATE 100 MICROGRAM(S): 200 INJECTION, SOLUTION INTRAVENOUS; SUBCUTANEOUS at 14:08

## 2019-02-03 RX ADMIN — Medication 1 PATCH: at 20:15

## 2019-02-03 RX ADMIN — Medication 1 MILLIGRAM(S): at 14:08

## 2019-02-03 RX ADMIN — HEPARIN SODIUM 5000 UNIT(S): 5000 INJECTION INTRAVENOUS; SUBCUTANEOUS at 06:25

## 2019-02-03 RX ADMIN — Medication 1 TABLET(S): at 14:08

## 2019-02-03 RX ADMIN — Medication 100 MILLIGRAM(S): at 23:51

## 2019-02-03 RX ADMIN — MORPHINE SULFATE 1 MILLIGRAM(S): 50 CAPSULE, EXTENDED RELEASE ORAL at 10:26

## 2019-02-03 RX ADMIN — HEPARIN SODIUM 5000 UNIT(S): 5000 INJECTION INTRAVENOUS; SUBCUTANEOUS at 18:46

## 2019-02-03 RX ADMIN — MORPHINE SULFATE 1 MILLIGRAM(S): 50 CAPSULE, EXTENDED RELEASE ORAL at 10:45

## 2019-02-03 RX ADMIN — MORPHINE SULFATE 1 MILLIGRAM(S): 50 CAPSULE, EXTENDED RELEASE ORAL at 23:51

## 2019-02-03 RX ADMIN — Medication 50 MILLILITER(S): at 14:12

## 2019-02-03 RX ADMIN — PANTOPRAZOLE SODIUM 40 MILLIGRAM(S): 20 TABLET, DELAYED RELEASE ORAL at 18:46

## 2019-02-03 RX ADMIN — LACTULOSE 10 GRAM(S): 10 SOLUTION ORAL at 06:26

## 2019-02-03 RX ADMIN — Medication 1 PATCH: at 07:45

## 2019-02-03 RX ADMIN — OCTREOTIDE ACETATE 100 MICROGRAM(S): 200 INJECTION, SOLUTION INTRAVENOUS; SUBCUTANEOUS at 23:50

## 2019-02-03 RX ADMIN — OCTREOTIDE ACETATE 100 MICROGRAM(S): 200 INJECTION, SOLUTION INTRAVENOUS; SUBCUTANEOUS at 06:26

## 2019-02-03 NOTE — PROGRESS NOTE ADULT - SUBJECTIVE AND OBJECTIVE BOX
Gastroenterology coverage for Dr Sheffield  Patient seen and examined bedside resting comfortably.  No complaints offered. Abdominal pain is well controlled.  Denies nausea and vomiting. Tolerating diet.  Normal flatus/BM.     T(F): 96.8 (02-03-19 @ 12:26), Max: 97.9 (02-02-19 @ 17:00)  HR: 76 (02-03-19 @ 12:26) (58 - 76)  BP: 99/61 (02-03-19 @ 12:26) (99/61 - 119/77)  RR: 16 (02-03-19 @ 12:26) (14 - 17)  SpO2: 100% (02-03-19 @ 12:26) (97% - 100%)  Wt(kg): --  CAPILLARY BLOOD GLUCOSE          PHYSICAL EXAM:  General: NAD, WDWN.   Neuro:  Alert & responsive  HEENT: NCAT, EOMI, conjunctiva clear  CV: +S1+S2 regular rate and rhythm  Lung: clear to ausculation bilaterally, respirations nonlabored, good inspiratory effort  Abdomen: soft, Non tender, No Distension. Normal active BS  Extremities: no pedal edema or calf tenderness noted     LABS:    02-03    135  |  105  |  125<H>  ----------------------------<  119<H>  3.6   |  21<L>  |  6.21<H>    Ca    8.4<L>      03 Feb 2019 08:51    TPro  6.6  /  Alb  2.7<L>  /  TBili  10.8<H>  /  DBili  x   /  AST  89<H>  /  ALT  23  /  AlkPhos  366<H>  02-03    LIVER FUNCTIONS - ( 03 Feb 2019 08:51 )  Alb: 2.7 g/dL / Pro: 6.6 gm/dL / ALK PHOS: 366 U/L / ALT: 23 U/L / AST: 89 U/L / GGT: x             I&O's Detail    02 Feb 2019 07:01  -  03 Feb 2019 07:00  --------------------------------------------------------  IN:    Albumin 25%: 50 mL    Oral Fluid: 230 mL  Total IN: 280 mL    OUT:    Voided: 450 mL  Total OUT: 450 mL    Total NET: -170 mL

## 2019-02-03 NOTE — PROGRESS NOTE ADULT - PROBLEM SELECTOR PLAN 4
dietician following, o ensure supplement - ARF likely from dehydration, pre renal azotemia  - would monitor Cr closely

## 2019-02-03 NOTE — PROGRESS NOTE ADULT - SUBJECTIVE AND OBJECTIVE BOX
Patient is a 48y old  Male who presents with a chief complaint of Coffee ground emesis, progressive jaundice, syncopal episodes. (02 Feb 2019 18:17)      OVERNIGHT EVENTS:  c/o urinary discomfort after Romero removed. Ex-wife at bedside    REVIEW OF SYSTEMS: denies chest pain/SOB, diaphoresis, no F/C, cough, dizziness, headache, blurry vision, nausea, vomiting, abdominal pain. All others review of systems negative     MEDICATIONS  (STANDING):  albumin human 25% IVPB 50 milliLiter(s) IV Intermittent every 24 hours  chlorhexidine 4% Liquid 1 Application(s) Topical <User Schedule>  folic acid 1 milliGRAM(s) Oral daily  heparin  Injectable 5000 Unit(s) SubCutaneous every 12 hours  lactulose Syrup 10 Gram(s) Oral two times a day  midodrine 20 milliGRAM(s) Oral every 8 hours  multivitamin 1 Tablet(s) Oral daily  nicotine -  14 mG/24Hr(s) Patch 1 patch Transdermal daily  octreotide  Injectable 100 MICROGram(s) SubCutaneous every 8 hours  pantoprazole  Injectable 40 milliGRAM(s) IV Push every 12 hours  rifaximin 550 milliGRAM(s) Oral two times a day  thiamine 100 milliGRAM(s) Oral daily    MEDICATIONS  (PRN):  morphine  - Injectable 1 milliGRAM(s) IV Push every 6 hours PRN Moderate Pain (4 - 6)      Allergies    No Known Allergies    Intolerances        T(F): 96.8 (02-03-19 @ 12:26), Max: 97.9 (02-02-19 @ 17:00)  HR: 76 (02-03-19 @ 12:26) (58 - 76)  BP: 99/61 (02-03-19 @ 12:26) (87/52 - 119/77)  RR: 16 (02-03-19 @ 12:26) (14 - 18)  SpO2: 100% (02-03-19 @ 12:26) (97% - 100%)  Wt(kg): --    PHYSICAL EXAM:  GENERAL: NAD, well-groomed, well-developed, chronically ill looking   HEAD:  Atraumatic, Normocephalic  EYES: EOMI, PERRLA, conjunctiva and sclera jaundiced  ENMT: No tonsillar erythema, exudates, or enlargement; Moist mucous membranes  NECK: Supple, No JVD, Normal thyroid  NERVOUS SYSTEM:  Alert & Oriented X3, Good concentration; Motor Strength 4/5 B/L upper and lower extremities; DTRs 2+ intact and symmetric  CHEST/LUNG: decrease BS bilaterally;   HEART: Regular rate and rhythm; No murmurs, rubs, or gallops  ABDOMEN: Soft, Nontender, diffusely distended; Bowel sounds distant   EXTREMITIES:  2+ Peripheral Pulses, No clubbing, cyanosis, or edema BL LE  LYMPH: No lymphadenopathy noted  SKIN: yellow tinged     LABS:    02-03    135  |  105  |  125<H>  ----------------------------<  119<H>  3.6   |  21<L>  |  6.21<H>    Ca    8.4<L>      03 Feb 2019 08:51    TPro  6.6  /  Alb  2.7<L>  /  TBili  10.8<H>  /  DBili  x   /  AST  89<H>  /  ALT  23  /  AlkPhos  366<H>  02-03        Cultures;   CAPILLARY BLOOD GLUCOSE        RADIOLOGY & ADDITIONAL TESTS:    Imaging Personally Reviewed:  [ x] YES    < from: CT Abdomen and Pelvis No Cont (01.23.19 @ 18:06) >  1. Diffuse ascites.  2. Hepatomegaly with cirrhotic changes and fatty infiltration.   Heterogeneity within the liver, and tumor/hepatocellular carcinoma cannot   be excluded.  3. Soft tissue thickening and/or edematous changes throughout the   mesentery and omental fat. Omental tumor versus omental edema is within   the differential.    < from: US Abdomen Complete (01.23.19 @ 18:33) >  Moderate diffuse ascites with hepatomegaly and cirrhotic changes of the   liver. Possible right liver mass.    < end of copied text >      Consultant(s) Notes Reviewed:  [x ] YES     Care Discussed with [x ] Consultants [X ] Patient [x ] Family; 2 brothers, 1 sister  [x ]    [x ]  Other; RN

## 2019-02-03 NOTE — PROGRESS NOTE ADULT - ASSESSMENT
49 y/o male PMH HTN, chronic alcohol abuse and dependence x 15 years now admitted with hepatic encephalopathy and GI bleed.   PAPO but the renal indices are now stabilizing. will follow closely at this time and watch for HRS. Will follow.

## 2019-02-03 NOTE — PROGRESS NOTE ADULT - PROBLEM SELECTOR PLAN 8
- albumin, AFP 3.5  - significant ascites noted: s/p therapeutic paracentesis  - there is concern for underlying malignancy based on symptoms as well as CT abdomen findings written material/individual instruction/skill demonstration/verbal instruction add pyridium

## 2019-02-03 NOTE — PROGRESS NOTE ADULT - PROBLEM SELECTOR PLAN 6
- trend lytes - albumin, AFP 3.5  - significant ascites noted: s/p therapeutic paracentesis  - there is concern for underlying malignancy based on symptoms as well as CT abdomen findings

## 2019-02-03 NOTE — PROGRESS NOTE ADULT - PROBLEM SELECTOR PLAN 5
- ARF likely from dehydration, pre renal azotemia  - would monitor Cr closely - monitor CIWA  - ativan for withdrawal symptoms

## 2019-02-03 NOTE — PROGRESS NOTE ADULT - SUBJECTIVE AND OBJECTIVE BOX
CHAVEZ RUIZ  48y  Male    Patient is a 48y old  Male who presents with a chief complaint of Coffee ground emesis, progressive jaundice, syncopal episodes. (03 Feb 2019 12:43)      lethargic but arousable  comfortable.       PAST MEDICAL & SURGICAL HISTORY:  ETOH abuse  Psoriasis  History of hypertension  No significant past surgical history          PHYSICAL EXAM:    T(C): 36 (02-03-19 @ 12:26), Max: 36.6 (02-02-19 @ 17:00)  HR: 76 (02-03-19 @ 12:26) (58 - 76)  BP: 99/61 (02-03-19 @ 12:26) (99/61 - 119/77)  RR: 16 (02-03-19 @ 12:26) (14 - 17)  SpO2: 100% (02-03-19 @ 12:26) (97% - 100%)  Wt(kg): --    I&O's Detail    02 Feb 2019 07:01  -  03 Feb 2019 07:00  --------------------------------------------------------  IN:    Albumin 25%: 50 mL    Oral Fluid: 230 mL  Total IN: 280 mL    OUT:    Voided: 450 mL  Total OUT: 450 mL    Total NET: -170 mL      03 Feb 2019 07:01  -  03 Feb 2019 16:31  --------------------------------------------------------  IN:    Oral Fluid: 480 mL  Total IN: 480 mL    OUT:    Rectal Tube: 1000 mL    Voided: 420 mL  Total OUT: 1420 mL    Total NET: -940 mL          Respiratory: clear anteriorly, decreased BS at bases  Cardiovascular: S1 S2  Gastrointestinal: soft NT distended +BS  Extremities: one plus edema   Neuro: Awake and alert    MEDICATIONS  (STANDING):  albumin human 25% IVPB 50 milliLiter(s) IV Intermittent every 24 hours  chlorhexidine 4% Liquid 1 Application(s) Topical <User Schedule>  folic acid 1 milliGRAM(s) Oral daily  heparin  Injectable 5000 Unit(s) SubCutaneous every 12 hours  lactulose Syrup 10 Gram(s) Oral two times a day  midodrine 20 milliGRAM(s) Oral every 8 hours  multivitamin 1 Tablet(s) Oral daily  nicotine -  14 mG/24Hr(s) Patch 1 patch Transdermal daily  octreotide  Injectable 100 MICROGram(s) SubCutaneous every 8 hours  pantoprazole  Injectable 40 milliGRAM(s) IV Push every 12 hours  phenazopyridine 100 milliGRAM(s) Oral every 8 hours  rifaximin 550 milliGRAM(s) Oral two times a day  thiamine 100 milliGRAM(s) Oral daily    MEDICATIONS  (PRN):  morphine  - Injectable 1 milliGRAM(s) IV Push every 6 hours PRN Moderate Pain (4 - 6)  oxyCODONE    IR 5 milliGRAM(s) Oral every 6 hours PRN Mild Pain (1 - 3)          02-03    135  |  105  |  125<H>  ----------------------------<  119<H>  3.6   |  21<L>  |  6.21<H>    Ca    8.4<L>      03 Feb 2019 08:51    TPro  6.6  /  Alb  2.7<L>  /  TBili  10.8<H>  /  DBili  x   /  AST  89<H>  /  ALT  23  /  AlkPhos  366<H>  02-03

## 2019-02-04 LAB
ALBUMIN SERPL ELPH-MCNC: 2.8 G/DL — LOW (ref 3.3–5)
ALP SERPL-CCNC: 355 U/L — HIGH (ref 40–120)
ALT FLD-CCNC: 23 U/L — SIGNIFICANT CHANGE UP (ref 12–78)
AMMONIA BLD-MCNC: 21 UMOL/L — SIGNIFICANT CHANGE UP (ref 11–32)
ANION GAP SERPL CALC-SCNC: 9 MMOL/L — SIGNIFICANT CHANGE UP (ref 5–17)
AST SERPL-CCNC: 81 U/L — HIGH (ref 15–37)
BILIRUB SERPL-MCNC: 10.8 MG/DL — HIGH (ref 0.2–1.2)
BUN SERPL-MCNC: 119 MG/DL — HIGH (ref 7–23)
CALCIUM SERPL-MCNC: 8.6 MG/DL — SIGNIFICANT CHANGE UP (ref 8.5–10.1)
CHLORIDE SERPL-SCNC: 106 MMOL/L — SIGNIFICANT CHANGE UP (ref 96–108)
CO2 SERPL-SCNC: 21 MMOL/L — LOW (ref 22–31)
CREAT SERPL-MCNC: 6.05 MG/DL — HIGH (ref 0.5–1.3)
GLUCOSE SERPL-MCNC: 123 MG/DL — HIGH (ref 70–99)
POTASSIUM SERPL-MCNC: 3.6 MMOL/L — SIGNIFICANT CHANGE UP (ref 3.5–5.3)
POTASSIUM SERPL-SCNC: 3.6 MMOL/L — SIGNIFICANT CHANGE UP (ref 3.5–5.3)
PROT SERPL-MCNC: 7 GM/DL — SIGNIFICANT CHANGE UP (ref 6–8.3)
SODIUM SERPL-SCNC: 136 MMOL/L — SIGNIFICANT CHANGE UP (ref 135–145)

## 2019-02-04 PROCEDURE — 99233 SBSQ HOSP IP/OBS HIGH 50: CPT

## 2019-02-04 RX ADMIN — MIDODRINE HYDROCHLORIDE 20 MILLIGRAM(S): 2.5 TABLET ORAL at 02:39

## 2019-02-04 RX ADMIN — MORPHINE SULFATE 1 MILLIGRAM(S): 50 CAPSULE, EXTENDED RELEASE ORAL at 00:23

## 2019-02-04 RX ADMIN — HEPARIN SODIUM 5000 UNIT(S): 5000 INJECTION INTRAVENOUS; SUBCUTANEOUS at 06:20

## 2019-02-04 RX ADMIN — Medication 50 MILLILITER(S): at 13:16

## 2019-02-04 RX ADMIN — Medication 1 MILLIGRAM(S): at 11:16

## 2019-02-04 RX ADMIN — OCTREOTIDE ACETATE 100 MICROGRAM(S): 200 INJECTION, SOLUTION INTRAVENOUS; SUBCUTANEOUS at 23:03

## 2019-02-04 RX ADMIN — LACTULOSE 10 GRAM(S): 10 SOLUTION ORAL at 17:41

## 2019-02-04 RX ADMIN — HEPARIN SODIUM 5000 UNIT(S): 5000 INJECTION INTRAVENOUS; SUBCUTANEOUS at 17:41

## 2019-02-04 RX ADMIN — PANTOPRAZOLE SODIUM 40 MILLIGRAM(S): 20 TABLET, DELAYED RELEASE ORAL at 06:20

## 2019-02-04 RX ADMIN — MIDODRINE HYDROCHLORIDE 20 MILLIGRAM(S): 2.5 TABLET ORAL at 17:42

## 2019-02-04 RX ADMIN — Medication 100 MILLIGRAM(S): at 06:20

## 2019-02-04 RX ADMIN — Medication 100 MILLIGRAM(S): at 13:18

## 2019-02-04 RX ADMIN — Medication 1 PATCH: at 14:11

## 2019-02-04 RX ADMIN — OCTREOTIDE ACETATE 100 MICROGRAM(S): 200 INJECTION, SOLUTION INTRAVENOUS; SUBCUTANEOUS at 13:17

## 2019-02-04 RX ADMIN — Medication 100 MILLIGRAM(S): at 11:16

## 2019-02-04 RX ADMIN — LACTULOSE 10 GRAM(S): 10 SOLUTION ORAL at 06:20

## 2019-02-04 RX ADMIN — Medication 1 TABLET(S): at 11:16

## 2019-02-04 RX ADMIN — Medication 100 MILLIGRAM(S): at 23:03

## 2019-02-04 RX ADMIN — MIDODRINE HYDROCHLORIDE 20 MILLIGRAM(S): 2.5 TABLET ORAL at 11:16

## 2019-02-04 RX ADMIN — OCTREOTIDE ACETATE 100 MICROGRAM(S): 200 INJECTION, SOLUTION INTRAVENOUS; SUBCUTANEOUS at 06:20

## 2019-02-04 RX ADMIN — Medication 1 PATCH: at 11:16

## 2019-02-04 RX ADMIN — PANTOPRAZOLE SODIUM 40 MILLIGRAM(S): 20 TABLET, DELAYED RELEASE ORAL at 17:41

## 2019-02-04 NOTE — PHYSICAL THERAPY INITIAL EVALUATION ADULT - GENERAL OBSERVATIONS, REHAB EVAL
Pt encountered supine, alert and oriented x3, flexiseal in place, NAD. Pt's wounds measured and documented by DARLEEN.

## 2019-02-04 NOTE — PHYSICAL THERAPY INITIAL EVALUATION ADULT - ADDITIONAL COMMENTS
Pt is R hand dominant, wears reading glasses, does not have/use assistive devices. Pt lives in an apartment, 1st floor, no steps to enter, none to negotiate once inside. Pt ambulated community distances,  does not drive.

## 2019-02-04 NOTE — PROGRESS NOTE ADULT - PROBLEM SELECTOR PLAN 6
- albumin, AFP 3.5  - significant ascites noted: s/p therapeutic paracentesis  - there is concern for underlying malignancy based on symptoms as well as CT abdomen findings

## 2019-02-04 NOTE — PROGRESS NOTE ADULT - SUBJECTIVE AND OBJECTIVE BOX
Patient is a 48y old  Male who presents with a chief complaint of Coffee ground emesis, progressive jaundice, syncopal episodes. (02 Feb 2019 18:17)      OVERNIGHT EVENTS:  c/o urinary discomfort after Romero removed. Ex-wife at bedside    REVIEW OF SYSTEMS: denies chest pain/SOB, diaphoresis, no F/C, cough, dizziness, headache, blurry vision, nausea, vomiting, abdominal pain. All others review of systems negative     MEDICATIONS  (STANDING):  albumin human 25% IVPB 50 milliLiter(s) IV Intermittent every 24 hours  chlorhexidine 4% Liquid 1 Application(s) Topical <User Schedule>  folic acid 1 milliGRAM(s) Oral daily  heparin  Injectable 5000 Unit(s) SubCutaneous every 12 hours  lactulose Syrup 10 Gram(s) Oral two times a day  midodrine 20 milliGRAM(s) Oral every 8 hours  multivitamin 1 Tablet(s) Oral daily  nicotine -  14 mG/24Hr(s) Patch 1 patch Transdermal daily  octreotide  Injectable 100 MICROGram(s) SubCutaneous every 8 hours  pantoprazole  Injectable 40 milliGRAM(s) IV Push every 12 hours  rifaximin 550 milliGRAM(s) Oral two times a day  thiamine 100 milliGRAM(s) Oral daily    MEDICATIONS  (PRN):  morphine  - Injectable 1 milliGRAM(s) IV Push every 6 hours PRN Moderate Pain (4 - 6)      Allergies    No Known Allergies    Intolerances        Vital Signs Last 24 Hrs  T(C): 36.2 (04 Feb 2019 17:19), Max: 36.4 (04 Feb 2019 11:00)  T(F): 97.1 (04 Feb 2019 17:19), Max: 97.5 (04 Feb 2019 11:00)  HR: 75 (04 Feb 2019 17:19) (65 - 78)  BP: 100/65 (04 Feb 2019 17:19) (100/65 - 105/79)  BP(mean): --  RR: 17 (04 Feb 2019 17:19) (17 - 17)  SpO2: 97% (04 Feb 2019 17:19) (97% - 100%)    PHYSICAL EXAM:  GENERAL: NAD, well-groomed, well-developed, chronically ill looking   HEAD:  Atraumatic, Normocephalic  EYES: EOMI, PERRLA, conjunctiva and sclera jaundiced  ENMT: No tonsillar erythema, exudates, or enlargement; Moist mucous membranes  NECK: Supple, No JVD, Normal thyroid  NERVOUS SYSTEM:  Alert & Oriented X3, Good concentration; Motor Strength 4/5 B/L upper and lower extremities; DTRs 2+ intact and symmetric  CHEST/LUNG: decrease BS bilaterally;   HEART: Regular rate and rhythm; No murmurs, rubs, or gallops  ABDOMEN: Soft, Nontender, diffusely distended; Bowel sounds distant   EXTREMITIES:  2+ Peripheral Pulses, No clubbing, cyanosis, or edema BL LE  LYMPH: No lymphadenopathy noted  SKIN: yellow tinged     LABS:      02-04    136  |  106  |  119<H>  ----------------------------<  123<H>  3.6   |  21<L>  |  6.05<H>    Ca    8.6      04 Feb 2019 07:31    TPro  7.0  /  Alb  2.8<L>  /  TBili  10.8<H>  /  DBili  x   /  AST  81<H>  /  ALT  23  /  AlkPhos  355<H>  02-04          Cultures;   CAPILLARY BLOOD GLUCOSE        RADIOLOGY & ADDITIONAL TESTS:    Imaging Personally Reviewed:  [ x] YES    < from: CT Abdomen and Pelvis No Cont (01.23.19 @ 18:06) >  1. Diffuse ascites.  2. Hepatomegaly with cirrhotic changes and fatty infiltration.   Heterogeneity within the liver, and tumor/hepatocellular carcinoma cannot   be excluded.  3. Soft tissue thickening and/or edematous changes throughout the   mesentery and omental fat. Omental tumor versus omental edema is within   the differential.    < from: US Abdomen Complete (01.23.19 @ 18:33) >  Moderate diffuse ascites with hepatomegaly and cirrhotic changes of the   liver. Possible right liver mass.    < end of copied text >      Consultant(s) Notes Reviewed:  [x ] YES     Care Discussed with [x ] Consultants [X ] Patient [x ] Family; 2 brothers, 1 sister  [x ]    [x ]  Other; RN

## 2019-02-04 NOTE — PROGRESS NOTE ADULT - PROBLEM SELECTOR PLAN 2
LFTs unchanged . Being treated for hepaorenal. Not a candidate for steroids.  11.2 / 132 / 137 / 403.  - 9.4 / 139 / 37 / 411 - 9.1/114/27/397  - 10.8 / 81 / 23 / 355 nh4 ? BUN / CRE  119/6.05  prognosis poor

## 2019-02-04 NOTE — PROGRESS NOTE ADULT - ASSESSMENT
HPI:  47 y/o male PMH HTN (not on medication), chronic alcohol abuse and dependence x 15 years (1/2 pint of vodka with cranberry juice daily), hx of alcohol withdrawal 2013 hospitalized at [facility] after which he went to inpatient rehab at [facility] presents for syncope and melanotic stools. History obtained from ex wife, mother, and patient. Since pt left rehab in 2013 pt has been drinking daily. Last drink 11AM today. Pt state for past one and a half months he has had progressive weight loss with loss of appetite. He has had in increased abdominal distention for the past 1 month and in the past 1 week he has noticed jaundice and scleral icterus. Pt also reports generalized fatigue. Reports coffee ground emesis x1 episode 3 days ago but none since then with melena for about a week. Pt states he has 3 bowel movements a day and now has also been seeing "blood when I pee" also within the last week. No abdominal pain. No CP no SOB. No fevers or chills. Today pt called ex wife after he was unsteady with his gait and "fell", he denies LOC. Denies trauma to head. Ex wife reports while attempting to get pt to the car she noticed he was "very weak" and witnessed pt to have had 3 syncopal episodes lasting a few minutes each before regaining consciousness, but no seizures. Ex wife state that in the car, pt had a bowel movement that was black and tarry.     In ED pt noted to be hypotensive SBP 80-90s. 2L IVF given with 3rd and 4th infusing. SBP post IVF resuscitation 100s to one teens. On labs pt with leukocytosis with 3% bands, Na 123, Cr 2.05, INR 2.05, T bili 6, , ALT 55, Alk phos 799.  Pt seen by critical care and not felt to be ICU candidate. (23 Jan 2019 22:33)  ----------------- As Above ------------------------------------------------------  Patient confused. Left message for family member to contact me   ETOH Abuse (+). Coffee ground emesis x 1, melena.   Eleveated LFTs, INR  See labs, CT Scan    Cirrhosis, ascites, fever - 1) Paracentesis 2) ammonia level  3) f/u LFTs 4) treat for impending Dts  - patient does not fit into the criteria for treatment with steroids.

## 2019-02-04 NOTE — PHYSICAL THERAPY INITIAL EVALUATION ADULT - PLANNED THERAPY INTERVENTIONS, PT EVAL
balance training/bed mobility training/as tolerated/neuromuscular re-education/transfer training/strengthening

## 2019-02-04 NOTE — PROGRESS NOTE ADULT - PROBLEM SELECTOR PLAN 1
HGB 10.5 on 2/1/19  , no signs of gi bleeding. PPI BID Will arrange for EGD once patient is stable off pressors.

## 2019-02-04 NOTE — PROGRESS NOTE ADULT - SUBJECTIVE AND OBJECTIVE BOX
Patient feels well no complaints today.    MEDICATIONS  (STANDING):  folic acid 1 milliGRAM(s) Oral daily  heparin  Injectable 5000 Unit(s) SubCutaneous every 12 hours  lactulose Syrup 10 Gram(s) Oral two times a day  midodrine 20 milliGRAM(s) Oral every 8 hours  multivitamin 1 Tablet(s) Oral daily  nicotine -  14 mG/24Hr(s) Patch 1 patch Transdermal daily  octreotide  Injectable 100 MICROGram(s) SubCutaneous every 8 hours  pantoprazole  Injectable 40 milliGRAM(s) IV Push every 12 hours  phenazopyridine 100 milliGRAM(s) Oral every 8 hours  rifaximin 550 milliGRAM(s) Oral two times a day  thiamine 100 milliGRAM(s) Oral daily    MEDICATIONS  (PRN):  morphine  - Injectable 1 milliGRAM(s) IV Push every 6 hours PRN Moderate Pain (4 - 6)  oxyCODONE    IR 5 milliGRAM(s) Oral every 6 hours PRN Mild Pain (1 - 3)      02-03-19 @ 07:01  -  02-04-19 @ 07:00  --------------------------------------------------------  IN: 630 mL / OUT: 1820 mL / NET: -1190 mL    02-04-19 @ 07:01  -  02-04-19 @ 21:50  --------------------------------------------------------  IN: 50 mL / OUT: 300 mL / NET: -250 mL      PHYSICAL EXAM:      T(C): 36.2 (02-04-19 @ 17:19), Max: 36.4 (02-04-19 @ 11:00)  HR: 75 (02-04-19 @ 17:19) (65 - 78)  BP: 100/65 (02-04-19 @ 17:19) (100/65 - 105/79)  RR: 17 (02-04-19 @ 17:19) (17 - 17)  SpO2: 97% (02-04-19 @ 17:19) (97% - 100%)  Wt(kg): --  Respiratory: clear anteriorly, decreased BS at bases  Cardiovascular: S1 S2  Gastrointestinal: soft NT ND +BS  Extremities:  tr   edema                02-04    136  |  106  |  119<H>  ----------------------------<  123<H>  3.6   |  21<L>  |  6.05<H>    Ca    8.6      04 Feb 2019 07:31    TPro  7.0  /  Alb  2.8<L>  /  TBili  10.8<H>  /  DBili  x   /  AST  81<H>  /  ALT  23  /  AlkPhos  355<H>  02-04      LIVER FUNCTIONS - ( 04 Feb 2019 07:31 )  Alb: 2.8 g/dL / Pro: 7.0 gm/dL / ALK PHOS: 355 U/L / ALT: 23 U/L / AST: 81 U/L / GGT: x             Assessment and Plan:  PAPO; suspected HRS; precipitated by SBP;  Indices stable; continue current rx

## 2019-02-04 NOTE — PROGRESS NOTE ADULT - SUBJECTIVE AND OBJECTIVE BOX
Patient is a 48y old  Male who presents with a chief complaint of Coffee ground emesis, progressive jaundice, syncopal episodes. (03 Feb 2019 16:31)      HPI:  47 y/o male PMH HTN (not on medication), chronic alcohol abuse and dependence x 15 years (1/2 pint of vodka with cranberry juice daily), hx of alcohol withdrawal 2013 hospitalized at [facility] [place] after which he went to inpatient rehab at [facility] for syncope and melanotic stools. History obtained from ex wife, mother, and patient. Since pt left rehab in 2013 pt has been drinking daily. Last drink 11AM today. Pt state for past one and a half months he has had progressive weight loss with loss of appetite. He has had in increased abdominal distention for the past 1 month and in the past 1 week he has noticed jaundice and scleral icterus. Pt also reports generalized fatigue. Reports coffee ground emesis x1 episode 3 days ago but none since then with melena for about a week. Pt states he has 3 bowel movements a day and now has also been seeing "blood when I pee" also within the last week. No abdominal pain. No CP no SOB. No fevers or chills. Today pt called ex wife after he was unsteady with his gait and "fell", he denies LOC. Denies trauma to head. Ex wife reports while attempting to get pt to the car she noticed he was "very weak" and witnessed pt to have had 3 syncopal episodes lasting a few minutes each before regaining consciousness, but no seizures. Ex wife state that in the car, pt had a bowel movement that was black and tarry.     In ED pt noted to be hypotensive SBP 80-90s. 2L IVF given with 3rd and 4th infusing. SBP post IVF resuscitation 100s to one teens. On labs pt with leukocytosis with 3% bands, Na 123, Cr 2.05, INR 2.05, T bili 6, , ALT 55, Alk phos 799.  Pt seen by critical care and not felt to be ICU candidate. (23 Jan 2019 22:33)      INTERVAL HPI/OVERNIGHT EVENTS:  Alert but confused. Vague pain at paracentesis site. Tolerating diet. S/P 5 liters removed via paracentesis over the weekend.    MEDICATIONS  (STANDING):  albumin human 25% IVPB 50 milliLiter(s) IV Intermittent every 24 hours  chlorhexidine 4% Liquid 1 Application(s) Topical <User Schedule>  folic acid 1 milliGRAM(s) Oral daily  heparin  Injectable 5000 Unit(s) SubCutaneous every 12 hours  lactulose Syrup 10 Gram(s) Oral two times a day  midodrine 20 milliGRAM(s) Oral every 8 hours  multivitamin 1 Tablet(s) Oral daily  nicotine -  14 mG/24Hr(s) Patch 1 patch Transdermal daily  octreotide  Injectable 100 MICROGram(s) SubCutaneous every 8 hours  pantoprazole  Injectable 40 milliGRAM(s) IV Push every 12 hours  phenazopyridine 100 milliGRAM(s) Oral every 8 hours  rifaximin 550 milliGRAM(s) Oral two times a day  thiamine 100 milliGRAM(s) Oral daily    MEDICATIONS  (PRN):  morphine  - Injectable 1 milliGRAM(s) IV Push every 6 hours PRN Moderate Pain (4 - 6)  oxyCODONE    IR 5 milliGRAM(s) Oral every 6 hours PRN Mild Pain (1 - 3)      FAMILY HISTORY:  No pertinent family history in first degree relatives      Allergies    No Known Allergies    Intolerances        PMH/PSH:  ETOH abuse  Psoriasis  History of hypertension  No significant past surgical history        REVIEW OF SYSTEMS:  CONSTITUTIONAL: No fever, weight loss, or fatigue  EYES: No eye pain, visual disturbances, or discharge  ENMT:  No difficulty hearing, tinnitus, vertigo; No sinus or throat pain  NECK: No pain or stiffness  BREASTS: No pain, masses, or nipple discharge  RESPIRATORY: No cough, wheezing, chills or hemoptysis; No shortness of breath  CARDIOVASCULAR: No chest pain, palpitations, dizziness, or leg swelling  GASTROINTESTINAL: See above  GENITOURINARY: No dysuria, frequency, hematuria, or incontinence  NEUROLOGICAL: No headaches, memory loss, loss of strength, numbness, or tremors  SKIN: No itching, burning, rashes, or lesions   LYMPH NODES: No enlarged glands  ENDOCRINE: No heat or cold intolerance; No hair loss  MUSCULOSKELETAL: No joint pain or swelling; No muscle, back, or extremity pain  PSYCHIATRIC: No depression, anxiety, mood swings, or difficulty sleeping  HEME/LYMPH: No easy bruising, or bleeding gums  ALLERGY AND IMMUNOLOGIC: No hives or eczema    Vital Signs Last 24 Hrs  T(C): 36.4 (04 Feb 2019 11:00), Max: 36.4 (04 Feb 2019 11:00)  T(F): 97.5 (04 Feb 2019 11:00), Max: 97.5 (04 Feb 2019 11:00)  HR: 78 (04 Feb 2019 11:00) (65 - 78)  BP: 105/79 (04 Feb 2019 11:00) (92/61 - 105/79)  BP(mean): --  RR: 17 (04 Feb 2019 11:00) (17 - 17)  SpO2: 97% (04 Feb 2019 11:00) (96% - 100%)    PHYSICAL EXAM:  GENERAL: NAD, well-groomed, well-developed  HEAD:  Atraumatic, Normocephalic  EYES: EOMI, PERRLA, conjunctiva and sclera clear  NECK: Supple, No JVD, Normal thyroid  NERVOUS SYSTEM:  Alert but confused. Fair concentration; No asterixis  CHEST/LUNG: Clear to percussion bilaterally; No rales, rhonchi, wheezing, or rubs  HEART: Regular rate and rhythm; No murmurs, rubs, or gallops  ABDOMEN: Soft, Nontender, Nondistended; Bowel sounds present  EXTREMITIES:  2+ Peripheral Pulses, No clubbing, cyanosis, or edema  LYMPH: No lymphadenopathy noted  SKIN: No rashes or lesions    LAB        CBC:  02-01 @ 08:11  WBC 34.23   Hgb 10.5   Hct 30.2   Plts 271  .7  01-31 @ 07:28  WBC 30.12   Hgb 10.3   Hct 29.8   Plts 259  .0  01-30 @ 04:21  WBC 27.16   Hgb 10.4   Hct 29.8   Plts 282  .4  01-29 @ 16:48  WBC 28.92   Hgb 10.5   Hct 29.8   Plts 297  .0  01-29 @ 04:09  WBC 23.35   Hgb 10.8   Hct 31.6   Plts 302  .3      Chemistry:  02-04 @ 07:31  Na+ 136  K+ 3.6  Cl- 106  CO2 21    Cr 6.05     02-03 @ 08:51  Na+ 135  K+ 3.6  Cl- 105  CO2 21    Cr 6.21     02-02 @ 07:27  Na+ 134  K+ 3.5  Cl- 102  CO2 21    Cr 6.22     02-01 @ 08:11  Na+ 132  K+ 3.5  Cl- 99  CO2 20    Cr 6.54     01-31 @ 07:28  Na+ 131  K+ 3.7  Cl- 99  CO2 20    Cr 6.31     01-30 @ 04:21  Na+ 133  K+ 4.3  Cl- 100  CO2 18    Cr 5.61     01-29 @ 04:09  Na+ 133  K+ 4.2  Cl- 97  CO2 21    Cr 5.12         Glucose, Serum: 123 mg/dL (02-04 @ 07:31)  Glucose, Serum: 119 mg/dL (02-03 @ 08:51)  Glucose, Serum: 121 mg/dL (02-02 @ 07:27)  Glucose, Serum: 70 mg/dL (02-01 @ 08:11)  Glucose, Serum: 104 mg/dL (01-31 @ 07:28)  Glucose, Serum: 139 mg/dL (01-30 @ 04:21)  Glucose, Serum: 71 mg/dL (01-29 @ 04:09)      04 Feb 2019 07:31    136    |  106    |  119    ----------------------------<  123    3.6     |  21     |  6.05   03 Feb 2019 08:51    135    |  105    |  125    ----------------------------<  119    3.6     |  21     |  6.21   02 Feb 2019 07:27    134    |  102    |  125    ----------------------------<  121    3.5     |  21     |  6.22   01 Feb 2019 08:11    132    |  99     |  128    ----------------------------<  70     3.5     |  20     |  6.54   31 Jan 2019 07:28    131    |  99     |  121    ----------------------------<  104    3.7     |  20     |  6.31   30 Jan 2019 04:21    133    |  100    |  114    ----------------------------<  139    4.3     |  18     |  5.61   29 Jan 2019 04:09    133    |  97     |  110    ----------------------------<  71     4.2     |  21     |  5.12     Ca    8.6        04 Feb 2019 07:31  Ca    8.4        03 Feb 2019 08:51  Ca    8.4        02 Feb 2019 07:27  Ca    8.4        01 Feb 2019 08:11  Ca    7.9        31 Jan 2019 07:28  Ca    7.7        30 Jan 2019 04:21  Ca    8.2        29 Jan 2019 04:09  Phos  5.9       31 Jan 2019 07:28  Phos  5.7       30 Jan 2019 04:21  Phos  5.8       29 Jan 2019 04:09  Mg     2.6       31 Jan 2019 07:28  Mg     2.9       30 Jan 2019 04:21  Mg     3.0       29 Jan 2019 04:09    TPro  7.0    /  Alb  2.8    /  TBili  10.8   /  DBili  x      /  AST  81     /  ALT  23     /  AlkPhos  355    04 Feb 2019 07:31  TPro  6.6    /  Alb  2.7    /  TBili  10.8   /  DBili  x      /  AST  89     /  ALT  23     /  AlkPhos  366    03 Feb 2019 08:51  TPro  6.1    /  Alb  2.7    /  TBili  9.8    /  DBili  x      /  AST  80     /  ALT  21     /  AlkPhos  356    02 Feb 2019 07:27  TPro  6.0    /  Alb  2.6    /  TBili  9.7    /  DBili  x      /  AST  90     /  ALT  23     /  AlkPhos  371    01 Feb 2019 08:11  TPro  6.0    /  Alb  2.7    /  TBili  9.1    /  DBili  x      /  AST  114    /  ALT  27     /  AlkPhos  397    31 Jan 2019 07:28  TPro  6.2    /  Alb  3.0    /  TBili  9.4    /  DBili  7.14   /  AST  139    /  ALT  37     /  AlkPhos  411    30 Jan 2019 04:21  TPro  7.0    /  Alb  3.8    /  TBili  11.2   /  DBili  8.34   /  AST  132    /  ALT  37     /  AlkPhos  403    29 Jan 2019 04:09              CAPILLARY BLOOD GLUCOSE              RADIOLOGY & ADDITIONAL TESTS:    Imaging Personally Reviewed:  [ ] YES  [ ] NO    Consultant(s) Notes Reviewed:  [ ] YES  [ ] NO    Care Discussed with Consultants/Other Providers [ ] YES  [ ] NO

## 2019-02-04 NOTE — PHYSICAL THERAPY INITIAL EVALUATION ADULT - PERTINENT HX OF CURRENT PROBLEM, REHAB EVAL
47 y/o male PMH HTN, chronic alcohol abuse and dependence x 15 years now admitted with hepatic encephalopathy and GI bleed. now on medical floor, was in critical care, s/p paracentesis.

## 2019-02-04 NOTE — PROGRESS NOTE ADULT - ASSESSMENT
48M PMH HTN, chronic alcohol abuse and dependence x 15 years (1/2 pint of vodka with cranberry juice daily), hx of alcohol withdrawal presents for syncope x3 with generalized weakness, weight loss, loss of appetite, jaundice, melena, coffee ground emesis. Here with hypotension, lactic acidosis, hyponatremia, elevated Cr, jaundice, hyperbilirubinemia, elevated alk phos and AST, melena, coffee ground emesis, elevated INR, cirrhosis with ascites. < from: US Abdomen Limited (02.01.19 @ 12:45) >here is a moderate to large amount of ascites seen in all 4 quadrants. s/p paracetesis on 2/1-700 ml fluid removed. Octreo and Midodrine for now;     Albumibn stopped poor progniosis   DC planning

## 2019-02-05 LAB
ALBUMIN SERPL ELPH-MCNC: 2.8 G/DL — LOW (ref 3.3–5)
ALP SERPL-CCNC: 356 U/L — HIGH (ref 40–120)
ALT FLD-CCNC: 25 U/L — SIGNIFICANT CHANGE UP (ref 12–78)
AMMONIA BLD-MCNC: 24 UMOL/L — SIGNIFICANT CHANGE UP (ref 11–32)
ANION GAP SERPL CALC-SCNC: 13 MMOL/L — SIGNIFICANT CHANGE UP (ref 5–17)
AST SERPL-CCNC: 71 U/L — HIGH (ref 15–37)
BILIRUB SERPL-MCNC: 10.3 MG/DL — HIGH (ref 0.2–1.2)
BUN SERPL-MCNC: 109 MG/DL — HIGH (ref 7–23)
CALCIUM SERPL-MCNC: 8.6 MG/DL — SIGNIFICANT CHANGE UP (ref 8.5–10.1)
CHLORIDE SERPL-SCNC: 106 MMOL/L — SIGNIFICANT CHANGE UP (ref 96–108)
CO2 SERPL-SCNC: 17 MMOL/L — LOW (ref 22–31)
CREAT SERPL-MCNC: 5.64 MG/DL — HIGH (ref 0.5–1.3)
GLUCOSE SERPL-MCNC: 128 MG/DL — HIGH (ref 70–99)
HCT VFR BLD CALC: 35.8 % — LOW (ref 39–50)
HGB BLD-MCNC: 12.6 G/DL — LOW (ref 13–17)
MAGNESIUM SERPL-MCNC: 2.7 MG/DL — HIGH (ref 1.6–2.6)
MCHC RBC-ENTMCNC: 35.2 GM/DL — SIGNIFICANT CHANGE UP (ref 32–36)
MCHC RBC-ENTMCNC: 36.2 PG — HIGH (ref 27–34)
MCV RBC AUTO: 102.9 FL — HIGH (ref 80–100)
NRBC # BLD: 0 /100 WBCS — SIGNIFICANT CHANGE UP (ref 0–0)
PHOSPHATE SERPL-MCNC: 5.7 MG/DL — HIGH (ref 2.5–4.5)
PLATELET # BLD AUTO: 319 K/UL — SIGNIFICANT CHANGE UP (ref 150–400)
POTASSIUM SERPL-MCNC: 3.7 MMOL/L — SIGNIFICANT CHANGE UP (ref 3.5–5.3)
POTASSIUM SERPL-SCNC: 3.7 MMOL/L — SIGNIFICANT CHANGE UP (ref 3.5–5.3)
PROT SERPL-MCNC: 7.2 GM/DL — SIGNIFICANT CHANGE UP (ref 6–8.3)
RBC # BLD: 3.48 M/UL — LOW (ref 4.2–5.8)
RBC # FLD: 20.5 % — HIGH (ref 10.3–14.5)
SODIUM SERPL-SCNC: 136 MMOL/L — SIGNIFICANT CHANGE UP (ref 135–145)
WBC # BLD: 43.75 K/UL — CRITICAL HIGH (ref 3.8–10.5)
WBC # FLD AUTO: 43.75 K/UL — CRITICAL HIGH (ref 3.8–10.5)

## 2019-02-05 PROCEDURE — 99233 SBSQ HOSP IP/OBS HIGH 50: CPT

## 2019-02-05 RX ADMIN — OCTREOTIDE ACETATE 100 MICROGRAM(S): 200 INJECTION, SOLUTION INTRAVENOUS; SUBCUTANEOUS at 21:51

## 2019-02-05 RX ADMIN — OCTREOTIDE ACETATE 100 MICROGRAM(S): 200 INJECTION, SOLUTION INTRAVENOUS; SUBCUTANEOUS at 06:49

## 2019-02-05 RX ADMIN — MIDODRINE HYDROCHLORIDE 20 MILLIGRAM(S): 2.5 TABLET ORAL at 11:27

## 2019-02-05 RX ADMIN — OCTREOTIDE ACETATE 100 MICROGRAM(S): 200 INJECTION, SOLUTION INTRAVENOUS; SUBCUTANEOUS at 13:31

## 2019-02-05 RX ADMIN — MIDODRINE HYDROCHLORIDE 20 MILLIGRAM(S): 2.5 TABLET ORAL at 17:32

## 2019-02-05 RX ADMIN — PANTOPRAZOLE SODIUM 40 MILLIGRAM(S): 20 TABLET, DELAYED RELEASE ORAL at 06:50

## 2019-02-05 RX ADMIN — Medication 100 MILLIGRAM(S): at 12:43

## 2019-02-05 RX ADMIN — Medication 100 MILLIGRAM(S): at 06:51

## 2019-02-05 RX ADMIN — LACTULOSE 10 GRAM(S): 10 SOLUTION ORAL at 06:49

## 2019-02-05 RX ADMIN — PANTOPRAZOLE SODIUM 40 MILLIGRAM(S): 20 TABLET, DELAYED RELEASE ORAL at 17:53

## 2019-02-05 RX ADMIN — Medication 1 MILLIGRAM(S): at 12:43

## 2019-02-05 RX ADMIN — MIDODRINE HYDROCHLORIDE 20 MILLIGRAM(S): 2.5 TABLET ORAL at 03:45

## 2019-02-05 RX ADMIN — Medication 1 TABLET(S): at 12:43

## 2019-02-05 RX ADMIN — HEPARIN SODIUM 5000 UNIT(S): 5000 INJECTION INTRAVENOUS; SUBCUTANEOUS at 17:28

## 2019-02-05 RX ADMIN — HEPARIN SODIUM 5000 UNIT(S): 5000 INJECTION INTRAVENOUS; SUBCUTANEOUS at 06:49

## 2019-02-05 NOTE — CHART NOTE - NSCHARTNOTEFT_GEN_A_CORE
Pt c alcoholic hepatitis, cirrhosis c recurrent ascites & progressive jaundice. Per nephrology not a candiddate for HD tx. Paracentesis  performed (1/25) 7000 ml fluid removed.     Factors impacting intake: [ ] none [ ] nausea  [ ] vomiting [ ] diarrhea [ ] constipation  [ ]chewing problems [ ] swallowing issues  [ ] other:     Diet Prescription: Diet, DASH/TLC:   Sodium & Cholesterol Restricted  Dysphagia 1, Pureed, Thin Liquids ([id number])  Supplement Feeding Modality:  Oral  Ensure Enlive Cans or Servings Per Day:  1       Frequency:  Three Times a day (02-04-19 @ 11:58)    Intake:     Current Weight:   % Weight Change    Pertinent Medications: MEDICATIONS  (STANDING):  folic acid 1 milliGRAM(s) Oral daily  heparin  Injectable 5000 Unit(s) SubCutaneous every 12 hours  lactulose Syrup 10 Gram(s) Oral two times a day  midodrine 20 milliGRAM(s) Oral every 8 hours  multivitamin 1 Tablet(s) Oral daily  nicotine -  14 mG/24Hr(s) Patch 1 patch Transdermal daily  octreotide  Injectable 100 MICROGram(s) SubCutaneous every 8 hours  pantoprazole  Injectable 40 milliGRAM(s) IV Push every 12 hours  rifaximin 550 milliGRAM(s) Oral two times a day  thiamine 100 milliGRAM(s) Oral daily    MEDICATIONS  (PRN):  morphine  - Injectable 1 milliGRAM(s) IV Push every 6 hours PRN Moderate Pain (4 - 6)  oxyCODONE    IR 5 milliGRAM(s) Oral every 6 hours PRN Mild Pain (1 - 3)    Pertinent Labs:   Skin:     Estimated Needs:   [ ] no change since previous assessment  [ ] recalculated:     Previous Nutrition Diagnosis:   [ ] Inadequate Energy Intake [ ]Inadequate Oral Intake [ ] Excessive Energy Intake   [ ] Underweight [ ] Increased Nutrient Needs [ ] Overweight/Obesity   [ ] Altered GI Function [ ] Unintended Weight Loss [ ] Food & Nutrition Related Knowledge Deficit [ ] Malnutrition     Nutrition Diagnosis is [ ] ongoing  [ ] resolved [ ] not applicable     New Nutrition Diagnosis: [ ] not applicable      Interventions:   Recommend  [ ] Change Diet To:  [ ] Nutrition Supplement  [ ] Nutrition Support  [ ] Other:     Monitoring and Evaluation:   [ ] PO intake [ x ] Tolerance to diet prescription [ x ] weights [ x ] labs[ x ] follow up per protocol  [ ] other: Pt c alcoholic hepatitis, cirrhosis c recurrent ascites & progressive jaundice. Renal failure; per nephrology not a candidate for HD tx. Paracentesis performed (1/25) 7000 ml fluid removed.   Family discussing possibility of hospice; referral pending.     Factors impacting intake: [ ] none [ ] nausea  [ ] vomiting [ ] diarrhea [ ] constipation  [ ]chewing problems [ ] swallowing issues  [ X] other: decreased appetite, lethargy; early satiety due to recurrent ascites    Diet Prescription: Diet, DASH/TLC:   Sodium & Cholesterol Restricted  Dysphagia 1, Pureed, Thin Liquids ([id number])  Supplement Feeding Modality:  Oral  Ensure Enlive Cans or Servings Per Day:  1       Frequency:  Three Times a day (02-04-19 @ 11:58)    Intake: remains poor, < 50% most meals; reviewed diet rx yesterday c Dr. Ann; pt previously on full liquids for extended time; advanced to solid diet    Current Weight: 68.5 kg (2/5); previously 65.8 kg (2/1)  % Weight Change: 4% gain x 4 days most likely due to recurrent ascites; wt is not a valid indicator of nutritional status     Physical Appearance: edema remains at 2+ generalized    Pertinent Medications: MEDICATIONS  (STANDING):  folic acid 1 milliGRAM(s) Oral daily  heparin  Injectable 5000 Unit(s) SubCutaneous every 12 hours  lactulose Syrup 10 Gram(s) Oral two times a day  midodrine 20 milliGRAM(s) Oral every 8 hours  multivitamin 1 Tablet(s) Oral daily  nicotine -  14 mG/24Hr(s) Patch 1 patch Transdermal daily  octreotide  Injectable 100 MICROGram(s) SubCutaneous every 8 hours  pantoprazole  Injectable 40 milliGRAM(s) IV Push every 12 hours  rifaximin 550 milliGRAM(s) Oral two times a day  thiamine 100 milliGRAM(s) Oral daily    MEDICATIONS  (PRN):  morphine  - Injectable 1 milliGRAM(s) IV Push every 6 hours PRN Moderate Pain (4 - 6)  oxyCODONE    IR 5 milliGRAM(s) Oral every 6 hours PRN Mild Pain (1 - 3)    Pertinent Labs: 02-04 Na136 mmol/L Glu 123 mg/dL<H> K+ 3.6 mmol/L Cr  6.05 mg/dL<H>  mg/dL<H> 01-31 Phos 5.9 mg/dL<H> 02-04 Alb 2.8 g/dL<L>    Skin: WDL    Estimated Needs:   [X ] no change since previous assessment (1/29 follow-up chart note)  [ ] recalculated:     Previous Nutrition Diagnosis:   [ ] Inadequate Energy Intake [ ]Inadequate Oral Intake [ ] Excessive Energy Intake   [ ] Underweight [ ] Increased Nutrient Needs [ ] Overweight/Obesity   [ ] Altered GI Function [ ] Unintended Weight Loss [ ] Food & Nutrition Related Knowledge Deficit   [X ] Severe Malnutrition - chronic     Nutrition Diagnosis is [X ] ongoing  [ ] resolved [ ] not applicable     GOAL: Pt to tolerate PO diet without GI distress - met; pt to consume > 75% meals/supplements - not met    New Nutrition Diagnosis: [X ] not applicable      Interventions:   Recommend  [ ]X Change Diet To:  Dysphagia 1 - pureed c thin liquids, low Na diet  [X ] Nutrition Supplement: Ensure Enlive x 3/day (provides 1050 kcal, 60 g protein)   [ ] Nutrition Support  [ X] Other: cater to food preferences when appropriate     Monitoring and Evaluation:   [X ] PO intake [ x ] Tolerance to diet prescription [ x ] weights [ x ] labs[ x ] follow up per protocol  [ ] other:

## 2019-02-05 NOTE — PROGRESS NOTE ADULT - SUBJECTIVE AND OBJECTIVE BOX
Patient feels well no complaints today.    MEDICATIONS  (STANDING):  folic acid 1 milliGRAM(s) Oral daily  heparin  Injectable 5000 Unit(s) SubCutaneous every 12 hours  lactulose Syrup 10 Gram(s) Oral two times a day  midodrine 20 milliGRAM(s) Oral every 8 hours  multivitamin 1 Tablet(s) Oral daily  nicotine -  14 mG/24Hr(s) Patch 1 patch Transdermal daily  octreotide  Injectable 100 MICROGram(s) SubCutaneous every 8 hours  pantoprazole  Injectable 40 milliGRAM(s) IV Push every 12 hours  rifaximin 550 milliGRAM(s) Oral two times a day  thiamine 100 milliGRAM(s) Oral daily    MEDICATIONS  (PRN):  morphine  - Injectable 1 milliGRAM(s) IV Push every 6 hours PRN Moderate Pain (4 - 6)  oxyCODONE    IR 5 milliGRAM(s) Oral every 6 hours PRN Mild Pain (1 - 3)      02-04-19 @ 07:01  -  02-05-19 @ 07:00  --------------------------------------------------------  IN: 50 mL / OUT: 1200 mL / NET: -1150 mL      PHYSICAL EXAM:      T(C): 36.3 (02-05-19 @ 11:49), Max: 36.4 (02-04-19 @ 23:25)  HR: 71 (02-05-19 @ 11:49) (71 - 81)  BP: 112/73 (02-05-19 @ 11:49) (100/65 - 115/84)  RR: 18 (02-05-19 @ 11:49) (17 - 18)  SpO2: 98% (02-05-19 @ 11:49) (97% - 98%)  Wt(kg): --  Respiratory: clear anteriorly, decreased BS at bases  Cardiovascular: S1 S2  Gastrointestinal: soft NT mild D +BS  Extremities:  1 edema                                    12.6   43.75 )-----------( 319      ( 05 Feb 2019 10:20 )             35.8     02-05    136  |  106  |  109<H>  ----------------------------<  128<H>  3.7   |  17<L>  |  5.64<H>    Ca    8.6      05 Feb 2019 10:20  Phos  5.7     02-05  Mg     2.7     02-05    TPro  7.2  /  Alb  2.8<L>  /  TBili  10.3<H>  /  DBili  x   /  AST  71<H>  /  ALT  25  /  AlkPhos  356<H>  02-05      LIVER FUNCTIONS - ( 05 Feb 2019 10:20 )  Alb: 2.8 g/dL / Pro: 7.2 gm/dL / ALK PHOS: 356 U/L / ALT: 25 U/L / AST: 71 U/L / GGT: x             Assessment and Plan:    PAPO; suspected HRS; precipitated by SBP;  Indices stable; continue current rx;

## 2019-02-05 NOTE — PROGRESS NOTE ADULT - PROBLEM SELECTOR PLAN 2
LFTs unchanged . Being treated for hepaorenal. Not a candidate for steroids.  11.2 / 132 / 137 / 403.  - 9.4 / 139 / 37 / 411 - 9.1/114/27/397  - 10.8 / 81 / 23 / 355 - 10.3 / 71 / 25 / 356  nh4 24 BUN / CRE  109/5.64  prognosis poor

## 2019-02-05 NOTE — PROGRESS NOTE ADULT - ASSESSMENT
HPI:  49 y/o male PMH HTN (not on medication), chronic alcohol abuse and dependence x 15 years (1/2 pint of vodka with cranberry juice daily), hx of alcohol withdrawal 2013 hospitalized at [facility] after which he went to inpatient rehab at [facility] presents for syncope and melanotic stools. History obtained from ex wife, mother, and patient. Since pt left rehab in 2013 pt has been drinking daily. Last drink 11AM today. Pt state for past one and a half months he has had progressive weight loss with loss of appetite. He has had in increased abdominal distention for the past 1 month and in the past 1 week he has noticed jaundice and scleral icterus. Pt also reports generalized fatigue. Reports coffee ground emesis x1 episode 3 days ago but none since then with melena for about a week. Pt states he has 3 bowel movements a day and now has also been seeing "blood when I pee" also within the last week. No abdominal pain. No CP no SOB. No fevers or chills. Today pt called ex wife after he was unsteady with his gait and "fell", he denies LOC. Denies trauma to head. Ex wife reports while attempting to get pt to the car she noticed he was "very weak" and witnessed pt to have had 3 syncopal episodes lasting a few minutes each before regaining consciousness, but no seizures. Ex wife state that in the car, pt had a bowel movement that was black and tarry.     In ED pt noted to be hypotensive SBP 80-90s. 2L IVF given with 3rd and 4th infusing. SBP post IVF resuscitation 100s to one teens. On labs pt with leukocytosis with 3% bands, Na 123, Cr 2.05, INR 2.05, T bili 6, , ALT 55, Alk phos 799.  Pt seen by critical care and not felt to be ICU candidate. (23 Jan 2019 22:33)  ----------------- As Above ------------------------------------------------------  Patient confused. Left message for family member to contact me   ETOH Abuse (+). Coffee ground emesis x 1, melena.   Eleveated LFTs, INR  See labs, CT Scan    Cirrhosis, ascites, fever - 1) Paracentesis 2) ammonia level  3) f/u LFTs 4) treat for impending Dts  - patient does not fit into the criteria for treatment with steroids.

## 2019-02-05 NOTE — PROGRESS NOTE ADULT - SUBJECTIVE AND OBJECTIVE BOX
Patient is a 48y old  Male who presents with a chief complaint of Coffee ground emesis, progressive jaundice, syncopal episodes. (04 Feb 2019 21:50)      HPI:  49 y/o male PMH HTN (not on medication), chronic alcohol abuse and dependence x 15 years (1/2 pint of vodka with cranberry juice daily), hx of alcohol withdrawal 2013 hospitalized at [facility] after which he went to inpatient rehab at [facility] for syncope and melanotic stools. History obtained from ex wife, mother, and patient. Since pt left rehab in 2013 pt has been drinking daily. Last drink 11AM today. Pt state for past one and a half months he has had progressive weight loss with loss of appetite. He has had in increased abdominal distention for the past 1 month and in the past 1 week he has noticed jaundice and scleral icterus. Pt also reports generalized fatigue. Reports coffee ground emesis x1 episode 3 days ago but none since then with melena for about a week. Pt states he has 3 bowel movements a day and now has also been seeing "blood when I pee" also within the last week. No abdominal pain. No CP no SOB. No fevers or chills. Today pt called ex wife after he was unsteady with his gait and "fell", he denies LOC. Denies trauma to head. Ex wife reports while attempting to get pt to the car she noticed he was "very weak" and witnessed pt to have had 3 syncopal episodes lasting a few minutes each before regaining consciousness, but no seizures. Ex wife state that in the car, pt had a bowel movement that was black and tarry.     In ED pt noted to be hypotensive SBP 80-90s. 2L IVF given with 3rd and 4th infusing. SBP post IVF resuscitation 100s to one teens. On labs pt with leukocytosis with 3% bands, Na 123, Cr 2.05, INR 2.05, T bili 6, , ALT 55, Alk phos 799.  Pt seen by critical care and not felt to be ICU candidate. (23 Jan 2019 22:33)      INTERVAL HPI/OVERNIGHT EVENTS:  WBC rising. A & O x 1. No c/o    MEDICATIONS  (STANDING):  folic acid 1 milliGRAM(s) Oral daily  heparin  Injectable 5000 Unit(s) SubCutaneous every 12 hours  lactulose Syrup 10 Gram(s) Oral two times a day  midodrine 20 milliGRAM(s) Oral every 8 hours  multivitamin 1 Tablet(s) Oral daily  nicotine -  14 mG/24Hr(s) Patch 1 patch Transdermal daily  octreotide  Injectable 100 MICROGram(s) SubCutaneous every 8 hours  pantoprazole  Injectable 40 milliGRAM(s) IV Push every 12 hours  rifaximin 550 milliGRAM(s) Oral two times a day  thiamine 100 milliGRAM(s) Oral daily    MEDICATIONS  (PRN):  morphine  - Injectable 1 milliGRAM(s) IV Push every 6 hours PRN Moderate Pain (4 - 6)  oxyCODONE    IR 5 milliGRAM(s) Oral every 6 hours PRN Mild Pain (1 - 3)      FAMILY HISTORY:  No pertinent family history in first degree relatives      Allergies    No Known Allergies    Intolerances        PMH/PSH:  ETOH abuse  Psoriasis  History of hypertension  No significant past surgical history        REVIEW OF SYSTEMS:  CONSTITUTIONAL: No fever, weight loss, or fatigue  EYES: No eye pain, visual disturbances, or discharge  ENMT:  No difficulty hearing, tinnitus, vertigo; No sinus or throat pain  NECK: No pain or stiffness  BREASTS: No pain, masses, or nipple discharge  RESPIRATORY: No cough, wheezing, chills or hemoptysis; No shortness of breath  CARDIOVASCULAR: No chest pain, palpitations, dizziness, or leg swelling  GASTROINTESTINAL: See above  GENITOURINARY: No dysuria, frequency, hematuria, or incontinence  NEUROLOGICAL: No headaches, memory loss, loss of strength, numbness, or tremors  SKIN: No itching, burning, rashes, or lesions   LYMPH NODES: No enlarged glands  ENDOCRINE: No heat or cold intolerance; No hair loss  MUSCULOSKELETAL: No joint pain or swelling; No muscle, back, or extremity pain  PSYCHIATRIC: No depression, anxiety, mood swings, or difficulty sleeping  HEME/LYMPH: No easy bruising, or bleeding gums  ALLERGY AND IMMUNOLOGIC: No hives or eczema    Vital Signs Last 24 Hrs  T(C): 36.3 (05 Feb 2019 11:49), Max: 36.4 (04 Feb 2019 23:25)  T(F): 97.3 (05 Feb 2019 11:49), Max: 97.6 (04 Feb 2019 23:25)  HR: 71 (05 Feb 2019 11:49) (71 - 81)  BP: 112/73 (05 Feb 2019 11:49) (100/65 - 115/84)  BP(mean): --  RR: 18 (05 Feb 2019 11:49) (17 - 18)  SpO2: 98% (05 Feb 2019 11:49) (97% - 98%)    PHYSICAL EXAM:  GENERAL: NAD, well-groomed, well-developed  HEAD:  Atraumatic, Normocephalic  EYES: EOMI, PERRLA, conjunctiva and sclera jaundiced  NECK: Supple, No JVD, Normal thyroid  NERVOUS SYSTEM:  Alert & Oriented X1 Fair concentration; no asterixis  CHEST/LUNG: Clear to percussion bilaterally; No rales, rhonchi, wheezing, or rubs  HEART: Regular rate and rhythm; No murmurs, rubs, or gallops  ABDOMEN: Soft, Nontender, Nondistended; Bowel sounds present  EXTREMITIES:  2+ Peripheral Pulses, No clubbing, cyanosis, or edema  LYMPH: No lymphadenopathy noted  SKIN: No rashes or lesions    LAB                          12.6   43.75 )-----------( 319      ( 05 Feb 2019 10:20 )             35.8       CBC:  02-05 @ 10:20  WBC 43.75   Hgb 12.6   Hct 35.8   Plts 319  .9  02-01 @ 08:11  WBC 34.23   Hgb 10.5   Hct 30.2   Plts 271  .7  01-31 @ 07:28  WBC 30.12   Hgb 10.3   Hct 29.8   Plts 259  .0  01-30 @ 04:21  WBC 27.16   Hgb 10.4   Hct 29.8   Plts 282  .4  01-29 @ 16:48  WBC 28.92   Hgb 10.5   Hct 29.8   Plts 297  .0      Chemistry:  02-05 @ 10:20  Na+ 136  K+ 3.7  Cl- 106  CO2 17    Cr 5.64     02-04 @ 07:31  Na+ 136  K+ 3.6  Cl- 106  CO2 21    Cr 6.05     02-03 @ 08:51  Na+ 135  K+ 3.6  Cl- 105  CO2 21    Cr 6.21     02-02 @ 07:27  Na+ 134  K+ 3.5  Cl- 102  CO2 21    Cr 6.22     02-01 @ 08:11  Na+ 132  K+ 3.5  Cl- 99  CO2 20    Cr 6.54     01-31 @ 07:28  Na+ 131  K+ 3.7  Cl- 99  CO2 20    Cr 6.31     01-30 @ 04:21  Na+ 133  K+ 4.3  Cl- 100  CO2 18    Cr 5.61         Glucose, Serum: 128 mg/dL (02-05 @ 10:20)  Glucose, Serum: 123 mg/dL (02-04 @ 07:31)  Glucose, Serum: 119 mg/dL (02-03 @ 08:51)  Glucose, Serum: 121 mg/dL (02-02 @ 07:27)  Glucose, Serum: 70 mg/dL (02-01 @ 08:11)  Glucose, Serum: 104 mg/dL (01-31 @ 07:28)  Glucose, Serum: 139 mg/dL (01-30 @ 04:21)      05 Feb 2019 10:20    136    |  106    |  109    ----------------------------<  128    3.7     |  17     |  5.64   04 Feb 2019 07:31    136    |  106    |  119    ----------------------------<  123    3.6     |  21     |  6.05   03 Feb 2019 08:51    135    |  105    |  125    ----------------------------<  119    3.6     |  21     |  6.21   02 Feb 2019 07:27    134    |  102    |  125    ----------------------------<  121    3.5     |  21     |  6.22   01 Feb 2019 08:11    132    |  99     |  128    ----------------------------<  70     3.5     |  20     |  6.54   31 Jan 2019 07:28    131    |  99     |  121    ----------------------------<  104    3.7     |  20     |  6.31   30 Jan 2019 04:21    133    |  100    |  114    ----------------------------<  139    4.3     |  18     |  5.61     Ca    8.6        05 Feb 2019 10:20  Ca    8.6        04 Feb 2019 07:31  Ca    8.4        03 Feb 2019 08:51  Ca    8.4        02 Feb 2019 07:27  Ca    8.4        01 Feb 2019 08:11  Ca    7.9        31 Jan 2019 07:28  Ca    7.7        30 Jan 2019 04:21  Phos  5.7       05 Feb 2019 10:20  Phos  5.9       31 Jan 2019 07:28  Phos  5.7       30 Jan 2019 04:21  Mg     2.7       05 Feb 2019 10:20  Mg     2.6       31 Jan 2019 07:28  Mg     2.9       30 Jan 2019 04:21    TPro  7.2    /  Alb  2.8    /  TBili  10.3   /  DBili  x      /  AST  71     /  ALT  25     /  AlkPhos  356    05 Feb 2019 10:20  TPro  7.0    /  Alb  2.8    /  TBili  10.8   /  DBili  x      /  AST  81     /  ALT  23     /  AlkPhos  355    04 Feb 2019 07:31  TPro  6.6    /  Alb  2.7    /  TBili  10.8   /  DBili  x      /  AST  89     /  ALT  23     /  AlkPhos  366    03 Feb 2019 08:51  TPro  6.1    /  Alb  2.7    /  TBili  9.8    /  DBili  x      /  AST  80     /  ALT  21     /  AlkPhos  356    02 Feb 2019 07:27  TPro  6.0    /  Alb  2.6    /  TBili  9.7    /  DBili  x      /  AST  90     /  ALT  23     /  AlkPhos  371    01 Feb 2019 08:11  TPro  6.0    /  Alb  2.7    /  TBili  9.1    /  DBili  x      /  AST  114    /  ALT  27     /  AlkPhos  397    31 Jan 2019 07:28  TPro  6.2    /  Alb  3.0    /  TBili  9.4    /  DBili  7.14   /  AST  139    /  ALT  37     /  AlkPhos  411    30 Jan 2019 04:21              CAPILLARY BLOOD GLUCOSE              RADIOLOGY & ADDITIONAL TESTS:    Imaging Personally Reviewed:  [ ] YES  [ ] NO    Consultant(s) Notes Reviewed:  [ ] YES  [ ] NO    Care Discussed with Consultants/Other Providers [ ] YES  [ ] NO

## 2019-02-05 NOTE — PROGRESS NOTE ADULT - SUBJECTIVE AND OBJECTIVE BOX
Patient is a 48y old  Male who presents with a chief complaint of Coffee ground emesis, progressive jaundice, syncopal episodes. (06 Feb 2019 15:25)      INTERVAL HPI/OVERNIGHT EVENTS: no acute events overnight     MEDICATIONS  (STANDING):  albumin human 25% IVPB 50 milliLiter(s) IV Intermittent daily  folic acid 1 milliGRAM(s) Oral daily  heparin  Injectable 5000 Unit(s) SubCutaneous every 12 hours  midodrine 20 milliGRAM(s) Oral every 8 hours  multivitamin 1 Tablet(s) Oral daily  nicotine -  14 mG/24Hr(s) Patch 1 patch Transdermal daily  octreotide  Injectable 100 MICROGram(s) SubCutaneous every 8 hours  pantoprazole  Injectable 40 milliGRAM(s) IV Push every 12 hours  rifaximin 550 milliGRAM(s) Oral two times a day  thiamine 100 milliGRAM(s) Oral daily    MEDICATIONS  (PRN):  morphine  - Injectable 1 milliGRAM(s) IV Push every 6 hours PRN Moderate Pain (4 - 6)  oxyCODONE    IR 5 milliGRAM(s) Oral every 6 hours PRN Mild Pain (1 - 3)      Allergies    No Known Allergies    Intolerances        REVIEW OF SYSTEMS:  CONSTITUTIONAL: No fever, weight loss, or fatigue  EYES: No eye pain, visual disturbances, or discharge  ENMT:  No difficulty hearing, tinnitus, vertigo; No sinus or throat pain  NECK: No pain or stiffness  BREASTS: No pain, masses, or nipple discharge  RESPIRATORY: No cough, wheezing, chills or hemoptysis; No shortness of breath  CARDIOVASCULAR: No chest pain, palpitations, dizziness, or leg swelling  GASTROINTESTINAL: No abdominal or epigastric pain. No nausea, vomiting, or hematemesis; No diarrhea or constipation. No melena or hematochezia.  GENITOURINARY: No dysuria, frequency, hematuria, or incontinence  NEUROLOGICAL: No headaches, memory loss, loss of strength, numbness, or tremors  SKIN: No itching, burning, rashes, or lesions   LYMPH NODES: No enlarged glands  ENDOCRINE: No heat or cold intolerance; No hair loss  MUSCULOSKELETAL: No joint pain or swelling; No muscle, back, or extremity pain  PSYCHIATRIC: No depression, anxiety, mood swings, or difficulty sleeping  HEME/LYMPH: No easy bruising, or bleeding gums  ALLERGY AND IMMUNOLOGIC: No hives or eczema    Vital Signs Last 24 Hrs  T(C): 36.3 (06 Feb 2019 12:37), Max: 36.8 (05 Feb 2019 23:28)  T(F): 97.4 (06 Feb 2019 12:37), Max: 98.3 (05 Feb 2019 23:28)  HR: 76 (06 Feb 2019 12:37) (67 - 76)  BP: 101/68 (06 Feb 2019 12:37) (99/68 - 115/72)  BP(mean): --  RR: 17 (06 Feb 2019 12:37) (17 - 18)  SpO2: 96% (06 Feb 2019 12:37) (96% - 97%)    PHYSICAL EXAM:  GENERAL: NAD, well-groomed, well-developed  HEAD:  Atraumatic, Normocephalic  EYES: EOMI, PERRLA, conjunctiva and sclera clear  ENMT: No tonsillar erythema, exudates, or enlargement; Moist mucous membranes, Good dentition, No lesions  NECK: Supple, No JVD, Normal thyroid  NERVOUS SYSTEM:  Alert & Oriented X3, Good concentration; Motor Strength 5/5 B/L upper and lower extremities; DTRs 2+ intact and symmetric  CHEST/LUNG: Clear to percussion bilaterally; No rales, rhonchi, wheezing, or rubs  HEART: Regular rate and rhythm; No murmurs, rubs, or gallops  ABDOMEN: Soft, distended abdomen   EXTREMITIES:  2+ Peripheral Pulses, No clubbing, cyanosis, or edema  LYMPH: No lymphadenopathy noted  SKIN: JAundice   LABS:                        12.2   39.93 )-----------( 300      ( 06 Feb 2019 08:03 )             36.9     02-06    133<L>  |  103  |  102<H>  ----------------------------<  118<H>  4.1   |  18<L>  |  5.45<H>    Ca    8.4<L>      06 Feb 2019 08:03  Phos  5.7     02-05  Mg     2.7     02-05    TPro  7.1  /  Alb  2.5<L>  /  TBili  9.8<H>  /  DBili  7.09<H>  /  AST  75<H>  /  ALT  25  /  AlkPhos  341<H>  02-06        CAPILLARY BLOOD GLUCOSE          RADIOLOGY & ADDITIONAL TESTS:  < from: US Paracentesis (02.01.19 @ 16:37) >  EXAM:  US PARACENTESIS [facility]                            PROCEDURE DATE:  02/01/2019          INTERPRETATION:  Procedure: Paracentesis. Images saved to PACS.    Clinical Information: Ascites.     Technique: Informed consent was obtained. The patient was placed supine   on the procedure table.  Using ultrasound guidance, a large pocket of   fluid was localized in the right lower quadrant which was prepped and   draped in the usual sterile fashion. Timeout performed. 1% lidocaine used   for local anesthesia.    Under sonographic guidance, the fluid pocket was accessed with a 5 Urdu   Yueh centesis sheath introducer needle with return of serous ascites.   About 5000 cc of fluid were then removed. Intravenous albumin was   administered.  Upon completion, the introducer sheath was removed and a   sterile dressing was placed. No complications.    Sedation: None.    Impression: Successful sonographic guided paracentesis.      < end of copied text >    Imaging Personally Reviewed:  [X ] YES  [ ] NO    Consultant(s) Notes Reviewed:  [ X] YES  [ ] NO    Care Discussed with Consultants/Other Providers [X ] YES  [ ] NO

## 2019-02-06 LAB
ALBUMIN SERPL ELPH-MCNC: 2.5 G/DL — LOW (ref 3.3–5)
ALP SERPL-CCNC: 341 U/L — HIGH (ref 40–120)
ALT FLD-CCNC: 25 U/L — SIGNIFICANT CHANGE UP (ref 12–78)
AMMONIA BLD-MCNC: 35 UMOL/L — HIGH (ref 11–32)
ANION GAP SERPL CALC-SCNC: 12 MMOL/L — SIGNIFICANT CHANGE UP (ref 5–17)
AST SERPL-CCNC: 75 U/L — HIGH (ref 15–37)
BILIRUB DIRECT SERPL-MCNC: 7.09 MG/DL — HIGH (ref 0.05–0.2)
BILIRUB INDIRECT FLD-MCNC: 2.7 MG/DL — HIGH (ref 0.2–1)
BILIRUB SERPL-MCNC: 9.8 MG/DL — HIGH (ref 0.2–1.2)
BUN SERPL-MCNC: 102 MG/DL — HIGH (ref 7–23)
CALCIUM SERPL-MCNC: 8.4 MG/DL — LOW (ref 8.5–10.1)
CHLORIDE SERPL-SCNC: 103 MMOL/L — SIGNIFICANT CHANGE UP (ref 96–108)
CO2 SERPL-SCNC: 18 MMOL/L — LOW (ref 22–31)
CREAT SERPL-MCNC: 5.45 MG/DL — HIGH (ref 0.5–1.3)
GLUCOSE SERPL-MCNC: 118 MG/DL — HIGH (ref 70–99)
HCT VFR BLD CALC: 36.9 % — LOW (ref 39–50)
HGB BLD-MCNC: 12.2 G/DL — LOW (ref 13–17)
MCHC RBC-ENTMCNC: 33.1 GM/DL — SIGNIFICANT CHANGE UP (ref 32–36)
MCHC RBC-ENTMCNC: 34.9 PG — HIGH (ref 27–34)
MCV RBC AUTO: 105.4 FL — HIGH (ref 80–100)
NRBC # BLD: 0 /100 WBCS — SIGNIFICANT CHANGE UP (ref 0–0)
PLATELET # BLD AUTO: 300 K/UL — SIGNIFICANT CHANGE UP (ref 150–400)
POTASSIUM SERPL-MCNC: 4.1 MMOL/L — SIGNIFICANT CHANGE UP (ref 3.5–5.3)
POTASSIUM SERPL-SCNC: 4.1 MMOL/L — SIGNIFICANT CHANGE UP (ref 3.5–5.3)
PROT SERPL-MCNC: 7.1 GM/DL — SIGNIFICANT CHANGE UP (ref 6–8.3)
RBC # BLD: 3.5 M/UL — LOW (ref 4.2–5.8)
RBC # FLD: 20.4 % — HIGH (ref 10.3–14.5)
SODIUM SERPL-SCNC: 133 MMOL/L — LOW (ref 135–145)
WBC # BLD: 39.93 K/UL — CRITICAL HIGH (ref 3.8–10.5)
WBC # FLD AUTO: 39.93 K/UL — CRITICAL HIGH (ref 3.8–10.5)

## 2019-02-06 PROCEDURE — 99233 SBSQ HOSP IP/OBS HIGH 50: CPT

## 2019-02-06 RX ORDER — ALBUMIN HUMAN 25 %
50 VIAL (ML) INTRAVENOUS DAILY
Qty: 0 | Refills: 0 | Status: COMPLETED | OUTPATIENT
Start: 2019-02-06 | End: 2019-02-09

## 2019-02-06 RX ADMIN — PANTOPRAZOLE SODIUM 40 MILLIGRAM(S): 20 TABLET, DELAYED RELEASE ORAL at 17:56

## 2019-02-06 RX ADMIN — OCTREOTIDE ACETATE 100 MICROGRAM(S): 200 INJECTION, SOLUTION INTRAVENOUS; SUBCUTANEOUS at 06:13

## 2019-02-06 RX ADMIN — Medication 1 PATCH: at 20:30

## 2019-02-06 RX ADMIN — MIDODRINE HYDROCHLORIDE 20 MILLIGRAM(S): 2.5 TABLET ORAL at 17:56

## 2019-02-06 RX ADMIN — MIDODRINE HYDROCHLORIDE 20 MILLIGRAM(S): 2.5 TABLET ORAL at 02:14

## 2019-02-06 RX ADMIN — MIDODRINE HYDROCHLORIDE 20 MILLIGRAM(S): 2.5 TABLET ORAL at 12:13

## 2019-02-06 RX ADMIN — Medication 100 MILLIGRAM(S): at 12:15

## 2019-02-06 RX ADMIN — Medication 50 MILLILITER(S): at 23:03

## 2019-02-06 RX ADMIN — OCTREOTIDE ACETATE 100 MICROGRAM(S): 200 INJECTION, SOLUTION INTRAVENOUS; SUBCUTANEOUS at 23:04

## 2019-02-06 RX ADMIN — Medication 1 MILLIGRAM(S): at 12:14

## 2019-02-06 RX ADMIN — Medication 1 PATCH: at 12:13

## 2019-02-06 RX ADMIN — OCTREOTIDE ACETATE 100 MICROGRAM(S): 200 INJECTION, SOLUTION INTRAVENOUS; SUBCUTANEOUS at 16:16

## 2019-02-06 RX ADMIN — Medication 1 TABLET(S): at 12:14

## 2019-02-06 RX ADMIN — LACTULOSE 10 GRAM(S): 10 SOLUTION ORAL at 06:14

## 2019-02-06 RX ADMIN — HEPARIN SODIUM 5000 UNIT(S): 5000 INJECTION INTRAVENOUS; SUBCUTANEOUS at 17:55

## 2019-02-06 RX ADMIN — PANTOPRAZOLE SODIUM 40 MILLIGRAM(S): 20 TABLET, DELAYED RELEASE ORAL at 06:14

## 2019-02-06 RX ADMIN — HEPARIN SODIUM 5000 UNIT(S): 5000 INJECTION INTRAVENOUS; SUBCUTANEOUS at 06:13

## 2019-02-06 NOTE — PROGRESS NOTE ADULT - SUBJECTIVE AND OBJECTIVE BOX
Patient feels well no complaints today.    MEDICATIONS  (STANDING):  folic acid 1 milliGRAM(s) Oral daily  heparin  Injectable 5000 Unit(s) SubCutaneous every 12 hours  midodrine 20 milliGRAM(s) Oral every 8 hours  multivitamin 1 Tablet(s) Oral daily  nicotine -  14 mG/24Hr(s) Patch 1 patch Transdermal daily  octreotide  Injectable 100 MICROGram(s) SubCutaneous every 8 hours  pantoprazole  Injectable 40 milliGRAM(s) IV Push every 12 hours  rifaximin 550 milliGRAM(s) Oral two times a day  thiamine 100 milliGRAM(s) Oral daily    MEDICATIONS  (PRN):  morphine  - Injectable 1 milliGRAM(s) IV Push every 6 hours PRN Moderate Pain (4 - 6)  oxyCODONE    IR 5 milliGRAM(s) Oral every 6 hours PRN Mild Pain (1 - 3)      02-05-19 @ 07:01  -  02-06-19 @ 07:00  --------------------------------------------------------  IN: 220 mL / OUT: 0 mL / NET: 220 mL      PHYSICAL EXAM:      T(C): 36.3 (02-06-19 @ 12:37), Max: 36.8 (02-05-19 @ 23:28)  HR: 76 (02-06-19 @ 12:37) (67 - 76)  BP: 101/68 (02-06-19 @ 12:37) (99/68 - 115/72)  RR: 17 (02-06-19 @ 12:37) (17 - 18)  SpO2: 96% (02-06-19 @ 12:37) (96% - 97%)  Wt(kg): --  Respiratory: clear anteriorly, decreased BS at bases  Cardiovascular: S1 S2  Gastrointestinal: soft NT ND +BS  Extremities:   tr edema                                    12.2   39.93 )-----------( 300      ( 06 Feb 2019 08:03 )             36.9     02-06    133<L>  |  103  |  102<H>  ----------------------------<  118<H>  4.1   |  18<L>  |  5.45<H>    Ca    8.4<L>      06 Feb 2019 08:03  Phos  5.7     02-05  Mg     2.7     02-05    TPro  7.1  /  Alb  2.5<L>  /  TBili  9.8<H>  /  DBili  7.09<H>  /  AST  75<H>  /  ALT  25  /  AlkPhos  341<H>  02-06      LIVER FUNCTIONS - ( 06 Feb 2019 08:03 )  Alb: 2.5 g/dL / Pro: 7.1 gm/dL / ALK PHOS: 341 U/L / ALT: 25 U/L / AST: 75 U/L / GGT: x             Assessment and Plan:  PAPO; suspected HRS; precipitated by SBP;  Indices stable; continue current rx with  midodrine, octreotide and SPA for 14 days then d/c ( started Jan 27);  Will need close outpatient follow up;

## 2019-02-06 NOTE — PROGRESS NOTE ADULT - ASSESSMENT
HPI:  47 y/o male PMH HTN (not on medication), chronic alcohol abuse and dependence x 15 years (1/2 pint of vodka with cranberry juice daily), hx of alcohol withdrawal 2013 hospitalized at [facility] after which he went to inpatient rehab at [facility] presents for syncope and melanotic stools. History obtained from ex wife, mother, and patient. Since pt left rehab in 2013 pt has been drinking daily. Last drink 11AM today. Pt state for past one and a half months he has had progressive weight loss with loss of appetite. He has had in increased abdominal distention for the past 1 month and in the past 1 week he has noticed jaundice and scleral icterus. Pt also reports generalized fatigue. Reports coffee ground emesis x1 episode 3 days ago but none since then with melena for about a week. Pt states he has 3 bowel movements a day and now has also been seeing "blood when I pee" also within the last week. No abdominal pain. No CP no SOB. No fevers or chills. Today pt called ex wife after he was unsteady with his gait and "fell", he denies LOC. Denies trauma to head. Ex wife reports while attempting to get pt to the car she noticed he was "very weak" and witnessed pt to have had 3 syncopal episodes lasting a few minutes each before regaining consciousness, but no seizures. Ex wife state that in the car, pt had a bowel movement that was black and tarry.     In ED pt noted to be hypotensive SBP 80-90s. 2L IVF given with 3rd and 4th infusing. SBP post IVF resuscitation 100s to one teens. On labs pt with leukocytosis with 3% bands, Na 123, Cr 2.05, INR 2.05, T bili 6, , ALT 55, Alk phos 799.  Pt seen by critical care and not felt to be ICU candidate. (23 Jan 2019 22:33)  ----------------- As Above ------------------------------------------------------  Patient confused. Left message for family member to contact me   ETOH Abuse (+). Coffee ground emesis x 1, melena.   Eleveated LFTs, INR  See labs, CT Scan    Cirrhosis, ascites, fever - 1) Paracentesis 2) ammonia level  3) f/u LFTs 4) treat for impending Dts  - patient does not fit into the criteria for treatment with steroids.

## 2019-02-06 NOTE — PROGRESS NOTE ADULT - SUBJECTIVE AND OBJECTIVE BOX
Patient is a 48y old  Male who presents with a chief complaint of Coffee ground emesis, progressive jaundice, syncopal episodes. (05 Feb 2019 16:58)      HPI:  47 y/o male PMH HTN (not on medication), chronic alcohol abuse and dependence x 15 years (1/2 pint of vodka with cranberry juice daily), hx of alcohol withdrawal 2013 hospitalized at [facility] after which he went to inpatient rehab at [facility] for syncope and melanotic stools. History obtained from ex wife, mother, and patient. Since pt left rehab in 2013 pt has been drinking daily. Last drink 11AM today. Pt state for past one and a half months he has had progressive weight loss with loss of appetite. He has had in increased abdominal distention for the past 1 month and in the past 1 week he has noticed jaundice and scleral icterus. Pt also reports generalized fatigue. Reports coffee ground emesis x1 episode 3 days ago but none since then with melena for about a week. Pt states he has 3 bowel movements a day and now has also been seeing "blood when I pee" also within the last week. No abdominal pain. No CP no SOB. No fevers or chills. Today pt called ex wife after he was unsteady with his gait and "fell", he denies LOC. Denies trauma to head. Ex wife reports while attempting to get pt to the car she noticed he was "very weak" and witnessed pt to have had 3 syncopal episodes lasting a few minutes each before regaining consciousness, but no seizures. Ex wife state that in the car, pt had a bowel movement that was black and tarry.     In ED pt noted to be hypotensive SBP 80-90s. 2L IVF given with 3rd and 4th infusing. SBP post IVF resuscitation 100s to one teens. On labs pt with leukocytosis with 3% bands, Na 123, Cr 2.05, INR 2.05, T bili 6, , ALT 55, Alk phos 799.  Pt seen by critical care and not felt to be ICU candidate. (23 Jan 2019 22:33)      INTERVAL HPI/OVERNIGHT EVENTS:  No c/o  The patient denies melena, hematochezia, hematemesis, nausea, vomiting, abdominal pain, constipation, diarrhea, or change in bowel movements     MEDICATIONS  (STANDING):  folic acid 1 milliGRAM(s) Oral daily  heparin  Injectable 5000 Unit(s) SubCutaneous every 12 hours  lactulose Syrup 10 Gram(s) Oral two times a day  midodrine 20 milliGRAM(s) Oral every 8 hours  multivitamin 1 Tablet(s) Oral daily  nicotine -  14 mG/24Hr(s) Patch 1 patch Transdermal daily  octreotide  Injectable 100 MICROGram(s) SubCutaneous every 8 hours  pantoprazole  Injectable 40 milliGRAM(s) IV Push every 12 hours  rifaximin 550 milliGRAM(s) Oral two times a day  thiamine 100 milliGRAM(s) Oral daily    MEDICATIONS  (PRN):  morphine  - Injectable 1 milliGRAM(s) IV Push every 6 hours PRN Moderate Pain (4 - 6)  oxyCODONE    IR 5 milliGRAM(s) Oral every 6 hours PRN Mild Pain (1 - 3)      FAMILY HISTORY:  No pertinent family history in first degree relatives      Allergies    No Known Allergies    Intolerances        PMH/PSH:  ETOH abuse  Psoriasis  History of hypertension  No significant past surgical history        REVIEW OF SYSTEMS:  CONSTITUTIONAL: No fever, weight loss, or fatigue  EYES: No eye pain, visual disturbances, or discharge  ENMT:  No difficulty hearing, tinnitus, vertigo; No sinus or throat pain  NECK: No pain or stiffness  BREASTS: No pain, masses, or nipple discharge  RESPIRATORY: No cough, wheezing, chills or hemoptysis; No shortness of breath  CARDIOVASCULAR: No chest pain, palpitations, dizziness, or leg swelling  GASTROINTESTINAL: See above  GENITOURINARY: No dysuria, frequency, hematuria, or incontinence  NEUROLOGICAL: No headaches, memory loss, loss of strength, numbness, or tremors  SKIN: No itching, burning, rashes, or lesions   LYMPH NODES: No enlarged glands  ENDOCRINE: No heat or cold intolerance; No hair loss  MUSCULOSKELETAL: No joint pain or swelling; No muscle, back, or extremity pain  PSYCHIATRIC: No depression, anxiety, mood swings, or difficulty sleeping  HEME/LYMPH: No easy bruising, or bleeding gums  ALLERGY AND IMMUNOLOGIC: No hives or eczema    Vital Signs Last 24 Hrs  T(C): 36.2 (06 Feb 2019 06:33), Max: 36.8 (05 Feb 2019 23:28)  T(F): 97.2 (06 Feb 2019 06:33), Max: 98.3 (05 Feb 2019 23:28)  HR: 67 (06 Feb 2019 06:33) (67 - 76)  BP: 115/72 (06 Feb 2019 06:33) (99/68 - 115/72)  BP(mean): --  RR: 17 (06 Feb 2019 06:33) (17 - 18)  SpO2: 97% (06 Feb 2019 06:33) (97% - 97%)    PHYSICAL EXAM:  GENERAL: NAD, well-groomed, well-developed  HEAD:  Atraumatic, Normocephalic  EYES: EOMI, PERRLA, conjunctiva and sclera clear  NECK: Supple, No JVD, Normal thyroid  NERVOUS SYSTEM:  Alert & Oriented X2 Fair concentration; No asterixis  CHEST/LUNG: Clear to percussion bilaterally; No rales, rhonchi, wheezing, or rubs  HEART: Regular rate and rhythm; No murmurs, rubs, or gallops  ABDOMEN: Soft, Nontender, slightly distended; Bowel sounds present  EXTREMITIES:  2+ Peripheral Pulses, No clubbing, cyanosis, or edema  LYMPH: No lymphadenopathy noted  SKIN: No rashes or lesions    LAB                          12.2   39.93 )-----------( 300      ( 06 Feb 2019 08:03 )             36.9       CBC:  02-06 @ 08:03  WBC 39.93   Hgb 12.2   Hct 36.9   Plts 300  .4  02-05 @ 10:20  WBC 43.75   Hgb 12.6   Hct 35.8   Plts 319  .9  02-01 @ 08:11  WBC 34.23   Hgb 10.5   Hct 30.2   Plts 271  .7  01-31 @ 07:28  WBC 30.12   Hgb 10.3   Hct 29.8   Plts 259  .0      Chemistry:  02-06 @ 08:03  Na+ 133  K+ 4.1  Cl- 103  CO2 18    Cr 5.45     02-05 @ 10:20  Na+ 136  K+ 3.7  Cl- 106  CO2 17    Cr 5.64     02-04 @ 07:31  Na+ 136  K+ 3.6  Cl- 106  CO2 21    Cr 6.05     02-03 @ 08:51  Na+ 135  K+ 3.6  Cl- 105  CO2 21    Cr 6.21     02-02 @ 07:27  Na+ 134  K+ 3.5  Cl- 102  CO2 21    Cr 6.22     02-01 @ 08:11  Na+ 132  K+ 3.5  Cl- 99  CO2 20    Cr 6.54     01-31 @ 07:28  Na+ 131  K+ 3.7  Cl- 99  CO2 20    Cr 6.31         Glucose, Serum: 118 mg/dL (02-06 @ 08:03)  Glucose, Serum: 128 mg/dL (02-05 @ 10:20)  Glucose, Serum: 123 mg/dL (02-04 @ 07:31)  Glucose, Serum: 119 mg/dL (02-03 @ 08:51)  Glucose, Serum: 121 mg/dL (02-02 @ 07:27)  Glucose, Serum: 70 mg/dL (02-01 @ 08:11)  Glucose, Serum: 104 mg/dL (01-31 @ 07:28)      06 Feb 2019 08:03    133    |  103    |  102    ----------------------------<  118    4.1     |  18     |  5.45   05 Feb 2019 10:20    136    |  106    |  109    ----------------------------<  128    3.7     |  17     |  5.64   04 Feb 2019 07:31    136    |  106    |  119    ----------------------------<  123    3.6     |  21     |  6.05   03 Feb 2019 08:51    135    |  105    |  125    ----------------------------<  119    3.6     |  21     |  6.21   02 Feb 2019 07:27    134    |  102    |  125    ----------------------------<  121    3.5     |  21     |  6.22   01 Feb 2019 08:11    132    |  99     |  128    ----------------------------<  70     3.5     |  20     |  6.54   31 Jan 2019 07:28    131    |  99     |  121    ----------------------------<  104    3.7     |  20     |  6.31     Ca    8.4        06 Feb 2019 08:03  Ca    8.6        05 Feb 2019 10:20  Ca    8.6        04 Feb 2019 07:31  Ca    8.4        03 Feb 2019 08:51  Ca    8.4        02 Feb 2019 07:27  Ca    8.4        01 Feb 2019 08:11  Ca    7.9        31 Jan 2019 07:28  Phos  5.7       05 Feb 2019 10:20  Phos  5.9       31 Jan 2019 07:28  Mg     2.7       05 Feb 2019 10:20  Mg     2.6       31 Jan 2019 07:28    TPro  7.1    /  Alb  2.5    /  TBili  9.8    /  DBili  7.09   /  AST  75     /  ALT  25     /  AlkPhos  341    06 Feb 2019 08:03  TPro  7.2    /  Alb  2.8    /  TBili  10.3   /  DBili  x      /  AST  71     /  ALT  25     /  AlkPhos  356    05 Feb 2019 10:20  TPro  7.0    /  Alb  2.8    /  TBili  10.8   /  DBili  x      /  AST  81     /  ALT  23     /  AlkPhos  355    04 Feb 2019 07:31  TPro  6.6    /  Alb  2.7    /  TBili  10.8   /  DBili  x      /  AST  89     /  ALT  23     /  AlkPhos  366    03 Feb 2019 08:51  TPro  6.1    /  Alb  2.7    /  TBili  9.8    /  DBili  x      /  AST  80     /  ALT  21     /  AlkPhos  356    02 Feb 2019 07:27  TPro  6.0    /  Alb  2.6    /  TBili  9.7    /  DBili  x      /  AST  90     /  ALT  23     /  AlkPhos  371    01 Feb 2019 08:11  TPro  6.0    /  Alb  2.7    /  TBili  9.1    /  DBili  x      /  AST  114    /  ALT  27     /  AlkPhos  397    31 Jan 2019 07:28              CAPILLARY BLOOD GLUCOSE              RADIOLOGY & ADDITIONAL TESTS:    Imaging Personally Reviewed:  [ ] YES  [ ] NO    Consultant(s) Notes Reviewed:  [ ] YES  [ ] NO    Care Discussed with Consultants/Other Providers [ ] YES  [ ] NO

## 2019-02-06 NOTE — PROGRESS NOTE ADULT - ASSESSMENT
48M PMH HTN, chronic alcohol abuse and dependence x 15 years (1/2 pint of vodka with cranberry juice daily), hx of alcohol withdrawal presents for syncope x3 with generalized weakness, weight loss, loss of appetite, jaundice, melena, coffee ground emesis. Here with hypotension, lactic acidosis, hyponatremia, elevated Cr, jaundice, hyperbilirubinemia, elevated alk phos and AST, melena, coffee ground emesis, elevated INR, cirrhosis with ascites. < from: US Abdomen Limited (02.01.19 @ 12:45) >here is a moderate to large amount of ascites seen in all 4 quadrants. s/p paracetesis on 2/1-700 ml fluid removed. Octreo and Midodrine for now;   Albumin restarted   will press for endoscopy and keep possibility for rehab

## 2019-02-06 NOTE — PROGRESS NOTE ADULT - PROBLEM SELECTOR PLAN 2
LFTs unchanged . Being treated for hepatorenal. Not a candidate for steroids.  11.2 / 132 / 137 / 403.  - 9.4 / 139 / 37 / 411 - 9.1/114/27/397  - 10.8 / 81 / 23 / 355 - 10.3 / 71 / 25 / 356  -- 9.8 / 7.5 / 25 / 341 nh4 24 BUN / CRE  102 / 5.45   prognosis poor

## 2019-02-06 NOTE — PROGRESS NOTE ADULT - SUBJECTIVE AND OBJECTIVE BOX
Patient is a 48y old  Male who presents with a chief complaint of Coffee ground emesis, progressive jaundice, syncopal episodes. (06 Feb 2019 15:25)    HPI:  49 y/o male PMH HTN (not on medication), chronic alcohol abuse and dependence x 15 years (1/2 pint of vodka with cranberry juice daily), hx of alcohol withdrawal 2013 hospitalized at [facility] after which he went to inpatient rehab at [facility] for syncope and melanotic stools. History obtained from ex wife, mother, and patient. Since pt left rehab in 2013 pt has been drinking daily. Last drink 11AM today. Pt state for past one and a half months he has had progressive weight loss with loss of appetite. He has had in increased abdominal distention for the past 1 month and in the past 1 week he has noticed jaundice and scleral icterus. Pt also reports generalized fatigue. Reports coffee ground emesis x1 episode 3 days ago but none since then with melena for about a week. Pt states he has 3 bowel movements a day and now has also been seeing "blood when I pee" also within the last week. No abdominal pain. No CP no SOB. No fevers or chills. Today pt called ex wife after he was unsteady with his gait and "fell", he denies LOC. Denies trauma to head. Ex wife reports while attempting to get pt to the car she noticed he was "very weak" and witnessed pt to have had 3 syncopal episodes lasting a few minutes each before regaining consciousness, but no seizures. Ex wife state that in the car, pt had a bowel movement that was black and tarry.     In ED pt noted to be hypotensive SBP 80-90s. 2L IVF given with 3rd and 4th infusing. SBP post IVF resuscitation 100s to one teens. On labs pt with leukocytosis with 3% bands, Na 123, Cr 2.05, INR 2.05, T bili 6, , ALT 55, Alk phos 799.  Pt seen by critical care and not felt to be ICU candidate. (23 Jan 2019 22:33)    INTERVAL HPI/OVERNIGHT EVENTS: no acute events     MEDICATIONS  (STANDING):  albumin human 25% IVPB 50 milliLiter(s) IV Intermittent daily  folic acid 1 milliGRAM(s) Oral daily  heparin  Injectable 5000 Unit(s) SubCutaneous every 12 hours  midodrine 20 milliGRAM(s) Oral every 8 hours  multivitamin 1 Tablet(s) Oral daily  nicotine -  14 mG/24Hr(s) Patch 1 patch Transdermal daily  octreotide  Injectable 100 MICROGram(s) SubCutaneous every 8 hours  pantoprazole  Injectable 40 milliGRAM(s) IV Push every 12 hours  rifaximin 550 milliGRAM(s) Oral two times a day  thiamine 100 milliGRAM(s) Oral daily    MEDICATIONS  (PRN):  morphine  - Injectable 1 milliGRAM(s) IV Push every 6 hours PRN Moderate Pain (4 - 6)  oxyCODONE    IR 5 milliGRAM(s) Oral every 6 hours PRN Mild Pain (1 - 3)      Allergies    No Known Allergies    Intolerances        REVIEW OF SYSTEMS:  CONSTITUTIONAL:  fatigue  EYES: No eye pain, visual disturbances, or discharge  ENMT:  No difficulty hearing, tinnitus, vertigo; No sinus or throat pain  NECK: No pain or stiffness  BREASTS: No pain, masses, or nipple discharge  RESPIRATORY: No cough, wheezing, chills or hemoptysis; No shortness of breath  CARDIOVASCULAR: No chest pain, palpitations, dizziness, or leg swelling  GASTROINTESTINAL: No abdominal or epigastric pain. No nausea, vomiting, or hematemesis; No diarrhea or constipation. No melena or hematochezia.  GENITOURINARY: No dysuria, frequency, hematuria, or incontinence  NEUROLOGICAL: No headaches, memory loss, loss of strength, numbness, or tremors  SKIN: No itching, burning, rashes, or lesions   LYMPH NODES: No enlarged glands  ENDOCRINE: No heat or cold intolerance; No hair loss  MUSCULOSKELETAL: on steady on his feet  PSYCHIATRIC: No depression, anxiety, mood swings, or difficulty sleeping  HEME/LYMPH: No easy bruising, or bleeding gums  ALLERGY AND IMMUNOLOGIC: No hives or eczema    Vital Signs Last 24 Hrs  T(C): 36.4 (06 Feb 2019 16:43), Max: 36.8 (05 Feb 2019 23:28)  T(F): 97.5 (06 Feb 2019 16:43), Max: 98.3 (05 Feb 2019 23:28)  HR: 76 (06 Feb 2019 16:43) (67 - 76)  BP: 104/73 (06 Feb 2019 16:43) (99/68 - 115/72)  BP(mean): --  RR: 17 (06 Feb 2019 16:43) (17 - 18)  SpO2: 99% (06 Feb 2019 16:43) (96% - 99%)    PHYSICAL EXAM:  GENERAL: NAD, well-groomed, well-developed  HEAD:  Atraumatic, Normocephalic  EYES: EOMI, PERRLA, conjunctiva and sclera clear  ENMT: No tonsillar erythema, exudates, or enlargement; Moist mucous membranes, Good dentition, No lesions  NECK: Supple, No JVD, Normal thyroid  NERVOUS SYSTEM:  Alert & Oriented X3, Good concentration; Motor Strength 5/5 B/L upper and lower extremities; DTRs 2+ intact and symmetric  CHEST/LUNG: Clear to percussion bilaterally; No rales, rhonchi, wheezing, or rubs  HEART: Regular rate and rhythm; No murmurs, rubs, or gallops  ABDOMEN: distended abdomen   EXTREMITIES:  2+ Peripheral Pulses, No clubbing, cyanosis, or edema  LYMPH: No lymphadenopathy noted  SKIN: No rashes or lesions    LABS:                        12.2   39.93 )-----------( 300      ( 06 Feb 2019 08:03 )             36.9     02-06    133<L>  |  103  |  102<H>  ----------------------------<  118<H>  4.1   |  18<L>  |  5.45<H>    Ca    8.4<L>      06 Feb 2019 08:03  Phos  5.7     02-05  Mg     2.7     02-05    TPro  7.1  /  Alb  2.5<L>  /  TBili  9.8<H>  /  DBili  7.09<H>  /  AST  75<H>  /  ALT  25  /  AlkPhos  341<H>  02-06        CAPILLARY BLOOD GLUCOSE          RADIOLOGY & ADDITIONAL TESTS:    Imaging Personally Reviewed:  [ X] YES  [ ] NO    Consultant(s) Notes Reviewed:  [ X] YES  [ ] NO    Care Discussed with Consultants/Other Providers [X ] YES  [ ] NO

## 2019-02-06 NOTE — PROGRESS NOTE ADULT - PROBLEM SELECTOR PLAN 1
HGB 12.2  WBC 39.93 , no signs of gi bleeding. PPI BID Will arrange for EGD once patient is stable .

## 2019-02-07 LAB
ALBUMIN SERPL ELPH-MCNC: 2.6 G/DL — LOW (ref 3.3–5)
ALP SERPL-CCNC: 344 U/L — HIGH (ref 40–120)
ALT FLD-CCNC: 25 U/L — SIGNIFICANT CHANGE UP (ref 12–78)
AMMONIA BLD-MCNC: 27 UMOL/L — SIGNIFICANT CHANGE UP (ref 11–32)
ANION GAP SERPL CALC-SCNC: 11 MMOL/L — SIGNIFICANT CHANGE UP (ref 5–17)
AST SERPL-CCNC: 66 U/L — HIGH (ref 15–37)
BILIRUB DIRECT SERPL-MCNC: 7.57 MG/DL — HIGH (ref 0.05–0.2)
BILIRUB INDIRECT FLD-MCNC: 2.5 MG/DL — HIGH (ref 0.2–1)
BILIRUB SERPL-MCNC: 10.1 MG/DL — HIGH (ref 0.2–1.2)
BUN SERPL-MCNC: 103 MG/DL — HIGH (ref 7–23)
CALCIUM SERPL-MCNC: 8.3 MG/DL — LOW (ref 8.5–10.1)
CHLORIDE SERPL-SCNC: 100 MMOL/L — SIGNIFICANT CHANGE UP (ref 96–108)
CO2 SERPL-SCNC: 21 MMOL/L — LOW (ref 22–31)
CREAT SERPL-MCNC: 5.14 MG/DL — HIGH (ref 0.5–1.3)
GLUCOSE SERPL-MCNC: 101 MG/DL — HIGH (ref 70–99)
HCT VFR BLD CALC: 33.7 % — LOW (ref 39–50)
HGB BLD-MCNC: 11.6 G/DL — LOW (ref 13–17)
MCHC RBC-ENTMCNC: 34.4 GM/DL — SIGNIFICANT CHANGE UP (ref 32–36)
MCHC RBC-ENTMCNC: 35.2 PG — HIGH (ref 27–34)
MCV RBC AUTO: 102.1 FL — HIGH (ref 80–100)
NRBC # BLD: 0 /100 WBCS — SIGNIFICANT CHANGE UP (ref 0–0)
PLATELET # BLD AUTO: 281 K/UL — SIGNIFICANT CHANGE UP (ref 150–400)
POTASSIUM SERPL-MCNC: 4.2 MMOL/L — SIGNIFICANT CHANGE UP (ref 3.5–5.3)
POTASSIUM SERPL-SCNC: 4.2 MMOL/L — SIGNIFICANT CHANGE UP (ref 3.5–5.3)
PROT SERPL-MCNC: 6.9 GM/DL — SIGNIFICANT CHANGE UP (ref 6–8.3)
RBC # BLD: 3.3 M/UL — LOW (ref 4.2–5.8)
RBC # FLD: 19.9 % — HIGH (ref 10.3–14.5)
SODIUM SERPL-SCNC: 132 MMOL/L — LOW (ref 135–145)
WBC # BLD: 34.36 K/UL — HIGH (ref 3.8–10.5)
WBC # FLD AUTO: 34.36 K/UL — HIGH (ref 3.8–10.5)

## 2019-02-07 PROCEDURE — 99233 SBSQ HOSP IP/OBS HIGH 50: CPT

## 2019-02-07 RX ORDER — MEROPENEM 1 G/30ML
500 INJECTION INTRAVENOUS EVERY 12 HOURS
Qty: 0 | Refills: 0 | Status: DISCONTINUED | OUTPATIENT
Start: 2019-02-07 | End: 2019-02-15

## 2019-02-07 RX ADMIN — Medication 1 TABLET(S): at 15:45

## 2019-02-07 RX ADMIN — Medication 1 PATCH: at 12:00

## 2019-02-07 RX ADMIN — PANTOPRAZOLE SODIUM 40 MILLIGRAM(S): 20 TABLET, DELAYED RELEASE ORAL at 06:24

## 2019-02-07 RX ADMIN — PANTOPRAZOLE SODIUM 40 MILLIGRAM(S): 20 TABLET, DELAYED RELEASE ORAL at 19:30

## 2019-02-07 RX ADMIN — Medication 1 MILLIGRAM(S): at 15:46

## 2019-02-07 RX ADMIN — OCTREOTIDE ACETATE 100 MICROGRAM(S): 200 INJECTION, SOLUTION INTRAVENOUS; SUBCUTANEOUS at 06:25

## 2019-02-07 RX ADMIN — MIDODRINE HYDROCHLORIDE 20 MILLIGRAM(S): 2.5 TABLET ORAL at 19:25

## 2019-02-07 RX ADMIN — OCTREOTIDE ACETATE 100 MICROGRAM(S): 200 INJECTION, SOLUTION INTRAVENOUS; SUBCUTANEOUS at 16:22

## 2019-02-07 RX ADMIN — Medication 50 MILLILITER(S): at 19:23

## 2019-02-07 RX ADMIN — HEPARIN SODIUM 5000 UNIT(S): 5000 INJECTION INTRAVENOUS; SUBCUTANEOUS at 06:25

## 2019-02-07 RX ADMIN — MIDODRINE HYDROCHLORIDE 20 MILLIGRAM(S): 2.5 TABLET ORAL at 02:11

## 2019-02-07 RX ADMIN — MEROPENEM 100 MILLIGRAM(S): 1 INJECTION INTRAVENOUS at 22:51

## 2019-02-07 RX ADMIN — Medication 100 MILLIGRAM(S): at 16:23

## 2019-02-07 RX ADMIN — OCTREOTIDE ACETATE 100 MICROGRAM(S): 200 INJECTION, SOLUTION INTRAVENOUS; SUBCUTANEOUS at 22:50

## 2019-02-07 RX ADMIN — MIDODRINE HYDROCHLORIDE 20 MILLIGRAM(S): 2.5 TABLET ORAL at 15:44

## 2019-02-07 RX ADMIN — HEPARIN SODIUM 5000 UNIT(S): 5000 INJECTION INTRAVENOUS; SUBCUTANEOUS at 19:26

## 2019-02-07 NOTE — PROGRESS NOTE ADULT - PROBLEM SELECTOR PLAN 1
HGB 12.2  WBC 39.93 , no signs of gi bleeding. PPI BID Will arrange for EGD once patient is stable . HGB 11.6   WBC 34.36 , no signs of gi bleeding. PPI BID

## 2019-02-07 NOTE — PROGRESS NOTE ADULT - PROBLEM SELECTOR PLAN 2
LFTs unchanged . Being treated for hepatorenal. Not a candidate for steroids.  11.2 / 132 / 137 / 403.  - 9.4 / 139 / 37 / 411 - 9.1/114/27/397  - 10.8 / 81 / 23 / 355 - 10.3 / 71 / 25 / 356  -- 9.8 / 7.5 / 25 / 341 - 10.1 / 66 / 25 / 344  nh4 24 BUN / CRE  103 / 5.14   prognosis poor LFTs unchanged . Being treated for hepatorenal. Not a candidate for steroids.  11.2 / 132 / 137 / 403.  - 9.4 / 139 / 37 / 411 - 9.1/114/27/397  - 10.8 / 81 / 23 / 355 - 10.3 / 71 / 25 / 356  -- 9.8 / 7.5 / 25 / 341 - 10.1 / 66 / 25 / 344  nh4 27 BUN / CRE  103 / 5.14   prognosis poor

## 2019-02-07 NOTE — PROGRESS NOTE ADULT - PROBLEM SELECTOR PLAN 6
- albumin, AFP 3.5  - significant ascites noted: s/p therapeutic paracentesis  - there is concern for underlying malignancy based on symptoms as well as CT   abdomen findings  Another paracentesis

## 2019-02-07 NOTE — PROGRESS NOTE ADULT - SUBJECTIVE AND OBJECTIVE BOX
Patient is a 48y old  Male who presents with a chief complaint of Coffee ground emesis, progressive jaundice, syncopal episodes. (07 Feb 2019 10:00)      HPI:  47 y/o male PMH HTN (not on medication), chronic alcohol abuse and dependence x 15 years (1/2 pint of vodka with cranberry juice daily), hx of alcohol withdrawal 2013 hospitalized at [facility] after which he went to inpatient rehab at [facility] for syncope and melanotic stools. History obtained from ex wife, mother, and patient. Since pt left rehab in 2013 pt has been drinking daily. Last drink 11AM today. Pt state for past one and a half months he has had progressive weight loss with loss of appetite. He has had in increased abdominal distention for the past 1 month and in the past 1 week he has noticed jaundice and scleral icterus. Pt also reports generalized fatigue. Reports coffee ground emesis x1 episode 3 days ago but none since then with melena for about a week. Pt states he has 3 bowel movements a day and now has also been seeing "blood when I pee" also within the last week. No abdominal pain. No CP no SOB. No fevers or chills. Today pt called ex wife after he was unsteady with his gait and "fell", he denies LOC. Denies trauma to head. Ex wife reports while attempting to get pt to the car she noticed he was "very weak" and witnessed pt to have had 3 syncopal episodes lasting a few minutes each before regaining consciousness, but no seizures. Ex wife state that in the car, pt had a bowel movement that was black and tarry.     In ED pt noted to be hypotensive SBP 80-90s. 2L IVF given with 3rd and 4th infusing. SBP post IVF resuscitation 100s to one teens. On labs pt with leukocytosis with 3% bands, Na 123, Cr 2.05, INR 2.05, T bili 6, , ALT 55, Alk phos 799.  Pt seen by critical care and not felt to be ICU candidate. (23 Jan 2019 22:33)      INTERVAL HPI/OVERNIGHT EVENTS:  getting stronger slowly. 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, hematochezia, hematemesis, nausea, vomiting, abdominal pain, constipation, diarrhea, or change in bowel movements     MEDICATIONS  (STANDING):  albumin human 25% IVPB 50 milliLiter(s) IV Intermittent daily  folic acid 1 milliGRAM(s) Oral daily  heparin  Injectable 5000 Unit(s) SubCutaneous every 12 hours  midodrine 20 milliGRAM(s) Oral every 8 hours  multivitamin 1 Tablet(s) Oral daily  nicotine -  14 mG/24Hr(s) Patch 1 patch Transdermal daily  octreotide  Injectable 100 MICROGram(s) SubCutaneous every 8 hours  pantoprazole  Injectable 40 milliGRAM(s) IV Push every 12 hours  rifaximin 550 milliGRAM(s) Oral two times a day  thiamine 100 milliGRAM(s) Oral daily    MEDICATIONS  (PRN):  morphine  - Injectable 1 milliGRAM(s) IV Push every 6 hours PRN Moderate Pain (4 - 6)  oxyCODONE    IR 5 milliGRAM(s) Oral every 6 hours PRN Mild Pain (1 - 3)      FAMILY HISTORY:  No pertinent family history in first degree relatives      Allergies    No Known Allergies    Intolerances        PMH/PSH:  ETOH abuse  Psoriasis  History of hypertension  No significant past surgical history        REVIEW OF SYSTEMS:  CONSTITUTIONAL: No fever, weight loss,   RESPIRATORY: No cough, wheezing, chills or hemoptysis; No shortness of breath  CARDIOVASCULAR: No chest pain, palpitations, dizziness, or leg swelling  GASTROINTESTINAL: See above      Vital Signs Last 24 Hrs  T(C): 36.4 (07 Feb 2019 11:53), Max: 36.5 (06 Feb 2019 23:55)  T(F): 97.6 (07 Feb 2019 11:53), Max: 97.7 (06 Feb 2019 23:55)  HR: 76 (07 Feb 2019 11:53) (65 - 76)  BP: 102/58 (07 Feb 2019 11:53) (102/58 - 115/74)  BP(mean): --  RR: 17 (07 Feb 2019 11:53) (17 - 17)  SpO2: 98% (07 Feb 2019 11:53) (98% - 99%)    PHYSICAL EXAM:  GENERAL: NAD, well-groomed, well-developed  NERVOUS SYSTEM:  Alert & Oriented  Fair concentration; No asterixis  CHEST/LUNG: Clear to percussion bilaterally; No rales, rhonchi, wheezing, or rubs  HEART: Regular rate and rhythm; No murmurs, rubs, or gallops  ABDOMEN: Soft, Nontender, Distended (+); Bowel sounds present  EXTREMITIES:  2+ Peripheral Pulses, No clubbing, cyanosis, or edema  LYMPH: No lymphadenopathy noted  SKIN: No rashes or lesions    LAB                          11.6   34.36 )-----------( 281      ( 07 Feb 2019 07:49 )             33.7       CBC:  02-07 @ 07:49  WBC 34.36   Hgb 11.6   Hct 33.7   Plts 281  .1  02-06 @ 08:03  WBC 39.93   Hgb 12.2   Hct 36.9   Plts 300  .4  02-05 @ 10:20  WBC 43.75   Hgb 12.6   Hct 35.8   Plts 319  .9  02-01 @ 08:11  WBC 34.23   Hgb 10.5   Hct 30.2   Plts 271  .7      Chemistry:  02-07 @ 07:49  Na+ 132  K+ 4.2  Cl- 100  CO2 21    Cr 5.14     02-06 @ 08:03  Na+ 133  K+ 4.1  Cl- 103  CO2 18    Cr 5.45     02-05 @ 10:20  Na+ 136  K+ 3.7  Cl- 106  CO2 17    Cr 5.64     02-04 @ 07:31  Na+ 136  K+ 3.6  Cl- 106  CO2 21    Cr 6.05     02-03 @ 08:51  Na+ 135  K+ 3.6  Cl- 105  CO2 21    Cr 6.21     02-02 @ 07:27  Na+ 134  K+ 3.5  Cl- 102  CO2 21    Cr 6.22     02-01 @ 08:11  Na+ 132  K+ 3.5  Cl- 99  CO2 20    Cr 6.54         Glucose, Serum: 101 mg/dL (02-07 @ 07:49)  Glucose, Serum: 118 mg/dL (02-06 @ 08:03)  Glucose, Serum: 128 mg/dL (02-05 @ 10:20)  Glucose, Serum: 123 mg/dL (02-04 @ 07:31)  Glucose, Serum: 119 mg/dL (02-03 @ 08:51)  Glucose, Serum: 121 mg/dL (02-02 @ 07:27)  Glucose, Serum: 70 mg/dL (02-01 @ 08:11)      07 Feb 2019 07:49    132    |  100    |  103    ----------------------------<  101    4.2     |  21     |  5.14   06 Feb 2019 08:03    133    |  103    |  102    ----------------------------<  118    4.1     |  18     |  5.45   05 Feb 2019 10:20    136    |  106    |  109    ----------------------------<  128    3.7     |  17     |  5.64   04 Feb 2019 07:31    136    |  106    |  119    ----------------------------<  123    3.6     |  21     |  6.05   03 Feb 2019 08:51    135    |  105    |  125    ----------------------------<  119    3.6     |  21     |  6.21   02 Feb 2019 07:27    134    |  102    |  125    ----------------------------<  121    3.5     |  21     |  6.22   01 Feb 2019 08:11    132    |  99     |  128    ----------------------------<  70     3.5     |  20     |  6.54     Ca    8.3        07 Feb 2019 07:49  Ca    8.4        06 Feb 2019 08:03  Ca    8.6        05 Feb 2019 10:20  Ca    8.6        04 Feb 2019 07:31  Ca    8.4        03 Feb 2019 08:51  Ca    8.4        02 Feb 2019 07:27  Ca    8.4        01 Feb 2019 08:11  Phos  5.7       05 Feb 2019 10:20  Mg     2.7       05 Feb 2019 10:20    TPro  6.9    /  Alb  2.6    /  TBili  10.1   /  DBili  7.57   /  AST  66     /  ALT  25     /  AlkPhos  344    07 Feb 2019 07:49  TPro  7.1    /  Alb  2.5    /  TBili  9.8    /  DBili  7.09   /  AST  75     /  ALT  25     /  AlkPhos  341    06 Feb 2019 08:03  TPro  7.2    /  Alb  2.8    /  TBili  10.3   /  DBili  x      /  AST  71     /  ALT  25     /  AlkPhos  356    05 Feb 2019 10:20  TPro  7.0    /  Alb  2.8    /  TBili  10.8   /  DBili  x      /  AST  81     /  ALT  23     /  AlkPhos  355    04 Feb 2019 07:31  TPro  6.6    /  Alb  2.7    /  TBili  10.8   /  DBili  x      /  AST  89     /  ALT  23     /  AlkPhos  366    03 Feb 2019 08:51  TPro  6.1    /  Alb  2.7    /  TBili  9.8    /  DBili  x      /  AST  80     /  ALT  21     /  AlkPhos  356    02 Feb 2019 07:27  TPro  6.0    /  Alb  2.6    /  TBili  9.7    /  DBili  x      /  AST  90     /  ALT  23     /  AlkPhos  371    01 Feb 2019 08:11              CAPILLARY BLOOD GLUCOSE              RADIOLOGY & ADDITIONAL TESTS:    Imaging Personally Reviewed:  [ ] YES  [ ] NO    Consultant(s) Notes Reviewed:  [ ] YES  [ ] NO    Care Discussed with Consultants/Other Providers [ ] YES  [ ] NO

## 2019-02-07 NOTE — PROGRESS NOTE ADULT - SUBJECTIVE AND OBJECTIVE BOX
Subjective: less fatigued. No dyspnea.       MEDICATIONS  (STANDING):  albumin human 25% IVPB 50 milliLiter(s) IV Intermittent daily  folic acid 1 milliGRAM(s) Oral daily  heparin  Injectable 5000 Unit(s) SubCutaneous every 12 hours  midodrine 20 milliGRAM(s) Oral every 8 hours  multivitamin 1 Tablet(s) Oral daily  nicotine -  14 mG/24Hr(s) Patch 1 patch Transdermal daily  octreotide  Injectable 100 MICROGram(s) SubCutaneous every 8 hours  pantoprazole  Injectable 40 milliGRAM(s) IV Push every 12 hours  rifaximin 550 milliGRAM(s) Oral two times a day  thiamine 100 milliGRAM(s) Oral daily    MEDICATIONS  (PRN):  morphine  - Injectable 1 milliGRAM(s) IV Push every 6 hours PRN Moderate Pain (4 - 6)  oxyCODONE    IR 5 milliGRAM(s) Oral every 6 hours PRN Mild Pain (1 - 3)          T(C): 36.2 (02-07-19 @ 05:11), Max: 36.5 (02-06-19 @ 23:55)  HR: 65 (02-07-19 @ 05:11) (65 - 76)  BP: 106/62 (02-07-19 @ 05:11) (101/68 - 115/74)  RR: 17 (02-07-19 @ 05:11) (17 - 17)  SpO2: 99% (02-07-19 @ 05:11) (96% - 99%)  Wt(kg): --        I&O's Detail    06 Feb 2019 07:01  -  07 Feb 2019 07:00  --------------------------------------------------------  IN:    Oral Fluid: 150 mL  Total IN: 150 mL    OUT:  Total OUT: 0 mL    Total NET: 150 mL               PHYSICAL EXAM:    GENERAL: jaundice  EYES: EOMI, PERRLA, icteric sclera  NECK: Supple, no inc in JVP  CHEST/LUNG: dec BS  HEART: S1S2  ABDOMEN: tensely distended, dressing in RLQ  EXTREMITIES:  min edema  NEURO: no asterixis        LABS:  CBC Full  -  ( 07 Feb 2019 07:49 )  WBC Count : 34.36 K/uL  Hemoglobin : 11.6 g/dL  Hematocrit : 33.7 %  Platelet Count - Automated : 281 K/uL  Mean Cell Volume : 102.1 fl  Mean Cell Hemoglobin : 35.2 pg  Mean Cell Hemoglobin Concentration : 34.4 gm/dL  Auto Neutrophil # : x  Auto Lymphocyte # : x  Auto Monocyte # : x  Auto Eosinophil # : x  Auto Basophil # : x  Auto Neutrophil % : x  Auto Lymphocyte % : x  Auto Monocyte % : x  Auto Eosinophil % : x  Auto Basophil % : x    02-07    132<L>  |  100  |  103<H>  ----------------------------<  101<H>  4.2   |  21<L>  |  5.14<H>    Ca    8.3<L>      07 Feb 2019 07:49  Phos  5.7     02-05  Mg     2.7     02-05    TPro  6.9  /  Alb  2.6<L>  /  TBili  10.1<H>  /  DBili  7.57<H>  /  AST  66<H>  /  ALT  25  /  AlkPhos  344<H>  02-07            Impression:  * PAPO -- ATN vs Type 1 HRS. Indices slowly better  * Alcoholic hepatitis  * GI bleed.     Recommendations:   * Cont supportive care  * Cont HR protocol  * Not a candidate for HD  * CrCl < 10cc/min  * DNR/I

## 2019-02-07 NOTE — PROGRESS NOTE ADULT - ASSESSMENT
HPI:  47 y/o male PMH HTN (not on medication), chronic alcohol abuse and dependence x 15 years (1/2 pint of vodka with cranberry juice daily), hx of alcohol withdrawal 2013 hospitalized at [facility] after which he went to inpatient rehab at [facility] presents for syncope and melanotic stools. History obtained from ex wife, mother, and patient. Since pt left rehab in 2013 pt has been drinking daily. Last drink 11AM today. Pt state for past one and a half months he has had progressive weight loss with loss of appetite. He has had in increased abdominal distention for the past 1 month and in the past 1 week he has noticed jaundice and scleral icterus. Pt also reports generalized fatigue. Reports coffee ground emesis x1 episode 3 days ago but none since then with melena for about a week. Pt states he has 3 bowel movements a day and now has also been seeing "blood when I pee" also within the last week. No abdominal pain. No CP no SOB. No fevers or chills. Today pt called ex wife after he was unsteady with his gait and "fell", he denies LOC. Denies trauma to head. Ex wife reports while attempting to get pt to the car she noticed he was "very weak" and witnessed pt to have had 3 syncopal episodes lasting a few minutes each before regaining consciousness, but no seizures. Ex wife state that in the car, pt had a bowel movement that was black and tarry.     In ED pt noted to be hypotensive SBP 80-90s. 2L IVF given with 3rd and 4th infusing. SBP post IVF resuscitation 100s to one teens. On labs pt with leukocytosis with 3% bands, Na 123, Cr 2.05, INR 2.05, T bili 6, , ALT 55, Alk phos 799.  Pt seen by critical care and not felt to be ICU candidate. (23 Jan 2019 22:33)  ----------------- As Above ------------------------------------------------------  Patient confused. Left message for family member to contact me   ETOH Abuse (+). Coffee ground emesis x 1, melena.   Eleveated LFTs, INR  See labs, CT Scan    Cirrhosis, ascites, fever - 1) Paracentesis 2) ammonia level  3) f/u LFTs 4) treat for impending Dts  - patient does not fit into the criteria for treatment with steroids.

## 2019-02-07 NOTE — PROGRESS NOTE ADULT - SUBJECTIVE AND OBJECTIVE BOX
HPI:  49 y/o male PMH HTN (not on medication), chronic alcohol abuse and dependence x 15 years (1/2 pint of vodka with cranberry juice daily), hx of alcohol withdrawal 2013 hospitalized at [facility] after which he went to inpatient rehab at [facility] for syncope and melanotic stools. History obtained from ex wife, mother, and patient. Since pt left rehab in 2013 pt has been drinking daily. Last drink 11AM today. Pt state for past one and a half months he has had progressive weight loss with loss of appetite. He has had in increased abdominal distention for the past 1 month and in the past 1 week he has noticed jaundice and scleral icterus. Pt also reports generalized fatigue. Reports coffee ground emesis x1 episode 3 days ago but none since then with melena for about a week. Pt states he has 3 bowel movements a day and now has also been seeing "blood when I pee" also within the last week. No abdominal pain. No CP no SOB. No fevers or chills. Today pt called ex wife after he was unsteady with his gait and "fell", he denies LOC. Denies trauma to head. Ex wife reports while attempting to get pt to the car she noticed he was "very weak" and witnessed pt to have had 3 syncopal episodes lasting a few minutes each before regaining consciousness, but no seizures. Ex wife state that in the car, pt had a bowel movement that was black and tarry.     In ED pt noted to be hypotensive SBP 80-90s. 2L IVF given with 3rd and 4th infusing. SBP post IVF resuscitation 100s to one teens. On labs pt with leukocytosis with 3% bands, Na 123, Cr 2.05, INR 2.05, T bili 6, , ALT 55, Alk phos 799.  Pt seen by critical care and not felt to be ICU candidate. (23 Jan 2019 22:33)    Patient is a 48y old  Male who presents with a chief complaint of Coffee ground emesis, progressive jaundice, syncopal episodes. (07 Feb 2019 16:35)      INTERVAL HPI/OVERNIGHT EVENTS: no acute events more awake less soft stool after lactulose stoppe d    MEDICATIONS  (STANDING):  albumin human 25% IVPB 50 milliLiter(s) IV Intermittent daily  folic acid 1 milliGRAM(s) Oral daily  heparin  Injectable 5000 Unit(s) SubCutaneous every 12 hours  meropenem  IVPB 500 milliGRAM(s) IV Intermittent every 12 hours  midodrine 20 milliGRAM(s) Oral every 8 hours  multivitamin 1 Tablet(s) Oral daily  nicotine -  14 mG/24Hr(s) Patch 1 patch Transdermal daily  octreotide  Injectable 100 MICROGram(s) SubCutaneous every 8 hours  pantoprazole  Injectable 40 milliGRAM(s) IV Push every 12 hours  rifaximin 550 milliGRAM(s) Oral two times a day  thiamine 100 milliGRAM(s) Oral daily    MEDICATIONS  (PRN):  morphine  - Injectable 1 milliGRAM(s) IV Push every 6 hours PRN Moderate Pain (4 - 6)  oxyCODONE    IR 5 milliGRAM(s) Oral every 6 hours PRN Mild Pain (1 - 3)      Allergies    No Known Allergies    Intolerances        REVIEW OF SYSTEMS:  CONSTITUTIONAL: No fever, weight loss, or fatigue  EYES: No eye pain, visual disturbances, or discharge  ENMT:  No difficulty hearing, tinnitus, vertigo; No sinus or throat pain  NECK: No pain or stiffness  BREASTS: No pain, masses, or nipple discharge  RESPIRATORY: No cough, wheezing, chills or hemoptysis; No shortness of breath  CARDIOVASCULAR: No chest pain, palpitations, dizziness, or leg swelling  GASTROINTESTINAL: No abdominal or epigastric pain. No nausea, vomiting, or hematemesis; No diarrhea or constipation. No melena or hematochezia.  GENITOURINARY: No dysuria, frequency, hematuria, or incontinence  NEUROLOGICAL: No headaches, memory loss, loss of strength, numbness, or tremors  SKIN: No itching, burning, rashes, or lesions   LYMPH NODES: No enlarged glands  ENDOCRINE: No heat or cold intolerance; No hair loss  MUSCULOSKELETAL: No joint pain or swelling; No muscle, back, or extremity pain  PSYCHIATRIC: No depression, anxiety, mood swings, or difficulty sleeping  HEME/LYMPH: No easy bruising, or bleeding gums  ALLERGY AND IMMUNOLOGIC: No hives or eczema    Vital Signs Last 24 Hrs  T(C): 36.6 (07 Feb 2019 17:34), Max: 36.6 (07 Feb 2019 17:34)  T(F): 97.9 (07 Feb 2019 17:34), Max: 97.9 (07 Feb 2019 17:34)  HR: 70 (07 Feb 2019 17:34) (65 - 76)  BP: 89/63 (07 Feb 2019 17:34) (89/63 - 115/74)  BP(mean): --  RR: 17 (07 Feb 2019 17:34) (17 - 17)  SpO2: 98% (07 Feb 2019 17:34) (98% - 99%)    PHYSICAL EXAM:  GENERAL: NAD, well-groomed, well-developed  HEAD:  Atraumatic, Normocephalic  EYES: EOMI, PERRLA, conjunctiva and sclera clear  ENMT: No tonsillar erythema, exudates, or enlargement; Moist mucous membranes, Good dentition, No lesions  NECK: Supple, No JVD, Normal thyroid  NERVOUS SYSTEM:  Alert & Oriented X3, Good concentration; Motor Strength 5/5 B/L upper and lower extremities; DTRs 2+ intact and symmetric  CHEST/LUNG: Clear to percussion bilaterally; No rales, rhonchi, wheezing, or rubs  HEART: Regular rate and rhythm; No murmurs, rubs, or gallops  ABDOMEN: Soft, Nontender, Nondistended; Bowel sounds present  EXTREMITIES:  2+ Peripheral Pulses, No clubbing, cyanosis, or edema  LYMPH: No lymphadenopathy noted  SKIN: No rashes or lesions    LABS:                        11.6   34.36 )-----------( 281      ( 07 Feb 2019 07:49 )             33.7     02-07    132<L>  |  100  |  103<H>  ----------------------------<  101<H>  4.2   |  21<L>  |  5.14<H>    Ca    8.3<L>      07 Feb 2019 07:49    TPro  6.9  /  Alb  2.6<L>  /  TBili  10.1<H>  /  DBili  7.57<H>  /  AST  66<H>  /  ALT  25  /  AlkPhos  344<H>  02-07        CAPILLARY BLOOD GLUCOSE          RADIOLOGY & ADDITIONAL TESTS:  < from: US Paracentesis (02.01.19 @ 16:37) >  Sedation: None.    Impression: Successful sonographic guided paracentesis.    < end of copied text >    Imaging Personally Reviewed:  [ X] YES  [ ] NO    Consultant(s) Notes Reviewed:  [X ] YES  [ ] NO    Care Discussed with Consultants/Other Providers [ X] YES  [ ] NO

## 2019-02-07 NOTE — PROGRESS NOTE ADULT - ASSESSMENT
48M PMH HTN, chronic alcohol abuse and dependence x 15 years (1/2 pint of vodka with cranberry juice daily), hx of alcohol withdrawal presents for syncope x3 with generalized weakness, weight loss, loss of appetite, jaundice, melena, coffee ground emesis. Here with hypotension, lactic acidosis, hyponatremia, elevated Cr, jaundice, hyperbilirubinemia, elevated alk phos and AST, melena, coffee ground emesis, elevated INR, cirrhosis with ascites. < from: US Abdomen Limited (02.01.19 @ 12:45) >here is a moderate to large amount of ascites seen in all 4 quadrants. s/p paracetesis on 2/1-700 ml fluid removed. Octreo and Midodrine for now;   Albumin restarted     will press for endoscopy and keep possibility for rehab   plan for another paracentesis   restarted abx

## 2019-02-08 DIAGNOSIS — F10.20 ALCOHOL DEPENDENCE, UNCOMPLICATED: ICD-10-CM

## 2019-02-08 DIAGNOSIS — K76.7 HEPATORENAL SYNDROME: ICD-10-CM

## 2019-02-08 DIAGNOSIS — F06.31 MOOD DISORDER DUE TO KNOWN PHYSIOLOGICAL CONDITION WITH DEPRESSIVE FEATURES: ICD-10-CM

## 2019-02-08 LAB
ALBUMIN SERPL ELPH-MCNC: 2.6 G/DL — LOW (ref 3.3–5)
ALP SERPL-CCNC: 364 U/L — HIGH (ref 40–120)
ALT FLD-CCNC: 29 U/L — SIGNIFICANT CHANGE UP (ref 12–78)
AMMONIA BLD-MCNC: 19 UMOL/L — SIGNIFICANT CHANGE UP (ref 11–32)
ANION GAP SERPL CALC-SCNC: 11 MMOL/L — SIGNIFICANT CHANGE UP (ref 5–17)
APTT BLD: 42 SEC — HIGH (ref 28.5–37)
AST SERPL-CCNC: 70 U/L — HIGH (ref 15–37)
BILIRUB DIRECT SERPL-MCNC: 7.97 MG/DL — HIGH (ref 0.05–0.2)
BILIRUB INDIRECT FLD-MCNC: 2.4 MG/DL — HIGH (ref 0.2–1)
BILIRUB SERPL-MCNC: 10.4 MG/DL — HIGH (ref 0.2–1.2)
BUN SERPL-MCNC: 97 MG/DL — HIGH (ref 7–23)
CALCIUM SERPL-MCNC: 8.8 MG/DL — SIGNIFICANT CHANGE UP (ref 8.5–10.1)
CHLORIDE SERPL-SCNC: 101 MMOL/L — SIGNIFICANT CHANGE UP (ref 96–108)
CO2 SERPL-SCNC: 21 MMOL/L — LOW (ref 22–31)
CREAT SERPL-MCNC: 4.69 MG/DL — HIGH (ref 0.5–1.3)
GLUCOSE SERPL-MCNC: 110 MG/DL — HIGH (ref 70–99)
HCT VFR BLD CALC: 34.7 % — LOW (ref 39–50)
HGB BLD-MCNC: 11.8 G/DL — LOW (ref 13–17)
INR BLD: 2.08 RATIO — HIGH (ref 0.88–1.16)
MCHC RBC-ENTMCNC: 34 GM/DL — SIGNIFICANT CHANGE UP (ref 32–36)
MCHC RBC-ENTMCNC: 35 PG — HIGH (ref 27–34)
MCV RBC AUTO: 103 FL — HIGH (ref 80–100)
NRBC # BLD: 0 /100 WBCS — SIGNIFICANT CHANGE UP (ref 0–0)
PLATELET # BLD AUTO: 309 K/UL — SIGNIFICANT CHANGE UP (ref 150–400)
POTASSIUM SERPL-MCNC: 4.1 MMOL/L — SIGNIFICANT CHANGE UP (ref 3.5–5.3)
POTASSIUM SERPL-SCNC: 4.1 MMOL/L — SIGNIFICANT CHANGE UP (ref 3.5–5.3)
PROT SERPL-MCNC: 7.1 GM/DL — SIGNIFICANT CHANGE UP (ref 6–8.3)
PROTHROM AB SERPL-ACNC: 23.8 SEC — HIGH (ref 10–12.9)
RBC # BLD: 3.37 M/UL — LOW (ref 4.2–5.8)
RBC # FLD: 19.9 % — HIGH (ref 10.3–14.5)
SODIUM SERPL-SCNC: 133 MMOL/L — LOW (ref 135–145)
WBC # BLD: 31.24 K/UL — HIGH (ref 3.8–10.5)
WBC # FLD AUTO: 31.24 K/UL — HIGH (ref 3.8–10.5)

## 2019-02-08 PROCEDURE — 99233 SBSQ HOSP IP/OBS HIGH 50: CPT

## 2019-02-08 RX ORDER — MIDODRINE HYDROCHLORIDE 2.5 MG/1
15 TABLET ORAL EVERY 8 HOURS
Qty: 0 | Refills: 0 | Status: DISCONTINUED | OUTPATIENT
Start: 2019-02-08 | End: 2019-02-12

## 2019-02-08 RX ADMIN — Medication 50 MILLILITER(S): at 11:05

## 2019-02-08 RX ADMIN — PANTOPRAZOLE SODIUM 40 MILLIGRAM(S): 20 TABLET, DELAYED RELEASE ORAL at 18:37

## 2019-02-08 RX ADMIN — OCTREOTIDE ACETATE 100 MICROGRAM(S): 200 INJECTION, SOLUTION INTRAVENOUS; SUBCUTANEOUS at 06:51

## 2019-02-08 RX ADMIN — OCTREOTIDE ACETATE 100 MICROGRAM(S): 200 INJECTION, SOLUTION INTRAVENOUS; SUBCUTANEOUS at 23:24

## 2019-02-08 RX ADMIN — HEPARIN SODIUM 5000 UNIT(S): 5000 INJECTION INTRAVENOUS; SUBCUTANEOUS at 18:35

## 2019-02-08 RX ADMIN — Medication 1 MILLIGRAM(S): at 11:04

## 2019-02-08 RX ADMIN — MIDODRINE HYDROCHLORIDE 20 MILLIGRAM(S): 2.5 TABLET ORAL at 11:02

## 2019-02-08 RX ADMIN — MEROPENEM 100 MILLIGRAM(S): 1 INJECTION INTRAVENOUS at 06:51

## 2019-02-08 RX ADMIN — HEPARIN SODIUM 5000 UNIT(S): 5000 INJECTION INTRAVENOUS; SUBCUTANEOUS at 06:51

## 2019-02-08 RX ADMIN — MIDODRINE HYDROCHLORIDE 15 MILLIGRAM(S): 2.5 TABLET ORAL at 23:24

## 2019-02-08 RX ADMIN — OCTREOTIDE ACETATE 100 MICROGRAM(S): 200 INJECTION, SOLUTION INTRAVENOUS; SUBCUTANEOUS at 18:36

## 2019-02-08 RX ADMIN — Medication 100 MILLIGRAM(S): at 11:03

## 2019-02-08 RX ADMIN — MEROPENEM 100 MILLIGRAM(S): 1 INJECTION INTRAVENOUS at 18:43

## 2019-02-08 RX ADMIN — MIDODRINE HYDROCHLORIDE 20 MILLIGRAM(S): 2.5 TABLET ORAL at 18:36

## 2019-02-08 RX ADMIN — PANTOPRAZOLE SODIUM 40 MILLIGRAM(S): 20 TABLET, DELAYED RELEASE ORAL at 06:51

## 2019-02-08 RX ADMIN — MIDODRINE HYDROCHLORIDE 20 MILLIGRAM(S): 2.5 TABLET ORAL at 02:50

## 2019-02-08 RX ADMIN — Medication 1 TABLET(S): at 11:04

## 2019-02-08 NOTE — PROGRESS NOTE BEHAVIORAL HEALTH - NSBHCHARTREVIEWVS_PSY_A_CORE FT
ICU Vital Signs Last 24 Hrs  T(C): 36.7 (01 Feb 2019 06:33), Max: 36.7 (01 Feb 2019 06:33)  T(F): 98 (01 Feb 2019 06:33), Max: 98 (01 Feb 2019 06:33)  HR: 72 (01 Feb 2019 06:33) (65 - 79)  BP: 97/57 (01 Feb 2019 06:33) (93/57 - 102/58)  BP(mean): --  ABP: --  ABP(mean): --  RR: 20 (01 Feb 2019 06:33) (17 - 21)  SpO2: 100% (01 Feb 2019 06:33) (95% - 100%)
Vital Signs Last 24 Hrs  T(C): 36.3 (08 Feb 2019 11:25), Max: 36.6 (07 Feb 2019 17:34)  T(F): 97.3 (08 Feb 2019 11:25), Max: 97.9 (07 Feb 2019 17:34)  HR: 65 (08 Feb 2019 12:00) (59 - 70)  BP: 100/71 (08 Feb 2019 12:00) (89/63 - 111/66)  BP(mean): --  RR: 18 (08 Feb 2019 12:00) (17 - 18)  SpO2: 99% (08 Feb 2019 12:00) (97% - 100%)

## 2019-02-08 NOTE — PROGRESS NOTE BEHAVIORAL HEALTH - RISK ASSESSMENT
Risk factors; h/o alcohol use, h/o passive SI, current acute and chronic medical issues, depressed  Protective factors; Denies SI and HI, future oriented, hopeful, wants to live for sons, supportive family, treatment seeking.
Risk factors; h/o alcohol use, h/o passive SI, current acute and chronic medical issues, depressed  Protective factors; Denies SI and HI, future oriented, hopeful, wants to live for sons, supportive family, treatment seeking

## 2019-02-08 NOTE — PROGRESS NOTE ADULT - ASSESSMENT
48M PMH HTN, chronic alcohol abuse and dependence x 15 years (1/2 pint of vodka with cranberry juice daily), hx of alcohol withdrawal presents for syncope x3 with generalized weakness, weight loss, loss of appetite, jaundice, melena, coffee ground emesis. Here with hypotension, lactic acidosis, hyponatremia, elevated Cr, jaundice, hyperbilirubinemia, elevated alk phos and AST, melena, coffee ground emesis, elevated INR, cirrhosis with ascites. < from: US Abdomen Limited (02.01.19 @ 12:45) >here is a moderate to large amount of ascites seen in all 4 quadrants. s/p paracetesis on 2/1-700 ml fluid removed. Octreo and Midodrine for now;   Albumin restarted     on abx   no paracentesis today

## 2019-02-08 NOTE — PROGRESS NOTE BEHAVIORAL HEALTH - SUMMARY
49 y/o male PMH HTN (not on medication), chronic alcohol abuse and dependence x 15 years (1/2 pint of vodka with cranberry juice daily), hx of alcohol withdrawal 2013 hospitalized at [facility] after which he went to inpatient rehab at [facility], who presents for syncope and melanotic stools.  Pt. seen and evaluated, AAOX3, pt. stated that he came to the hospital as he was drinking pint of vodka everyday, last drink was day before admission, reported h/o shaking, denies alcohol withdrawal seizures or DTs. Reported feeling depressed, had passive suicidal thoughts in the past (months back), no h/o SA, denies current SI and HI. Denies acute psychotic sxs. Patient is future oriented, and hopeful, wants to live for his 2 sons, wants to get better. Discussed about possibly starting antidepressant next week, once he is more alert and medically better, pt. wants to think about it.    2/8: Depressive symptoms in context to medical issues, but patient nor mother wants to start antidepressant just yet. Mood is improving as medically he improves.
47 y/o male PMH HTN (not on medication), chronic alcohol abuse and dependence x 15 years (1/2 pint of vodka with cranberry juice daily), hx of alcohol withdrawal 2013 hospitalized at [facility] after which he went to inpatient rehab at [facility], who presents for syncope and melanotic stools.    Pt. seen and evaluated, AAOX3, pt. stated that he came to the hospital as he was drinking pint of vodka everyday, last drink was day before admission, reported h/o shaking, denies alcohol withdrawal seizures or DTs. Reported feeling depressed, had passive suicidal thoughts in the past (months back), no h/o SA, denies current SI and HI. Denies acute psychotic sxs. Patient is future oriented, and hopeful, wants to live for his 2 sons, wants to get better. Discussed about possibly starting antidepressant next week, once he is more alert and medically better, pt. wants to think about it.  HR: 72-73, CIWA :1  Plan:  -Continue Ativan PRN as per symptom triggered CIWA  -Continue to monitor vitals closely for withdrawal  - thiamine 500mg IV BID for 5 days  - check Ammonia level  - Pt. will benefit from antidepressant, Psych CL will follow up and will discuss about antidepressant with the pt.

## 2019-02-08 NOTE — PROGRESS NOTE BEHAVIORAL HEALTH - NSBHADMITCOUNSEL_PSY_A_CORE
instructions for management, treatment and follow up/risk factor reduction/importance of adherence to chosen treatment/client/family/caregiver education

## 2019-02-08 NOTE — PROGRESS NOTE ADULT - SUBJECTIVE AND OBJECTIVE BOX
Patient feels better; out of bed; no complaints today.    MEDICATIONS  (STANDING):  albumin human 25% IVPB 50 milliLiter(s) IV Intermittent daily  folic acid 1 milliGRAM(s) Oral daily  heparin  Injectable 5000 Unit(s) SubCutaneous every 12 hours  meropenem  IVPB 500 milliGRAM(s) IV Intermittent every 12 hours  midodrine 20 milliGRAM(s) Oral every 8 hours  multivitamin 1 Tablet(s) Oral daily  nicotine -  14 mG/24Hr(s) Patch 1 patch Transdermal daily  octreotide  Injectable 100 MICROGram(s) SubCutaneous every 8 hours  pantoprazole  Injectable 40 milliGRAM(s) IV Push every 12 hours  rifaximin 550 milliGRAM(s) Oral two times a day  thiamine 100 milliGRAM(s) Oral daily    MEDICATIONS  (PRN):  morphine  - Injectable 1 milliGRAM(s) IV Push every 6 hours PRN Moderate Pain (4 - 6)  oxyCODONE    IR 5 milliGRAM(s) Oral every 6 hours PRN Mild Pain (1 - 3)      02-07-19 @ 07:01  -  02-08-19 @ 07:00  --------------------------------------------------------  IN: 100 mL / OUT: 0 mL / NET: 100 mL      PHYSICAL EXAM:      T(C): 36.7 (02-08-19 @ 17:07), Max: 36.7 (02-08-19 @ 17:07)  HR: 55 (02-08-19 @ 17:07) (55 - 65)  BP: 104/68 (02-08-19 @ 17:07) (94/56 - 111/66)  RR: 18 (02-08-19 @ 17:07) (17 - 18)  SpO2: 98% (02-08-19 @ 17:07) (97% - 100%)  Wt(kg): --  Respiratory: clear anteriorly, decreased BS at bases  Cardiovascular: S1 S2  Gastrointestinal: soft NT ND +BS  Extremities:   no edema                                    11.8   31.24 )-----------( 309      ( 08 Feb 2019 08:16 )             34.7     02-08    133<L>  |  101  |  97<H>  ----------------------------<  110<H>  4.1   |  21<L>  |  4.69<H>    Ca    8.8      08 Feb 2019 08:16    TPro  7.1  /  Alb  2.6<L>  /  TBili  10.4<H>  /  DBili  7.97<H>  /  AST  70<H>  /  ALT  29  /  AlkPhos  364<H>  02-08      LIVER FUNCTIONS - ( 08 Feb 2019 08:16 )  Alb: 2.6 g/dL / Pro: 7.1 gm/dL / ALK PHOS: 364 U/L / ALT: 29 U/L / AST: 70 U/L / GGT: x             Assessment and Plan:    PAPO; suspected HRS; precipitated by SBP;  Indices stable; continue current rx with  midodrine, octreotide and SPA for 14 days then d/c ( started Jan 27)  Supportive care

## 2019-02-08 NOTE — PROGRESS NOTE ADULT - SUBJECTIVE AND OBJECTIVE BOX
Patient is a 48y old  Male who presents with a chief complaint of Coffee ground emesis, progressive jaundice, syncopal episodes. (08 Feb 2019 19:44)      HPI:  49 y/o male PMH HTN (not on medication), chronic alcohol abuse and dependence x 15 years (1/2 pint of vodka with cranberry juice daily), hx of alcohol withdrawal 2013 hospitalized at [facility] after which he went to inpatient rehab at [facility] for syncope and melanotic stools. History obtained from ex wife, mother, and patient. Since pt left rehab in 2013 pt has been drinking daily. Last drink 11AM today. Pt state for past one and a half months he has had progressive weight loss with loss of appetite. He has had in increased abdominal distention for the past 1 month and in the past 1 week he has noticed jaundice and scleral icterus. Pt also reports generalized fatigue. Reports coffee ground emesis x1 episode 3 days ago but none since then with melena for about a week. Pt states he has 3 bowel movements a day and now has also been seeing "blood when I pee" also within the last week. No abdominal pain. No CP no SOB. No fevers or chills. Today pt called ex wife after he was unsteady with his gait and "fell", he denies LOC. Denies trauma to head. Ex wife reports while attempting to get pt to the car she noticed he was "very weak" and witnessed pt to have had 3 syncopal episodes lasting a few minutes each before regaining consciousness, but no seizures. Ex wife state that in the car, pt had a bowel movement that was black and tarry.     In ED pt noted to be hypotensive SBP 80-90s. 2L IVF given with 3rd and 4th infusing. SBP post IVF resuscitation 100s to one teens. On labs pt with leukocytosis with 3% bands, Na 123, Cr 2.05, INR 2.05, T bili 6, , ALT 55, Alk phos 799.  Pt seen by critical care and not felt to be ICU candidate. (23 Jan 2019 22:33)      INTERVAL HPI/OVERNIGHT EVENTS:  Eating solid foods w/o difficulty. No diarrhea. 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, hematochezia, hematemesis, nausea, vomiting, abdominal pain, constipation, diarrhea, or change in bowel movements     MEDICATIONS  (STANDING):  albumin human 25% IVPB 50 milliLiter(s) IV Intermittent daily  folic acid 1 milliGRAM(s) Oral daily  heparin  Injectable 5000 Unit(s) SubCutaneous every 12 hours  meropenem  IVPB 500 milliGRAM(s) IV Intermittent every 12 hours  midodrine 15 milliGRAM(s) Oral every 8 hours  multivitamin 1 Tablet(s) Oral daily  nicotine -  14 mG/24Hr(s) Patch 1 patch Transdermal daily  octreotide  Injectable 100 MICROGram(s) SubCutaneous every 8 hours  pantoprazole  Injectable 40 milliGRAM(s) IV Push every 12 hours  rifaximin 550 milliGRAM(s) Oral two times a day  thiamine 100 milliGRAM(s) Oral daily    MEDICATIONS  (PRN):  morphine  - Injectable 1 milliGRAM(s) IV Push every 6 hours PRN Moderate Pain (4 - 6)  oxyCODONE    IR 5 milliGRAM(s) Oral every 6 hours PRN Mild Pain (1 - 3)      FAMILY HISTORY:  No pertinent family history in first degree relatives      Allergies    No Known Allergies    Intolerances        PMH/PSH:  ETOH abuse  Psoriasis  History of hypertension  No significant past surgical history        REVIEW OF SYSTEMS:  CONSTITUTIONAL: No fever, weight loss  EYES: No eye pain, visual disturbances, or discharge  ENMT:  No difficulty hearing, tinnitus, vertigo; No sinus or throat pain  NECK: No pain or stiffness  BREASTS: No pain, masses, or nipple discharge  RESPIRATORY: No cough, wheezing, chills or hemoptysis; No shortness of breath  CARDIOVASCULAR: No chest pain, palpitations, dizziness, or leg swelling  GASTROINTESTINAL: See above  GENITOURINARY: No dysuria, frequency, hematuria, or incontinence  NEUROLOGICAL: No headaches,  numbness, or tremors  SKIN: No itching, burning, rashes, or lesions  Icterus (+)  LYMPH NODES: No enlarged glands  ENDOCRINE: No heat or cold intolerance; No hair loss  MUSCULOSKELETAL: No joint pain or swelling; No muscle, back, or extremity pain  PSYCHIATRIC: No depression, anxiety, mood swings, or difficulty sleeping  HEME/LYMPH: No easy bruising, or bleeding gums  ALLERGY AND IMMUNOLOGIC: No hives or eczema    Vital Signs Last 24 Hrs  T(C): 36.7 (08 Feb 2019 17:07), Max: 36.7 (08 Feb 2019 17:07)  T(F): 98 (08 Feb 2019 17:07), Max: 98 (08 Feb 2019 17:07)  HR: 55 (08 Feb 2019 17:07) (55 - 65)  BP: 104/68 (08 Feb 2019 17:07) (94/56 - 111/66)  BP(mean): --  RR: 18 (08 Feb 2019 17:07) (17 - 18)  SpO2: 98% (08 Feb 2019 17:07) (97% - 100%)    PHYSICAL EXAM:  GENERAL: NAD, well-groomed, well-developed  HEAD:  Atraumatic, Normocephalic  EYES: EOMI, PERRLA, conjunctiva and sclera jaundice  NECK: Supple, No JVD, Normal thyroid  NERVOUS SYSTEM:  Alert & Oriented X2 Good concentration; No asterixis  CHEST/LUNG: Clear to percussion bilaterally; No rales, rhonchi, wheezing, or rubs  HEART: Regular rate and rhythm; No murmurs, rubs, or gallops  ABDOMEN: Soft, Nontender, Minimally distended; Bowel sounds present  EXTREMITIES:  2+ Peripheral Pulses, No clubbing, cyanosis, or edema  LYMPH: No lymphadenopathy noted  SKIN: No rashes or lesions    LAB                          11.8   31.24 )-----------( 309      ( 08 Feb 2019 08:16 )             34.7       CBC:  02-08 @ 08:16  WBC 31.24   Hgb 11.8   Hct 34.7   Plts 309  .0  02-07 @ 07:49  WBC 34.36   Hgb 11.6   Hct 33.7   Plts 281  .1  02-06 @ 08:03  WBC 39.93   Hgb 12.2   Hct 36.9   Plts 300  .4  02-05 @ 10:20  WBC 43.75   Hgb 12.6   Hct 35.8   Plts 319  .9      Chemistry:  02-08 @ 08:16  Na+ 133  K+ 4.1  Cl- 101  CO2 21  BUN 97  Cr 4.69     02-07 @ 07:49  Na+ 132  K+ 4.2  Cl- 100  CO2 21    Cr 5.14     02-06 @ 08:03  Na+ 133  K+ 4.1  Cl- 103  CO2 18    Cr 5.45     02-05 @ 10:20  Na+ 136  K+ 3.7  Cl- 106  CO2 17    Cr 5.64     02-04 @ 07:31  Na+ 136  K+ 3.6  Cl- 106  CO2 21    Cr 6.05     02-03 @ 08:51  Na+ 135  K+ 3.6  Cl- 105  CO2 21    Cr 6.21     02-02 @ 07:27  Na+ 134  K+ 3.5  Cl- 102  CO2 21    Cr 6.22         Glucose, Serum: 110 mg/dL (02-08 @ 08:16)  Glucose, Serum: 101 mg/dL (02-07 @ 07:49)  Glucose, Serum: 118 mg/dL (02-06 @ 08:03)  Glucose, Serum: 128 mg/dL (02-05 @ 10:20)  Glucose, Serum: 123 mg/dL (02-04 @ 07:31)  Glucose, Serum: 119 mg/dL (02-03 @ 08:51)  Glucose, Serum: 121 mg/dL (02-02 @ 07:27)      08 Feb 2019 08:16    133    |  101    |  97     ----------------------------<  110    4.1     |  21     |  4.69   07 Feb 2019 07:49    132    |  100    |  103    ----------------------------<  101    4.2     |  21     |  5.14   06 Feb 2019 08:03    133    |  103    |  102    ----------------------------<  118    4.1     |  18     |  5.45   05 Feb 2019 10:20    136    |  106    |  109    ----------------------------<  128    3.7     |  17     |  5.64   04 Feb 2019 07:31    136    |  106    |  119    ----------------------------<  123    3.6     |  21     |  6.05   03 Feb 2019 08:51    135    |  105    |  125    ----------------------------<  119    3.6     |  21     |  6.21   02 Feb 2019 07:27    134    |  102    |  125    ----------------------------<  121    3.5     |  21     |  6.22     Ca    8.8        08 Feb 2019 08:16  Ca    8.3        07 Feb 2019 07:49  Ca    8.4        06 Feb 2019 08:03  Ca    8.6        05 Feb 2019 10:20  Ca    8.6        04 Feb 2019 07:31  Ca    8.4        03 Feb 2019 08:51  Ca    8.4        02 Feb 2019 07:27  Phos  5.7       05 Feb 2019 10:20  Mg     2.7       05 Feb 2019 10:20    TPro  7.1    /  Alb  2.6    /  TBili  10.4   /  DBili  7.97   /  AST  70     /  ALT  29     /  AlkPhos  364    08 Feb 2019 08:16  TPro  6.9    /  Alb  2.6    /  TBili  10.1   /  DBili  7.57   /  AST  66     /  ALT  25     /  AlkPhos  344    07 Feb 2019 07:49  TPro  7.1    /  Alb  2.5    /  TBili  9.8    /  DBili  7.09   /  AST  75     /  ALT  25     /  AlkPhos  341    06 Feb 2019 08:03  TPro  7.2    /  Alb  2.8    /  TBili  10.3   /  DBili  x      /  AST  71     /  ALT  25     /  AlkPhos  356    05 Feb 2019 10:20  TPro  7.0    /  Alb  2.8    /  TBili  10.8   /  DBili  x      /  AST  81     /  ALT  23     /  AlkPhos  355    04 Feb 2019 07:31  TPro  6.6    /  Alb  2.7    /  TBili  10.8   /  DBili  x      /  AST  89     /  ALT  23     /  AlkPhos  366    03 Feb 2019 08:51  TPro  6.1    /  Alb  2.7    /  TBili  9.8    /  DBili  x      /  AST  80     /  ALT  21     /  AlkPhos  356    02 Feb 2019 07:27      PT/INR - ( 08 Feb 2019 11:49 )   PT: 23.8 sec;   INR: 2.08 ratio         PTT - ( 08 Feb 2019 11:49 )  PTT:42.0 sec        CAPILLARY BLOOD GLUCOSE              RADIOLOGY & ADDITIONAL TESTS:    Imaging Personally Reviewed:  [ ] YES  [ ] NO    Consultant(s) Notes Reviewed:  [ ] YES  [ ] NO    Care Discussed with Consultants/Other Providers [ ] YES  [ ] NO

## 2019-02-08 NOTE — PROGRESS NOTE ADULT - SUBJECTIVE AND OBJECTIVE BOX
HPI:  47 y/o male PMH HTN (not on medication), chronic alcohol abuse and dependence x 15 years (1/2 pint of vodka with cranberry juice daily), hx of alcohol withdrawal 2013 hospitalized at [facility] after which he went to inpatient rehab at [facility] for syncope and melanotic stools. History obtained from ex wife, mother, and patient. Since pt left rehab in 2013 pt has been drinking daily. Last drink 11AM today. Pt state for past one and a half months he has had progressive weight loss with loss of appetite. He has had in increased abdominal distention for the past 1 month and in the past 1 week he has noticed jaundice and scleral icterus. Pt also reports generalized fatigue. Reports coffee ground emesis x1 episode 3 days ago but none since then with melena for about a week. Pt states he has 3 bowel movements a day and now has also been seeing "blood when I pee" also within the last week. No abdominal pain. No CP no SOB. No fevers or chills. Today pt called ex wife after he was unsteady with his gait and "fell", he denies LOC. Denies trauma to head. Ex wife reports while attempting to get pt to the car she noticed he was "very weak" and witnessed pt to have had 3 syncopal episodes lasting a few minutes each before regaining consciousness, but no seizures. Ex wife state that in the car, pt had a bowel movement that was black and tarry.     In ED pt noted to be hypotensive SBP 80-90s. 2L IVF given with 3rd and 4th infusing. SBP post IVF resuscitation 100s to one teens. On labs pt with leukocytosis with 3% bands, Na 123, Cr 2.05, INR 2.05, T bili 6, , ALT 55, Alk phos 799.  Pt seen by critical care and not felt to be ICU candidate. (23 Jan 2019 22:33)    Patient is a 48y old  Male who presents with a chief complaint of Coffee ground emesis, progressive jaundice, syncopal episodes. (07 Feb 2019 16:35)      INTERVAL HPI/OVERNIGHT EVENTS: no acute events more awake less soft stool after lactulose stoppe d    MEDICATIONS  (STANDING):  albumin human 25% IVPB 50 milliLiter(s) IV Intermittent daily  folic acid 1 milliGRAM(s) Oral daily  heparin  Injectable 5000 Unit(s) SubCutaneous every 12 hours  meropenem  IVPB 500 milliGRAM(s) IV Intermittent every 12 hours  midodrine 20 milliGRAM(s) Oral every 8 hours  multivitamin 1 Tablet(s) Oral daily  nicotine -  14 mG/24Hr(s) Patch 1 patch Transdermal daily  octreotide  Injectable 100 MICROGram(s) SubCutaneous every 8 hours  pantoprazole  Injectable 40 milliGRAM(s) IV Push every 12 hours  rifaximin 550 milliGRAM(s) Oral two times a day  thiamine 100 milliGRAM(s) Oral daily    MEDICATIONS  (PRN):  morphine  - Injectable 1 milliGRAM(s) IV Push every 6 hours PRN Moderate Pain (4 - 6)  oxyCODONE    IR 5 milliGRAM(s) Oral every 6 hours PRN Mild Pain (1 - 3)      Allergies    No Known Allergies    Intolerances        REVIEW OF SYSTEMS:  CONSTITUTIONAL: No fever, weight loss, or fatigue  EYES: No eye pain, visual disturbances, or discharge  ENMT:  No difficulty hearing, tinnitus, vertigo; No sinus or throat pain  NECK: No pain or stiffness  BREASTS: No pain, masses, or nipple discharge  RESPIRATORY: No cough, wheezing, chills or hemoptysis; No shortness of breath  CARDIOVASCULAR: No chest pain, palpitations, dizziness, or leg swelling  GASTROINTESTINAL: No abdominal or epigastric pain. No nausea, vomiting, or hematemesis; No diarrhea or constipation. No melena or hematochezia.  GENITOURINARY: No dysuria, frequency, hematuria, or incontinence  NEUROLOGICAL: No headaches, memory loss, loss of strength, numbness, or tremors  SKIN: No itching, burning, rashes, or lesions   LYMPH NODES: No enlarged glands  ENDOCRINE: No heat or cold intolerance; No hair loss  MUSCULOSKELETAL: No joint pain or swelling; No muscle, back, or extremity pain  PSYCHIATRIC: No depression, anxiety, mood swings, or difficulty sleeping  HEME/LYMPH: No easy bruising, or bleeding gums  ALLERGY AND IMMUNOLOGIC: No hives or eczema    Vital Signs Last 24 Hrs  T(C): 36.6 (07 Feb 2019 17:34), Max: 36.6 (07 Feb 2019 17:34)  T(F): 97.9 (07 Feb 2019 17:34), Max: 97.9 (07 Feb 2019 17:34)  HR: 70 (07 Feb 2019 17:34) (65 - 76)  BP: 89/63 (07 Feb 2019 17:34) (89/63 - 115/74)  BP(mean): --  RR: 17 (07 Feb 2019 17:34) (17 - 17)  SpO2: 98% (07 Feb 2019 17:34) (98% - 99%)    PHYSICAL EXAM:  GENERAL: NAD, well-groomed, well-developed  HEAD:  Atraumatic, Normocephalic  EYES: EOMI, PERRLA, conjunctiva and sclera clear  ENMT: No tonsillar erythema, exudates, or enlargement; Moist mucous membranes, Good dentition, No lesions  NECK: Supple, No JVD, Normal thyroid  NERVOUS SYSTEM:  Alert & Oriented X3, Good concentration; Motor Strength 5/5 B/L upper and lower extremities; DTRs 2+ intact and symmetric  CHEST/LUNG: Clear to percussion bilaterally; No rales, rhonchi, wheezing, or rubs  HEART: Regular rate and rhythm; No murmurs, rubs, or gallops  ABDOMEN: Soft, Nontender, Nondistended; Bowel sounds present  EXTREMITIES:  2+ Peripheral Pulses, No clubbing, cyanosis, or edema  LYMPH: No lymphadenopathy noted  SKIN: No rashes or lesions    LABS:                        11.6   34.36 )-----------( 281      ( 07 Feb 2019 07:49 )             33.7     02-07    132<L>  |  100  |  103<H>  ----------------------------<  101<H>  4.2   |  21<L>  |  5.14<H>    Ca    8.3<L>      07 Feb 2019 07:49    TPro  6.9  /  Alb  2.6<L>  /  TBili  10.1<H>  /  DBili  7.57<H>  /  AST  66<H>  /  ALT  25  /  AlkPhos  344<H>  02-07        CAPILLARY BLOOD GLUCOSE          RADIOLOGY & ADDITIONAL TESTS:  < from: US Paracentesis (02.01.19 @ 16:37) >  Sedation: None.    Impression: Successful sonographic guided paracentesis.    < end of copied text >    Imaging Personally Reviewed:  [ X] YES  [ ] NO    Consultant(s) Notes Reviewed:  [X ] YES  [ ] NO    Care Discussed with Consultants/Other Providers [ X] YES  [ ] NO

## 2019-02-08 NOTE — CHART NOTE - NSCHARTNOTEFT_GEN_A_CORE
Pt c alcoholic hepatitis; cirrhosis c recurrent & progressive jaundice; pt reports progressive wt loss & decreased appetite x 1.5 month.  Renal failure per nephology not a candidate for HD tx; paracentesis performed (1/25) 7000 ml fluid removed.  Ongoing discussions c family re hospice referral       Factors impacting intake: [ ] none [ ] nausea  [ ] vomiting [ ] diarrhea [ ] constipation  [ ]chewing problems [ ] swallowing issues  [X ] other: decreased appetite; lethargy; early satiety due to recurrent ascites    Diet Prescription: Diet, Regular:   Low Sodium  Supplement Feeding Modality:  Oral  Ensure Enlive Cans or Servings Per Day:  3       Frequency:  Three Times a day (02-08-19 @ 12:32)    Intake: poor PO continues; < 50%; 20 - 30% recently    Current Weight: 68.1 kg (2/8); previous 68.5 kg (2/5); admission wt 77 kg (1/24)   % Weight Change: wt fluctuations most likely a function of recurrent ascites & not a valid indicator of nutrition status     Nutrition focused physical exam conducted:  Subcutaneous fat loss: [moderate ] Orbital fat pads region, [moderate ]Buccal fat region, [severe ]Triceps region,  [severe ]Ribs region.    Muscle wasting: [severe ]Temples region, [severe ]Clavicle region, [moderate ]Shoulder region, [unable ]Scapula region, [mild ]Interosseous region,  [severe ]thigh region, [severe ]Calf region    Physical Appearance: 2+ generalized edema remains     Pertinent Medications: MEDICATIONS  (STANDING):  albumin human 25% IVPB 50 milliLiter(s) IV Intermittent daily  folic acid 1 milliGRAM(s) Oral daily  heparin  Injectable 5000 Unit(s) SubCutaneous every 12 hours  meropenem  IVPB 500 milliGRAM(s) IV Intermittent every 12 hours  midodrine 20 milliGRAM(s) Oral every 8 hours  multivitamin 1 Tablet(s) Oral daily  nicotine -  14 mG/24Hr(s) Patch 1 patch Transdermal daily  octreotide  Injectable 100 MICROGram(s) SubCutaneous every 8 hours  pantoprazole  Injectable 40 milliGRAM(s) IV Push every 12 hours  rifaximin 550 milliGRAM(s) Oral two times a day  thiamine 100 milliGRAM(s) Oral daily    MEDICATIONS  (PRN):  morphine  - Injectable 1 milliGRAM(s) IV Push every 6 hours PRN Moderate Pain (4 - 6)  oxyCODONE    IR 5 milliGRAM(s) Oral every 6 hours PRN Mild Pain (1 - 3)    Pertinent Labs: 02-08 Na133 mmol/L<L> Glu 110 mg/dL<H> K+ 4.1 mmol/L Cr  4.69 mg/dL<H> BUN 97 mg/dL<H> 02-05 Phos 5.7 mg/dL<H> 02-08 Alb 2.6 g/dL<L>    Skin: WDL    Estimated Needs:   [X ] no change since previous assessment (1/29)  [ ] recalculated:     Previous Nutrition Diagnosis:   [ ] Inadequate Energy Intake [ ]Inadequate Oral Intake [ ] Excessive Energy Intake   [ ] Underweight [ ] Increased Nutrient Needs [ ] Overweight/Obesity   [ ] Altered GI Function [ ] Unintended Weight Loss [ ] Food & Nutrition Related Knowledge Deficit   [X ] Severe Malnutrition - chronic    Nutrition Diagnosis is [X ] ongoing  [ ] resolved [ ] not applicable     GOAL: Pt to tolerate PO diet without GI distress - continues to be met; pt to consume > 75% meals/supplements - no met    New Nutrition Diagnosis: [X ] not applicable      Interventions:   continue current diet rx as noted  Recommend  [ ] Change Diet To:  [ ] Nutrition Supplement  [ ] Nutrition Support  [ ] Other:     Monitoring and Evaluation:   [ X] PO intake [ x ] Tolerance to diet prescription [ x ] weights [ x ] labs[ x ] follow up per protocol  [ ] other: Pt c alcoholic hepatitis; cirrhosis c recurrent & progressive jaundice; pt reports progressive wt loss & decreased appetite x 1.5 month.  Renal failure per nephology not a candidate for HD tx; paracentesis performed (1/25) 7000 ml fluid removed.  Ongoing discussions c family re hospice referral       Factors impacting intake: [ ] none [ ] nausea  [ ] vomiting [ ] diarrhea [ ] constipation  [ ]chewing problems [ ] swallowing issues  [X ] other: decreased appetite; lethargy; early satiety due to recurrent ascites    Diet Prescription: Diet, Regular:   Low Sodium  Supplement Feeding Modality:  Oral  Ensure Enlive Cans or Servings Per Day:  3       Frequency:  Three Times a day (02-08-19 @ 12:32)    Intake: poor PO continues; < 50%; 20 - 30% recently    Current Weight: 68.1 kg (2/8); previous 68.5 kg (2/5); admission wt 77 kg (1/24)   % Weight Change: wt fluctuations most likely a function of recurrent ascites & not a valid indicator of nutrition status     Nutrition focused physical exam conducted:  Subcutaneous fat loss: [mild ] Orbital fat pads region, [mild ]Buccal fat region, [severe ]Triceps region,  [severe ]Ribs region.    Muscle wasting: [severe ]Temples region, [moderate ]Clavicle region, [moderate ]Shoulder region, [unable ]Scapula region, [mild ]Interosseous region,  [severe ]thigh region, [severe ]Calf region    Physical Appearance: 2+ generalized edema remains     Pertinent Medications: MEDICATIONS  (STANDING):  albumin human 25% IVPB 50 milliLiter(s) IV Intermittent daily  folic acid 1 milliGRAM(s) Oral daily  heparin  Injectable 5000 Unit(s) SubCutaneous every 12 hours  meropenem  IVPB 500 milliGRAM(s) IV Intermittent every 12 hours  midodrine 20 milliGRAM(s) Oral every 8 hours  multivitamin 1 Tablet(s) Oral daily  nicotine -  14 mG/24Hr(s) Patch 1 patch Transdermal daily  octreotide  Injectable 100 MICROGram(s) SubCutaneous every 8 hours  pantoprazole  Injectable 40 milliGRAM(s) IV Push every 12 hours  rifaximin 550 milliGRAM(s) Oral two times a day  thiamine 100 milliGRAM(s) Oral daily    MEDICATIONS  (PRN):  morphine  - Injectable 1 milliGRAM(s) IV Push every 6 hours PRN Moderate Pain (4 - 6)  oxyCODONE    IR 5 milliGRAM(s) Oral every 6 hours PRN Mild Pain (1 - 3)    Pertinent Labs: 02-08 Na133 mmol/L<L> Glu 110 mg/dL<H> K+ 4.1 mmol/L Cr  4.69 mg/dL<H> BUN 97 mg/dL<H> 02-05 Phos 5.7 mg/dL<H> 02-08 Alb 2.6 g/dL<L>    Skin: WDL    Estimated Needs:   [X ] no change since previous assessment (1/29)  [ ] recalculated:     Previous Nutrition Diagnosis:   [ ] Inadequate Energy Intake [ ]Inadequate Oral Intake [ ] Excessive Energy Intake   [ ] Underweight [ ] Increased Nutrient Needs [ ] Overweight/Obesity   [ ] Altered GI Function [ ] Unintended Weight Loss [ ] Food & Nutrition Related Knowledge Deficit   [X ] Severe Malnutrition - chronic (based on physical signs of severe fat depletion & muscle wasting as noted)    Nutrition Diagnosis is [X ] ongoing  [ ] resolved [ ] not applicable     GOAL: Pt to tolerate PO diet without GI distress - continues to be met; pt to consume > 75% meals/supplements - no met    New Nutrition Diagnosis: [X ] not applicable      Interventions:   continue current diet rx as noted (pt requested regular consistency as appetite improving & not as lethargic)  Recommend  [ ] Change Diet To:  [X ] Nutrition Supplement: requests strawberry Ensure Enlive only  [ ] Nutrition Support  [ ] Other:  cater to food preferences as appropriate     Monitoring and Evaluation:   [ X] PO intake [ x ] Tolerance to diet prescription [ x ] weights [ x ] labs[ x ] follow up per protocol  [ ] other:

## 2019-02-08 NOTE — PROGRESS NOTE BEHAVIORAL HEALTH - NSBHFUPINTERVALCCFT_PSY_A_CORE
" I am feeling depressed and down"
Calm, no behavioral issues. Likely will need another paracentesis. DNR status now.

## 2019-02-08 NOTE — PROGRESS NOTE ADULT - ASSESSMENT
HPI:  47 y/o male PMH HTN (not on medication), chronic alcohol abuse and dependence x 15 years (1/2 pint of vodka with cranberry juice daily), hx of alcohol withdrawal 2013 hospitalized at [facility] after which he went to inpatient rehab at [facility] presents for syncope and melanotic stools. History obtained from ex wife, mother, and patient. Since pt left rehab in 2013 pt has been drinking daily. Last drink 11AM today. Pt state for past one and a half months he has had progressive weight loss with loss of appetite. He has had in increased abdominal distention for the past 1 month and in the past 1 week he has noticed jaundice and scleral icterus. Pt also reports generalized fatigue. Reports coffee ground emesis x1 episode 3 days ago but none since then with melena for about a week. Pt states he has 3 bowel movements a day and now has also been seeing "blood when I pee" also within the last week. No abdominal pain. No CP no SOB. No fevers or chills. Today pt called ex wife after he was unsteady with his gait and "fell", he denies LOC. Denies trauma to head. Ex wife reports while attempting to get pt to the car she noticed he was "very weak" and witnessed pt to have had 3 syncopal episodes lasting a few minutes each before regaining consciousness, but no seizures. Ex wife state that in the car, pt had a bowel movement that was black and tarry.     In ED pt noted to be hypotensive SBP 80-90s. 2L IVF given with 3rd and 4th infusing. SBP post IVF resuscitation 100s to one teens. On labs pt with leukocytosis with 3% bands, Na 123, Cr 2.05, INR 2.05, T bili 6, , ALT 55, Alk phos 799.  Pt seen by critical care and not felt to be ICU candidate. (23 Jan 2019 22:33)  ----------------- As Above ------------------------------------------------------  Patient confused. Left message for family member to contact me   ETOH Abuse (+). Coffee ground emesis x 1, melena.   Eleveated LFTs, INR  See labs, CT Scan    Cirrhosis, ascites, fever - 1) Paracentesis 2) ammonia level  3) f/u LFTs 4) treat for impending Dts  - patient does not fit into the criteria for treatment with steroids.

## 2019-02-08 NOTE — PROGRESS NOTE ADULT - PROBLEM SELECTOR PLAN 2
LFTs unchanged . Being treated for hepatorenal. Not a candidate for steroids.  11.2 / 132 / 137 / 403.  - 9.4 / 139 / 37 / 411 - 9.1/114/27/397  - 10.8 / 81 / 23 / 355 - 10.3 / 71 / 25 / 356  -- 9.8 / 7.5 / 25 / 341 - 10.1 / 66 / 25 / 344  - 10.4 / 70 / 29 / 364 nh4 27 BUN / CRE  97 / 4.69    prognosis poor

## 2019-02-08 NOTE — PROGRESS NOTE BEHAVIORAL HEALTH - NSBHCHARTREVIEWLAB_PSY_A_CORE FT
CBC Full  -  ( 01 Feb 2019 08:11 )  WBC Count : 34.23 K/uL  Hemoglobin : 10.5 g/dL  Hematocrit : 30.2 %  Platelet Count - Automated : 271 K/uL  Mean Cell Volume : 101.7 fl  Mean Cell Hemoglobin : 35.4 pg  Mean Cell Hemoglobin Concentration : 34.8 gm/dL  Auto Neutrophil # : x  Auto Lymphocyte # : x  Auto Monocyte # : x  Auto Eosinophil # : x  Auto Basophil # : x  Auto Neutrophil % : x  Auto Lymphocyte % : x  Auto Monocyte % : x  Auto Eosinophil % : x  Auto Basophil % : x
CBC Full  -  ( 08 Feb 2019 08:16 )  WBC Count : 31.24 K/uL  Hemoglobin : 11.8 g/dL  Hematocrit : 34.7 %  Platelet Count - Automated : 309 K/uL  Mean Cell Volume : 103.0 fl  Mean Cell Hemoglobin : 35.0 pg  Mean Cell Hemoglobin Concentration : 34.0 gm/dL  Auto Neutrophil # : x  Auto Lymphocyte # : x  Auto Monocyte # : x  Auto Eosinophil # : x  Auto Basophil # : x  Auto Neutrophil % : x  Auto Lymphocyte % : x  Auto Monocyte % : x  Auto Eosinophil % : x  Auto Basophil % : x  02-08    133<L>  |  101  |  97<H>  ----------------------------<  110<H>  4.1   |  21<L>  |  4.69<H>    Ca    8.8      08 Feb 2019 08:16    TPro  7.1  /  Alb  2.6<L>  /  TBili  10.4<H>  /  DBili  7.97<H>  /  AST  70<H>  /  ALT  29  /  AlkPhos  364<H>  02-08

## 2019-02-08 NOTE — CHART NOTE - NSCHARTNOTEFT_GEN_A_CORE
IR consulted for paracentesis however patients INR is 2.08; BUN- 100, plus patient had heparin SQ this AM.  Pt just had paracentesis performed Fri with 5 Liters removed.  -Pt at a high risk for bleed between elevated coags, uremic platelets, anticoagulant and a recent procedure  -Procedure deferred, please reconsult

## 2019-02-08 NOTE — PROGRESS NOTE BEHAVIORAL HEALTH - NSBHCONSULTMEDS_PSY_A_CORE FT
Depressive symptoms in context to medical issues, but patient nor mother wants to start antidepressant just yet. Mood is improving as medically he improves.
symptom triggered CIWA  thiamine 500mg IV BID for 5 days

## 2019-02-08 NOTE — PROGRESS NOTE BEHAVIORAL HEALTH - NSBHFUPINTERVALHXFT_PSY_A_CORE
Pt. seen and evaluated, AAOX3, family present at bedside (pt. gave consent for the family to be present). Pt. stated that he came to the hospital as he was drinking pint of vodka everyday, last drink was day before admission, reported h/o shaking, denies alcohol withdrawal seizures or DTs. Reported feeling depressed, had passive suicidal thoughts in the past (months back), no h/o SA, denies current SI and HI. Denies acute psychotic sxs. Patient is future oriented, and hopeful, wants to live for his 2 sons, wants to get better.  Discussed about possibly starting antidepressant next week, once he is more alert and medically better, pt. wants to think about it.
Met with the patient. Mother by his side. Patient states that he feels better medically, and resultantly feels better emotionally. States that he does feel intermittently sad, but denies it to be pervasive. Denies any si or hi when asked. No a/vh or paranoia when asked. Discussed that an antidepressant would be helpful but mother and patient keen on further improvement medically before another medication is added to his list

## 2019-02-09 DIAGNOSIS — D72.828 OTHER ELEVATED WHITE BLOOD CELL COUNT: ICD-10-CM

## 2019-02-09 LAB
ALBUMIN SERPL ELPH-MCNC: 2.7 G/DL — LOW (ref 3.3–5)
ALP SERPL-CCNC: 409 U/L — HIGH (ref 40–120)
ALT FLD-CCNC: 31 U/L — SIGNIFICANT CHANGE UP (ref 12–78)
ANION GAP SERPL CALC-SCNC: 14 MMOL/L — SIGNIFICANT CHANGE UP (ref 5–17)
AST SERPL-CCNC: 72 U/L — HIGH (ref 15–37)
BILIRUB SERPL-MCNC: 9.8 MG/DL — HIGH (ref 0.2–1.2)
BUN SERPL-MCNC: 89 MG/DL — HIGH (ref 7–23)
CALCIUM SERPL-MCNC: 8.6 MG/DL — SIGNIFICANT CHANGE UP (ref 8.5–10.1)
CHLORIDE SERPL-SCNC: 102 MMOL/L — SIGNIFICANT CHANGE UP (ref 96–108)
CO2 SERPL-SCNC: 21 MMOL/L — LOW (ref 22–31)
CREAT SERPL-MCNC: 4.02 MG/DL — HIGH (ref 0.5–1.3)
GLUCOSE SERPL-MCNC: 94 MG/DL — SIGNIFICANT CHANGE UP (ref 70–99)
HCT VFR BLD CALC: 37.5 % — LOW (ref 39–50)
HGB BLD-MCNC: 12.5 G/DL — LOW (ref 13–17)
MCHC RBC-ENTMCNC: 33.3 GM/DL — SIGNIFICANT CHANGE UP (ref 32–36)
MCHC RBC-ENTMCNC: 34.5 PG — HIGH (ref 27–34)
MCV RBC AUTO: 103.6 FL — HIGH (ref 80–100)
NRBC # BLD: 0 /100 WBCS — SIGNIFICANT CHANGE UP (ref 0–0)
PLATELET # BLD AUTO: 317 K/UL — SIGNIFICANT CHANGE UP (ref 150–400)
POTASSIUM SERPL-MCNC: 3.9 MMOL/L — SIGNIFICANT CHANGE UP (ref 3.5–5.3)
POTASSIUM SERPL-SCNC: 3.9 MMOL/L — SIGNIFICANT CHANGE UP (ref 3.5–5.3)
PROT SERPL-MCNC: 7.5 GM/DL — SIGNIFICANT CHANGE UP (ref 6–8.3)
RBC # BLD: 3.62 M/UL — LOW (ref 4.2–5.8)
RBC # FLD: 19.6 % — HIGH (ref 10.3–14.5)
SODIUM SERPL-SCNC: 137 MMOL/L — SIGNIFICANT CHANGE UP (ref 135–145)
WBC # BLD: 30.5 K/UL — HIGH (ref 3.8–10.5)
WBC # FLD AUTO: 30.5 K/UL — HIGH (ref 3.8–10.5)

## 2019-02-09 PROCEDURE — 76705 ECHO EXAM OF ABDOMEN: CPT | Mod: 26

## 2019-02-09 PROCEDURE — 99233 SBSQ HOSP IP/OBS HIGH 50: CPT

## 2019-02-09 RX ADMIN — Medication 50 MILLILITER(S): at 11:53

## 2019-02-09 RX ADMIN — MEROPENEM 100 MILLIGRAM(S): 1 INJECTION INTRAVENOUS at 06:33

## 2019-02-09 RX ADMIN — MIDODRINE HYDROCHLORIDE 15 MILLIGRAM(S): 2.5 TABLET ORAL at 13:47

## 2019-02-09 RX ADMIN — HEPARIN SODIUM 5000 UNIT(S): 5000 INJECTION INTRAVENOUS; SUBCUTANEOUS at 06:32

## 2019-02-09 RX ADMIN — PANTOPRAZOLE SODIUM 40 MILLIGRAM(S): 20 TABLET, DELAYED RELEASE ORAL at 17:12

## 2019-02-09 RX ADMIN — MEROPENEM 100 MILLIGRAM(S): 1 INJECTION INTRAVENOUS at 17:12

## 2019-02-09 RX ADMIN — Medication 1 MILLIGRAM(S): at 11:52

## 2019-02-09 RX ADMIN — Medication 1 TABLET(S): at 11:52

## 2019-02-09 RX ADMIN — OCTREOTIDE ACETATE 100 MICROGRAM(S): 200 INJECTION, SOLUTION INTRAVENOUS; SUBCUTANEOUS at 13:47

## 2019-02-09 RX ADMIN — PANTOPRAZOLE SODIUM 40 MILLIGRAM(S): 20 TABLET, DELAYED RELEASE ORAL at 06:32

## 2019-02-09 RX ADMIN — OCTREOTIDE ACETATE 100 MICROGRAM(S): 200 INJECTION, SOLUTION INTRAVENOUS; SUBCUTANEOUS at 06:31

## 2019-02-09 RX ADMIN — HEPARIN SODIUM 5000 UNIT(S): 5000 INJECTION INTRAVENOUS; SUBCUTANEOUS at 17:12

## 2019-02-09 RX ADMIN — MIDODRINE HYDROCHLORIDE 15 MILLIGRAM(S): 2.5 TABLET ORAL at 06:32

## 2019-02-09 RX ADMIN — Medication 100 MILLIGRAM(S): at 11:52

## 2019-02-09 RX ADMIN — OCTREOTIDE ACETATE 100 MICROGRAM(S): 200 INJECTION, SOLUTION INTRAVENOUS; SUBCUTANEOUS at 22:29

## 2019-02-09 RX ADMIN — MIDODRINE HYDROCHLORIDE 15 MILLIGRAM(S): 2.5 TABLET ORAL at 22:29

## 2019-02-09 NOTE — PROGRESS NOTE ADULT - SUBJECTIVE AND OBJECTIVE BOX
Patient is a 48y old  Male who presents with a chief complaint of Coffee ground emesis, progressive jaundice, syncopal episodes. (09 Feb 2019 09:02)      HPI:  47 y/o male PMH HTN (not on medication), chronic alcohol abuse and dependence x 15 years (1/2 pint of vodka with cranberry juice daily), hx of alcohol withdrawal 2013 hospitalized at [facility] after which he went to inpatient rehab at [facility] for syncope and melanotic stools. History obtained from ex wife, mother, and patient. Since pt left rehab in 2013 pt has been drinking daily. Last drink 11AM today. Pt state for past one and a half months he has had progressive weight loss with loss of appetite. He has had in increased abdominal distention for the past 1 month and in the past 1 week he has noticed jaundice and scleral icterus. Pt also reports generalized fatigue. Reports coffee ground emesis x1 episode 3 days ago but none since then with melena for about a week. Pt states he has 3 bowel movements a day and now has also been seeing "blood when I pee" also within the last week. No abdominal pain. No CP no SOB. No fevers or chills. Today pt called ex wife after he was unsteady with his gait and "fell", he denies LOC. Denies trauma to head. Ex wife reports while attempting to get pt to the car she noticed he was "very weak" and witnessed pt to have had 3 syncopal episodes lasting a few minutes each before regaining consciousness, but no seizures. Ex wife state that in the car, pt had a bowel movement that was black and tarry.     In ED pt noted to be hypotensive SBP 80-90s. 2L IVF given with 3rd and 4th infusing. SBP post IVF resuscitation 100s to one teens. On labs pt with leukocytosis with 3% bands, Na 123, Cr 2.05, INR 2.05, T bili 6, , ALT 55, Alk phos 799.  Pt seen by critical care and not felt to be ICU candidate. (23 Jan 2019 22:33)      INTERVAL HPI/OVERNIGHT EVENTS:  Improving daily. Ate 1/2 hamburger with some fries. Happy at his present status.     MEDICATIONS  (STANDING):  folic acid 1 milliGRAM(s) Oral daily  heparin  Injectable 5000 Unit(s) SubCutaneous every 12 hours  meropenem  IVPB 500 milliGRAM(s) IV Intermittent every 12 hours  midodrine 15 milliGRAM(s) Oral every 8 hours  multivitamin 1 Tablet(s) Oral daily  nicotine -  14 mG/24Hr(s) Patch 1 patch Transdermal daily  octreotide  Injectable 100 MICROGram(s) SubCutaneous every 8 hours  pantoprazole  Injectable 40 milliGRAM(s) IV Push every 12 hours  rifaximin 550 milliGRAM(s) Oral two times a day  thiamine 100 milliGRAM(s) Oral daily    MEDICATIONS  (PRN):  oxyCODONE    IR 5 milliGRAM(s) Oral every 6 hours PRN Mild Pain (1 - 3)      FAMILY HISTORY:  No pertinent family history in first degree relatives      Allergies    No Known Allergies    Intolerances        PMH/PSH:  ETOH abuse  Psoriasis  History of hypertension  No significant past surgical history        REVIEW OF SYSTEMS:  CONSTITUTIONAL: No fever, weight loss, or fatigue  EYES: No eye pain, visual disturbances, or discharge  ENMT:  No difficulty hearing, tinnitus, vertigo; No sinus or throat pain  NECK: No pain or stiffness  BREASTS: No pain, masses, or nipple discharge  RESPIRATORY: No cough, wheezing, chills or hemoptysis; No shortness of breath  CARDIOVASCULAR: No chest pain, palpitations, dizziness, or leg swelling  GASTROINTESTINAL: See above  GENITOURINARY: No dysuria, frequency, hematuria, or incontinence  NEUROLOGICAL: No headaches, memory loss, loss of strength, numbness, or tremors  SKIN: No itching, burning, rashes, or lesions . Jaundice (+)  LYMPH NODES: No enlarged glands  ENDOCRINE: No heat or cold intolerance; No hair loss  MUSCULOSKELETAL: No joint pain or swelling; No muscle, back, or extremity pain  PSYCHIATRIC: No depression, anxiety, mood swings, or difficulty sleeping  HEME/LYMPH: No easy bruising, or bleeding gums  ALLERGY AND IMMUNOLOGIC: No hives or eczema    Vital Signs Last 24 Hrs  T(C): 36.6 (09 Feb 2019 17:04), Max: 36.6 (09 Feb 2019 17:04)  T(F): 97.9 (09 Feb 2019 17:04), Max: 97.9 (09 Feb 2019 17:04)  HR: 57 (09 Feb 2019 17:04) (54 - 58)  BP: 114/69 (09 Feb 2019 17:04) (98/62 - 114/69)  BP(mean): --  RR: 20 (09 Feb 2019 17:04) (17 - 20)  SpO2: 100% (09 Feb 2019 17:04) (98% - 100%)    PHYSICAL EXAM:  GENERAL: NAD, well-groomed, well-developed  HEAD:  Atraumatic, Normocephalic  EYES: EOMI, PERRLA, conjunctiva and sclera jaundiced  NECK: Supple, No JVD, Normal thyroid  NERVOUS SYSTEM:  Alert & Oriented X3, Good concentration;   CHEST/LUNG: Clear to percussion bilaterally; No rales, rhonchi, wheezing, or rubs  HEART: Regular rate and rhythm; No murmurs, rubs, or gallops  ABDOMEN: Soft, Nontender, Distended; Bowel sounds present  EXTREMITIES:  2+ Peripheral Pulses, No clubbing, cyanosis, or edema  LYMPH: No lymphadenopathy noted  SKIN: No rashes or lesions    LAB                          12.5   30.50 )-----------( 317      ( 09 Feb 2019 08:55 )             37.5       CBC:  02-09 @ 08:55  WBC 30.50   Hgb 12.5   Hct 37.5   Plts 317  .6  02-08 @ 08:16  WBC 31.24   Hgb 11.8   Hct 34.7   Plts 309  .0  02-07 @ 07:49  WBC 34.36   Hgb 11.6   Hct 33.7   Plts 281  .1  02-06 @ 08:03  WBC 39.93   Hgb 12.2   Hct 36.9   Plts 300  .4  02-05 @ 10:20  WBC 43.75   Hgb 12.6   Hct 35.8   Plts 319  .9      Chemistry:  02-09 @ 08:55  Na+ 137  K+ 3.9  Cl- 102  CO2 21  BUN 89  Cr 4.02     02-08 @ 08:16  Na+ 133  K+ 4.1  Cl- 101  CO2 21  BUN 97  Cr 4.69     02-07 @ 07:49  Na+ 132  K+ 4.2  Cl- 100  CO2 21    Cr 5.14     02-06 @ 08:03  Na+ 133  K+ 4.1  Cl- 103  CO2 18    Cr 5.45     02-05 @ 10:20  Na+ 136  K+ 3.7  Cl- 106  CO2 17    Cr 5.64     02-04 @ 07:31  Na+ 136  K+ 3.6  Cl- 106  CO2 21    Cr 6.05     02-03 @ 08:51  Na+ 135  K+ 3.6  Cl- 105  CO2 21    Cr 6.21         Glucose, Serum: 94 mg/dL (02-09 @ 08:55)  Glucose, Serum: 110 mg/dL (02-08 @ 08:16)  Glucose, Serum: 101 mg/dL (02-07 @ 07:49)  Glucose, Serum: 118 mg/dL (02-06 @ 08:03)  Glucose, Serum: 128 mg/dL (02-05 @ 10:20)  Glucose, Serum: 123 mg/dL (02-04 @ 07:31)  Glucose, Serum: 119 mg/dL (02-03 @ 08:51)      09 Feb 2019 08:55    137    |  102    |  89     ----------------------------<  94     3.9     |  21     |  4.02   08 Feb 2019 08:16    133    |  101    |  97     ----------------------------<  110    4.1     |  21     |  4.69   07 Feb 2019 07:49    132    |  100    |  103    ----------------------------<  101    4.2     |  21     |  5.14   06 Feb 2019 08:03    133    |  103    |  102    ----------------------------<  118    4.1     |  18     |  5.45   05 Feb 2019 10:20    136    |  106    |  109    ----------------------------<  128    3.7     |  17     |  5.64   04 Feb 2019 07:31    136    |  106    |  119    ----------------------------<  123    3.6     |  21     |  6.05   03 Feb 2019 08:51    135    |  105    |  125    ----------------------------<  119    3.6     |  21     |  6.21     Ca    8.6        09 Feb 2019 08:55  Ca    8.8        08 Feb 2019 08:16  Ca    8.3        07 Feb 2019 07:49  Ca    8.4        06 Feb 2019 08:03  Ca    8.6        05 Feb 2019 10:20  Ca    8.6        04 Feb 2019 07:31  Ca    8.4        03 Feb 2019 08:51  Phos  5.7       05 Feb 2019 10:20  Mg     2.7       05 Feb 2019 10:20    TPro  7.5    /  Alb  2.7    /  TBili  9.8    /  DBili  x      /  AST  72     /  ALT  31     /  AlkPhos  409    09 Feb 2019 08:55  TPro  7.1    /  Alb  2.6    /  TBili  10.4   /  DBili  7.97   /  AST  70     /  ALT  29     /  AlkPhos  364    08 Feb 2019 08:16  TPro  6.9    /  Alb  2.6    /  TBili  10.1   /  DBili  7.57   /  AST  66     /  ALT  25     /  AlkPhos  344    07 Feb 2019 07:49  TPro  7.1    /  Alb  2.5    /  TBili  9.8    /  DBili  7.09   /  AST  75     /  ALT  25     /  AlkPhos  341    06 Feb 2019 08:03  TPro  7.2    /  Alb  2.8    /  TBili  10.3   /  DBili  x      /  AST  71     /  ALT  25     /  AlkPhos  356    05 Feb 2019 10:20  TPro  7.0    /  Alb  2.8    /  TBili  10.8   /  DBili  x      /  AST  81     /  ALT  23     /  AlkPhos  355    04 Feb 2019 07:31  TPro  6.6    /  Alb  2.7    /  TBili  10.8   /  DBili  x      /  AST  89     /  ALT  23     /  AlkPhos  366    03 Feb 2019 08:51      PT/INR - ( 08 Feb 2019 11:49 )   PT: 23.8 sec;   INR: 2.08 ratio         PTT - ( 08 Feb 2019 11:49 )  PTT:42.0 sec        CAPILLARY BLOOD GLUCOSE              RADIOLOGY & ADDITIONAL TESTS:    Imaging Personally Reviewed:  [ ] YES  [ ] NO    Consultant(s) Notes Reviewed:  [ ] YES  [ ] NO    Care Discussed with Consultants/Other Providers [ ] YES  [ ] NO

## 2019-02-09 NOTE — PROGRESS NOTE ADULT - SUBJECTIVE AND OBJECTIVE BOX
HPI:  49 y/o male PMH HTN (not on medication), chronic alcohol abuse and dependence x 15 years (1/2 pint of vodka with cranberry juice daily), hx of alcohol withdrawal 2013 hospitalized at [facility] after which he went to inpatient rehab at [facility] for syncope and melanotic stools. History obtained from ex wife, mother, and patient. Since pt left rehab in 2013 pt has been drinking daily. Last drink 11AM today. Pt state for past one and a half months he has had progressive weight loss with loss of appetite. He has had in increased abdominal distention for the past 1 month and in the past 1 week he has noticed jaundice and scleral icterus. Pt also reports generalized fatigue. Reports coffee ground emesis x1 episode 3 days ago but none since then with melena for about a week. Pt states he has 3 bowel movements a day and now has also been seeing "blood when I pee" also within the last week. No abdominal pain. No CP no SOB. No fevers or chills. Today pt called ex wife after he was unsteady with his gait and "fell", he denies LOC. Denies trauma to head. Ex wife reports while attempting to get pt to the car she noticed he was "very weak" and witnessed pt to have had 3 syncopal episodes lasting a few minutes each before regaining consciousness, but no seizures. Ex wife state that in the car, pt had a bowel movement that was black and tarry.     In ED pt noted to be hypotensive SBP 80-90s. 2L IVF given with 3rd and 4th infusing. SBP post IVF resuscitation 100s to one teens. On labs pt with leukocytosis with 3% bands, Na 123, Cr 2.05, INR 2.05, T bili 6, , ALT 55, Alk phos 799.  Pt seen by critical care and not felt to be ICU candidate. (23 Jan 2019 22:33)      Patient is a 48y old  Male who presents with a chief complaint of Coffee ground emesis, progressive jaundice, syncopal episodes. (09 Feb 2019 17:55)      INTERVAL HPI/OVERNIGHT EVENTS: no acute events overnight     MEDICATIONS  (STANDING):  folic acid 1 milliGRAM(s) Oral daily  meropenem  IVPB 500 milliGRAM(s) IV Intermittent every 12 hours  midodrine 15 milliGRAM(s) Oral every 8 hours  multivitamin 1 Tablet(s) Oral daily  nicotine -  14 mG/24Hr(s) Patch 1 patch Transdermal daily  octreotide  Injectable 100 MICROGram(s) SubCutaneous every 8 hours  pantoprazole  Injectable 40 milliGRAM(s) IV Push every 12 hours  rifaximin 550 milliGRAM(s) Oral two times a day  thiamine 100 milliGRAM(s) Oral daily    MEDICATIONS  (PRN):  oxyCODONE    IR 5 milliGRAM(s) Oral every 6 hours PRN Mild Pain (1 - 3)      Allergies    No Known Allergies    Intolerances        REVIEW OF SYSTEMS:  CONSTITUTIONAL:  fatigue but feels better   EYES: icteric sclara   ENMT:  No difficulty hearing, tinnitus, vertigo; No sinus or throat pain  NECK: No pain or stiffness  RESPIRATORY: No cough, wheezing, chills or hemoptysis; No shortness of breath  CARDIOVASCULAR: No chest pain, palpitations, dizziness, or leg swelling  GASTROINTESTINAL: abdominal distension and pain   GENITOURINARY: No dysuria, frequency, hematuria, or incontinence  NEUROLOGICAL: No headaches, memory loss, loss of strength, numbness, or tremors  SKIN: jaundice LYMPH NODES: No enlarged glands  ENDOCRINE: No heat or cold intolerance; No hair loss  MUSCULOSKELETAL: back pain   PSYCHIATRIC: No depression, anxiety, mood swings, or difficulty sleeping  HEME/LYMPH: No easy bruising, or bleeding gums  ALLERGY AND IMMUNOLOGIC: No hives or eczema    Vital Signs Last 24 Hrs  T(C): 36.6 (09 Feb 2019 17:04), Max: 36.6 (09 Feb 2019 17:04)  T(F): 97.9 (09 Feb 2019 17:04), Max: 97.9 (09 Feb 2019 17:04)  HR: 57 (09 Feb 2019 17:04) (54 - 58)  BP: 114/69 (09 Feb 2019 17:04) (98/62 - 114/69)  BP(mean): --  RR: 20 (09 Feb 2019 17:04) (17 - 20)  SpO2: 100% (09 Feb 2019 17:04) (98% - 100%)    PHYSICAL EXAM:  GENERAL: NAD, well-groomed, well-developed  HEAD:  Atraumatic, Normocephalic  EYES: icteric sclera  ENMT: No tonsillar erythema, exudates, or enlargement; Moist mucous membranes, Good dentition, No lesions  NECK: Supple, No JVD, Normal thyroid  NERVOUS SYSTEM:  Alert & Oriented X3, Good concentration; Motor Strength 5/5 B/L upper and lower extremities; DTRs 2+ intact and symmetric  CHEST/LUNG: Clear to percussion bilaterally; No rales, rhonchi, wheezing, or rubs  HEART: Regular rate and rhythm; No murmurs, rubs, or gallops  ABDOMEN: Soft, distended with fluid wave EXTREMITIES:  2+ Peripheral Pulses, No clubbing, cyanosis, or edema  LYMPH: No lymphadenopathy noted  SKIN: Jaundice   LABS:                        12.5   30.50 )-----------( 317      ( 09 Feb 2019 08:55 )             37.5     02-09    137  |  102  |  89<H>  ----------------------------<  94  3.9   |  21<L>  |  4.02<H>    Ca    8.6      09 Feb 2019 08:55    TPro  7.5  /  Alb  2.7<L>  /  TBili  9.8<H>  /  DBili  x   /  AST  72<H>  /  ALT  31  /  AlkPhos  409<H>  02-09    PT/INR - ( 08 Feb 2019 11:49 )   PT: 23.8 sec;   INR: 2.08 ratio         PTT - ( 08 Feb 2019 11:49 )  PTT:42.0 sec    CAPILLARY BLOOD GLUCOSE          RADIOLOGY & ADDITIONAL TESTS:  < from: US Abdomen Limited (02.09.19 @ 08:45) >    EXAM:  US ABDOMEN LIMITED                            PROCEDURE DATE:  02/09/2019          INTERPRETATION:    CLINICAL HISTORY: 48 years  Male with evaluate ascites.    COMPARISON: 2/1/2019    FINDINGS/  IMPRESSION:    Ultrasound evaluation of all 4quadrants was obtained with low frequency   curved transducer.    A moderate to large large volume of ascites is present in all 4 quadrants.        Imaging Personally Reviewed:  [X ] YES  [ ] NO    Consultant(s) Notes Reviewed:  [X ] YES  [ ] NO    Care Discussed with Consultants/Other Providers [X ] YES  [ ] NO

## 2019-02-09 NOTE — PROGRESS NOTE ADULT - SUBJECTIVE AND OBJECTIVE BOX
Subjective: fatigue. No dyspnea.       MEDICATIONS  (STANDING):  albumin human 25% IVPB 50 milliLiter(s) IV Intermittent daily  folic acid 1 milliGRAM(s) Oral daily  heparin  Injectable 5000 Unit(s) SubCutaneous every 12 hours  meropenem  IVPB 500 milliGRAM(s) IV Intermittent every 12 hours  midodrine 15 milliGRAM(s) Oral every 8 hours  multivitamin 1 Tablet(s) Oral daily  nicotine -  14 mG/24Hr(s) Patch 1 patch Transdermal daily  octreotide  Injectable 100 MICROGram(s) SubCutaneous every 8 hours  pantoprazole  Injectable 40 milliGRAM(s) IV Push every 12 hours  rifaximin 550 milliGRAM(s) Oral two times a day  thiamine 100 milliGRAM(s) Oral daily    MEDICATIONS  (PRN):  oxyCODONE    IR 5 milliGRAM(s) Oral every 6 hours PRN Mild Pain (1 - 3)          T(C): 36.1 (02-09-19 @ 06:13), Max: 36.7 (02-08-19 @ 17:07)  HR: 54 (02-09-19 @ 06:13) (54 - 65)  BP: 110/70 (02-09-19 @ 06:13) (98/62 - 111/66)  RR: 17 (02-09-19 @ 06:13) (17 - 18)  SpO2: 98% (02-09-19 @ 06:13) (98% - 100%)  Wt(kg): --        I&O's Detail           PHYSICAL EXAM:    GENERAL: jaundice  EYES: EOMI, PERRLA, icteric sclera  NECK: Supple, no inc in JVP  CHEST/LUNG: dec BS  HEART: S1S2  ABDOMEN: tensely distended, dressing in RLQ  EXTREMITIES:  min edema  NEURO: no asterixis        LABS:  CBC Full  -  ( 09 Feb 2019 08:55 )  WBC Count : 30.50 K/uL  Hemoglobin : 12.5 g/dL  Hematocrit : 37.5 %  Platelet Count - Automated : 317 K/uL  Mean Cell Volume : 103.6 fl  Mean Cell Hemoglobin : 34.5 pg  Mean Cell Hemoglobin Concentration : 33.3 gm/dL  Auto Neutrophil # : x  Auto Lymphocyte # : x  Auto Monocyte # : x  Auto Eosinophil # : x  Auto Basophil # : x  Auto Neutrophil % : x  Auto Lymphocyte % : x  Auto Monocyte % : x  Auto Eosinophil % : x  Auto Basophil % : x    02-08    133<L>  |  101  |  97<H>  ----------------------------<  110<H>  4.1   |  21<L>  |  4.69<H>    Ca    8.8      08 Feb 2019 08:16    TPro  7.1  /  Alb  2.6<L>  /  TBili  10.4<H>  /  DBili  7.97<H>  /  AST  70<H>  /  ALT  29  /  AlkPhos  364<H>  02-08    PT/INR - ( 08 Feb 2019 11:49 )   PT: 23.8 sec;   INR: 2.08 ratio         PTT - ( 08 Feb 2019 11:49 )  PTT:42.0 sec        Impression:  * PAPO -- ATN vs Type 1 HRS. Indices slowly better  * Alcoholic hepatitis  * GI bleed.     Recommendations:   * Cont supportive care  * Cont HR protocol  * No dialytic indication nor is he a candidate  * CrCl < 10-15cc/min

## 2019-02-09 NOTE — PROGRESS NOTE ADULT - ASSESSMENT
HPI:  49 y/o male PMH HTN (not on medication), chronic alcohol abuse and dependence x 15 years (1/2 pint of vodka with cranberry juice daily), hx of alcohol withdrawal 2013 hospitalized at [facility] after which he went to inpatient rehab at [facility] presents for syncope and melanotic stools. History obtained from ex wife, mother, and patient. Since pt left rehab in 2013 pt has been drinking daily. Last drink 11AM today. Pt state for past one and a half months he has had progressive weight loss with loss of appetite. He has had in increased abdominal distention for the past 1 month and in the past 1 week he has noticed jaundice and scleral icterus. Pt also reports generalized fatigue. Reports coffee ground emesis x1 episode 3 days ago but none since then with melena for about a week. Pt states he has 3 bowel movements a day and now has also been seeing "blood when I pee" also within the last week. No abdominal pain. No CP no SOB. No fevers or chills. Today pt called ex wife after he was unsteady with his gait and "fell", he denies LOC. Denies trauma to head. Ex wife reports while attempting to get pt to the car she noticed he was "very weak" and witnessed pt to have had 3 syncopal episodes lasting a few minutes each before regaining consciousness, but no seizures. Ex wife state that in the car, pt had a bowel movement that was black and tarry.     In ED pt noted to be hypotensive SBP 80-90s. 2L IVF given with 3rd and 4th infusing. SBP post IVF resuscitation 100s to one teens. On labs pt with leukocytosis with 3% bands, Na 123, Cr 2.05, INR 2.05, T bili 6, , ALT 55, Alk phos 799.  Pt seen by critical care and not felt to be ICU candidate. (23 Jan 2019 22:33)  ----------------- As Above ------------------------------------------------------  Patient confused. Left message for family member to contact me   ETOH Abuse (+). Coffee ground emesis x 1, melena.   Eleveated LFTs, INR  See labs, CT Scan    Cirrhosis, ascites, fever - 1) Paracentesis 2) ammonia level  3) f/u LFTs 4) treat for impending Dts  - patient does not fit into the criteria for treatment with steroids.

## 2019-02-09 NOTE — PROGRESS NOTE ADULT - PROBLEM SELECTOR PLAN 2
LFTs unchanged . Being treated for hepatorenal. Not a candidate for steroids.  11.2 / 132 / 137 / 403.  - 9.4 / 139 / 37 / 411 - 9.1/114/27/397  - 10.8 / 81 / 23 / 355 - 10.3 / 71 / 25 / 356  -- 9.8 / 7.5 / 25 / 341 - 10.1 / 66 / 25 / 344  - 10.4 / 70 / 29 / 364 - 9.8 / 72 / 31 / 409 nh4 27 BUN / CRE  89 / 4.02    prognosis poor

## 2019-02-09 NOTE — PROGRESS NOTE ADULT - ASSESSMENT
48M PMH HTN, chronic alcohol abuse and dependence x 15 years (1/2 pint of vodka with cranberry juice daily), hx of alcohol withdrawal presents for syncope x3 with generalized weakness, weight loss, loss of appetite, jaundice, melena, coffee ground emesis. Here with hypotension, lactic acidosis, hyponatremia, elevated Cr, jaundice, hyperbilirubinemia, elevated alk phos and AST, melena, coffee ground emesis, elevated INR, cirrhosis with ascites. < from: US Abdomen Limited (02.01.19 @ 12:45) >here is a moderate to large amount of ascites seen in all 4 quadrants. s/p paracetesis on 2/1-700 ml fluid removed. Octreo and Midodrine for now;   Albumin restarted     on abx   repeat paracentesis denied   long discussion with family and patient about prognosis but optimistic about patient being stable engh for physical rehab   will get hematology consult as well for leukcytosis

## 2019-02-10 LAB
ALBUMIN SERPL ELPH-MCNC: 2.6 G/DL — LOW (ref 3.3–5)
ALP SERPL-CCNC: 399 U/L — HIGH (ref 40–120)
ALT FLD-CCNC: 31 U/L — SIGNIFICANT CHANGE UP (ref 12–78)
AMMONIA BLD-MCNC: <10 UMOL/L — LOW (ref 11–32)
ANION GAP SERPL CALC-SCNC: 11 MMOL/L — SIGNIFICANT CHANGE UP (ref 5–17)
AST SERPL-CCNC: 72 U/L — HIGH (ref 15–37)
BILIRUB DIRECT SERPL-MCNC: 6.72 MG/DL — HIGH (ref 0.05–0.2)
BILIRUB INDIRECT FLD-MCNC: 2.3 MG/DL — HIGH (ref 0.2–1)
BILIRUB SERPL-MCNC: 9 MG/DL — HIGH (ref 0.2–1.2)
BUN SERPL-MCNC: 82 MG/DL — HIGH (ref 7–23)
CALCIUM SERPL-MCNC: 8.5 MG/DL — SIGNIFICANT CHANGE UP (ref 8.5–10.1)
CHLORIDE SERPL-SCNC: 102 MMOL/L — SIGNIFICANT CHANGE UP (ref 96–108)
CO2 SERPL-SCNC: 21 MMOL/L — LOW (ref 22–31)
CREAT SERPL-MCNC: 3.53 MG/DL — HIGH (ref 0.5–1.3)
GLUCOSE SERPL-MCNC: 99 MG/DL — SIGNIFICANT CHANGE UP (ref 70–99)
HCT VFR BLD CALC: 36.3 % — LOW (ref 39–50)
HGB BLD-MCNC: 12 G/DL — LOW (ref 13–17)
INR BLD: 2.01 RATIO — HIGH (ref 0.88–1.16)
MAGNESIUM SERPL-MCNC: 2.2 MG/DL — SIGNIFICANT CHANGE UP (ref 1.6–2.6)
MCHC RBC-ENTMCNC: 33.1 GM/DL — SIGNIFICANT CHANGE UP (ref 32–36)
MCHC RBC-ENTMCNC: 34.7 PG — HIGH (ref 27–34)
MCV RBC AUTO: 104.9 FL — HIGH (ref 80–100)
NRBC # BLD: 0 /100 WBCS — SIGNIFICANT CHANGE UP (ref 0–0)
PHOSPHATE SERPL-MCNC: 4.6 MG/DL — HIGH (ref 2.5–4.5)
PLATELET # BLD AUTO: 338 K/UL — SIGNIFICANT CHANGE UP (ref 150–400)
POTASSIUM SERPL-MCNC: 4.1 MMOL/L — SIGNIFICANT CHANGE UP (ref 3.5–5.3)
POTASSIUM SERPL-SCNC: 4.1 MMOL/L — SIGNIFICANT CHANGE UP (ref 3.5–5.3)
PROT SERPL-MCNC: 7.3 GM/DL — SIGNIFICANT CHANGE UP (ref 6–8.3)
PROTHROM AB SERPL-ACNC: 23 SEC — HIGH (ref 10–12.9)
RBC # BLD: 3.46 M/UL — LOW (ref 4.2–5.8)
RBC # FLD: 19.5 % — HIGH (ref 10.3–14.5)
SODIUM SERPL-SCNC: 134 MMOL/L — LOW (ref 135–145)
WBC # BLD: 27.87 K/UL — HIGH (ref 3.8–10.5)
WBC # FLD AUTO: 27.87 K/UL — HIGH (ref 3.8–10.5)

## 2019-02-10 PROCEDURE — 99233 SBSQ HOSP IP/OBS HIGH 50: CPT

## 2019-02-10 RX ADMIN — MEROPENEM 100 MILLIGRAM(S): 1 INJECTION INTRAVENOUS at 18:04

## 2019-02-10 RX ADMIN — OCTREOTIDE ACETATE 100 MICROGRAM(S): 200 INJECTION, SOLUTION INTRAVENOUS; SUBCUTANEOUS at 13:56

## 2019-02-10 RX ADMIN — MEROPENEM 100 MILLIGRAM(S): 1 INJECTION INTRAVENOUS at 06:05

## 2019-02-10 RX ADMIN — MIDODRINE HYDROCHLORIDE 15 MILLIGRAM(S): 2.5 TABLET ORAL at 13:56

## 2019-02-10 RX ADMIN — Medication 100 MILLIGRAM(S): at 11:24

## 2019-02-10 RX ADMIN — Medication 1 TABLET(S): at 11:24

## 2019-02-10 RX ADMIN — OCTREOTIDE ACETATE 100 MICROGRAM(S): 200 INJECTION, SOLUTION INTRAVENOUS; SUBCUTANEOUS at 06:04

## 2019-02-10 RX ADMIN — MIDODRINE HYDROCHLORIDE 15 MILLIGRAM(S): 2.5 TABLET ORAL at 22:05

## 2019-02-10 RX ADMIN — PANTOPRAZOLE SODIUM 40 MILLIGRAM(S): 20 TABLET, DELAYED RELEASE ORAL at 18:03

## 2019-02-10 RX ADMIN — Medication 1 MILLIGRAM(S): at 11:24

## 2019-02-10 RX ADMIN — PANTOPRAZOLE SODIUM 40 MILLIGRAM(S): 20 TABLET, DELAYED RELEASE ORAL at 06:07

## 2019-02-10 RX ADMIN — OCTREOTIDE ACETATE 100 MICROGRAM(S): 200 INJECTION, SOLUTION INTRAVENOUS; SUBCUTANEOUS at 22:06

## 2019-02-10 RX ADMIN — MIDODRINE HYDROCHLORIDE 15 MILLIGRAM(S): 2.5 TABLET ORAL at 06:05

## 2019-02-10 NOTE — PROGRESS NOTE ADULT - SUBJECTIVE AND OBJECTIVE BOX
Patient is a 48y old  Male who presents with a chief complaint of Coffee ground emesis, progressive jaundice, syncopal episodes. (10 Feb 2019 08:08)      HPI:  47 y/o male PMH HTN (not on medication), chronic alcohol abuse and dependence x 15 years (1/2 pint of vodka with cranberry juice daily), hx of alcohol withdrawal 2013 hospitalized at [facility] after which he went to inpatient rehab at [facility] for syncope and melanotic stools. History obtained from ex wife, mother, and patient. Since pt left rehab in 2013 pt has been drinking daily. Last drink 11AM today. Pt state for past one and a half months he has had progressive weight loss with loss of appetite. He has had in increased abdominal distention for the past 1 month and in the past 1 week he has noticed jaundice and scleral icterus. Pt also reports generalized fatigue. Reports coffee ground emesis x1 episode 3 days ago but none since then with melena for about a week. Pt states he has 3 bowel movements a day and now has also been seeing "blood when I pee" also within the last week. No abdominal pain. No CP no SOB. No fevers or chills. Today pt called ex wife after he was unsteady with his gait and "fell", he denies LOC. Denies trauma to head. Ex wife reports while attempting to get pt to the car she noticed he was "very weak" and witnessed pt to have had 3 syncopal episodes lasting a few minutes each before regaining consciousness, but no seizures. Ex wife state that in the car, pt had a bowel movement that was black and tarry.     In ED pt noted to be hypotensive SBP 80-90s. 2L IVF given with 3rd and 4th infusing. SBP post IVF resuscitation 100s to one teens. On labs pt with leukocytosis with 3% bands, Na 123, Cr 2.05, INR 2.05, T bili 6, , ALT 55, Alk phos 799.  Pt seen by critical care and not felt to be ICU candidate. (23 Jan 2019 22:33)      INTERVAL HPI/OVERNIGHT EVENTS:  Getting stronger. The patient denies melena, hematochezia, hematemesis, nausea, vomiting, abdominal pain, constipation, diarrhea, or change in bowel movements     MEDICATIONS  (STANDING):  folic acid 1 milliGRAM(s) Oral daily  meropenem  IVPB 500 milliGRAM(s) IV Intermittent every 12 hours  midodrine 15 milliGRAM(s) Oral every 8 hours  multivitamin 1 Tablet(s) Oral daily  nicotine -  14 mG/24Hr(s) Patch 1 patch Transdermal daily  octreotide  Injectable 100 MICROGram(s) SubCutaneous every 8 hours  pantoprazole  Injectable 40 milliGRAM(s) IV Push every 12 hours  rifaximin 550 milliGRAM(s) Oral two times a day  thiamine 100 milliGRAM(s) Oral daily    MEDICATIONS  (PRN):  oxyCODONE    IR 5 milliGRAM(s) Oral every 6 hours PRN Mild Pain (1 - 3)      FAMILY HISTORY:  No pertinent family history in first degree relatives      Allergies    No Known Allergies    Intolerances        PMH/PSH:  ETOH abuse  Psoriasis  History of hypertension  No significant past surgical history        REVIEW OF SYSTEMS:  CONSTITUTIONAL: No fever, weight loss, or fatigue  EYES: No eye pain, visual disturbances, or discharge  ENMT:  No difficulty hearing, tinnitus, vertigo; No sinus or throat pain  NECK: No pain or stiffness  BREASTS: No pain, masses, or nipple discharge  RESPIRATORY: No cough, wheezing, chills or hemoptysis; No shortness of breath  CARDIOVASCULAR: No chest pain, palpitations, dizziness, or leg swelling  GASTROINTESTINAL: See above  GENITOURINARY: No dysuria, frequency, hematuria, or incontinence  NEUROLOGICAL: No headaches, memory loss, loss of strength, numbness, or tremors  SKIN: No itching, burning, or lesions   LYMPH NODES: No enlarged glands  ENDOCRINE: No heat or cold intolerance; No hair loss  MUSCULOSKELETAL: No joint pain or swelling; No muscle, back, or extremity pain  PSYCHIATRIC: No depression, anxiety, mood swings, or difficulty sleeping  HEME/LYMPH: No easy bruising, or bleeding gums  ALLERGY AND IMMUNOLOGIC: No hives or eczema    Vital Signs Last 24 Hrs  T(C): 36.1 (10 Feb 2019 11:23), Max: 36.6 (09 Feb 2019 17:04)  T(F): 97 (10 Feb 2019 11:23), Max: 97.9 (09 Feb 2019 17:04)  HR: 53 (10 Feb 2019 11:23) (53 - 60)  BP: 102/65 (10 Feb 2019 11:23) (102/65 - 114/69)  BP(mean): --  RR: 17 (10 Feb 2019 11:23) (17 - 20)  SpO2: 100% (10 Feb 2019 11:23) (100% - 100%)    PHYSICAL EXAM:  GENERAL: NAD, well-groomed, well-developed  HEAD:  Atraumatic, Normocephalic  EYES: EOMI, PERRLA, conjunctiva and sclera ( icteric )  NECK: Supple, No JVD, Normal thyroid  NERVOUS SYSTEM:  Alert & Oriented X3, Good concentration;   CHEST/LUNG: Clear to percussion bilaterally; No rales, rhonchi, wheezing, or rubs  HEART: Regular rate and rhythm; No murmurs, rubs, or gallops  ABDOMEN: Soft, Nontender, Nondistended; Bowel sounds present  EXTREMITIES:  2+ Peripheral Pulses, No clubbing, cyanosis, or edema  LYMPH: No lymphadenopathy noted  SKIN: No rashes or lesions    LAB                          12.0   27.87 )-----------( 338      ( 10 Feb 2019 09:12 )             36.3       CBC:  02-10 @ 09:12  WBC 27.87   Hgb 12.0   Hct 36.3   Plts 338  .9  02-09 @ 08:55  WBC 30.50   Hgb 12.5   Hct 37.5   Plts 317  .6  02-08 @ 08:16  WBC 31.24   Hgb 11.8   Hct 34.7   Plts 309  .0  02-07 @ 07:49  WBC 34.36   Hgb 11.6   Hct 33.7   Plts 281  .1  02-06 @ 08:03  WBC 39.93   Hgb 12.2   Hct 36.9   Plts 300  .4  02-05 @ 10:20  WBC 43.75   Hgb 12.6   Hct 35.8   Plts 319  .9      Chemistry:  02-10 @ 09:12  Na+ 134  K+ 4.1  Cl- 102  CO2 21  BUN 82  Cr 3.53     02-09 @ 08:55  Na+ 137  K+ 3.9  Cl- 102  CO2 21  BUN 89  Cr 4.02     02-08 @ 08:16  Na+ 133  K+ 4.1  Cl- 101  CO2 21  BUN 97  Cr 4.69     02-07 @ 07:49  Na+ 132  K+ 4.2  Cl- 100  CO2 21    Cr 5.14     02-06 @ 08:03  Na+ 133  K+ 4.1  Cl- 103  CO2 18    Cr 5.45     02-05 @ 10:20  Na+ 136  K+ 3.7  Cl- 106  CO2 17    Cr 5.64     02-04 @ 07:31  Na+ 136  K+ 3.6  Cl- 106  CO2 21    Cr 6.05         Glucose, Serum: 99 mg/dL (02-10 @ 09:12)  Glucose, Serum: 94 mg/dL (02-09 @ 08:55)  Glucose, Serum: 110 mg/dL (02-08 @ 08:16)  Glucose, Serum: 101 mg/dL (02-07 @ 07:49)  Glucose, Serum: 118 mg/dL (02-06 @ 08:03)  Glucose, Serum: 128 mg/dL (02-05 @ 10:20)  Glucose, Serum: 123 mg/dL (02-04 @ 07:31)      10 Feb 2019 09:12    134    |  102    |  82     ----------------------------<  99     4.1     |  21     |  3.53   09 Feb 2019 08:55    137    |  102    |  89     ----------------------------<  94     3.9     |  21     |  4.02   08 Feb 2019 08:16    133    |  101    |  97     ----------------------------<  110    4.1     |  21     |  4.69   07 Feb 2019 07:49    132    |  100    |  103    ----------------------------<  101    4.2     |  21     |  5.14   06 Feb 2019 08:03    133    |  103    |  102    ----------------------------<  118    4.1     |  18     |  5.45   05 Feb 2019 10:20    136    |  106    |  109    ----------------------------<  128    3.7     |  17     |  5.64   04 Feb 2019 07:31    136    |  106    |  119    ----------------------------<  123    3.6     |  21     |  6.05     Ca    8.5        10 Feb 2019 09:12  Ca    8.6        09 Feb 2019 08:55  Ca    8.8        08 Feb 2019 08:16  Ca    8.3        07 Feb 2019 07:49  Ca    8.4        06 Feb 2019 08:03  Ca    8.6        05 Feb 2019 10:20  Ca    8.6        04 Feb 2019 07:31  Phos  4.6       10 Feb 2019 09:12  Phos  5.7       05 Feb 2019 10:20  Mg     2.2       10 Feb 2019 09:12  Mg     2.7       05 Feb 2019 10:20    TPro  7.3    /  Alb  2.6    /  TBili  9.0    /  DBili  6.72   /  AST  72     /  ALT  31     /  AlkPhos  399    10 Feb 2019 09:12  TPro  7.5    /  Alb  2.7    /  TBili  9.8    /  DBili  x      /  AST  72     /  ALT  31     /  AlkPhos  409    09 Feb 2019 08:55  TPro  7.1    /  Alb  2.6    /  TBili  10.4   /  DBili  7.97   /  AST  70     /  ALT  29     /  AlkPhos  364    08 Feb 2019 08:16  TPro  6.9    /  Alb  2.6    /  TBili  10.1   /  DBili  7.57   /  AST  66     /  ALT  25     /  AlkPhos  344    07 Feb 2019 07:49  TPro  7.1    /  Alb  2.5    /  TBili  9.8    /  DBili  7.09   /  AST  75     /  ALT  25     /  AlkPhos  341    06 Feb 2019 08:03  TPro  7.2    /  Alb  2.8    /  TBili  10.3   /  DBili  x      /  AST  71     /  ALT  25     /  AlkPhos  356    05 Feb 2019 10:20  TPro  7.0    /  Alb  2.8    /  TBili  10.8   /  DBili  x      /  AST  81     /  ALT  23     /  AlkPhos  355    04 Feb 2019 07:31      PT/INR - ( 10 Feb 2019 09:12 )   PT: 23.0 sec;   INR: 2.01 ratio                 CAPILLARY BLOOD GLUCOSE              RADIOLOGY & ADDITIONAL TESTS:    Imaging Personally Reviewed:  [ ] YES  [ ] NO    Consultant(s) Notes Reviewed:  [ ] YES  [ ] NO    Care Discussed with Consultants/Other Providers [ ] YES  [ ] NO

## 2019-02-10 NOTE — PROGRESS NOTE ADULT - SUBJECTIVE AND OBJECTIVE BOX
HPI:  47 y/o male PMH HTN (not on medication), chronic alcohol abuse and dependence x 15 years (1/2 pint of vodka with cranberry juice daily), hx of alcohol withdrawal 2013 hospitalized at [facility] after which he went to inpatient rehab at [facility] for syncope and melanotic stools. History obtained from ex wife, mother, and patient. Since pt left rehab in 2013 pt has been drinking daily. Last drink 11AM today. Pt state for past one and a half months he has had progressive weight loss with loss of appetite. He has had in increased abdominal distention for the past 1 month and in the past 1 week he has noticed jaundice and scleral icterus. Pt also reports generalized fatigue. Reports coffee ground emesis x1 episode 3 days ago but none since then with melena for about a week. Pt states he has 3 bowel movements a day and now has also been seeing "blood when I pee" also within the last week. No abdominal pain. No CP no SOB. No fevers or chills. Today pt called ex wife after he was unsteady with his gait and "fell", he denies LOC. Denies trauma to head. Ex wife reports while attempting to get pt to the car she noticed he was "very weak" and witnessed pt to have had 3 syncopal episodes lasting a few minutes each before regaining consciousness, but no seizures. Ex wife state that in the car, pt had a bowel movement that was black and tarry.     In ED pt noted to be hypotensive SBP 80-90s. 2L IVF given with 3rd and 4th infusing. SBP post IVF resuscitation 100s to one teens. On labs pt with leukocytosis with 3% bands, Na 123, Cr 2.05, INR 2.05, T bili 6, , ALT 55, Alk phos 799.  Pt seen by critical care and not felt to be ICU candidate. (23 Jan 2019 22:33)    Patient is a 48y old  Male who presents with a chief complaint of Coffee ground emesis, progressive jaundice, syncopal episodes. (10 Feb 2019 17:02)      INTERVAL HPI/OVERNIGHT EVENTS: no complaints today feels better     MEDICATIONS  (STANDING):  folic acid 1 milliGRAM(s) Oral daily  meropenem  IVPB 500 milliGRAM(s) IV Intermittent every 12 hours  midodrine 15 milliGRAM(s) Oral every 8 hours  multivitamin 1 Tablet(s) Oral daily  nicotine -  14 mG/24Hr(s) Patch 1 patch Transdermal daily  octreotide  Injectable 100 MICROGram(s) SubCutaneous every 8 hours  pantoprazole  Injectable 40 milliGRAM(s) IV Push every 12 hours  rifaximin 550 milliGRAM(s) Oral two times a day  thiamine 100 milliGRAM(s) Oral daily    MEDICATIONS  (PRN):  oxyCODONE    IR 5 milliGRAM(s) Oral every 6 hours PRN Mild Pain (1 - 3)      Allergies    No Known Allergies    Intolerances        REVIEW OF SYSTEMS:  CONSTITUTIONAL: No fever, weight loss, or fatigue  EYES:icteric sclera  ENMT:  No difficulty hearing, tinnitus, vertigo; No sinus or throat pain  NECK: No pain or stiffness  RESPIRATORY: No cough, wheezing, chills or hemoptysis; No shortness of breath  CARDIOVASCULAR: No chest pain, palpitations, dizziness, or leg swelling  GASTROINTESTINAL: abdominal distension GENITOURINARY: No dysuria, frequency, hematuria, or incontinence  NEUROLOGICAL: No headaches, memory loss, loss of strength, numbness, or tremors  SKIN: Jaundice LYMPH NODES: No enlarged glands  ENDOCRINE: No heat or cold intolerance; No hair loss  MUSCULOSKELETAL: No joint pain or swelling; No muscle, back, or extremity pain  PSYCHIATRIC: No depression, anxiety, mood swings, or difficulty sleeping  HEME/LYMPH: No easy bruising, or bleeding gums  ALLERGY AND IMMUNOLOGIC: No hives or eczema    Vital Signs Last 24 Hrs  T(C): 36.1 (10 Feb 2019 11:23), Max: 36.5 (09 Feb 2019 23:26)  T(F): 97 (10 Feb 2019 11:23), Max: 97.7 (09 Feb 2019 23:26)  HR: 53 (10 Feb 2019 11:23) (53 - 60)  BP: 102/65 (10 Feb 2019 11:23) (102/65 - 109/75)  BP(mean): --  RR: 17 (10 Feb 2019 11:23) (17 - 18)  SpO2: 100% (10 Feb 2019 11:23) (100% - 100%)    PHYSICAL EXAM:  GENERAL: NAD, well-groomed, well-developed  HEAD:  Atraumatic, Normocephalic  EYES: icteric sclera   ENMT: No tonsillar erythema, exudates, or enlargement; Moist mucous membranes, Good dentition, No lesions  NECK: Supple, No JVD, Normal thyroid  NERVOUS SYSTEM:  Alert & Oriented X3, Good concentration; Motor Strength 5/5 B/L upper and lower extremities; DTRs 2+ intact and symmetric  CHEST/LUNG: Clear to percussion bilaterally; No rales, rhonchi, wheezing, or rubs  HEART: Regular rate and rhythm; No murmurs, rubs, or gallops  ABDOMEN: Soft, mild tenderness with fluid wave   EXTREMITIES:  2+ Peripheral Pulses, No clubbing, cyanosis, or edema  LYMPH: No lymphadenopathy noted  SKIN: Jaundice   LABS:                        12.0   27.87 )-----------( 338      ( 10 Feb 2019 09:12 )             36.3     02-10    134<L>  |  102  |  82<H>  ----------------------------<  99  4.1   |  21<L>  |  3.53<H>    Ca    8.5      10 Feb 2019 09:12  Phos  4.6     02-10  Mg     2.2     02-10    TPro  7.3  /  Alb  2.6<L>  /  TBili  9.0<H>  /  DBili  6.72<H>  /  AST  72<H>  /  ALT  31  /  AlkPhos  399<H>  02-10    PT/INR - ( 10 Feb 2019 09:12 )   PT: 23.0 sec;   INR: 2.01 ratio             CAPILLARY BLOOD GLUCOSE          RADIOLOGY & ADDITIONAL TESTS:    < from: US Abdomen Limited (02.09.19 @ 08:45) >  XAM:  US ABDOMEN LIMITED                            PROCEDURE DATE:  02/09/2019          INTERPRETATION:    CLINICAL HISTORY: 48 years  Male with evaluate ascites.    COMPARISON: 2/1/2019    FINDINGS/  IMPRESSION:    Ultrasound evaluation of all 4quadrants was obtained with low frequency   curved transducer.    A moderate to large large volume of ascites is present in all 4 quadrants.    < end of copied text >  Imaging Personally Reviewed:  [X ] YES  [ ] NO    Consultant(s) Notes Reviewed:  [X ] YES  [ ] NO    Care Discussed with Consultants/Other Providers [ X] YES  [ ] NO

## 2019-02-10 NOTE — PROGRESS NOTE ADULT - ASSESSMENT
48M PMH HTN, chronic alcohol abuse and dependence x 15 years (1/2 pint of vodka with cranberry juice daily), hx of alcohol withdrawal presents for syncope x3 with generalized weakness, weight loss, loss of appetite, jaundice, melena, coffee ground emesis. Here with hypotension, lactic acidosis, hyponatremia, elevated Cr, jaundice, hyperbilirubinemia, elevated alk phos and AST, melena, coffee ground emesis, elevated INR, cirrhosis with ascites. < from: US Abdomen Limited (02.01.19 @ 12:45) >here is a moderate to large amount of ascites seen in all 4 quadrants. s/p paracetesis on 2/1-700 ml fluid removed. Octreo and Midodrine for now;   Albumin restarted on abx repeat paracentesis denied long discussion with family and patient about prognosis but optimistic about patient being stable enogh for physical rehab will get hematology consult as well for leukcytosis

## 2019-02-10 NOTE — PROGRESS NOTE ADULT - ASSESSMENT
HPI:  47 y/o male PMH HTN (not on medication), chronic alcohol abuse and dependence x 15 years (1/2 pint of vodka with cranberry juice daily), hx of alcohol withdrawal 2013 hospitalized at [facility] after which he went to inpatient rehab at [facility] presents for syncope and melanotic stools. History obtained from ex wife, mother, and patient. Since pt left rehab in 2013 pt has been drinking daily. Last drink 11AM today. Pt state for past one and a half months he has had progressive weight loss with loss of appetite. He has had in increased abdominal distention for the past 1 month and in the past 1 week he has noticed jaundice and scleral icterus. Pt also reports generalized fatigue. Reports coffee ground emesis x1 episode 3 days ago but none since then with melena for about a week. Pt states he has 3 bowel movements a day and now has also been seeing "blood when I pee" also within the last week. No abdominal pain. No CP no SOB. No fevers or chills. Today pt called ex wife after he was unsteady with his gait and "fell", he denies LOC. Denies trauma to head. Ex wife reports while attempting to get pt to the car she noticed he was "very weak" and witnessed pt to have had 3 syncopal episodes lasting a few minutes each before regaining consciousness, but no seizures. Ex wife state that in the car, pt had a bowel movement that was black and tarry.     In ED pt noted to be hypotensive SBP 80-90s. 2L IVF given with 3rd and 4th infusing. SBP post IVF resuscitation 100s to one teens. On labs pt with leukocytosis with 3% bands, Na 123, Cr 2.05, INR 2.05, T bili 6, , ALT 55, Alk phos 799.  Pt seen by critical care and not felt to be ICU candidate. (23 Jan 2019 22:33)  ----------------- As Above ------------------------------------------------------  Patient confused. Left message for family member to contact me   ETOH Abuse (+). Coffee ground emesis x 1, melena.   Eleveated LFTs, INR  See labs, CT Scan    Cirrhosis, ascites, fever - 1) Paracentesis 2) ammonia level  3) f/u LFTs 4) treat for impending Dts  - patient does not fit into the criteria for treatment with steroids.

## 2019-02-10 NOTE — PROGRESS NOTE ADULT - PROBLEM SELECTOR PLAN 2
LFTs IMPROVING . Being treated for hepatorenal. Not a candidate for steroids.  11.2 / 132 / 137 / 403.  - 9.4 / 139 / 37 / 411 - 9.1/114/27/397  - 10.8 / 81 / 23 / 355 - 10.3 / 71 / 25 / 356  -- 9.8 / 7.5 / 25 / 341 - 10.1 / 66 / 25 / 344  - 10.4 / 70 / 29 / 364 - 9.8 / 72 / 31 / 409  --  9.0 / 72 / 31 / 399  nh4 27 BUN / CRE  84 / 3.53    prognosis poor

## 2019-02-10 NOTE — PROGRESS NOTE ADULT - SUBJECTIVE AND OBJECTIVE BOX
Subjective: fatigue      MEDICATIONS  (STANDING):  folic acid 1 milliGRAM(s) Oral daily  meropenem  IVPB 500 milliGRAM(s) IV Intermittent every 12 hours  midodrine 15 milliGRAM(s) Oral every 8 hours  multivitamin 1 Tablet(s) Oral daily  nicotine -  14 mG/24Hr(s) Patch 1 patch Transdermal daily  octreotide  Injectable 100 MICROGram(s) SubCutaneous every 8 hours  pantoprazole  Injectable 40 milliGRAM(s) IV Push every 12 hours  rifaximin 550 milliGRAM(s) Oral two times a day  thiamine 100 milliGRAM(s) Oral daily    MEDICATIONS  (PRN):  oxyCODONE    IR 5 milliGRAM(s) Oral every 6 hours PRN Mild Pain (1 - 3)          T(C): 36.2 (02-10-19 @ 05:55), Max: 36.6 (02-09-19 @ 17:04)  HR: 55 (02-10-19 @ 05:55) (55 - 60)  BP: 109/75 (02-10-19 @ 05:55) (103/68 - 114/69)  RR: 17 (02-10-19 @ 05:55) (17 - 20)  SpO2: 100% (02-10-19 @ 05:55) (99% - 100%)  Wt(kg): --        I&O's Detail    09 Feb 2019 07:01  -  10 Feb 2019 07:00  --------------------------------------------------------  IN:    IV PiggyBack: 50 mL  Total IN: 50 mL    OUT:  Total OUT: 0 mL    Total NET: 50 mL               PHYSICAL EXAM:    GENERAL: jaundice  EYES: EOMI, PERRLA, icteric sclera  NECK: Supple, no inc in JVP  CHEST/LUNG: dec BS  HEART: S1S2  ABDOMEN: tensely distended, dressing in RLQ  EXTREMITIES:  min edema  NEURO: no asterixis        LABS:  CBC Full  -  ( 09 Feb 2019 08:55 )  WBC Count : 30.50 K/uL  Hemoglobin : 12.5 g/dL  Hematocrit : 37.5 %  Platelet Count - Automated : 317 K/uL  Mean Cell Volume : 103.6 fl  Mean Cell Hemoglobin : 34.5 pg  Mean Cell Hemoglobin Concentration : 33.3 gm/dL  Auto Neutrophil # : x  Auto Lymphocyte # : x  Auto Monocyte # : x  Auto Eosinophil # : x  Auto Basophil # : x  Auto Neutrophil % : x  Auto Lymphocyte % : x  Auto Monocyte % : x  Auto Eosinophil % : x  Auto Basophil % : x    02-09    137  |  102  |  89<H>  ----------------------------<  94  3.9   |  21<L>  |  4.02<H>    Ca    8.6      09 Feb 2019 08:55    TPro  7.5  /  Alb  2.7<L>  /  TBili  9.8<H>  /  DBili  x   /  AST  72<H>  /  ALT  31  /  AlkPhos  409<H>  02-09    PT/INR - ( 08 Feb 2019 11:49 )   PT: 23.8 sec;   INR: 2.08 ratio         PTT - ( 08 Feb 2019 11:49 )  PTT:42.0 sec        Impression:  * PAPO -- ATN vs Type 1 HRS. Indices slowly better  * Alcoholic hepatitis  * GI bleed.     Recommendations:   * Cont supportive care  * Cont HR protocol  * No dialytic indication  * CrCl < 10-15cc/min

## 2019-02-11 LAB
ALBUMIN SERPL ELPH-MCNC: 2.6 G/DL — LOW (ref 3.3–5)
ALP SERPL-CCNC: 434 U/L — HIGH (ref 40–120)
ALT FLD-CCNC: 31 U/L — SIGNIFICANT CHANGE UP (ref 12–78)
ANION GAP SERPL CALC-SCNC: 9 MMOL/L — SIGNIFICANT CHANGE UP (ref 5–17)
AST SERPL-CCNC: 72 U/L — HIGH (ref 15–37)
BILIRUB DIRECT SERPL-MCNC: 6.45 MG/DL — HIGH (ref 0.05–0.2)
BILIRUB INDIRECT FLD-MCNC: 1.8 MG/DL — HIGH (ref 0.2–1)
BILIRUB SERPL-MCNC: 8.2 MG/DL — HIGH (ref 0.2–1.2)
BUN SERPL-MCNC: 73 MG/DL — HIGH (ref 7–23)
CALCIUM SERPL-MCNC: 8.4 MG/DL — LOW (ref 8.5–10.1)
CHLORIDE SERPL-SCNC: 102 MMOL/L — SIGNIFICANT CHANGE UP (ref 96–108)
CO2 SERPL-SCNC: 24 MMOL/L — SIGNIFICANT CHANGE UP (ref 22–31)
CREAT SERPL-MCNC: 2.97 MG/DL — HIGH (ref 0.5–1.3)
GLUCOSE SERPL-MCNC: 90 MG/DL — SIGNIFICANT CHANGE UP (ref 70–99)
HCT VFR BLD CALC: 35.4 % — LOW (ref 39–50)
HGB BLD-MCNC: 11.8 G/DL — LOW (ref 13–17)
INR BLD: 1.99 RATIO — HIGH (ref 0.88–1.16)
MAGNESIUM SERPL-MCNC: 2.1 MG/DL — SIGNIFICANT CHANGE UP (ref 1.6–2.6)
MCHC RBC-ENTMCNC: 33.3 GM/DL — SIGNIFICANT CHANGE UP (ref 32–36)
MCHC RBC-ENTMCNC: 34.7 PG — HIGH (ref 27–34)
MCV RBC AUTO: 104.1 FL — HIGH (ref 80–100)
NRBC # BLD: 0 /100 WBCS — SIGNIFICANT CHANGE UP (ref 0–0)
PHOSPHATE SERPL-MCNC: 4.3 MG/DL — SIGNIFICANT CHANGE UP (ref 2.5–4.5)
PLATELET # BLD AUTO: 356 K/UL — SIGNIFICANT CHANGE UP (ref 150–400)
POTASSIUM SERPL-MCNC: 4.1 MMOL/L — SIGNIFICANT CHANGE UP (ref 3.5–5.3)
POTASSIUM SERPL-SCNC: 4.1 MMOL/L — SIGNIFICANT CHANGE UP (ref 3.5–5.3)
PROT SERPL-MCNC: 7.3 GM/DL — SIGNIFICANT CHANGE UP (ref 6–8.3)
PROTHROM AB SERPL-ACNC: 22.8 SEC — HIGH (ref 10–12.9)
RBC # BLD: 3.4 M/UL — LOW (ref 4.2–5.8)
RBC # FLD: 19.1 % — HIGH (ref 10.3–14.5)
SODIUM SERPL-SCNC: 135 MMOL/L — SIGNIFICANT CHANGE UP (ref 135–145)
WBC # BLD: 28.42 K/UL — HIGH (ref 3.8–10.5)
WBC # FLD AUTO: 28.42 K/UL — HIGH (ref 3.8–10.5)

## 2019-02-11 PROCEDURE — 99233 SBSQ HOSP IP/OBS HIGH 50: CPT

## 2019-02-11 PROCEDURE — 49083 ABD PARACENTESIS W/IMAGING: CPT

## 2019-02-11 RX ORDER — ZINC OXIDE 200 MG/G
1 OINTMENT TOPICAL EVERY 8 HOURS
Qty: 0 | Refills: 0 | Status: DISCONTINUED | OUTPATIENT
Start: 2019-02-11 | End: 2019-02-19

## 2019-02-11 RX ADMIN — MEROPENEM 100 MILLIGRAM(S): 1 INJECTION INTRAVENOUS at 06:02

## 2019-02-11 RX ADMIN — MIDODRINE HYDROCHLORIDE 15 MILLIGRAM(S): 2.5 TABLET ORAL at 22:21

## 2019-02-11 RX ADMIN — MIDODRINE HYDROCHLORIDE 15 MILLIGRAM(S): 2.5 TABLET ORAL at 15:25

## 2019-02-11 RX ADMIN — Medication 1 TABLET(S): at 13:02

## 2019-02-11 RX ADMIN — PANTOPRAZOLE SODIUM 40 MILLIGRAM(S): 20 TABLET, DELAYED RELEASE ORAL at 20:15

## 2019-02-11 RX ADMIN — Medication 100 MILLIGRAM(S): at 13:02

## 2019-02-11 RX ADMIN — Medication 1 MILLIGRAM(S): at 13:02

## 2019-02-11 RX ADMIN — OCTREOTIDE ACETATE 100 MICROGRAM(S): 200 INJECTION, SOLUTION INTRAVENOUS; SUBCUTANEOUS at 22:21

## 2019-02-11 RX ADMIN — PANTOPRAZOLE SODIUM 40 MILLIGRAM(S): 20 TABLET, DELAYED RELEASE ORAL at 06:03

## 2019-02-11 RX ADMIN — OCTREOTIDE ACETATE 100 MICROGRAM(S): 200 INJECTION, SOLUTION INTRAVENOUS; SUBCUTANEOUS at 06:02

## 2019-02-11 RX ADMIN — MEROPENEM 100 MILLIGRAM(S): 1 INJECTION INTRAVENOUS at 20:16

## 2019-02-11 RX ADMIN — OCTREOTIDE ACETATE 100 MICROGRAM(S): 200 INJECTION, SOLUTION INTRAVENOUS; SUBCUTANEOUS at 20:16

## 2019-02-11 RX ADMIN — MIDODRINE HYDROCHLORIDE 15 MILLIGRAM(S): 2.5 TABLET ORAL at 06:03

## 2019-02-11 NOTE — PROGRESS NOTE ADULT - ASSESSMENT
48M PMH HTN, chronic alcohol abuse and dependence x 15 years (1/2 pint of vodka with cranberry juice daily), hx of alcohol withdrawal presents for syncope x3 with generalized weakness, weight loss, loss of appetite, jaundice, melena, coffee ground emesis. Here with hypotension, lactic acidosis, hyponatremia, elevated Cr, jaundice, hyperbilirubinemia, elevated alk phos and AST, melena, coffee ground emesis, elevated INR, cirrhosis with ascites,s/p paracetesis on 2/1-700 ml fluid removed and today 2/11. all other ROS negative except as per HPI

## 2019-02-11 NOTE — PROGRESS NOTE ADULT - SUBJECTIVE AND OBJECTIVE BOX
Patient is a 48y old  Male who presents with a chief complaint of Coffee ground emesis, progressive jaundice, syncopal episodes. (13 Feb 2019 09:01)      INTERVAL HPI / OVERNIGHT EVENTS: being seen after 1/28/19.On 1/28 ICU stopped antibiotics as pt was detoriating and had gone into HRS syndrome.  Since then leukocytosis continued to go up and was 43 last week. Pt was restated on Meropenem .previously since admission he was on antibiotics for a week for possible pneumonia and SBP .HIS UA was abnormal last week though both urine and all blood c.s neagtive    MEDICATIONS  (STANDING):  folic acid 1 milliGRAM(s) Oral daily  meropenem  IVPB 500 milliGRAM(s) IV Intermittent every 12 hours  midodrine 10 milliGRAM(s) Oral every 8 hours  multivitamin 1 Tablet(s) Oral daily  nicotine -  14 mG/24Hr(s) Patch 1 patch Transdermal daily  octreotide  Injectable 100 MICROGram(s) SubCutaneous every 8 hours  pantoprazole  Injectable 40 milliGRAM(s) IV Push every 12 hours  rifaximin 550 milliGRAM(s) Oral two times a day  thiamine 100 milliGRAM(s) Oral daily    MEDICATIONS  (PRN):  zinc oxide 40% Ointment 1 Application(s) Topical every 8 hours PRN rash      Vital Signs Last 24 Hrs  Tmax ;afebrile    Review of systems:  General : no fever /chills,+ fatigue, has lost significant weight in the hospital   CVS : no chest pain, palpitations  Lungs : no shortness of breath, cough  GI :+ abdominal pain, No  vomiting, diarrhea   : no dysuria, hematuria        PHYSICAL EXAM:  General :NAD,jaundice+  Constitutional:  thin built++wt loss  Respiratory: CTAB/L  Cardiovascular: S1 and S2, RRR, no M/G/R  Gastrointestinal: BS+, distended , tenderness+  Extremities: No peripheral edema  Vascular: 2+ peripheral pulses  Skin: No rashes      LABS:                        11.8   28.42 )-----------( 356      ( 11 Feb 2019 08:16 )             35.4     02-11    135  |  102  |  73<H>  ----------------------------<  90  4.1   |  24  |  2.97<H>    Ca    8.4<L>      11 Feb 2019 08:16  Phos  4.3     02-11  Mg     2.1     02-11    TPro  7.3  /  Alb  2.6<L>  /  TBili  8.2<H>  /  DBili  6.45<H>  /  AST  72<H>  /  ALT  31  /  AlkPhos  434<H>  02-11    PT/INR - ( 11 Feb 2019 08:16 )   PT: 22.8 sec;   INR: 1.99 ratio              MICROBIOLOGY:  RECENT CULTURES:        RADIOLOGY & ADDITIONAL STUDIES:

## 2019-02-11 NOTE — PROGRESS NOTE ADULT - ASSESSMENT
HPI:  49 y/o male PMH HTN (not on medication), chronic alcohol abuse and dependence x 15 years (1/2 pint of vodka with cranberry juice daily), hx of alcohol withdrawal 2013 hospitalized at [facility] after which he went to inpatient rehab at [facility] presents for syncope and melanotic stools. History obtained from ex wife, mother, and patient. Since pt left rehab in 2013 pt has been drinking daily. Last drink 11AM today. Pt state for past one and a half months he has had progressive weight loss with loss of appetite. He has had in increased abdominal distention for the past 1 month and in the past 1 week he has noticed jaundice and scleral icterus. Pt also reports generalized fatigue. Reports coffee ground emesis x1 episode 3 days ago but none since then with melena for about a week. Pt states he has 3 bowel movements a day and now has also been seeing "blood when I pee" also within the last week. No abdominal pain. No CP no SOB. No fevers or chills. Today pt called ex wife after he was unsteady with his gait and "fell", he denies LOC. Denies trauma to head. Ex wife reports while attempting to get pt to the car she noticed he was "very weak" and witnessed pt to have had 3 syncopal episodes lasting a few minutes each before regaining consciousness, but no seizures. Ex wife state that in the car, pt had a bowel movement that was black and tarry.     In ED pt noted to be hypotensive SBP 80-90s. 2L IVF given with 3rd and 4th infusing. SBP post IVF resuscitation 100s to one teens. On labs pt with leukocytosis with 3% bands, Na 123, Cr 2.05, INR 2.05, T bili 6, , ALT 55, Alk phos 799.  Pt seen by critical care and not felt to be ICU candidate. (23 Jan 2019 22:33)  ----------------- As Above ------------------------------------------------------  Patient confused. Left message for family member to contact me   ETOH Abuse (+). Coffee ground emesis x 1, melena.   Eleveated LFTs, INR  See labs, CT Scan    Cirrhosis, ascites, fever - 1) Paracentesis 2) ammonia level  3) f/u LFTs 4) treat for impending Dts  - patient does not fit into the criteria for treatment with steroids.

## 2019-02-11 NOTE — PROCEDURE NOTE - ADDITIONAL PROCEDURE DETAILS
pt s/p repeat therapeutic paracentesis.  3200mLs of serous fluid removed.  Pt has a h/o ETOH cirrhosis and recurrent ascites.  Hemostasis achieved.  Pt tolerated procedure well.  VSS

## 2019-02-11 NOTE — PROGRESS NOTE ADULT - PROBLEM SELECTOR PLAN 1
.On 1/28 ICU stopped antibiotics as pt was detoriating and had gone into HRS syndrome.  Since then leukocytosis continued to go up and was 43 last week. Pt was restated on Meropenem .previously since admission he was on antibiotics for a week for possible pneumonia and SBP .HIS UA was abnormal last week though both urine and all blood c/s negative.  started on meropenem on 2/7 as wbc was upto 43-->36-->34  Now down to 28   I feel leukocytosis is definetley mutlifactorail with ascites,HRS but a possibility of superimposed SBP ,UTIs etc cant be overruled  leukocytosis seem to be responding to antibiotics   will cont few more day at this point

## 2019-02-11 NOTE — PROGRESS NOTE ADULT - SUBJECTIVE AND OBJECTIVE BOX
Patient is a 48y old  Male who presents with a chief complaint of Coffee ground emesis, progressive jaundice, syncopal episodes. (11 Feb 2019 13:29)      HPI:  49 y/o male PMH HTN (not on medication), chronic alcohol abuse and dependence x 15 years (1/2 pint of vodka with cranberry juice daily), hx of alcohol withdrawal 2013 hospitalized at [facility] after which he went to inpatient rehab at [facility] for syncope and melanotic stools. History obtained from ex wife, mother, and patient. Since pt left rehab in 2013 pt has been drinking daily. Last drink 11AM today. Pt state for past one and a half months he has had progressive weight loss with loss of appetite. He has had in increased abdominal distention for the past 1 month and in the past 1 week he has noticed jaundice and scleral icterus. Pt also reports generalized fatigue. Reports coffee ground emesis x1 episode 3 days ago but none since then with melena for about a week. Pt states he has 3 bowel movements a day and now has also been seeing "blood when I pee" also within the last week. No abdominal pain. No CP no SOB. No fevers or chills. Today pt called ex wife after he was unsteady with his gait and "fell", he denies LOC. Denies trauma to head. Ex wife reports while attempting to get pt to the car she noticed he was "very weak" and witnessed pt to have had 3 syncopal episodes lasting a few minutes each before regaining consciousness, but no seizures. Ex wife state that in the car, pt had a bowel movement that was black and tarry.     In ED pt noted to be hypotensive SBP 80-90s. 2L IVF given with 3rd and 4th infusing. SBP post IVF resuscitation 100s to one teens. On labs pt with leukocytosis with 3% bands, Na 123, Cr 2.05, INR 2.05, T bili 6, , ALT 55, Alk phos 799.  Pt seen by critical care and not felt to be ICU candidate. (23 Jan 2019 22:33)      INTERVAL HPI/OVERNIGHT EVENTS:  Getting stronger. The patient denies melena, hematochezia, hematemesis, nausea, vomiting, abdominal pain, constipation, diarrhea, or change in bowel movements Paracentesis with removal of 3 liters    MEDICATIONS  (STANDING):  folic acid 1 milliGRAM(s) Oral daily  meropenem  IVPB 500 milliGRAM(s) IV Intermittent every 12 hours  midodrine 15 milliGRAM(s) Oral every 8 hours  multivitamin 1 Tablet(s) Oral daily  nicotine -  14 mG/24Hr(s) Patch 1 patch Transdermal daily  octreotide  Injectable 100 MICROGram(s) SubCutaneous every 8 hours  pantoprazole  Injectable 40 milliGRAM(s) IV Push every 12 hours  rifaximin 550 milliGRAM(s) Oral two times a day  thiamine 100 milliGRAM(s) Oral daily    MEDICATIONS  (PRN):  zinc oxide 40% Ointment 1 Application(s) Topical every 8 hours PRN rash      FAMILY HISTORY:  No pertinent family history in first degree relatives      Allergies    No Known Allergies    Intolerances        PMH/PSH:  ETOH abuse  Psoriasis  History of hypertension  No significant past surgical history        REVIEW OF SYSTEMS:  CONSTITUTIONAL: No fever, weight loss, or fatigue  EYES: No eye pain, visual disturbances, or discharge  ENMT:  No difficulty hearing, tinnitus, vertigo; No sinus or throat pain  NECK: No pain or stiffness  BREASTS: No pain, masses, or nipple discharge  RESPIRATORY: No cough, wheezing, chills or hemoptysis; No shortness of breath  CARDIOVASCULAR: No chest pain, palpitations, dizziness, or leg swelling  GASTROINTESTINAL: See above  GENITOURINARY: No dysuria, frequency, hematuria, or incontinence  NEUROLOGICAL: No headaches, memory loss, loss of strength, numbness, or tremors  SKIN: No itching, burning, rashes, or lesions   LYMPH NODES: No enlarged glands  ENDOCRINE: No heat or cold intolerance; No hair loss  MUSCULOSKELETAL: No joint pain or swelling; No muscle, back, or extremity pain  PSYCHIATRIC: No depression, anxiety, mood swings, or difficulty sleeping  HEME/LYMPH: No easy bruising, or bleeding gums  ALLERGY AND IMMUNOLOGIC: No hives or eczema    Vital Signs Last 24 Hrs  T(C): 36.2 (11 Feb 2019 17:34), Max: 36.6 (10 Feb 2019 23:43)  T(F): 97.1 (11 Feb 2019 17:34), Max: 97.8 (10 Feb 2019 23:43)  HR: 63 (11 Feb 2019 17:34) (55 - 63)  BP: 87/58 (11 Feb 2019 17:34) (87/58 - 110/65)  BP(mean): --  RR: 18 (11 Feb 2019 17:34) (17 - 18)  SpO2: 98% (11 Feb 2019 17:34) (98% - 100%)    PHYSICAL EXAM:  GENERAL: NAD, well-groomed, well-developed  HEAD:  Atraumatic, Normocephalic  EYES: EOMI, PERRLA, conjunctiva and sclera jaundiced  NECK: Supple, No JVD, Normal thyroid  NERVOUS SYSTEM:  Alert & Oriented X2, Good concentration;   CHEST/LUNG: Clear to percussion bilaterally; No rales, rhonchi, wheezing, or rubs  HEART: Regular rate and rhythm; No murmurs, rubs, or gallops  ABDOMEN: Soft, Nontender, Nondistended; Bowel sounds present  EXTREMITIES:  2+ Peripheral Pulses, No clubbing, cyanosis, or edema  LYMPH: No lymphadenopathy noted  SKIN: No rashes or lesions    LAB                          11.8   28.42 )-----------( 356      ( 11 Feb 2019 08:16 )             35.4       CBC:  02-11 @ 08:16  WBC 28.42   Hgb 11.8   Hct 35.4   Plts 356  .1  02-10 @ 09:12  WBC 27.87   Hgb 12.0   Hct 36.3   Plts 338  .9  02-09 @ 08:55  WBC 30.50   Hgb 12.5   Hct 37.5   Plts 317  .6  02-08 @ 08:16  WBC 31.24   Hgb 11.8   Hct 34.7   Plts 309  .0  02-07 @ 07:49  WBC 34.36   Hgb 11.6   Hct 33.7   Plts 281  .1  02-06 @ 08:03  WBC 39.93   Hgb 12.2   Hct 36.9   Plts 300  .4  02-05 @ 10:20  WBC 43.75   Hgb 12.6   Hct 35.8   Plts 319  .9      Chemistry:  02-11 @ 08:16  Na+ 135  K+ 4.1  Cl- 102  CO2 24  BUN 73  Cr 2.97     02-10 @ 09:12  Na+ 134  K+ 4.1  Cl- 102  CO2 21  BUN 82  Cr 3.53     02-09 @ 08:55  Na+ 137  K+ 3.9  Cl- 102  CO2 21  BUN 89  Cr 4.02     02-08 @ 08:16  Na+ 133  K+ 4.1  Cl- 101  CO2 21  BUN 97  Cr 4.69     02-07 @ 07:49  Na+ 132  K+ 4.2  Cl- 100  CO2 21    Cr 5.14     02-06 @ 08:03  Na+ 133  K+ 4.1  Cl- 103  CO2 18    Cr 5.45     02-05 @ 10:20  Na+ 136  K+ 3.7  Cl- 106  CO2 17    Cr 5.64         Glucose, Serum: 90 mg/dL (02-11 @ 08:16)  Glucose, Serum: 99 mg/dL (02-10 @ 09:12)  Glucose, Serum: 94 mg/dL (02-09 @ 08:55)  Glucose, Serum: 110 mg/dL (02-08 @ 08:16)  Glucose, Serum: 101 mg/dL (02-07 @ 07:49)  Glucose, Serum: 118 mg/dL (02-06 @ 08:03)  Glucose, Serum: 128 mg/dL (02-05 @ 10:20)      11 Feb 2019 08:16    135    |  102    |  73     ----------------------------<  90     4.1     |  24     |  2.97   10 Feb 2019 09:12    134    |  102    |  82     ----------------------------<  99     4.1     |  21     |  3.53   09 Feb 2019 08:55    137    |  102    |  89     ----------------------------<  94     3.9     |  21     |  4.02   08 Feb 2019 08:16    133    |  101    |  97     ----------------------------<  110    4.1     |  21     |  4.69   07 Feb 2019 07:49    132    |  100    |  103    ----------------------------<  101    4.2     |  21     |  5.14   06 Feb 2019 08:03    133    |  103    |  102    ----------------------------<  118    4.1     |  18     |  5.45   05 Feb 2019 10:20    136    |  106    |  109    ----------------------------<  128    3.7     |  17     |  5.64     Ca    8.4        11 Feb 2019 08:16  Ca    8.5        10 Feb 2019 09:12  Ca    8.6        09 Feb 2019 08:55  Ca    8.8        08 Feb 2019 08:16  Ca    8.3        07 Feb 2019 07:49  Ca    8.4        06 Feb 2019 08:03  Ca    8.6        05 Feb 2019 10:20  Phos  4.3       11 Feb 2019 08:16  Phos  4.6       10 Feb 2019 09:12  Phos  5.7       05 Feb 2019 10:20  Mg     2.1       11 Feb 2019 08:16  Mg     2.2       10 Feb 2019 09:12  Mg     2.7       05 Feb 2019 10:20    TPro  7.3    /  Alb  2.6    /  TBili  8.2    /  DBili  6.45   /  AST  72     /  ALT  31     /  AlkPhos  434    11 Feb 2019 08:16  TPro  7.3    /  Alb  2.6    /  TBili  9.0    /  DBili  6.72   /  AST  72     /  ALT  31     /  AlkPhos  399    10 Feb 2019 09:12  TPro  7.5    /  Alb  2.7    /  TBili  9.8    /  DBili  x      /  AST  72     /  ALT  31     /  AlkPhos  409    09 Feb 2019 08:55  TPro  7.1    /  Alb  2.6    /  TBili  10.4   /  DBili  7.97   /  AST  70     /  ALT  29     /  AlkPhos  364    08 Feb 2019 08:16  TPro  6.9    /  Alb  2.6    /  TBili  10.1   /  DBili  7.57   /  AST  66     /  ALT  25     /  AlkPhos  344    07 Feb 2019 07:49  TPro  7.1    /  Alb  2.5    /  TBili  9.8    /  DBili  7.09   /  AST  75     /  ALT  25     /  AlkPhos  341    06 Feb 2019 08:03  TPro  7.2    /  Alb  2.8    /  TBili  10.3   /  DBili  x      /  AST  71     /  ALT  25     /  AlkPhos  356    05 Feb 2019 10:20      PT/INR - ( 11 Feb 2019 08:16 )   PT: 22.8 sec;   INR: 1.99 ratio                 CAPILLARY BLOOD GLUCOSE              RADIOLOGY & ADDITIONAL TESTS:    Imaging Personally Reviewed:  [ ] YES  [ ] NO    Consultant(s) Notes Reviewed:  [ ] YES  [ ] NO    Care Discussed with Consultants/Other Providers [ ] YES  [ ] NO

## 2019-02-11 NOTE — PROGRESS NOTE ADULT - PROBLEM SELECTOR PLAN 2
LFTs IMPROVING . Being treated for hepatorenal. Not a candidate for steroids.  11.2 / 132 / 137 / 403.  - 9.4 / 139 / 37 / 411 - 9.1/114/27/397  - 10.8 / 81 / 23 / 355 - 10.3 / 71 / 25 / 356  -- 9.8 / 7.5 / 25 / 341 - 10.1 / 66 / 25 / 344  - 10.4 / 70 / 29 / 364 - 9.8 / 72 / 31 / 409  --  9.0 / 72 / 31 / 399 - 8.2/72/31/434   BUN / CRE  73/ 2.97    prognosis better

## 2019-02-11 NOTE — PROGRESS NOTE ADULT - SUBJECTIVE AND OBJECTIVE BOX
Patient is a 48y old  Male who presents with a chief complaint of Coffee ground emesis, progressive jaundice, syncopal episodes. (10 Feb 2019 17:54)      INTERVAL HPI/OVERNIGHT EVENTS:    MEDICATIONS  (STANDING):  folic acid 1 milliGRAM(s) Oral daily  meropenem  IVPB 500 milliGRAM(s) IV Intermittent every 12 hours  midodrine 15 milliGRAM(s) Oral every 8 hours  multivitamin 1 Tablet(s) Oral daily  nicotine -  14 mG/24Hr(s) Patch 1 patch Transdermal daily  octreotide  Injectable 100 MICROGram(s) SubCutaneous every 8 hours  pantoprazole  Injectable 40 milliGRAM(s) IV Push every 12 hours  rifaximin 550 milliGRAM(s) Oral two times a day  thiamine 100 milliGRAM(s) Oral daily    MEDICATIONS  (PRN):  zinc oxide 40% Ointment 1 Application(s) Topical every 8 hours PRN rash      Allergies    No Known Allergies    Intolerances             Vital Signs Last 24 Hrs  T(C): 36.1 (11 Feb 2019 06:24), Max: 36.8 (10 Feb 2019 17:10)  T(F): 97 (11 Feb 2019 06:24), Max: 98.3 (10 Feb 2019 17:10)  HR: 55 (11 Feb 2019 06:24) (55 - 72)  BP: 110/65 (11 Feb 2019 06:24) (99/56 - 110/65)  BP(mean): --  RR: 17 (11 Feb 2019 06:24) (17 - 22)  SpO2: 98% (11 Feb 2019 06:24) (97% - 99%)    PHYSICAL EXAM:     GENERAL: NAD, well-groomed, well-developed  HEAD:  Atraumatic, Normocephalic  EYES: icteric sclera   ENMT: No tonsillar erythema, exudates, or enlargement; Moist mucous membranes, Good dentition, No lesions  NECK: Supple, No JVD, Normal thyroid  NERVOUS SYSTEM:  Alert & Oriented X3, Good concentration; Motor Strength 5/5 B/L upper and lower extremities; DTRs 2+ intact and symmetric  CHEST/LUNG: Clear to percussion bilaterally; No rales, rhonchi, wheezing, or rubs  HEART: Regular rate and rhythm; No murmurs, rubs, or gallops  ABDOMEN: Soft, mild tenderness with fluid wave   EXTREMITIES:  2+ Peripheral Pulses, No clubbing, cyanosis, or edema  LYMPH: No lymphadenopathy noted  SKIN: Jaundice     LABS:                        11.8   28.42 )-----------( 356      ( 11 Feb 2019 08:16 )             35.4     02-11    135  |  102  |  73<H>  ----------------------------<  90  4.1   |  24  |  2.97<H>    Ca    8.4<L>      11 Feb 2019 08:16  Phos  4.3     02-11  Mg     2.1     02-11    TPro  7.3  /  Alb  2.6<L>  /  TBili  8.2<H>  /  DBili  6.45<H>  /  AST  72<H>  /  ALT  31  /  AlkPhos  434<H>  02-11    PT/INR - ( 11 Feb 2019 08:16 )   PT: 22.8 sec;   INR: 1.99 ratio             CAPILLARY BLOOD GLUCOSE          RADIOLOGY & ADDITIONAL TESTS:    Imaging Personally Reviewed:  [ X] YES  [ ] NO    Consultant(s) Notes Reviewed:  [ X] YES  [ ] NO    Care Discussed with Consultants/Other Providers [X ] YES  [ ] NO

## 2019-02-11 NOTE — PROGRESS NOTE ADULT - ASSESSMENT
48M PMH HTN, chronic alcohol abuse and dependence x 15 years (1/2 pint of vodka with cranberry juice daily), hx of alcohol withdrawal presents for syncope x3 with generalized weakness, weight loss, loss of appetite, jaundice, melena, coffee ground emesis. Here with hypotension, lactic acidosis, hyponatremia, elevated Cr, jaundice, hyperbilirubinemia, elevated alk phos and AST, melena, coffee ground emesis, elevated INR, cirrhosis with ascites. < from: US Abdomen Limited (02.01.19 @ 12:45) >here is a moderate to large amount of ascites seen in all 4 quadrants. s/p paracetesis on 2/1-700 ml fluid removed. Octreo and Midodrine for now;   Albumin restarted on abx repeat paracentesis denied long discussion with family and patient about prognosis but optimistic about patient being stable enogh for physical rehab will get hematology consult as well for leukcytosis      s/p tap today

## 2019-02-11 NOTE — CHART NOTE - NSCHARTNOTEFT_GEN_A_CORE
To whom it may concern:   John Hilliard   KALEB    1970   has been under inpatient care at [facility] [place] since   2019 to present  .  His brother Hung Lopez has been here visiting him.     If there are any questions please do not hesitate to call    Raffy Dent MD  [phone]

## 2019-02-12 LAB
ALBUMIN SERPL ELPH-MCNC: 2.3 G/DL — LOW (ref 3.3–5)
ALP SERPL-CCNC: 461 U/L — HIGH (ref 40–120)
ALT FLD-CCNC: 30 U/L — SIGNIFICANT CHANGE UP (ref 12–78)
ANION GAP SERPL CALC-SCNC: 10 MMOL/L — SIGNIFICANT CHANGE UP (ref 5–17)
AST SERPL-CCNC: 73 U/L — HIGH (ref 15–37)
BASOPHILS # BLD AUTO: 0.13 K/UL — SIGNIFICANT CHANGE UP (ref 0–0.2)
BASOPHILS NFR BLD AUTO: 0.5 % — SIGNIFICANT CHANGE UP (ref 0–2)
BILIRUB SERPL-MCNC: 6.6 MG/DL — HIGH (ref 0.2–1.2)
BUN SERPL-MCNC: 68 MG/DL — HIGH (ref 7–23)
CALCIUM SERPL-MCNC: 8.1 MG/DL — LOW (ref 8.5–10.1)
CHLORIDE SERPL-SCNC: 103 MMOL/L — SIGNIFICANT CHANGE UP (ref 96–108)
CO2 SERPL-SCNC: 22 MMOL/L — SIGNIFICANT CHANGE UP (ref 22–31)
CREAT SERPL-MCNC: 2.73 MG/DL — HIGH (ref 0.5–1.3)
EOSINOPHIL # BLD AUTO: 0.44 K/UL — SIGNIFICANT CHANGE UP (ref 0–0.5)
EOSINOPHIL NFR BLD AUTO: 1.7 % — SIGNIFICANT CHANGE UP (ref 0–6)
GLUCOSE SERPL-MCNC: 117 MG/DL — HIGH (ref 70–99)
HCT VFR BLD CALC: 33.1 % — LOW (ref 39–50)
HGB BLD-MCNC: 11.1 G/DL — LOW (ref 13–17)
IMM GRANULOCYTES NFR BLD AUTO: 0.8 % — SIGNIFICANT CHANGE UP (ref 0–1.5)
LYMPHOCYTES # BLD AUTO: 1.79 K/UL — SIGNIFICANT CHANGE UP (ref 1–3.3)
LYMPHOCYTES # BLD AUTO: 6.8 % — LOW (ref 13–44)
MCHC RBC-ENTMCNC: 33.5 GM/DL — SIGNIFICANT CHANGE UP (ref 32–36)
MCHC RBC-ENTMCNC: 34.7 PG — HIGH (ref 27–34)
MCV RBC AUTO: 103.4 FL — HIGH (ref 80–100)
MONOCYTES # BLD AUTO: 2.02 K/UL — HIGH (ref 0–0.9)
MONOCYTES NFR BLD AUTO: 7.7 % — SIGNIFICANT CHANGE UP (ref 2–14)
NEUTROPHILS # BLD AUTO: 21.59 K/UL — HIGH (ref 1.8–7.4)
NEUTROPHILS NFR BLD AUTO: 82.5 % — HIGH (ref 43–77)
NRBC # BLD: 0 /100 WBCS — SIGNIFICANT CHANGE UP (ref 0–0)
PLATELET # BLD AUTO: 372 K/UL — SIGNIFICANT CHANGE UP (ref 150–400)
POTASSIUM SERPL-MCNC: 4 MMOL/L — SIGNIFICANT CHANGE UP (ref 3.5–5.3)
POTASSIUM SERPL-SCNC: 4 MMOL/L — SIGNIFICANT CHANGE UP (ref 3.5–5.3)
PROT SERPL-MCNC: 7 GM/DL — SIGNIFICANT CHANGE UP (ref 6–8.3)
RBC # BLD: 3.2 M/UL — LOW (ref 4.2–5.8)
RBC # FLD: 18.9 % — HIGH (ref 10.3–14.5)
SODIUM SERPL-SCNC: 135 MMOL/L — SIGNIFICANT CHANGE UP (ref 135–145)
WBC # BLD: 26.18 K/UL — HIGH (ref 3.8–10.5)
WBC # FLD AUTO: 26.18 K/UL — HIGH (ref 3.8–10.5)

## 2019-02-12 PROCEDURE — 99233 SBSQ HOSP IP/OBS HIGH 50: CPT

## 2019-02-12 RX ORDER — MIDODRINE HYDROCHLORIDE 2.5 MG/1
10 TABLET ORAL EVERY 8 HOURS
Qty: 0 | Refills: 0 | Status: DISCONTINUED | OUTPATIENT
Start: 2019-02-12 | End: 2019-02-16

## 2019-02-12 RX ADMIN — Medication 100 MILLIGRAM(S): at 12:10

## 2019-02-12 RX ADMIN — OCTREOTIDE ACETATE 100 MICROGRAM(S): 200 INJECTION, SOLUTION INTRAVENOUS; SUBCUTANEOUS at 05:33

## 2019-02-12 RX ADMIN — Medication 1 MILLIGRAM(S): at 12:10

## 2019-02-12 RX ADMIN — PANTOPRAZOLE SODIUM 40 MILLIGRAM(S): 20 TABLET, DELAYED RELEASE ORAL at 19:22

## 2019-02-12 RX ADMIN — MEROPENEM 100 MILLIGRAM(S): 1 INJECTION INTRAVENOUS at 19:22

## 2019-02-12 RX ADMIN — MIDODRINE HYDROCHLORIDE 10 MILLIGRAM(S): 2.5 TABLET ORAL at 21:58

## 2019-02-12 RX ADMIN — OCTREOTIDE ACETATE 100 MICROGRAM(S): 200 INJECTION, SOLUTION INTRAVENOUS; SUBCUTANEOUS at 16:59

## 2019-02-12 RX ADMIN — OCTREOTIDE ACETATE 100 MICROGRAM(S): 200 INJECTION, SOLUTION INTRAVENOUS; SUBCUTANEOUS at 21:58

## 2019-02-12 RX ADMIN — MEROPENEM 100 MILLIGRAM(S): 1 INJECTION INTRAVENOUS at 05:33

## 2019-02-12 RX ADMIN — MIDODRINE HYDROCHLORIDE 15 MILLIGRAM(S): 2.5 TABLET ORAL at 05:33

## 2019-02-12 RX ADMIN — Medication 1 TABLET(S): at 12:10

## 2019-02-12 RX ADMIN — PANTOPRAZOLE SODIUM 40 MILLIGRAM(S): 20 TABLET, DELAYED RELEASE ORAL at 05:33

## 2019-02-12 NOTE — PROGRESS NOTE ADULT - SUBJECTIVE AND OBJECTIVE BOX
Patient is a 48y old  Male who presents with a chief complaint of Coffee ground emesis, progressive jaundice, syncopal episodes. (12 Feb 2019 14:25)      HPI:  49 y/o male PMH HTN (not on medication), chronic alcohol abuse and dependence x 15 years (1/2 pint of vodka with cranberry juice daily), hx of alcohol withdrawal 2013 hospitalized at [facility] after which he went to inpatient rehab at [facility] for syncope and melanotic stools. History obtained from ex wife, mother, and patient. Since pt left rehab in 2013 pt has been drinking daily. Last drink 11AM today. Pt state for past one and a half months he has had progressive weight loss with loss of appetite. He has had in increased abdominal distention for the past 1 month and in the past 1 week he has noticed jaundice and scleral icterus. Pt also reports generalized fatigue. Reports coffee ground emesis x1 episode 3 days ago but none since then with melena for about a week. Pt states he has 3 bowel movements a day and now has also been seeing "blood when I pee" also within the last week. No abdominal pain. No CP no SOB. No fevers or chills. Today pt called ex wife after he was unsteady with his gait and "fell", he denies LOC. Denies trauma to head. Ex wife reports while attempting to get pt to the car she noticed he was "very weak" and witnessed pt to have had 3 syncopal episodes lasting a few minutes each before regaining consciousness, but no seizures. Ex wife state that in the car, pt had a bowel movement that was black and tarry.     In ED pt noted to be hypotensive SBP 80-90s. 2L IVF given with 3rd and 4th infusing. SBP post IVF resuscitation 100s to one teens. On labs pt with leukocytosis with 3% bands, Na 123, Cr 2.05, INR 2.05, T bili 6, , ALT 55, Alk phos 799.  Pt seen by critical care and not felt to be ICU candidate. (23 Jan 2019 22:33)      INTERVAL HPI/OVERNIGHT EVENTS:  The patient denies melena, hematochezia, hematemesis, nausea, vomiting, abdominal pain, constipation, diarrhea, or change in bowel movements  Slowly improving.    MEDICATIONS  (STANDING):  folic acid 1 milliGRAM(s) Oral daily  meropenem  IVPB 500 milliGRAM(s) IV Intermittent every 12 hours  midodrine 10 milliGRAM(s) Oral every 8 hours  multivitamin 1 Tablet(s) Oral daily  nicotine -  14 mG/24Hr(s) Patch 1 patch Transdermal daily  octreotide  Injectable 100 MICROGram(s) SubCutaneous every 8 hours  pantoprazole  Injectable 40 milliGRAM(s) IV Push every 12 hours  rifaximin 550 milliGRAM(s) Oral two times a day  thiamine 100 milliGRAM(s) Oral daily    MEDICATIONS  (PRN):  zinc oxide 40% Ointment 1 Application(s) Topical every 8 hours PRN rash      FAMILY HISTORY:  No pertinent family history in first degree relatives      Allergies    No Known Allergies    Intolerances        PMH/PSH:  ETOH abuse  Psoriasis  History of hypertension  No significant past surgical history        REVIEW OF SYSTEMS:  CONSTITUTIONAL: No fever, weight loss, or fatigue  EYES: No eye pain, visual disturbances, or discharge  ENMT:  No difficulty hearing, tinnitus, vertigo; No sinus or throat pain  NECK: No pain or stiffness  BREASTS: No pain, masses, or nipple discharge  RESPIRATORY: No cough, wheezing, chills or hemoptysis; No shortness of breath  CARDIOVASCULAR: No chest pain, palpitations, dizziness, or leg swelling  GASTROINTESTINAL: See above  GENITOURINARY: No dysuria, frequency, hematuria, or incontinence  NEUROLOGICAL: No headaches, memory loss, loss of strength, numbness, or tremors  SKIN: No itching, burning, rashes, or lesions   LYMPH NODES: No enlarged glands  ENDOCRINE: No heat or cold intolerance; No hair loss  MUSCULOSKELETAL: No joint pain or swelling; No muscle, back, or extremity pain  PSYCHIATRIC: No depression, anxiety, mood swings, or difficulty sleeping  HEME/LYMPH: No easy bruising, or bleeding gums  ALLERGY AND IMMUNOLOGIC: No hives or eczema    Vital Signs Last 24 Hrs  T(C): 35.9 (12 Feb 2019 11:43), Max: 36.8 (11 Feb 2019 23:12)  T(F): 96.7 (12 Feb 2019 11:43), Max: 98.3 (11 Feb 2019 23:12)  HR: 54 (12 Feb 2019 11:43) (54 - 63)  BP: 113/64 (12 Feb 2019 11:43) (87/58 - 143/55)  BP(mean): --  RR: 16 (12 Feb 2019 11:43) (16 - 18)  SpO2: 100% (12 Feb 2019 11:43) (98% - 100%)    PHYSICAL EXAM:  GENERAL: NAD, well-groomed, well-developed  HEAD:  Atraumatic, Normocephalic  EYES: EOMI, PERRLA, conjunctiva and sclera clear  NECK: Supple, No JVD, Normal thyroid  NERVOUS SYSTEM:  Alert & Oriented X3, Good concentration;   CHEST/LUNG: Clear to percussion bilaterally; No rales, rhonchi, wheezing, or rubs  HEART: Regular rate and rhythm; No murmurs, rubs, or gallops  ABDOMEN: Soft, Nontender, Nondistended; Bowel sounds present  EXTREMITIES:  2+ Peripheral Pulses, No clubbing, cyanosis, or edema  LYMPH: No lymphadenopathy noted  SKIN: No rashes or lesions    LAB                          11.1   26.18 )-----------( 372      ( 12 Feb 2019 07:21 )             33.1       CBC:  02-12 @ 07:21  WBC 26.18   Hgb 11.1   Hct 33.1   Plts 372  .4  02-11 @ 08:16  WBC 28.42   Hgb 11.8   Hct 35.4   Plts 356  .1  02-10 @ 09:12  WBC 27.87   Hgb 12.0   Hct 36.3   Plts 338  .9  02-09 @ 08:55  WBC 30.50   Hgb 12.5   Hct 37.5   Plts 317  .6  02-08 @ 08:16  WBC 31.24   Hgb 11.8   Hct 34.7   Plts 309  .0  02-07 @ 07:49  WBC 34.36   Hgb 11.6   Hct 33.7   Plts 281  .1  02-06 @ 08:03  WBC 39.93   Hgb 12.2   Hct 36.9   Plts 300  .4      Chemistry:  02-12 @ 07:21  Na+ 135  K+ 4.0  Cl- 103  CO2 22  BUN 68  Cr 2.73     02-11 @ 08:16  Na+ 135  K+ 4.1  Cl- 102  CO2 24  BUN 73  Cr 2.97     02-10 @ 09:12  Na+ 134  K+ 4.1  Cl- 102  CO2 21  BUN 82  Cr 3.53     02-09 @ 08:55  Na+ 137  K+ 3.9  Cl- 102  CO2 21  BUN 89  Cr 4.02     02-08 @ 08:16  Na+ 133  K+ 4.1  Cl- 101  CO2 21  BUN 97  Cr 4.69     02-07 @ 07:49  Na+ 132  K+ 4.2  Cl- 100  CO2 21    Cr 5.14     02-06 @ 08:03  Na+ 133  K+ 4.1  Cl- 103  CO2 18    Cr 5.45         Glucose, Serum: 117 mg/dL (02-12 @ 07:21)  Glucose, Serum: 90 mg/dL (02-11 @ 08:16)  Glucose, Serum: 99 mg/dL (02-10 @ 09:12)  Glucose, Serum: 94 mg/dL (02-09 @ 08:55)  Glucose, Serum: 110 mg/dL (02-08 @ 08:16)  Glucose, Serum: 101 mg/dL (02-07 @ 07:49)  Glucose, Serum: 118 mg/dL (02-06 @ 08:03)      12 Feb 2019 07:21    135    |  103    |  68     ----------------------------<  117    4.0     |  22     |  2.73   11 Feb 2019 08:16    135    |  102    |  73     ----------------------------<  90     4.1     |  24     |  2.97   10 Feb 2019 09:12    134    |  102    |  82     ----------------------------<  99     4.1     |  21     |  3.53   09 Feb 2019 08:55    137    |  102    |  89     ----------------------------<  94     3.9     |  21     |  4.02   08 Feb 2019 08:16    133    |  101    |  97     ----------------------------<  110    4.1     |  21     |  4.69   07 Feb 2019 07:49    132    |  100    |  103    ----------------------------<  101    4.2     |  21     |  5.14   06 Feb 2019 08:03    133    |  103    |  102    ----------------------------<  118    4.1     |  18     |  5.45     Ca    8.1        12 Feb 2019 07:21  Ca    8.4        11 Feb 2019 08:16  Ca    8.5        10 Feb 2019 09:12  Ca    8.6        09 Feb 2019 08:55  Ca    8.8        08 Feb 2019 08:16  Ca    8.3        07 Feb 2019 07:49  Ca    8.4        06 Feb 2019 08:03  Phos  4.3       11 Feb 2019 08:16  Phos  4.6       10 Feb 2019 09:12  Mg     2.1       11 Feb 2019 08:16  Mg     2.2       10 Feb 2019 09:12    TPro  7.0    /  Alb  2.3    /  TBili  6.6    /  DBili  x      /  AST  73     /  ALT  30     /  AlkPhos  461    12 Feb 2019 07:21  TPro  7.3    /  Alb  2.6    /  TBili  8.2    /  DBili  6.45   /  AST  72     /  ALT  31     /  AlkPhos  434    11 Feb 2019 08:16  TPro  7.3    /  Alb  2.6    /  TBili  9.0    /  DBili  6.72   /  AST  72     /  ALT  31     /  AlkPhos  399    10 Feb 2019 09:12  TPro  7.5    /  Alb  2.7    /  TBili  9.8    /  DBili  x      /  AST  72     /  ALT  31     /  AlkPhos  409    09 Feb 2019 08:55  TPro  7.1    /  Alb  2.6    /  TBili  10.4   /  DBili  7.97   /  AST  70     /  ALT  29     /  AlkPhos  364    08 Feb 2019 08:16  TPro  6.9    /  Alb  2.6    /  TBili  10.1   /  DBili  7.57   /  AST  66     /  ALT  25     /  AlkPhos  344    07 Feb 2019 07:49  TPro  7.1    /  Alb  2.5    /  TBili  9.8    /  DBili  7.09   /  AST  75     /  ALT  25     /  AlkPhos  341    06 Feb 2019 08:03      PT/INR - ( 11 Feb 2019 08:16 )   PT: 22.8 sec;   INR: 1.99 ratio                 CAPILLARY BLOOD GLUCOSE              RADIOLOGY & ADDITIONAL TESTS:    Imaging Personally Reviewed:  [ ] YES  [ ] NO    Consultant(s) Notes Reviewed:  [ ] YES  [ ] NO    Care Discussed with Consultants/Other Providers [ ] YES  [ ] NO Patient is a 48y old  Male who presents with a chief complaint of Coffee ground emesis, progressive jaundice, syncopal episodes. (12 Feb 2019 14:25)      HPI:  47 y/o male PMH HTN (not on medication), chronic alcohol abuse and dependence x 15 years (1/2 pint of vodka with cranberry juice daily), hx of alcohol withdrawal 2013 hospitalized at [facility] after which he went to inpatient rehab at [facility] for syncope and melanotic stools. History obtained from ex wife, mother, and patient. Since pt left rehab in 2013 pt has been drinking daily. Last drink 11AM today. Pt state for past one and a half months he has had progressive weight loss with loss of appetite. He has had in increased abdominal distention for the past 1 month and in the past 1 week he has noticed jaundice and scleral icterus. Pt also reports generalized fatigue. Reports coffee ground emesis x1 episode 3 days ago but none since then with melena for about a week. Pt states he has 3 bowel movements a day and now has also been seeing "blood when I pee" also within the last week. No abdominal pain. No CP no SOB. No fevers or chills. Today pt called ex wife after he was unsteady with his gait and "fell", he denies LOC. Denies trauma to head. Ex wife reports while attempting to get pt to the car she noticed he was "very weak" and witnessed pt to have had 3 syncopal episodes lasting a few minutes each before regaining consciousness, but no seizures. Ex wife state that in the car, pt had a bowel movement that was black and tarry.     In ED pt noted to be hypotensive SBP 80-90s. 2L IVF given with 3rd and 4th infusing. SBP post IVF resuscitation 100s to one teens. On labs pt with leukocytosis with 3% bands, Na 123, Cr 2.05, INR 2.05, T bili 6, , ALT 55, Alk phos 799.  Pt seen by critical care and not felt to be ICU candidate. (23 Jan 2019 22:33)      INTERVAL HPI/OVERNIGHT EVENTS:  The patient denies melena, hematochezia, hematemesis, nausea, vomiting, abdominal pain, constipation, diarrhea, or change in bowel movements  Slowly improving.    MEDICATIONS  (STANDING):  folic acid 1 milliGRAM(s) Oral daily  meropenem  IVPB 500 milliGRAM(s) IV Intermittent every 12 hours  midodrine 10 milliGRAM(s) Oral every 8 hours  multivitamin 1 Tablet(s) Oral daily  nicotine -  14 mG/24Hr(s) Patch 1 patch Transdermal daily  octreotide  Injectable 100 MICROGram(s) SubCutaneous every 8 hours  pantoprazole  Injectable 40 milliGRAM(s) IV Push every 12 hours  rifaximin 550 milliGRAM(s) Oral two times a day  thiamine 100 milliGRAM(s) Oral daily    MEDICATIONS  (PRN):  zinc oxide 40% Ointment 1 Application(s) Topical every 8 hours PRN rash      FAMILY HISTORY:  No pertinent family history in first degree relatives      Allergies    No Known Allergies    Intolerances        PMH/PSH:  ETOH abuse  Psoriasis  History of hypertension  No significant past surgical history        REVIEW OF SYSTEMS:  CONSTITUTIONAL: No fever, weight loss, or fatigue  EYES: No eye pain, visual disturbances, or discharge  ENMT:  No difficulty hearing, tinnitus, vertigo; No sinus or throat pain  NECK: No pain or stiffness  BREASTS: No pain, masses, or nipple discharge  RESPIRATORY: No cough, wheezing, chills or hemoptysis; No shortness of breath  CARDIOVASCULAR: No chest pain, palpitations, dizziness, or leg swelling  GASTROINTESTINAL: See above  GENITOURINARY: No dysuria, frequency, hematuria, or incontinence  NEUROLOGICAL: No headaches, memory loss, loss of strength, numbness, or tremors  SKIN: No itching, burning, rashes, or lesions   LYMPH NODES: No enlarged glands  ENDOCRINE: No heat or cold intolerance; No hair loss  MUSCULOSKELETAL: No joint pain or swelling; No muscle, back, or extremity pain  PSYCHIATRIC: No depression, anxiety, mood swings, or difficulty sleeping  HEME/LYMPH: No easy bruising, or bleeding gums  ALLERGY AND IMMUNOLOGIC: No hives or eczema    Vital Signs Last 24 Hrs  T(C): 35.9 (12 Feb 2019 11:43), Max: 36.8 (11 Feb 2019 23:12)  T(F): 96.7 (12 Feb 2019 11:43), Max: 98.3 (11 Feb 2019 23:12)  HR: 54 (12 Feb 2019 11:43) (54 - 63)  BP: 113/64 (12 Feb 2019 11:43) (87/58 - 143/55)  BP(mean): --  RR: 16 (12 Feb 2019 11:43) (16 - 18)  SpO2: 100% (12 Feb 2019 11:43) (98% - 100%)    PHYSICAL EXAM:  GENERAL: NAD, well-groomed, well-developed  HEAD:  Atraumatic, Normocephalic  EYES: EOMI, PERRLA, conjunctiva and sclera jaundiced  NECK: Supple, No JVD, Normal thyroid  NERVOUS SYSTEM:  Alert & Oriented X3, Good concentration;   CHEST/LUNG: Clear to percussion bilaterally; No rales, rhonchi, wheezing, or rubs  HEART: Regular rate and rhythm; No murmurs, rubs, or gallops  ABDOMEN: Soft, Nontender, Nondistended; Bowel sounds present  EXTREMITIES:  2+ Peripheral Pulses, No clubbing, cyanosis, or edema  LYMPH: No lymphadenopathy noted  SKIN: No rashes or lesions    LAB                          11.1   26.18 )-----------( 372      ( 12 Feb 2019 07:21 )             33.1       CBC:  02-12 @ 07:21  WBC 26.18   Hgb 11.1   Hct 33.1   Plts 372  .4  02-11 @ 08:16  WBC 28.42   Hgb 11.8   Hct 35.4   Plts 356  .1  02-10 @ 09:12  WBC 27.87   Hgb 12.0   Hct 36.3   Plts 338  .9  02-09 @ 08:55  WBC 30.50   Hgb 12.5   Hct 37.5   Plts 317  .6  02-08 @ 08:16  WBC 31.24   Hgb 11.8   Hct 34.7   Plts 309  .0  02-07 @ 07:49  WBC 34.36   Hgb 11.6   Hct 33.7   Plts 281  .1  02-06 @ 08:03  WBC 39.93   Hgb 12.2   Hct 36.9   Plts 300  .4      Chemistry:  02-12 @ 07:21  Na+ 135  K+ 4.0  Cl- 103  CO2 22  BUN 68  Cr 2.73     02-11 @ 08:16  Na+ 135  K+ 4.1  Cl- 102  CO2 24  BUN 73  Cr 2.97     02-10 @ 09:12  Na+ 134  K+ 4.1  Cl- 102  CO2 21  BUN 82  Cr 3.53     02-09 @ 08:55  Na+ 137  K+ 3.9  Cl- 102  CO2 21  BUN 89  Cr 4.02     02-08 @ 08:16  Na+ 133  K+ 4.1  Cl- 101  CO2 21  BUN 97  Cr 4.69     02-07 @ 07:49  Na+ 132  K+ 4.2  Cl- 100  CO2 21    Cr 5.14     02-06 @ 08:03  Na+ 133  K+ 4.1  Cl- 103  CO2 18    Cr 5.45         Glucose, Serum: 117 mg/dL (02-12 @ 07:21)  Glucose, Serum: 90 mg/dL (02-11 @ 08:16)  Glucose, Serum: 99 mg/dL (02-10 @ 09:12)  Glucose, Serum: 94 mg/dL (02-09 @ 08:55)  Glucose, Serum: 110 mg/dL (02-08 @ 08:16)  Glucose, Serum: 101 mg/dL (02-07 @ 07:49)  Glucose, Serum: 118 mg/dL (02-06 @ 08:03)      12 Feb 2019 07:21    135    |  103    |  68     ----------------------------<  117    4.0     |  22     |  2.73   11 Feb 2019 08:16    135    |  102    |  73     ----------------------------<  90     4.1     |  24     |  2.97   10 Feb 2019 09:12    134    |  102    |  82     ----------------------------<  99     4.1     |  21     |  3.53   09 Feb 2019 08:55    137    |  102    |  89     ----------------------------<  94     3.9     |  21     |  4.02   08 Feb 2019 08:16    133    |  101    |  97     ----------------------------<  110    4.1     |  21     |  4.69   07 Feb 2019 07:49    132    |  100    |  103    ----------------------------<  101    4.2     |  21     |  5.14   06 Feb 2019 08:03    133    |  103    |  102    ----------------------------<  118    4.1     |  18     |  5.45     Ca    8.1        12 Feb 2019 07:21  Ca    8.4        11 Feb 2019 08:16  Ca    8.5        10 Feb 2019 09:12  Ca    8.6        09 Feb 2019 08:55  Ca    8.8        08 Feb 2019 08:16  Ca    8.3        07 Feb 2019 07:49  Ca    8.4        06 Feb 2019 08:03  Phos  4.3       11 Feb 2019 08:16  Phos  4.6       10 Feb 2019 09:12  Mg     2.1       11 Feb 2019 08:16  Mg     2.2       10 Feb 2019 09:12    TPro  7.0    /  Alb  2.3    /  TBili  6.6    /  DBili  x      /  AST  73     /  ALT  30     /  AlkPhos  461    12 Feb 2019 07:21  TPro  7.3    /  Alb  2.6    /  TBili  8.2    /  DBili  6.45   /  AST  72     /  ALT  31     /  AlkPhos  434    11 Feb 2019 08:16  TPro  7.3    /  Alb  2.6    /  TBili  9.0    /  DBili  6.72   /  AST  72     /  ALT  31     /  AlkPhos  399    10 Feb 2019 09:12  TPro  7.5    /  Alb  2.7    /  TBili  9.8    /  DBili  x      /  AST  72     /  ALT  31     /  AlkPhos  409    09 Feb 2019 08:55  TPro  7.1    /  Alb  2.6    /  TBili  10.4   /  DBili  7.97   /  AST  70     /  ALT  29     /  AlkPhos  364    08 Feb 2019 08:16  TPro  6.9    /  Alb  2.6    /  TBili  10.1   /  DBili  7.57   /  AST  66     /  ALT  25     /  AlkPhos  344    07 Feb 2019 07:49  TPro  7.1    /  Alb  2.5    /  TBili  9.8    /  DBili  7.09   /  AST  75     /  ALT  25     /  AlkPhos  341    06 Feb 2019 08:03      PT/INR - ( 11 Feb 2019 08:16 )   PT: 22.8 sec;   INR: 1.99 ratio                 CAPILLARY BLOOD GLUCOSE              RADIOLOGY & ADDITIONAL TESTS:    Imaging Personally Reviewed:  [ ] YES  [ ] NO    Consultant(s) Notes Reviewed:  [ ] YES  [ ] NO    Care Discussed with Consultants/Other Providers [ ] YES  [ ] NO

## 2019-02-12 NOTE — PROGRESS NOTE ADULT - PROBLEM SELECTOR PLAN 2
LFTs IMPROVING . ( Except alk phos is slowly rising )Being treated for hepatorenal. Not a candidate for steroids.  11.2 / 132 / 137 / 403.  - 9.4 / 139 / 37 / 411 - 9.1/114/27/397  - 10.8 / 81 / 23 / 355 - 10.3 / 71 / 25 / 356  -- 9.8 / 7.5 / 25 / 341 - 10.1 / 66 / 25 / 344  - 10.4 / 70 / 29 / 364 - 9.8 / 72 / 31 / 409  --  9.0 / 72 / 31 / 399 - 8.2/72/31/434  -  6.6/73/30/461  ////// BUN / CRE  768 / 2.73   Increasing alk phos seconday to ? R/O  prognosis better LFTs IMPROVING . ( Except alk phos is slowly rising )Being treated for hepatorenal. Not a candidate for steroids.  11.2 / 132 / 137 / 403.  - 9.4 / 139 / 37 / 411 - 9.1/114/27/397  - 10.8 / 81 / 23 / 355 - 10.3 / 71 / 25 / 356  -- 9.8 / 7.5 / 25 / 341 - 10.1 / 66 / 25 / 344  - 10.4 / 70 / 29 / 364 - 9.8 / 72 / 31 / 409  --  9.0 / 72 / 31 / 399 - 8.2/72/31/434  -  6.6/73/30/461  ////// BUN / CRE  768 / 2.73   Increasing alk phos seconday to ? R/O  meds. prognosis better

## 2019-02-12 NOTE — PROGRESS NOTE ADULT - ASSESSMENT
HPI:  49 y/o male PMH HTN (not on medication), chronic alcohol abuse and dependence x 15 years (1/2 pint of vodka with cranberry juice daily), hx of alcohol withdrawal 2013 hospitalized at [facility] after which he went to inpatient rehab at [facility] presents for syncope and melanotic stools. History obtained from ex wife, mother, and patient. Since pt left rehab in 2013 pt has been drinking daily. Last drink 11AM today. Pt state for past one and a half months he has had progressive weight loss with loss of appetite. He has had in increased abdominal distention for the past 1 month and in the past 1 week he has noticed jaundice and scleral icterus. Pt also reports generalized fatigue. Reports coffee ground emesis x1 episode 3 days ago but none since then with melena for about a week. Pt states he has 3 bowel movements a day and now has also been seeing "blood when I pee" also within the last week. No abdominal pain. No CP no SOB. No fevers or chills. Today pt called ex wife after he was unsteady with his gait and "fell", he denies LOC. Denies trauma to head. Ex wife reports while attempting to get pt to the car she noticed he was "very weak" and witnessed pt to have had 3 syncopal episodes lasting a few minutes each before regaining consciousness, but no seizures. Ex wife state that in the car, pt had a bowel movement that was black and tarry.     In ED pt noted to be hypotensive SBP 80-90s. 2L IVF given with 3rd and 4th infusing. SBP post IVF resuscitation 100s to one teens. On labs pt with leukocytosis with 3% bands, Na 123, Cr 2.05, INR 2.05, T bili 6, , ALT 55, Alk phos 799.  Pt seen by critical care and not felt to be ICU candidate. (23 Jan 2019 22:33)  ----------------- As Above ------------------------------------------------------  Patient confused. Left message for family member to contact me   ETOH Abuse (+). Coffee ground emesis x 1, melena.   Eleveated LFTs, INR  See labs, CT Scan    Cirrhosis, ascites, fever - 1) Paracentesis 2) ammonia level  3) f/u LFTs 4) treat for impending Dts  - patient does not fit into the criteria for treatment with steroids.

## 2019-02-12 NOTE — PROGRESS NOTE ADULT - SUBJECTIVE AND OBJECTIVE BOX
Patient is a 48y old  Male who presents with a chief complaint of Coffee ground emesis, progressive jaundice, syncopal episodes. (11 Feb 2019 19:33)      INTERVAL HPI/OVERNIGHT EVENTS: no events     MEDICATIONS  (STANDING):  folic acid 1 milliGRAM(s) Oral daily  meropenem  IVPB 500 milliGRAM(s) IV Intermittent every 12 hours  midodrine 10 milliGRAM(s) Oral every 8 hours  multivitamin 1 Tablet(s) Oral daily  nicotine -  14 mG/24Hr(s) Patch 1 patch Transdermal daily  octreotide  Injectable 100 MICROGram(s) SubCutaneous every 8 hours  pantoprazole  Injectable 40 milliGRAM(s) IV Push every 12 hours  rifaximin 550 milliGRAM(s) Oral two times a day  thiamine 100 milliGRAM(s) Oral daily    MEDICATIONS  (PRN):  zinc oxide 40% Ointment 1 Application(s) Topical every 8 hours PRN rash      Allergies    No Known Allergies    Intolerances         Vital Signs Last 24 Hrs  T(C): 35.9 (12 Feb 2019 11:43), Max: 36.8 (11 Feb 2019 23:12)  T(F): 96.7 (12 Feb 2019 11:43), Max: 98.3 (11 Feb 2019 23:12)  HR: 54 (12 Feb 2019 11:43) (54 - 63)  BP: 113/64 (12 Feb 2019 11:43) (87/58 - 143/55)  BP(mean): --  RR: 16 (12 Feb 2019 11:43) (16 - 18)  SpO2: 100% (12 Feb 2019 11:43) (98% - 100%)    PHYSICAL EXAM:  GENERAL: NAD, well-groomed, well-developed  HEAD:  Atraumatic, Normocephalic  EYES: icteric sclera   ENMT: No tonsillar erythema, exudates, or enlargement; Moist mucous membranes, Good dentition, No lesions  NECK: Supple, No JVD, Normal thyroid  NERVOUS SYSTEM:  Alert & Oriented X3, Good concentration; Motor Strength 5/5 B/L upper and lower extremities; DTRs 2+ intact and symmetric  CHEST/LUNG: Clear to percussion bilaterally; No rales, rhonchi, wheezing, or rubs  HEART: Regular rate and rhythm; No murmurs, rubs, or gallops  ABDOMEN: Soft, mild tenderness with fluid wave   EXTREMITIES:  2+ Peripheral Pulses, No clubbing, cyanosis, or edema  LYMPH: No lymphadenopathy noted  SKIN: Jaundice       LABS:                        11.1   26.18 )-----------( 372      ( 12 Feb 2019 07:21 )             33.1     02-12    135  |  103  |  68<H>  ----------------------------<  117<H>  4.0   |  22  |  2.73<H>    Ca    8.1<L>      12 Feb 2019 07:21  Phos  4.3     02-11  Mg     2.1     02-11    TPro  7.0  /  Alb  2.3<L>  /  TBili  6.6<H>  /  DBili  x   /  AST  73<H>  /  ALT  30  /  AlkPhos  461<H>  02-12    PT/INR - ( 11 Feb 2019 08:16 )   PT: 22.8 sec;   INR: 1.99 ratio             CAPILLARY BLOOD GLUCOSE          RADIOLOGY & ADDITIONAL TESTS:    Imaging Personally Reviewed:  [ X] YES  [ ] NO    Consultant(s) Notes Reviewed:  [ X] YES  [ ] NO    Care Discussed with Consultants/Other Providers [X ] YES  [ ] NO

## 2019-02-12 NOTE — CHART NOTE - NSCHARTNOTEFT_GEN_A_CORE
Pt c alcoholic hepatitis; cirrhosis c progressive jaundice; pt reports wt loss & decreased appetite 1.5 months PTA.  Renal failure; per nephrology pt not a candidate for HD tx; paracentesis performed 1/25 (7000 ml removed) & again on 2/11 (3200 ml).      Factors impacting intake: [ ] none [ ] nausea  [ ] vomiting [ ] diarrhea [ ] constipation  [ ]chewing problems [ ] swallowing issues  [ X] other: lethargy; early saitiety     Diet Prescription: Diet, Regular:   Low Sodium  Supplement Feeding Modality:  Oral  Ensure Enlive Cans or Servings Per Day:  3       Frequency:  Three Times a day (02-08-19 @ 12:32)    Intake:  pt reports improved appetite; documented by nursing last few days % consumed; Pt reports drinking 1-2 Ensure/day; also eating food from home    Current Weight:   % Weight Change    Pertinent Medications: MEDICATIONS  (STANDING):  folic acid 1 milliGRAM(s) Oral daily  meropenem  IVPB 500 milliGRAM(s) IV Intermittent every 12 hours  midodrine 10 milliGRAM(s) Oral every 8 hours  multivitamin 1 Tablet(s) Oral daily  nicotine -  14 mG/24Hr(s) Patch 1 patch Transdermal daily  octreotide  Injectable 100 MICROGram(s) SubCutaneous every 8 hours  pantoprazole  Injectable 40 milliGRAM(s) IV Push every 12 hours  rifaximin 550 milliGRAM(s) Oral two times a day  thiamine 100 milliGRAM(s) Oral daily    MEDICATIONS  (PRN):  zinc oxide 40% Ointment 1 Application(s) Topical every 8 hours PRN rash    Pertinent Labs:   Skin:     Estimated Needs:   [ ] no change since previous assessment  [ ] recalculated:     Previous Nutrition Diagnosis:   [ ] Inadequate Energy Intake [ ]Inadequate Oral Intake [ ] Excessive Energy Intake   [ ] Underweight [ ] Increased Nutrient Needs [ ] Overweight/Obesity   [ ] Altered GI Function [ ] Unintended Weight Loss [ ] Food & Nutrition Related Knowledge Deficit [ ] Malnutrition     Nutrition Diagnosis is [ ] ongoing  [ ] resolved [ ] not applicable     New Nutrition Diagnosis: [ ] not applicable      Interventions:   Recommend  [ ] Change Diet To:  [ ] Nutrition Supplement  [ ] Nutrition Support  [ ] Other:     Monitoring and Evaluation:   [ ] PO intake [ x ] Tolerance to diet prescription [ x ] weights [ x ] labs[ x ] follow up per protocol  [ ] other: Pt c alcoholic hepatitis; cirrhosis c progressive jaundice; pt reports wt loss & decreased appetite 1.5 months PTA.  Renal failure; per nephrology pt not a candidate for HD tx; paracentesis performed 1/25 (7000 ml removed) & again on 2/11 (3200 ml).      Factors impacting intake: [ ] none [ ] nausea  [ ] vomiting [ ] diarrhea [ ] constipation  [ ]chewing problems [ ] swallowing issues  [ X] other: lethargy; early saitiety     Diet Prescription: Diet, Regular:   Low Sodium  Supplement Feeding Modality:  Oral  Ensure Enlive Cans or Servings Per Day:  3       Frequency:  Three Times a day (02-08-19 @ 12:32)    Intake:  pt reports improved appetite; documented by nursing last few days % consumed; Pt reports drinking 1-2 Ensure/day; also eating food from home    Current Weight:  67.7 kg (2/12); previous wt 68.1 (2/8); admission wt 77 kg (1/24  % Weight Change: wt fluctuations most likely a function of recurrent ascites & not a valid indicator of nutrition status    Physical Appearance; no edema documented since 1/30; no edema observed    Pertinent Medications: MEDICATIONS  (STANDING):  folic acid 1 milliGRAM(s) Oral daily  meropenem  IVPB 500 milliGRAM(s) IV Intermittent every 12 hours  midodrine 10 milliGRAM(s) Oral every 8 hours  multivitamin 1 Tablet(s) Oral daily  nicotine -  14 mG/24Hr(s) Patch 1 patch Transdermal daily  octreotide  Injectable 100 MICROGram(s) SubCutaneous every 8 hours  pantoprazole  Injectable 40 milliGRAM(s) IV Push every 12 hours  rifaximin 550 milliGRAM(s) Oral two times a day  thiamine 100 milliGRAM(s) Oral daily    MEDICATIONS  (PRN):  zinc oxide 40% Ointment 1 Application(s) Topical every 8 hours PRN rash    Pertinent Labs:  02-12 Na135 mmol/L Glu 117 mg/dL<H> K+ 4.0 mmol/L Cr  2.73 mg/dL<H> BUN 68 mg/dL<H> 02-11 Phos 4.3 mg/dL 02-12 Alb 2.3 g/dL<L>    Skin:  WDL    Estimated Needs:   [X ] no change since previous assessment (1/29)  [ ] recalculated:     Previous Nutrition Diagnosis:   [ ] Inadequate Energy Intake [ ]Inadequate Oral Intake [ ] Excessive Energy Intake   [ ] Underweight [ ] Increased Nutrient Needs [ ] Overweight/Obesity   [ ] Altered GI Function [ ] Unintended Weight Loss [ ] Food & Nutrition Related Knowledge Deficit   [X ] Severe Malnutrition - chronic (based on physical signs of severe fat depletion & muscle wasting as noted on 2/8 chart note)     Nutrition Diagnosis is [X ] ongoing  [ ] resolved [ ] not applicable     GOAL: Pt to tolerate PO diet without GI distress - continues to be met; pt to consume > 75% meals/supplements - met at this time    New Nutrition Diagnosis: [X ] not applicable      Interventions: continue Low Na diet  Recommend  [ ] Change Diet To:  [X ] Nutrition Supplement: decrease supplement to Ensure Enlive x 2/day (provides 700 kcal, 40 g protein)   [ ] Nutrition Support  X ] Other: cater to food preferences as appropriate; encourage consumption on supplement    Monitoring and Evaluation:   [X ] PO intake [ x ] Tolerance to diet prescription [ x ] weights [ x ] labs[ x ] follow up per protocol  [ ] other:

## 2019-02-12 NOTE — PROGRESS NOTE ADULT - ASSESSMENT
48M PMH HTN, chronic alcohol abuse and dependence x 15 years (1/2 pint of vodka with cranberry juice daily), hx of alcohol withdrawal presents for syncope x3 with generalized weakness, weight loss, loss of appetite, jaundice, melena, coffee ground emesis. Here with hypotension, lactic acidosis, hyponatremia, elevated Cr, jaundice, hyperbilirubinemia, elevated alk phos and AST, melena, coffee ground emesis, elevated INR, cirrhosis with ascites. < from: US Abdomen Limited (02.01.19 @ 12:45) >here is a moderate to large amount of ascites seen in all 4 quadrants. s/p paracetesis on 2/1-700 ml fluid removed. Octreo and Midodrine for now;   Albumin restarted on abx repeat paracentesis denied long discussion with family and patient about prognosis but optimistic about patient being stable enogh for physical rehab will get hematology consult as well for leukcytosis      s/p tap  2/11/19

## 2019-02-13 DIAGNOSIS — D72.829 ELEVATED WHITE BLOOD CELL COUNT, UNSPECIFIED: ICD-10-CM

## 2019-02-13 DIAGNOSIS — F10.10 ALCOHOL ABUSE, UNCOMPLICATED: ICD-10-CM

## 2019-02-13 LAB
ALBUMIN SERPL ELPH-MCNC: 2.3 G/DL — LOW (ref 3.3–5)
ALP SERPL-CCNC: 447 U/L — HIGH (ref 40–120)
ALT FLD-CCNC: 31 U/L — SIGNIFICANT CHANGE UP (ref 12–78)
ANION GAP SERPL CALC-SCNC: 15 MMOL/L — SIGNIFICANT CHANGE UP (ref 5–17)
AST SERPL-CCNC: 65 U/L — HIGH (ref 15–37)
BILIRUB DIRECT SERPL-MCNC: 4.64 MG/DL — HIGH (ref 0.05–0.2)
BILIRUB INDIRECT FLD-MCNC: 1.7 MG/DL — HIGH (ref 0.2–1)
BILIRUB SERPL-MCNC: 6.3 MG/DL — HIGH (ref 0.2–1.2)
BUN SERPL-MCNC: 61 MG/DL — HIGH (ref 7–23)
CALCIUM SERPL-MCNC: 8.3 MG/DL — LOW (ref 8.5–10.1)
CHLORIDE SERPL-SCNC: 98 MMOL/L — SIGNIFICANT CHANGE UP (ref 96–108)
CO2 SERPL-SCNC: 23 MMOL/L — SIGNIFICANT CHANGE UP (ref 22–31)
CREAT SERPL-MCNC: 2.64 MG/DL — HIGH (ref 0.5–1.3)
GLUCOSE SERPL-MCNC: 116 MG/DL — HIGH (ref 70–99)
HCT VFR BLD CALC: 33.8 % — LOW (ref 39–50)
HGB BLD-MCNC: 11.2 G/DL — LOW (ref 13–17)
MCHC RBC-ENTMCNC: 33.1 GM/DL — SIGNIFICANT CHANGE UP (ref 32–36)
MCHC RBC-ENTMCNC: 35 PG — HIGH (ref 27–34)
MCV RBC AUTO: 105.6 FL — HIGH (ref 80–100)
NRBC # BLD: 0 /100 WBCS — SIGNIFICANT CHANGE UP (ref 0–0)
PLATELET # BLD AUTO: 356 K/UL — SIGNIFICANT CHANGE UP (ref 150–400)
POTASSIUM SERPL-MCNC: 3.8 MMOL/L — SIGNIFICANT CHANGE UP (ref 3.5–5.3)
POTASSIUM SERPL-SCNC: 3.8 MMOL/L — SIGNIFICANT CHANGE UP (ref 3.5–5.3)
PROT SERPL-MCNC: 6.9 GM/DL — SIGNIFICANT CHANGE UP (ref 6–8.3)
RBC # BLD: 3.2 M/UL — LOW (ref 4.2–5.8)
RBC # FLD: 18.6 % — HIGH (ref 10.3–14.5)
SODIUM SERPL-SCNC: 136 MMOL/L — SIGNIFICANT CHANGE UP (ref 135–145)
WBC # BLD: 23.48 K/UL — HIGH (ref 3.8–10.5)
WBC # FLD AUTO: 23.48 K/UL — HIGH (ref 3.8–10.5)

## 2019-02-13 PROCEDURE — 99233 SBSQ HOSP IP/OBS HIGH 50: CPT

## 2019-02-13 RX ADMIN — MIDODRINE HYDROCHLORIDE 10 MILLIGRAM(S): 2.5 TABLET ORAL at 22:22

## 2019-02-13 RX ADMIN — Medication 100 MILLIGRAM(S): at 11:37

## 2019-02-13 RX ADMIN — OCTREOTIDE ACETATE 100 MICROGRAM(S): 200 INJECTION, SOLUTION INTRAVENOUS; SUBCUTANEOUS at 06:27

## 2019-02-13 RX ADMIN — MEROPENEM 100 MILLIGRAM(S): 1 INJECTION INTRAVENOUS at 06:27

## 2019-02-13 RX ADMIN — MEROPENEM 100 MILLIGRAM(S): 1 INJECTION INTRAVENOUS at 18:33

## 2019-02-13 RX ADMIN — MIDODRINE HYDROCHLORIDE 10 MILLIGRAM(S): 2.5 TABLET ORAL at 06:27

## 2019-02-13 RX ADMIN — OCTREOTIDE ACETATE 100 MICROGRAM(S): 200 INJECTION, SOLUTION INTRAVENOUS; SUBCUTANEOUS at 13:45

## 2019-02-13 RX ADMIN — MIDODRINE HYDROCHLORIDE 10 MILLIGRAM(S): 2.5 TABLET ORAL at 13:45

## 2019-02-13 RX ADMIN — Medication 1 TABLET(S): at 11:37

## 2019-02-13 RX ADMIN — PANTOPRAZOLE SODIUM 40 MILLIGRAM(S): 20 TABLET, DELAYED RELEASE ORAL at 06:27

## 2019-02-13 RX ADMIN — PANTOPRAZOLE SODIUM 40 MILLIGRAM(S): 20 TABLET, DELAYED RELEASE ORAL at 18:33

## 2019-02-13 RX ADMIN — Medication 1 MILLIGRAM(S): at 11:37

## 2019-02-13 NOTE — PROGRESS NOTE ADULT - SUBJECTIVE AND OBJECTIVE BOX
Patient is a 48y old  Male who presents with a chief complaint of Coffee ground emesis, progressive jaundice, syncopal episodes. (13 Feb 2019 09:01)      INTERVAL HPI/OVERNIGHT EVENTS: no events     MEDICATIONS  (STANDING):  folic acid 1 milliGRAM(s) Oral daily  meropenem  IVPB 500 milliGRAM(s) IV Intermittent every 12 hours  midodrine 10 milliGRAM(s) Oral every 8 hours  multivitamin 1 Tablet(s) Oral daily  nicotine -  14 mG/24Hr(s) Patch 1 patch Transdermal daily  octreotide  Injectable 100 MICROGram(s) SubCutaneous every 8 hours  pantoprazole  Injectable 40 milliGRAM(s) IV Push every 12 hours  rifaximin 550 milliGRAM(s) Oral two times a day  thiamine 100 milliGRAM(s) Oral daily    MEDICATIONS  (PRN):  zinc oxide 40% Ointment 1 Application(s) Topical every 8 hours PRN rash      Allergies    No Known Allergies    Intolerances           Vital Signs Last 24 Hrs  T(C): 36.6 (13 Feb 2019 05:52), Max: 36.8 (13 Feb 2019 00:31)  T(F): 97.9 (13 Feb 2019 05:52), Max: 98.3 (13 Feb 2019 00:31)  HR: 73 (13 Feb 2019 05:52) (58 - 86)  BP: 97/66 (13 Feb 2019 05:52) (91/56 - 110/53)  BP(mean): --  RR: 18 (13 Feb 2019 05:52) (18 - 18)  SpO2: 99% (13 Feb 2019 05:52) (97% - 99%)    PHYSICAL EXAM:  GENERAL: NAD, well-groomed, well-developed  HEAD:  Atraumatic, Normocephalic  EYES: icteric sclera   ENMT: No tonsillar erythema, exudates, or enlargement; Moist mucous membranes, Good dentition, No lesions  NECK: Supple, No JVD, Normal thyroid  NERVOUS SYSTEM:  Alert & Oriented X3, Good concentration; Motor Strength 5/5 B/L upper and lower extremities; DTRs 2+ intact and symmetric  CHEST/LUNG: Clear to percussion bilaterally; No rales, rhonchi, wheezing, or rubs  HEART: Regular rate and rhythm; No murmurs, rubs, or gallops  ABDOMEN: Soft, mild tenderness with fluid wave   EXTREMITIES:  2+ Peripheral Pulses, No clubbing, cyanosis, or edema  LYMPH: No lymphadenopathy noted  SKIN: Jaundice     LABS:                        11.2   23.48 )-----------( 356      ( 13 Feb 2019 07:56 )             33.8     02-13    136  |  98  |  61<H>  ----------------------------<  116<H>  3.8   |  23  |  2.64<H>    Ca    8.3<L>      13 Feb 2019 07:56    TPro  6.9  /  Alb  2.3<L>  /  TBili  6.3<H>  /  DBili  4.64<H>  /  AST  65<H>  /  ALT  31  /  AlkPhos  447<H>  02-13        CAPILLARY BLOOD GLUCOSE          RADIOLOGY & ADDITIONAL TESTS:    Imaging Personally Reviewed:  [ X] YES  [ ] NO    Consultant(s) Notes Reviewed:  [ X] YES  [ ] NO    Care Discussed with Consultants/Other Providers [X ] YES  [ ] NO

## 2019-02-13 NOTE — CONSULT NOTE ADULT - CONSULT REASON
Ascites, liver disease
Leucocytosis.  Cirrhosis.  Ascites
PAPO
r/o SBP
leukocytosis, hypotension SBP 90, jaundice

## 2019-02-13 NOTE — CONSULT NOTE ADULT - SUBJECTIVE AND OBJECTIVE BOX
REASON FOR CONSULTATION:  Leucocytosis.  Cirrhosis.    INTERVAL HISTORY:  Ascites.  Cirrhosis.      Allergies    No Known Allergies    Intolerances        MEDICATIONS  (STANDING):  folic acid 1 milliGRAM(s) Oral daily  meropenem  IVPB 500 milliGRAM(s) IV Intermittent every 12 hours  midodrine 10 milliGRAM(s) Oral every 8 hours  multivitamin 1 Tablet(s) Oral daily  nicotine -  14 mG/24Hr(s) Patch 1 patch Transdermal daily  octreotide  Injectable 100 MICROGram(s) SubCutaneous every 8 hours  pantoprazole  Injectable 40 milliGRAM(s) IV Push every 12 hours  rifaximin 550 milliGRAM(s) Oral two times a day  thiamine 100 milliGRAM(s) Oral daily    MEDICATIONS  (PRN):  zinc oxide 40% Ointment 1 Application(s) Topical every 8 hours PRN rash      Vital Signs Last 24 Hrs  T(C): 36.6 (13 Feb 2019 05:52), Max: 36.8 (13 Feb 2019 00:31)  T(F): 97.9 (13 Feb 2019 05:52), Max: 98.3 (13 Feb 2019 00:31)  HR: 73 (13 Feb 2019 05:52) (54 - 86)  BP: 97/66 (13 Feb 2019 05:52) (91/56 - 113/64)  BP(mean): --  RR: 18 (13 Feb 2019 05:52) (16 - 18)  SpO2: 99% (13 Feb 2019 05:52) (97% - 100%)    PHYSICAL EXAM:    EYES: EOMI, PERRLA, conjunctiva and sclera clear  CHEST/LUNG: Clear to percussion bilaterally;   HEART: Regular rate and rhythm;   ABDOMEN: Ascites:-++  LYMPH: No lymphadenopathy noted.        LABS:                        11.2   23.48 )-----------( 356      ( 13 Feb 2019 07:56 )             33.8     02-13    136  |  98  |  61<H>  ----------------------------<  116<H>  3.8   |  23  |  2.64<H>    Ca    8.3<L>      13 Feb 2019 07:56    TPro  6.9  /  Alb  2.3<L>  /  TBili  6.3<H>  /  DBili  4.64<H>  /  AST  65<H>  /  ALT  31  /  AlkPhos  447<H>  02-13            RADIOLOGY & ADDITIONAL STUDIES:    PATHOLOGY:

## 2019-02-13 NOTE — CONSULT NOTE ADULT - PROBLEM SELECTOR RECOMMENDATION 9
CBC  Watch WBC Count.
possible source SBP vs UTI   change zosyn to meropenem as leukocytossi worsenieng  Paracentesis when possible   follow up on urine and blood c/s   fluids

## 2019-02-13 NOTE — PROGRESS NOTE ADULT - PROBLEM SELECTOR PLAN 2
LFTs IMPROVING . ( Except alk phos is slowly rising )Being treated for hepatorenal. Not a candidate for steroids.  11.2 / 132 / 137 / 403.  - 9.4 / 139 / 37 / 411 - 9.1/114/27/397  - 10.8 / 81 / 23 / 355 - 10.3 / 71 / 25 / 356  -- 9.8 / 7.5 / 25 / 341 - 10.1 / 66 / 25 / 344  - 10.4 / 70 / 29 / 364 - 9.8 / 72 / 31 / 409  --  9.0 / 72 / 31 / 399 - 8.2/72/31/434  -  6.6/73/30/461 --- 6.3 / 65 / 31 / 447  ////// BUN / CRE  61 / 2.64   Increasing alk phos seconday to ? R/O  meds. ( antibiotics for 2 more days ) prognosis better

## 2019-02-13 NOTE — PROGRESS NOTE ADULT - ASSESSMENT
HPI:  47 y/o male PMH HTN (not on medication), chronic alcohol abuse and dependence x 15 years (1/2 pint of vodka with cranberry juice daily), hx of alcohol withdrawal 2013 hospitalized at [facility] after which he went to inpatient rehab at [facility] presents for syncope and melanotic stools. History obtained from ex wife, mother, and patient. Since pt left rehab in 2013 pt has been drinking daily. Last drink 11AM today. Pt state for past one and a half months he has had progressive weight loss with loss of appetite. He has had in increased abdominal distention for the past 1 month and in the past 1 week he has noticed jaundice and scleral icterus. Pt also reports generalized fatigue. Reports coffee ground emesis x1 episode 3 days ago but none since then with melena for about a week. Pt states he has 3 bowel movements a day and now has also been seeing "blood when I pee" also within the last week. No abdominal pain. No CP no SOB. No fevers or chills. Today pt called ex wife after he was unsteady with his gait and "fell", he denies LOC. Denies trauma to head. Ex wife reports while attempting to get pt to the car she noticed he was "very weak" and witnessed pt to have had 3 syncopal episodes lasting a few minutes each before regaining consciousness, but no seizures. Ex wife state that in the car, pt had a bowel movement that was black and tarry.     In ED pt noted to be hypotensive SBP 80-90s. 2L IVF given with 3rd and 4th infusing. SBP post IVF resuscitation 100s to one teens. On labs pt with leukocytosis with 3% bands, Na 123, Cr 2.05, INR 2.05, T bili 6, , ALT 55, Alk phos 799.  Pt seen by critical care and not felt to be ICU candidate. (23 Jan 2019 22:33)  ----------------- As Above ------------------------------------------------------  Patient confused. Left message for family member to contact me   ETOH Abuse (+). Coffee ground emesis x 1, melena.   Eleveated LFTs, INR  See labs, CT Scan    Cirrhosis, ascites, fever - 1) Paracentesis 2) ammonia level  3) f/u LFTs 4) treat for impending Dts  - patient does not fit into the criteria for treatment with steroids.

## 2019-02-13 NOTE — PROGRESS NOTE ADULT - SUBJECTIVE AND OBJECTIVE BOX
Patient is a 48y old  Male who presents with a chief complaint of Coffee ground emesis, progressive jaundice, syncopal episodes. (13 Feb 2019 14:12)      INTERVAL HPI / OVERNIGHT EVENTS:    MEDICATIONS  (STANDING):  folic acid 1 milliGRAM(s) Oral daily  meropenem  IVPB 500 milliGRAM(s) IV Intermittent every 12 hours  midodrine 10 milliGRAM(s) Oral every 8 hours  multivitamin 1 Tablet(s) Oral daily  nicotine -  14 mG/24Hr(s) Patch 1 patch Transdermal daily  octreotide  Injectable 100 MICROGram(s) SubCutaneous every 8 hours  pantoprazole  Injectable 40 milliGRAM(s) IV Push every 12 hours  rifaximin 550 milliGRAM(s) Oral two times a day  thiamine 100 milliGRAM(s) Oral daily    MEDICATIONS  (PRN):  zinc oxide 40% Ointment 1 Application(s) Topical every 8 hours PRN rash      Vital Signs Last 24 Hrs  T(C): 36.1 (13 Feb 2019 14:00), Max: 36.8 (13 Feb 2019 00:31)  T(F): 96.9 (13 Feb 2019 14:00), Max: 98.3 (13 Feb 2019 00:31)  HR: 60 (13 Feb 2019 14:00) (58 - 86)  BP: 119/52 (13 Feb 2019 14:00) (91/56 - 119/52)  BP(mean): --  RR: 18 (13 Feb 2019 14:00) (18 - 18)  SpO2: 96% (13 Feb 2019 14:00) (96% - 99%)    Review of systems:  General : no fever /chills,fatigue  CVS : no chest pain, palpitations  Lungs : no shortness of breath, cough  GI : no abdominal pain,vomiting, diarrhea   : no dysuria,hematuria        PHYSICAL EXAM:  General :NAD  Constitutional:  well-groomed, well-developed  Respiratory: CTAB/L  Cardiovascular: S1 and S2, RRR, no M/G/R  Gastrointestinal: BS+, soft, NT/ND  Extremities: No peripheral edema  Vascular: 2+ peripheral pulses  Skin: No rashes      LABS:                        11.2   23.48 )-----------( 356      ( 13 Feb 2019 07:56 )             33.8     02-13    136  |  98  |  61<H>  ----------------------------<  116<H>  3.8   |  23  |  2.64<H>    Ca    8.3<L>      13 Feb 2019 07:56    TPro  6.9  /  Alb  2.3<L>  /  TBili  6.3<H>  /  DBili  4.64<H>  /  AST  65<H>  /  ALT  31  /  AlkPhos  447<H>  02-13          MICROBIOLOGY:  RECENT CULTURES:        RADIOLOGY & ADDITIONAL STUDIES:

## 2019-02-13 NOTE — PROGRESS NOTE ADULT - SUBJECTIVE AND OBJECTIVE BOX
Patient is a 48y old  Male who presents with a chief complaint of Coffee ground emesis, progressive jaundice, syncopal episodes. (13 Feb 2019 15:47)      HPI:  49 y/o male PMH HTN (not on medication), chronic alcohol abuse and dependence x 15 years (1/2 pint of vodka with cranberry juice daily), hx of alcohol withdrawal 2013 hospitalized at [facility] after which he went to inpatient rehab at [facility] for syncope and melanotic stools. History obtained from ex wife, mother, and patient. Since pt left rehab in 2013 pt has been drinking daily. Last drink 11AM today. Pt state for past one and a half months he has had progressive weight loss with loss of appetite. He has had in increased abdominal distention for the past 1 month and in the past 1 week he has noticed jaundice and scleral icterus. Pt also reports generalized fatigue. Reports coffee ground emesis x1 episode 3 days ago but none since then with melena for about a week. Pt states he has 3 bowel movements a day and now has also been seeing "blood when I pee" also within the last week. No abdominal pain. No CP no SOB. No fevers or chills. Today pt called ex wife after he was unsteady with his gait and "fell", he denies LOC. Denies trauma to head. Ex wife reports while attempting to get pt to the car she noticed he was "very weak" and witnessed pt to have had 3 syncopal episodes lasting a few minutes each before regaining consciousness, but no seizures. Ex wife state that in the car, pt had a bowel movement that was black and tarry.     In ED pt noted to be hypotensive SBP 80-90s. 2L IVF given with 3rd and 4th infusing. SBP post IVF resuscitation 100s to one teens. On labs pt with leukocytosis with 3% bands, Na 123, Cr 2.05, INR 2.05, T bili 6, , ALT 55, Alk phos 799.  Pt seen by critical care and not felt to be ICU candidate. (23 Jan 2019 22:33)      INTERVAL HPI/OVERNIGHT EVENTS:  No new GI symptoms. Unchanged compared to yesterday    MEDICATIONS  (STANDING):  folic acid 1 milliGRAM(s) Oral daily  meropenem  IVPB 500 milliGRAM(s) IV Intermittent every 12 hours  midodrine 10 milliGRAM(s) Oral every 8 hours  multivitamin 1 Tablet(s) Oral daily  nicotine -  14 mG/24Hr(s) Patch 1 patch Transdermal daily  pantoprazole  Injectable 40 milliGRAM(s) IV Push every 12 hours  rifaximin 550 milliGRAM(s) Oral two times a day  thiamine 100 milliGRAM(s) Oral daily    MEDICATIONS  (PRN):  zinc oxide 40% Ointment 1 Application(s) Topical every 8 hours PRN rash      FAMILY HISTORY:  No pertinent family history in first degree relatives      Allergies    No Known Allergies    Intolerances        PMH/PSH:  ETOH abuse  Psoriasis  History of hypertension  No significant past surgical history        REVIEW OF SYSTEMS:  CONSTITUTIONAL: No fever, weight loss,  RESPIRATORY: No cough, wheezing, chills or hemoptysis; No shortness of breath  CARDIOVASCULAR: No chest pain, palpitations, dizziness, or leg swelling  GASTROINTESTINAL: No abdominal or epigastric pain. No nausea, vomiting, or hematemesis; No diarrhea or constipation. No melena or hematochezia.      Vital Signs Last 24 Hrs  T(C): 36.6 (13 Feb 2019 17:39), Max: 36.8 (13 Feb 2019 00:31)  T(F): 97.9 (13 Feb 2019 17:39), Max: 98.3 (13 Feb 2019 00:31)  HR: 57 (13 Feb 2019 17:39) (57 - 73)  BP: 99/65 (13 Feb 2019 17:39) (91/56 - 119/52)  BP(mean): --  RR: 18 (13 Feb 2019 17:39) (18 - 18)  SpO2: 98% (13 Feb 2019 17:39) (96% - 99%)    PHYSICAL EXAM:  GENERAL: NAD, well-groomed, well-developed  NERVOUS SYSTEM:  Alert & Oriented X3, Good concentration; No asterixis  CHEST/LUNG: Clear to percussion bilaterally; No rales, rhonchi, wheezing, or rubs  HEART: Regular rate and rhythm; No murmurs, rubs, or gallops  ABDOMEN: Soft, Nontender, mildly distended; Bowel sounds present   Jaundiced (+)    LAB                          11.2   23.48 )-----------( 356      ( 13 Feb 2019 07:56 )             33.8       CBC:  02-13 @ 07:56  WBC 23.48   Hgb 11.2   Hct 33.8   Plts 356  .6  02-12 @ 07:21  WBC 26.18   Hgb 11.1   Hct 33.1   Plts 372  .4  02-11 @ 08:16  WBC 28.42   Hgb 11.8   Hct 35.4   Plts 356  .1  02-10 @ 09:12  WBC 27.87   Hgb 12.0   Hct 36.3   Plts 338  .9  02-09 @ 08:55  WBC 30.50   Hgb 12.5   Hct 37.5   Plts 317  .6  02-08 @ 08:16  WBC 31.24   Hgb 11.8   Hct 34.7   Plts 309  .0  02-07 @ 07:49  WBC 34.36   Hgb 11.6   Hct 33.7   Plts 281  .1      Chemistry:  02-13 @ 07:56  Na+ 136  K+ 3.8  Cl- 98  CO2 23  BUN 61  Cr 2.64     02-12 @ 07:21  Na+ 135  K+ 4.0  Cl- 103  CO2 22  BUN 68  Cr 2.73     02-11 @ 08:16  Na+ 135  K+ 4.1  Cl- 102  CO2 24  BUN 73  Cr 2.97     02-10 @ 09:12  Na+ 134  K+ 4.1  Cl- 102  CO2 21  BUN 82  Cr 3.53     02-09 @ 08:55  Na+ 137  K+ 3.9  Cl- 102  CO2 21  BUN 89  Cr 4.02     02-08 @ 08:16  Na+ 133  K+ 4.1  Cl- 101  CO2 21  BUN 97  Cr 4.69     02-07 @ 07:49  Na+ 132  K+ 4.2  Cl- 100  CO2 21    Cr 5.14         Glucose, Serum: 116 mg/dL (02-13 @ 07:56)  Glucose, Serum: 117 mg/dL (02-12 @ 07:21)  Glucose, Serum: 90 mg/dL (02-11 @ 08:16)  Glucose, Serum: 99 mg/dL (02-10 @ 09:12)  Glucose, Serum: 94 mg/dL (02-09 @ 08:55)  Glucose, Serum: 110 mg/dL (02-08 @ 08:16)  Glucose, Serum: 101 mg/dL (02-07 @ 07:49)      13 Feb 2019 07:56    136    |  98     |  61     ----------------------------<  116    3.8     |  23     |  2.64   12 Feb 2019 07:21    135    |  103    |  68     ----------------------------<  117    4.0     |  22     |  2.73   11 Feb 2019 08:16    135    |  102    |  73     ----------------------------<  90     4.1     |  24     |  2.97   10 Feb 2019 09:12    134    |  102    |  82     ----------------------------<  99     4.1     |  21     |  3.53   09 Feb 2019 08:55    137    |  102    |  89     ----------------------------<  94     3.9     |  21     |  4.02   08 Feb 2019 08:16    133    |  101    |  97     ----------------------------<  110    4.1     |  21     |  4.69   07 Feb 2019 07:49    132    |  100    |  103    ----------------------------<  101    4.2     |  21     |  5.14     Ca    8.3        13 Feb 2019 07:56  Ca    8.1        12 Feb 2019 07:21  Ca    8.4        11 Feb 2019 08:16  Ca    8.5        10 Feb 2019 09:12  Ca    8.6        09 Feb 2019 08:55  Ca    8.8        08 Feb 2019 08:16  Ca    8.3        07 Feb 2019 07:49  Phos  4.3       11 Feb 2019 08:16  Phos  4.6       10 Feb 2019 09:12  Mg     2.1       11 Feb 2019 08:16  Mg     2.2       10 Feb 2019 09:12    TPro  6.9    /  Alb  2.3    /  TBili  6.3    /  DBili  4.64   /  AST  65     /  ALT  31     /  AlkPhos  447    13 Feb 2019 07:56  TPro  7.0    /  Alb  2.3    /  TBili  6.6    /  DBili  x      /  AST  73     /  ALT  30     /  AlkPhos  461    12 Feb 2019 07:21  TPro  7.3    /  Alb  2.6    /  TBili  8.2    /  DBili  6.45   /  AST  72     /  ALT  31     /  AlkPhos  434    11 Feb 2019 08:16  TPro  7.3    /  Alb  2.6    /  TBili  9.0    /  DBili  6.72   /  AST  72     /  ALT  31     /  AlkPhos  399    10 Feb 2019 09:12  TPro  7.5    /  Alb  2.7    /  TBili  9.8    /  DBili  x      /  AST  72     /  ALT  31     /  AlkPhos  409    09 Feb 2019 08:55  TPro  7.1    /  Alb  2.6    /  TBili  10.4   /  DBili  7.97   /  AST  70     /  ALT  29     /  AlkPhos  364    08 Feb 2019 08:16  TPro  6.9    /  Alb  2.6    /  TBili  10.1   /  DBili  7.57   /  AST  66     /  ALT  25     /  AlkPhos  344    07 Feb 2019 07:49              CAPILLARY BLOOD GLUCOSE              RADIOLOGY & ADDITIONAL TESTS:    Imaging Personally Reviewed:  [ ] YES  [ ] NO    Consultant(s) Notes Reviewed:  [ ] YES  [ ] NO    Care Discussed with Consultants/Other Providers [ ] YES  [ ] NO

## 2019-02-13 NOTE — PROGRESS NOTE ADULT - SUBJECTIVE AND OBJECTIVE BOX
Patient feels well; still weak but overall better;       MEDICATIONS  (STANDING):  folic acid 1 milliGRAM(s) Oral daily  meropenem  IVPB 500 milliGRAM(s) IV Intermittent every 12 hours  midodrine 10 milliGRAM(s) Oral every 8 hours  multivitamin 1 Tablet(s) Oral daily  nicotine -  14 mG/24Hr(s) Patch 1 patch Transdermal daily  octreotide  Injectable 100 MICROGram(s) SubCutaneous every 8 hours  pantoprazole  Injectable 40 milliGRAM(s) IV Push every 12 hours  rifaximin 550 milliGRAM(s) Oral two times a day  thiamine 100 milliGRAM(s) Oral daily    MEDICATIONS  (PRN):  zinc oxide 40% Ointment 1 Application(s) Topical every 8 hours PRN rash      02-12-19 @ 07:01  -  02-13-19 @ 07:00  --------------------------------------------------------  IN: 900 mL / OUT: 0 mL / NET: 900 mL      PHYSICAL EXAM:      T(C): 36.1 (02-13-19 @ 14:00), Max: 36.8 (02-13-19 @ 00:31)  HR: 60 (02-13-19 @ 14:00) (58 - 86)  BP: 119/52 (02-13-19 @ 14:00) (91/56 - 119/52)  RR: 18 (02-13-19 @ 14:00) (18 - 18)  SpO2: 96% (02-13-19 @ 14:00) (96% - 99%)  Wt(kg): --  Respiratory: clear anteriorly, decreased BS at bases  Cardiovascular: S1 S2  Gastrointestinal: soft NT ND +BS  Extremities:   tr edema                                    11.2   23.48 )-----------( 356      ( 13 Feb 2019 07:56 )             33.8     02-13    136  |  98  |  61<H>  ----------------------------<  116<H>  3.8   |  23  |  2.64<H>    Ca    8.3<L>      13 Feb 2019 07:56    TPro  6.9  /  Alb  2.3<L>  /  TBili  6.3<H>  /  DBili  4.64<H>  /  AST  65<H>  /  ALT  31  /  AlkPhos  447<H>  02-13      LIVER FUNCTIONS - ( 13 Feb 2019 07:56 )  Alb: 2.3 g/dL / Pro: 6.9 gm/dL / ALK PHOS: 447 U/L / ALT: 31 U/L / AST: 65 U/L / GGT: x             Assessment and Plan:    PAPO; suspected HRS; precipitated by SBP;  Indices stable; continue current rx with  midodrine, octreotide and SPA for 14 days then d/c ( started Jan 27)  Supportive care.

## 2019-02-13 NOTE — CONSULT NOTE ADULT - REASON FOR ADMISSION
Coffee ground emesis, progressive jaundice, syncopal episodes.

## 2019-02-14 LAB
ALBUMIN SERPL ELPH-MCNC: 2.4 G/DL — LOW (ref 3.3–5)
ALP SERPL-CCNC: 549 U/L — HIGH (ref 40–120)
ALT FLD-CCNC: 33 U/L — SIGNIFICANT CHANGE UP (ref 12–78)
ANION GAP SERPL CALC-SCNC: 12 MMOL/L — SIGNIFICANT CHANGE UP (ref 5–17)
AST SERPL-CCNC: 81 U/L — HIGH (ref 15–37)
BILIRUB DIRECT SERPL-MCNC: 5.33 MG/DL — HIGH (ref 0.05–0.2)
BILIRUB INDIRECT FLD-MCNC: 1.7 MG/DL — HIGH (ref 0.2–1)
BILIRUB SERPL-MCNC: 7 MG/DL — HIGH (ref 0.2–1.2)
BILIRUB SERPL-MCNC: 7 MG/DL — HIGH (ref 0.2–1.2)
BUN SERPL-MCNC: 58 MG/DL — HIGH (ref 7–23)
CALCIUM SERPL-MCNC: 8.5 MG/DL — SIGNIFICANT CHANGE UP (ref 8.5–10.1)
CHLORIDE SERPL-SCNC: 100 MMOL/L — SIGNIFICANT CHANGE UP (ref 96–108)
CO2 SERPL-SCNC: 21 MMOL/L — LOW (ref 22–31)
CREAT SERPL-MCNC: 2.63 MG/DL — HIGH (ref 0.5–1.3)
GLUCOSE SERPL-MCNC: 197 MG/DL — HIGH (ref 70–99)
HCT VFR BLD CALC: 38.2 % — LOW (ref 39–50)
HGB BLD-MCNC: 12.3 G/DL — LOW (ref 13–17)
MCHC RBC-ENTMCNC: 32.2 GM/DL — SIGNIFICANT CHANGE UP (ref 32–36)
MCHC RBC-ENTMCNC: 34.4 PG — HIGH (ref 27–34)
MCV RBC AUTO: 106.7 FL — HIGH (ref 80–100)
NRBC # BLD: 0 /100 WBCS — SIGNIFICANT CHANGE UP (ref 0–0)
PLATELET # BLD AUTO: 287 K/UL — SIGNIFICANT CHANGE UP (ref 150–400)
POTASSIUM SERPL-MCNC: 4.9 MMOL/L — SIGNIFICANT CHANGE UP (ref 3.5–5.3)
POTASSIUM SERPL-SCNC: 4.9 MMOL/L — SIGNIFICANT CHANGE UP (ref 3.5–5.3)
PROT SERPL-MCNC: 7.5 GM/DL — SIGNIFICANT CHANGE UP (ref 6–8.3)
RBC # BLD: 3.58 M/UL — LOW (ref 4.2–5.8)
RBC # FLD: 18.6 % — HIGH (ref 10.3–14.5)
SODIUM SERPL-SCNC: 133 MMOL/L — LOW (ref 135–145)
WBC # BLD: 23.42 K/UL — HIGH (ref 3.8–10.5)
WBC # FLD AUTO: 23.42 K/UL — HIGH (ref 3.8–10.5)

## 2019-02-14 PROCEDURE — 99233 SBSQ HOSP IP/OBS HIGH 50: CPT

## 2019-02-14 RX ADMIN — MEROPENEM 100 MILLIGRAM(S): 1 INJECTION INTRAVENOUS at 06:00

## 2019-02-14 RX ADMIN — MIDODRINE HYDROCHLORIDE 10 MILLIGRAM(S): 2.5 TABLET ORAL at 21:31

## 2019-02-14 RX ADMIN — Medication 1 MILLIGRAM(S): at 13:09

## 2019-02-14 RX ADMIN — MIDODRINE HYDROCHLORIDE 10 MILLIGRAM(S): 2.5 TABLET ORAL at 13:09

## 2019-02-14 RX ADMIN — MEROPENEM 100 MILLIGRAM(S): 1 INJECTION INTRAVENOUS at 17:57

## 2019-02-14 RX ADMIN — MIDODRINE HYDROCHLORIDE 10 MILLIGRAM(S): 2.5 TABLET ORAL at 06:01

## 2019-02-14 RX ADMIN — Medication 1 TABLET(S): at 13:09

## 2019-02-14 RX ADMIN — PANTOPRAZOLE SODIUM 40 MILLIGRAM(S): 20 TABLET, DELAYED RELEASE ORAL at 06:00

## 2019-02-14 RX ADMIN — Medication 100 MILLIGRAM(S): at 13:09

## 2019-02-14 RX ADMIN — PANTOPRAZOLE SODIUM 40 MILLIGRAM(S): 20 TABLET, DELAYED RELEASE ORAL at 17:58

## 2019-02-14 NOTE — PROGRESS NOTE ADULT - ASSESSMENT
HPI:  47 y/o male PMH HTN (not on medication), chronic alcohol abuse and dependence x 15 years (1/2 pint of vodka with cranberry juice daily), hx of alcohol withdrawal 2013 hospitalized at [facility] after which he went to inpatient rehab at [facility] presents for syncope and melanotic stools. History obtained from ex wife, mother, and patient. Since pt left rehab in 2013 pt has been drinking daily. Last drink 11AM today. Pt state for past one and a half months he has had progressive weight loss with loss of appetite. He has had in increased abdominal distention for the past 1 month and in the past 1 week he has noticed jaundice and scleral icterus. Pt also reports generalized fatigue. Reports coffee ground emesis x1 episode 3 days ago but none since then with melena for about a week. Pt states he has 3 bowel movements a day and now has also been seeing "blood when I pee" also within the last week. No abdominal pain. No CP no SOB. No fevers or chills. Today pt called ex wife after he was unsteady with his gait and "fell", he denies LOC. Denies trauma to head. Ex wife reports while attempting to get pt to the car she noticed he was "very weak" and witnessed pt to have had 3 syncopal episodes lasting a few minutes each before regaining consciousness, but no seizures. Ex wife state that in the car, pt had a bowel movement that was black and tarry.     In ED pt noted to be hypotensive SBP 80-90s. 2L IVF given with 3rd and 4th infusing. SBP post IVF resuscitation 100s to one teens. On labs pt with leukocytosis with 3% bands, Na 123, Cr 2.05, INR 2.05, T bili 6, , ALT 55, Alk phos 799.  Pt seen by critical care and not felt to be ICU candidate. (23 Jan 2019 22:33)  ----------------- As Above ------------------------------------------------------  Patient confused. Left message for family member to contact me   ETOH Abuse (+). Coffee ground emesis x 1, melena.   Eleveated LFTs, INR  See labs, CT Scan    Cirrhosis, ascites, fever - 1) Paracentesis 2) ammonia level  3) f/u LFTs 4) treat for impending Dts  - patient does not fit into the criteria for treatment with steroids.

## 2019-02-14 NOTE — PROGRESS NOTE ADULT - SUBJECTIVE AND OBJECTIVE BOX
INTERVAL History:  Weak  Ascites    Allergies    No Known Allergies    Intolerances        MEDICATIONS  (STANDING):  folic acid 1 milliGRAM(s) Oral daily  meropenem  IVPB 500 milliGRAM(s) IV Intermittent every 12 hours  midodrine 10 milliGRAM(s) Oral every 8 hours  multivitamin 1 Tablet(s) Oral daily  nicotine -  14 mG/24Hr(s) Patch 1 patch Transdermal daily  pantoprazole  Injectable 40 milliGRAM(s) IV Push every 12 hours  rifaximin 550 milliGRAM(s) Oral two times a day  thiamine 100 milliGRAM(s) Oral daily    MEDICATIONS  (PRN):  zinc oxide 40% Ointment 1 Application(s) Topical every 8 hours PRN rash      Vital Signs Last 24 Hrs  T(C): 36.6 (14 Feb 2019 05:13), Max: 36.8 (13 Feb 2019 20:25)  T(F): 97.8 (14 Feb 2019 05:13), Max: 98.3 (13 Feb 2019 20:25)  HR: 62 (14 Feb 2019 05:13) (57 - 62)  BP: 109/63 (14 Feb 2019 05:13) (99/65 - 119/52)  BP(mean): --  RR: 17 (14 Feb 2019 05:13) (17 - 18)  SpO2: 97% (14 Feb 2019 05:13) (96% - 99%)    PHYSICAL EXAM:      EYES: EOMI, PERRLA, conjunctiva and sclera clear  NECK: Supple, No JVD, Normal thyroid  CHEST/LUNG: Clear to percussion bilaterally; No rales, rhonchi,   HEART: Regular rate and rhythm;   ABDOMEN: Ascites:- ++          LABS:                        12.3   23.42 )-----------( 287      ( 14 Feb 2019 07:59 )             38.2     02-13    136  |  98  |  61<H>  ----------------------------<  116<H>  3.8   |  23  |  2.64<H>    Ca    8.3<L>      13 Feb 2019 07:56    TPro  6.9  /  Alb  2.3<L>  /  TBili  6.3<H>  /  DBili  4.64<H>  /  AST  65<H>  /  ALT  31  /  AlkPhos  447<H>  02-13            RADIOLOGY & ADDITIONAL STUDIES:    PATHOLOGY:

## 2019-02-14 NOTE — PROGRESS NOTE ADULT - SUBJECTIVE AND OBJECTIVE BOX
Patient is a 48y old  Male who presents with a chief complaint of Coffee ground emesis, progressive jaundice, syncopal episodes. (14 Feb 2019 10:34)      INTERVAL HPI/OVERNIGHT EVENTS: no events     MEDICATIONS  (STANDING):  folic acid 1 milliGRAM(s) Oral daily  meropenem  IVPB 500 milliGRAM(s) IV Intermittent every 12 hours  midodrine 10 milliGRAM(s) Oral every 8 hours  multivitamin 1 Tablet(s) Oral daily  nicotine -  14 mG/24Hr(s) Patch 1 patch Transdermal daily  pantoprazole  Injectable 40 milliGRAM(s) IV Push every 12 hours  rifaximin 550 milliGRAM(s) Oral two times a day  thiamine 100 milliGRAM(s) Oral daily    MEDICATIONS  (PRN):  zinc oxide 40% Ointment 1 Application(s) Topical every 8 hours PRN rash      Allergies    No Known Allergies    Intolerances           Vital Signs Last 24 Hrs  T(C): 37.1 (14 Feb 2019 12:40), Max: 37.1 (14 Feb 2019 12:40)  T(F): 98.7 (14 Feb 2019 12:40), Max: 98.7 (14 Feb 2019 12:40)  HR: 81 (14 Feb 2019 12:40) (57 - 81)  BP: 99/53 (14 Feb 2019 12:40) (99/53 - 109/63)  BP(mean): --  RR: 18 (14 Feb 2019 12:40) (17 - 18)  SpO2: 100% (14 Feb 2019 12:40) (97% - 100%)    PHYSICAL EXAM:  GENERAL: NAD, well-groomed, well-developed  HEAD:  Atraumatic, Normocephalic  EYES: icteric sclera   ENMT: No tonsillar erythema, exudates, or enlargement; Moist mucous membranes, Good dentition, No lesions  NECK: Supple, No JVD, Normal thyroid  NERVOUS SYSTEM:  Alert & Oriented X3, Good concentration; Motor Strength 5/5 B/L upper and lower extremities; DTRs 2+ intact and symmetric  CHEST/LUNG: Clear to percussion bilaterally; No rales, rhonchi, wheezing, or rubs  HEART: Regular rate and rhythm; No murmurs, rubs, or gallops  ABDOMEN: Soft, mild tenderness with fluid wave   EXTREMITIES:  2+ Peripheral Pulses, No clubbing, cyanosis, or edema  LYMPH: No lymphadenopathy noted  SKIN: Jaundice   LABS:                        12.3   23.42 )-----------( 287      ( 14 Feb 2019 07:59 )             38.2     02-14    133<L>  |  100  |  58<H>  ----------------------------<  197<H>  4.9   |  21<L>  |  2.63<H>    Ca    8.5      14 Feb 2019 10:11    TPro  7.5  /  Alb  2.4<L>  /  TBili  7.0<H>  /  DBili  5.33<H>  /  AST  81<H>  /  ALT  33  /  AlkPhos  549<H>  02-14        CAPILLARY BLOOD GLUCOSE          RADIOLOGY & ADDITIONAL TESTS:    Imaging Personally Reviewed:  [ X] YES  [ ] NO    Consultant(s) Notes Reviewed:  [ X] YES  [ ] NO    Care Discussed with Consultants/Other Providers [X ] YES  [ ] NO

## 2019-02-14 NOTE — PROGRESS NOTE ADULT - SUBJECTIVE AND OBJECTIVE BOX
Patient is a 48y old  Male who presents with a chief complaint of Coffee ground emesis, progressive jaundice, syncopal episodes. (14 Feb 2019 14:32)      HPI:  49 y/o male PMH HTN (not on medication), chronic alcohol abuse and dependence x 15 years (1/2 pint of vodka with cranberry juice daily), hx of alcohol withdrawal 2013 hospitalized at [facility] after which he went to inpatient rehab at [facility] for syncope and melanotic stools. History obtained from ex wife, mother, and patient. Since pt left rehab in 2013 pt has been drinking daily. Last drink 11AM today. Pt state for past one and a half months he has had progressive weight loss with loss of appetite. He has had in increased abdominal distention for the past 1 month and in the past 1 week he has noticed jaundice and scleral icterus. Pt also reports generalized fatigue. Reports coffee ground emesis x1 episode 3 days ago but none since then with melena for about a week. Pt states he has 3 bowel movements a day and now has also been seeing "blood when I pee" also within the last week. No abdominal pain. No CP no SOB. No fevers or chills. Today pt called ex wife after he was unsteady with his gait and "fell", he denies LOC. Denies trauma to head. Ex wife reports while attempting to get pt to the car she noticed he was "very weak" and witnessed pt to have had 3 syncopal episodes lasting a few minutes each before regaining consciousness, but no seizures. Ex wife state that in the car, pt had a bowel movement that was black and tarry.     In ED pt noted to be hypotensive SBP 80-90s. 2L IVF given with 3rd and 4th infusing. SBP post IVF resuscitation 100s to one teens. On labs pt with leukocytosis with 3% bands, Na 123, Cr 2.05, INR 2.05, T bili 6, , ALT 55, Alk phos 799.  Pt seen by critical care and not felt to be ICU candidate. (23 Jan 2019 22:33)      INTERVAL HPI/OVERNIGHT EVENTS:  The patient denies melena, hematochezia, hematemesis, nausea, vomiting, abdominal pain, constipation, diarrhea, or change in bowel movements Tolerating regular diet    MEDICATIONS  (STANDING):  folic acid 1 milliGRAM(s) Oral daily  meropenem  IVPB 500 milliGRAM(s) IV Intermittent every 12 hours  midodrine 10 milliGRAM(s) Oral every 8 hours  multivitamin 1 Tablet(s) Oral daily  nicotine -  14 mG/24Hr(s) Patch 1 patch Transdermal daily  pantoprazole  Injectable 40 milliGRAM(s) IV Push every 12 hours  rifaximin 550 milliGRAM(s) Oral two times a day  thiamine 100 milliGRAM(s) Oral daily    MEDICATIONS  (PRN):  zinc oxide 40% Ointment 1 Application(s) Topical every 8 hours PRN rash      FAMILY HISTORY:  No pertinent family history in first degree relatives      Allergies    No Known Allergies    Intolerances        PMH/PSH:  ETOH abuse  Psoriasis  History of hypertension  No significant past surgical history        REVIEW OF SYSTEMS:  CONSTITUTIONAL: No fever, weight loss, or fatigue  EYES: No eye pain, visual disturbances, or discharge  ENMT:  No difficulty hearing, tinnitus, vertigo; No sinus or throat pain  NECK: No pain or stiffness  BREASTS: No pain, masses, or nipple discharge  RESPIRATORY: No cough, wheezing, chills or hemoptysis; No shortness of breath  CARDIOVASCULAR: No chest pain, palpitations, dizziness, or leg swelling  GASTROINTESTINAL: See above  GENITOURINARY: No dysuria, frequency, hematuria, or incontinence  NEUROLOGICAL: No headaches,, numbness, or tremors  SKIN: No itching, burning, rashes, or lesions   LYMPH NODES: No enlarged glands  ENDOCRINE: No heat or cold intolerance; No hair loss  MUSCULOSKELETAL: No joint pain or swelling; No muscle, back, or extremity pain  PSYCHIATRIC: No depression, anxiety, mood swings, or difficulty sleeping  HEME/LYMPH: No easy bruising, or bleeding gums  ALLERGY AND IMMUNOLOGIC: No hives or eczema    Vital Signs Last 24 Hrs  T(C): 37.1 (14 Feb 2019 12:40), Max: 37.1 (14 Feb 2019 12:40)  T(F): 98.7 (14 Feb 2019 12:40), Max: 98.7 (14 Feb 2019 12:40)  HR: 81 (14 Feb 2019 12:40) (57 - 81)  BP: 99/53 (14 Feb 2019 12:40) (99/53 - 109/63)  BP(mean): --  RR: 18 (14 Feb 2019 12:40) (17 - 18)  SpO2: 100% (14 Feb 2019 12:40) (97% - 100%)    PHYSICAL EXAM:  GENERAL: NAD, well-groomed, well-developed  HEAD:  Atraumatic, Normocephalic  EYES: EOMI, PERRLA, conjunctiva and sclera jaundiced  NECK: Supple, No JVD, Normal thyroid  NERVOUS SYSTEM:  Alert & Oriented X3, Good concentration; No asterixis  CHEST/LUNG: Clear to percussion bilaterally; No rales, rhonchi, wheezing, or rubs  HEART: Regular rate and rhythm; No murmurs, rubs, or gallops  ABDOMEN: Soft, Nontender, Nondistended; Bowel sounds present  EXTREMITIES:  2+ Peripheral Pulses, No clubbing, cyanosis, or edema  LYMPH: No lymphadenopathy noted  SKIN: No rashes or lesions    LAB                          12.3   23.42 )-----------( 287      ( 14 Feb 2019 07:59 )             38.2       CBC:  02-14 @ 07:59  WBC 23.42   Hgb 12.3   Hct 38.2   Plts 287  .7  02-13 @ 07:56  WBC 23.48   Hgb 11.2   Hct 33.8   Plts 356  .6  02-12 @ 07:21  WBC 26.18   Hgb 11.1   Hct 33.1   Plts 372  .4  02-11 @ 08:16  WBC 28.42   Hgb 11.8   Hct 35.4   Plts 356  .1  02-10 @ 09:12  WBC 27.87   Hgb 12.0   Hct 36.3   Plts 338  .9  02-09 @ 08:55  WBC 30.50   Hgb 12.5   Hct 37.5   Plts 317  .6  02-08 @ 08:16  WBC 31.24   Hgb 11.8   Hct 34.7   Plts 309  .0      Chemistry:  02-14 @ 10:11  Na+ 133  K+ 4.9  Cl- 100  CO2 21  BUN 58  Cr 2.63     02-13 @ 07:56  Na+ 136  K+ 3.8  Cl- 98  CO2 23  BUN 61  Cr 2.64     02-12 @ 07:21  Na+ 135  K+ 4.0  Cl- 103  CO2 22  BUN 68  Cr 2.73     02-11 @ 08:16  Na+ 135  K+ 4.1  Cl- 102  CO2 24  BUN 73  Cr 2.97     02-10 @ 09:12  Na+ 134  K+ 4.1  Cl- 102  CO2 21  BUN 82  Cr 3.53     02-09 @ 08:55  Na+ 137  K+ 3.9  Cl- 102  CO2 21  BUN 89  Cr 4.02     02-08 @ 08:16  Na+ 133  K+ 4.1  Cl- 101  CO2 21  BUN 97  Cr 4.69         Glucose, Serum: 197 mg/dL (02-14 @ 10:11)  Glucose, Serum: 116 mg/dL (02-13 @ 07:56)  Glucose, Serum: 117 mg/dL (02-12 @ 07:21)  Glucose, Serum: 90 mg/dL (02-11 @ 08:16)  Glucose, Serum: 99 mg/dL (02-10 @ 09:12)  Glucose, Serum: 94 mg/dL (02-09 @ 08:55)  Glucose, Serum: 110 mg/dL (02-08 @ 08:16)      14 Feb 2019 10:11    133    |  100    |  58     ----------------------------<  197    4.9     |  21     |  2.63   13 Feb 2019 07:56    136    |  98     |  61     ----------------------------<  116    3.8     |  23     |  2.64   12 Feb 2019 07:21    135    |  103    |  68     ----------------------------<  117    4.0     |  22     |  2.73   11 Feb 2019 08:16    135    |  102    |  73     ----------------------------<  90     4.1     |  24     |  2.97   10 Feb 2019 09:12    134    |  102    |  82     ----------------------------<  99     4.1     |  21     |  3.53   09 Feb 2019 08:55    137    |  102    |  89     ----------------------------<  94     3.9     |  21     |  4.02   08 Feb 2019 08:16    133    |  101    |  97     ----------------------------<  110    4.1     |  21     |  4.69     Ca    8.5        14 Feb 2019 10:11  Ca    8.3        13 Feb 2019 07:56  Ca    8.1        12 Feb 2019 07:21  Ca    8.4        11 Feb 2019 08:16  Ca    8.5        10 Feb 2019 09:12  Ca    8.6        09 Feb 2019 08:55  Ca    8.8        08 Feb 2019 08:16  Phos  4.3       11 Feb 2019 08:16  Phos  4.6       10 Feb 2019 09:12  Mg     2.1       11 Feb 2019 08:16  Mg     2.2       10 Feb 2019 09:12    TPro  7.5    /  Alb  2.4    /  TBili  7.0    /  DBili  5.33   /  AST  81     /  ALT  33     /  AlkPhos  549    14 Feb 2019 10:11  TPro  6.9    /  Alb  2.3    /  TBili  6.3    /  DBili  4.64   /  AST  65     /  ALT  31     /  AlkPhos  447    13 Feb 2019 07:56  TPro  7.0    /  Alb  2.3    /  TBili  6.6    /  DBili  x      /  AST  73     /  ALT  30     /  AlkPhos  461    12 Feb 2019 07:21  TPro  7.3    /  Alb  2.6    /  TBili  8.2    /  DBili  6.45   /  AST  72     /  ALT  31     /  AlkPhos  434    11 Feb 2019 08:16  TPro  7.3    /  Alb  2.6    /  TBili  9.0    /  DBili  6.72   /  AST  72     /  ALT  31     /  AlkPhos  399    10 Feb 2019 09:12  TPro  7.5    /  Alb  2.7    /  TBili  9.8    /  DBili  x      /  AST  72     /  ALT  31     /  AlkPhos  409    09 Feb 2019 08:55  TPro  7.1    /  Alb  2.6    /  TBili  10.4   /  DBili  7.97   /  AST  70     /  ALT  29     /  AlkPhos  364    08 Feb 2019 08:16              CAPILLARY BLOOD GLUCOSE              RADIOLOGY & ADDITIONAL TESTS:    Imaging Personally Reviewed:  [ ] YES  [ ] NO    Consultant(s) Notes Reviewed:  [ ] YES  [ ] NO    Care Discussed with Consultants/Other Providers [ ] YES  [ ] NO

## 2019-02-14 NOTE — PROGRESS NOTE ADULT - SUBJECTIVE AND OBJECTIVE BOX
Subjective: no complaints.       MEDICATIONS  (STANDING):  folic acid 1 milliGRAM(s) Oral daily  meropenem  IVPB 500 milliGRAM(s) IV Intermittent every 12 hours  midodrine 10 milliGRAM(s) Oral every 8 hours  multivitamin 1 Tablet(s) Oral daily  nicotine -  14 mG/24Hr(s) Patch 1 patch Transdermal daily  pantoprazole  Injectable 40 milliGRAM(s) IV Push every 12 hours  rifaximin 550 milliGRAM(s) Oral two times a day  thiamine 100 milliGRAM(s) Oral daily    MEDICATIONS  (PRN):  zinc oxide 40% Ointment 1 Application(s) Topical every 8 hours PRN rash          T(C): 36.6 (02-14-19 @ 05:13), Max: 36.8 (02-13-19 @ 20:25)  HR: 62 (02-14-19 @ 05:13) (57 - 62)  BP: 109/63 (02-14-19 @ 05:13) (99/65 - 119/52)  RR: 17 (02-14-19 @ 05:13) (17 - 18)  SpO2: 97% (02-14-19 @ 05:13) (96% - 99%)  Wt(kg): --        I&O's Detail    13 Feb 2019 07:01  -  14 Feb 2019 07:00  --------------------------------------------------------  IN:    Oral Fluid: 150 mL  Total IN: 150 mL    OUT:  Total OUT: 0 mL    Total NET: 150 mL               PHYSICAL EXAM:    GENERAL: jaundice  EYES: EOMI, PERRLA, icteric sclera  NECK: Supple, no inc in JVP  CHEST/LUNG: dec BS  HEART: S1S2  ABDOMEN: tensely distended, dressing in RLQ  EXTREMITIES:  min edema  NEURO: no asterixis        LABS:  CBC Full  -  ( 14 Feb 2019 07:59 )  WBC Count : 23.42 K/uL  Hemoglobin : 12.3 g/dL  Hematocrit : 38.2 %  Platelet Count - Automated : 287 K/uL  Mean Cell Volume : 106.7 fl  Mean Cell Hemoglobin : 34.4 pg  Mean Cell Hemoglobin Concentration : 32.2 gm/dL  Auto Neutrophil # : x  Auto Lymphocyte # : x  Auto Monocyte # : x  Auto Eosinophil # : x  Auto Basophil # : x  Auto Neutrophil % : x  Auto Lymphocyte % : x  Auto Monocyte % : x  Auto Eosinophil % : x  Auto Basophil % : x    02-13    136  |  98  |  61<H>  ----------------------------<  116<H>  3.8   |  23  |  2.64<H>    Ca    8.3<L>      13 Feb 2019 07:56    TPro  6.9  /  Alb  2.3<L>  /  TBili  6.3<H>  /  DBili  4.64<H>  /  AST  65<H>  /  ALT  31  /  AlkPhos  447<H>  02-13            Impression:  * PAPO -- ATN vs Type 1 HRS. Indices are slowly better  * Alcoholic hepatitis  * GI bleed.     Recommendations:   * Cont supportive care  * Taper Midodrine  * May dc to rehab

## 2019-02-14 NOTE — CHART NOTE - NSCHARTNOTEFT_GEN_A_CORE
Pt w/ hepatorenal syndrome ; sepsis ; acute encephalopathy ; Acute GI bleeding ; PAPO ; Alcohol withdrawal delirium, acute, hypoactive ; alcoholic cirrhosis of liver w/ ascites ; dysuria; severe protein calorie malnutrition.     Factors impacting intake: [ ] none [ ] nausea  [ ] vomiting [ ] diarrhea [ ] constipation  [ ]chewing problems [ ] swallowing issues  [x ] other: early satiety/ lethargic    2/12 - Diet Prescription: Diet, Regular:   Low Sodium  Supplement Feeding Modality:  Oral  Ensure Enlive Cans or Servings Per Day:  1       Frequency:  Two Times a day (02-12-19 @ 14:48)    Intake:  Pt reports improved appetite/intake. Takes his time eating/drinking. Consuming 100 % meals & 100 % ensure enlive 8 oz twice per day (700 travis & 40 g pro) supplement. Also eats meals from home if he doesn't eat meals from tray.     Current Weight: 2/14 - 149.2 (67.7 kg) no edema noted ; 2/12 - 149.2 (67.7 kg) no edema noted  % Weight Change - stable - Not valid due to ascites    Nutrition focused physical exam conducted ;   Subcutaneous fat loss: [mild] Orbital fat pads region, [mild ]Buccal fat region, [severe ]Triceps region,  [severe ]Ribs region.  Muscle wasting: [severe ]Temples region, [moderate ]Clavicle region, [moderate ]Shoulder region, [unable ]Scapula region, [ mild]Interosseous region,  [severe ]thigh region, [severe ]Calf region      Pertinent Medications: MEDICATIONS  (STANDING):  folic acid 1 milliGRAM(s) Oral daily  meropenem  IVPB 500 milliGRAM(s) IV Intermittent every 12 hours  midodrine 10 milliGRAM(s) Oral every 8 hours  multivitamin 1 Tablet(s) Oral daily  nicotine -  14 mG/24Hr(s) Patch 1 patch Transdermal daily  pantoprazole  Injectable 40 milliGRAM(s) IV Push every 12 hours  rifaximin 550 milliGRAM(s) Oral two times a day  thiamine 100 milliGRAM(s) Oral daily    MEDICATIONS  (PRN):  zinc oxide 40% Ointment 1 Application(s) Topical every 8 hours PRN rash    Pertinent Labs: 02-14 Na133 mmol/L<L> Glu 197 mg/dL<H> K+ 4.9 mmol/L Cr  2.63 mg/dL<H> BUN 58 mg/dL<H> 02-11 Phos 4.3 mg/dL 02-14 Alb 2.4 g/dL<L>     CAPILLARY BLOOD GLUCOSE        Skin: WDL    Estimated Needs:   [x ] no change since previous assessment (1/29/2019)  [ ] recalculated:     Previous Nutrition Diagnosis:   [ ] Inadequate Energy Intake [ ]Inadequate Oral Intake [ ] Excessive Energy Intake   [ ] Underweight [ ] Increased Nutrient Needs [ ] Overweight/Obesity   [ ] Altered GI Function [ ] Unintended Weight Loss [ ] Food & Nutrition Related Knowledge Deficit [x ] Malnutrition  - chronic, severe    Previous Goal: Pt to tolerate p.o. diet without GI distress-> continues to be met ; Pt to consume > 75% meals/supplements-> Met    Nutrition Diagnosis is [x ] ongoing  [ ] resolved [ ] not applicable     New Nutrition Diagnosis: [x ] not applicable       Interventions:   Recommend  [ x ] Continue Diet as Rx'd  [ ] Change Diet To:  [ ] Nutrition Supplement  [ ] Nutrition Support  [x ] Other: cater to food preferences as appropriate; encourage continued consumption of nutritional supplement    Monitoring and Evaluation:   [x ] PO intake [ x ] Tolerance to diet prescription [ x ] weights [ x ] labs[ x ] follow up per protocol  [ ] other:

## 2019-02-14 NOTE — PROGRESS NOTE ADULT - PROBLEM SELECTOR PLAN 2
LFTs slightly worse today . Being treated for hepatorenal. Not a candidate for steroids.  11.2 / 132 / 137 / 403.  - 9.4 / 139 / 37 / 411 - 9.1/114/27/397  - 10.8 / 81 / 23 / 355 - 10.3 / 71 / 25 / 356  -- 9.8 / 7.5 / 25 / 341 - 10.1 / 66 / 25 / 344  - 10.4 / 70 / 29 / 364 - 9.8 / 72 / 31 / 409  --  9.0 / 72 / 31 / 399 - 8.2/72/31/434  -  6.6/73/30/461 --- 6.3 / 65 / 31 / 447  --  7.0 / 81 / 53 / 549 ////// BUN / CRE  61 / 2.64   Increasing alk phos seconday to ? R/O  meds. ( antibiotics for 2 more days (?) ) prognosis better

## 2019-02-15 LAB
ALBUMIN SERPL ELPH-MCNC: 2.2 G/DL — LOW (ref 3.3–5)
ALP SERPL-CCNC: 546 U/L — HIGH (ref 40–120)
ALT FLD-CCNC: 33 U/L — SIGNIFICANT CHANGE UP (ref 12–78)
ANION GAP SERPL CALC-SCNC: 13 MMOL/L — SIGNIFICANT CHANGE UP (ref 5–17)
AST SERPL-CCNC: 77 U/L — HIGH (ref 15–37)
BILIRUB DIRECT SERPL-MCNC: 4.69 MG/DL — HIGH (ref 0.05–0.2)
BILIRUB INDIRECT FLD-MCNC: 1.4 MG/DL — HIGH (ref 0.2–1)
BILIRUB SERPL-MCNC: 6.1 MG/DL — HIGH (ref 0.2–1.2)
BUN SERPL-MCNC: 53 MG/DL — HIGH (ref 7–23)
CALCIUM SERPL-MCNC: 8.2 MG/DL — LOW (ref 8.5–10.1)
CHLORIDE SERPL-SCNC: 101 MMOL/L — SIGNIFICANT CHANGE UP (ref 96–108)
CO2 SERPL-SCNC: 21 MMOL/L — LOW (ref 22–31)
CREAT SERPL-MCNC: 2.5 MG/DL — HIGH (ref 0.5–1.3)
GLUCOSE SERPL-MCNC: 100 MG/DL — HIGH (ref 70–99)
HCT VFR BLD CALC: 34.4 % — LOW (ref 39–50)
HGB BLD-MCNC: 11.2 G/DL — LOW (ref 13–17)
MCHC RBC-ENTMCNC: 32.6 GM/DL — SIGNIFICANT CHANGE UP (ref 32–36)
MCHC RBC-ENTMCNC: 34 PG — SIGNIFICANT CHANGE UP (ref 27–34)
MCV RBC AUTO: 104.6 FL — HIGH (ref 80–100)
NRBC # BLD: 0 /100 WBCS — SIGNIFICANT CHANGE UP (ref 0–0)
PLATELET # BLD AUTO: 383 K/UL — SIGNIFICANT CHANGE UP (ref 150–400)
POTASSIUM SERPL-MCNC: 4.1 MMOL/L — SIGNIFICANT CHANGE UP (ref 3.5–5.3)
POTASSIUM SERPL-SCNC: 4.1 MMOL/L — SIGNIFICANT CHANGE UP (ref 3.5–5.3)
PROT SERPL-MCNC: 6.9 GM/DL — SIGNIFICANT CHANGE UP (ref 6–8.3)
RBC # BLD: 3.29 M/UL — LOW (ref 4.2–5.8)
RBC # FLD: 17.9 % — HIGH (ref 10.3–14.5)
SODIUM SERPL-SCNC: 135 MMOL/L — SIGNIFICANT CHANGE UP (ref 135–145)
WBC # BLD: 22.36 K/UL — HIGH (ref 3.8–10.5)
WBC # FLD AUTO: 22.36 K/UL — HIGH (ref 3.8–10.5)

## 2019-02-15 PROCEDURE — 99233 SBSQ HOSP IP/OBS HIGH 50: CPT

## 2019-02-15 RX ADMIN — PANTOPRAZOLE SODIUM 40 MILLIGRAM(S): 20 TABLET, DELAYED RELEASE ORAL at 18:46

## 2019-02-15 RX ADMIN — PANTOPRAZOLE SODIUM 40 MILLIGRAM(S): 20 TABLET, DELAYED RELEASE ORAL at 05:26

## 2019-02-15 RX ADMIN — MIDODRINE HYDROCHLORIDE 10 MILLIGRAM(S): 2.5 TABLET ORAL at 05:27

## 2019-02-15 RX ADMIN — MIDODRINE HYDROCHLORIDE 10 MILLIGRAM(S): 2.5 TABLET ORAL at 14:00

## 2019-02-15 RX ADMIN — MEROPENEM 100 MILLIGRAM(S): 1 INJECTION INTRAVENOUS at 05:26

## 2019-02-15 RX ADMIN — Medication 1 MILLIGRAM(S): at 11:28

## 2019-02-15 RX ADMIN — Medication 1 TABLET(S): at 11:28

## 2019-02-15 RX ADMIN — Medication 100 MILLIGRAM(S): at 14:00

## 2019-02-15 RX ADMIN — MIDODRINE HYDROCHLORIDE 10 MILLIGRAM(S): 2.5 TABLET ORAL at 21:54

## 2019-02-15 NOTE — PROGRESS NOTE ADULT - ASSESSMENT
HPI:  47 y/o male PMH HTN (not on medication), chronic alcohol abuse and dependence x 15 years (1/2 pint of vodka with cranberry juice daily), hx of alcohol withdrawal 2013 hospitalized at [facility] after which he went to inpatient rehab at [facility] presents for syncope and melanotic stools. History obtained from ex wife, mother, and patient. Since pt left rehab in 2013 pt has been drinking daily. Last drink 11AM today. Pt state for past one and a half months he has had progressive weight loss with loss of appetite. He has had in increased abdominal distention for the past 1 month and in the past 1 week he has noticed jaundice and scleral icterus. Pt also reports generalized fatigue. Reports coffee ground emesis x1 episode 3 days ago but none since then with melena for about a week. Pt states he has 3 bowel movements a day and now has also been seeing "blood when I pee" also within the last week. No abdominal pain. No CP no SOB. No fevers or chills. Today pt called ex wife after he was unsteady with his gait and "fell", he denies LOC. Denies trauma to head. Ex wife reports while attempting to get pt to the car she noticed he was "very weak" and witnessed pt to have had 3 syncopal episodes lasting a few minutes each before regaining consciousness, but no seizures. Ex wife state that in the car, pt had a bowel movement that was black and tarry.     In ED pt noted to be hypotensive SBP 80-90s. 2L IVF given with 3rd and 4th infusing. SBP post IVF resuscitation 100s to one teens. On labs pt with leukocytosis with 3% bands, Na 123, Cr 2.05, INR 2.05, T bili 6, , ALT 55, Alk phos 799.  Pt seen by critical care and not felt to be ICU candidate. (23 Jan 2019 22:33)  ----------------- As Above ------------------------------------------------------  Patient confused. Left message for family member to contact me   ETOH Abuse (+). Coffee ground emesis x 1, melena.   Eleveated LFTs, INR  See labs, CT Scan    Cirrhosis, ascites, fever - 1) Paracentesis 2) ammonia level  3) f/u LFTs 4) treat for impending Dts  - patient does not fit into the criteria for treatment with steroids.

## 2019-02-15 NOTE — PROGRESS NOTE ADULT - SUBJECTIVE AND OBJECTIVE BOX
Patient is a 48y old  Male who presents with a chief complaint of Coffee ground emesis, progressive jaundice, syncopal episodes. (15 Feb 2019 15:46)      INTERVAL HPI / OVERNIGHT EVENTS: doing  better    MEDICATIONS  (STANDING):  folic acid 1 milliGRAM(s) Oral daily  midodrine 10 milliGRAM(s) Oral every 8 hours  multivitamin 1 Tablet(s) Oral daily  nicotine -  14 mG/24Hr(s) Patch 1 patch Transdermal daily  pantoprazole  Injectable 40 milliGRAM(s) IV Push every 12 hours  rifaximin 550 milliGRAM(s) Oral two times a day  thiamine 100 milliGRAM(s) Oral daily    MEDICATIONS  (PRN):  zinc oxide 40% Ointment 1 Application(s) Topical every 8 hours PRN rash      Vital Signs Last 24 Hrs  T(C): 36.3 (15 Feb 2019 11:21), Max: 36.8 (14 Feb 2019 23:33)  T(F): 97.4 (15 Feb 2019 11:21), Max: 98.3 (14 Feb 2019 23:33)  HR: 64 (15 Feb 2019 13:56) (59 - 86)  BP: 108/72 (15 Feb 2019 13:56) (98/69 - 125/79)  BP(mean): --  RR: 16 (15 Feb 2019 11:21) (16 - 18)  SpO2: 100% (15 Feb 2019 11:21) (99% - 100%)    Review of systems:  General : no fever /chills, fatigue  CVS : no chest pain, palpitations  Lungs : no shortness of breath, cough  GI : no abdominal pain, vomiting, diarrhea   : no dysuria, hematuria        PHYSICAL EXAM:  General :NAD, jaundice+  Constitutional:  well-groomed, well-developed  Respiratory: CTAB/L  Cardiovascular: S1 and S2, RRR, no M/G/R  Gastrointestinal: BS+, soft, NT/ND  Extremities: No peripheral edema  Vascular: 2+ peripheral pulses  Skin: No rashes      LABS:                        11.2   22.36 )-----------( 383      ( 15 Feb 2019 07:26 )             34.4     02-15    135  |  101  |  53<H>  ----------------------------<  100<H>  4.1   |  21<L>  |  2.50<H>    Ca    8.2<L>      15 Feb 2019 07:26    TPro  6.9  /  Alb  2.2<L>  /  TBili  6.1<H>  /  DBili  4.69<H>  /  AST  77<H>  /  ALT  33  /  AlkPhos  546<H>  02-15          MICROBIOLOGY:  RECENT CULTURES:        RADIOLOGY & ADDITIONAL STUDIES:

## 2019-02-15 NOTE — PROGRESS NOTE ADULT - PROBLEM SELECTOR PLAN 2
LFTs slightly worse today . Being treated for hepatorenal. Not a candidate for steroids.  11.2 / 132 / 137 / 403.  - 9.4 / 139 / 37 / 411 - 9.1/114/27/397  - 10.8 / 81 / 23 / 355 - 10.3 / 71 / 25 / 356  -- 9.8 / 7.5 / 25 / 341 - 10.1 / 66 / 25 / 344  - 10.4 / 70 / 29 / 364 - 9.8 / 72 / 31 / 409  --  9.0 / 72 / 31 / 399 - 8.2/72/31/434  -  6.6/73/30/461 --- 6.3 / 65 / 31 / 447  --  7.0 / 81 / 53 / 549 ////// BUN / CRE  61 / 2.64   Increasing alk phos seconday to ? R/O  meds. ( antibiotics for 2 more days (?) ) prognosis better LFTs slightly worse today . Being treated for hepatorenal. Not a candidate for steroids.  11.2 / 132 / 137 / 403.  - 9.4 / 139 / 37 / 411 - 9.1/114/27/397  - 10.8 / 81 / 23 / 355 - 10.3 / 71 / 25 / 356  -- 9.8 / 7.5 / 25 / 341 - 10.1 / 66 / 25 / 344  - 10.4 / 70 / 29 / 364 - 9.8 / 72 / 31 / 409  --  9.0 / 72 / 31 / 399 - 8.2/72/31/434  -  6.6/73/30/461 --- 6.3 / 65 / 31 / 447  --  7.0 / 81 / 53 / 549  - 6.1 / 77 / 32 /  546 ////// BUN / CRE  53 / 22.56   Increasing alk phos seconday to ? R/O  meds. Antibiotics to be d/c'd today

## 2019-02-15 NOTE — PROGRESS NOTE ADULT - SUBJECTIVE AND OBJECTIVE BOX
Patient feels well no complaints today. Walking hallways;    MEDICATIONS  (STANDING):  folic acid 1 milliGRAM(s) Oral daily  midodrine 10 milliGRAM(s) Oral every 8 hours  multivitamin 1 Tablet(s) Oral daily  nicotine -  14 mG/24Hr(s) Patch 1 patch Transdermal daily  pantoprazole  Injectable 40 milliGRAM(s) IV Push every 12 hours  rifaximin 550 milliGRAM(s) Oral two times a day  thiamine 100 milliGRAM(s) Oral daily    MEDICATIONS  (PRN):  zinc oxide 40% Ointment 1 Application(s) Topical every 8 hours PRN rash      02-14-19 @ 07:01  -  02-15-19 @ 07:00  --------------------------------------------------------  IN: 1380 mL / OUT: 0 mL / NET: 1380 mL    02-15-19 @ 07:01  -  02-15-19 @ 18:53  --------------------------------------------------------  IN: 470 mL / OUT: 0 mL / NET: 470 mL      PHYSICAL EXAM:      T(C): 36.9 (02-15-19 @ 17:06), Max: 36.9 (02-15-19 @ 17:06)  HR: 62 (02-15-19 @ 17:06) (59 - 86)  BP: 104/68 (02-15-19 @ 17:06) (102/63 - 125/79)  RR: 18 (02-15-19 @ 17:06) (16 - 18)  SpO2: 99% (02-15-19 @ 17:06) (99% - 100%)  Wt(kg): --  Respiratory: clear anteriorly, decreased BS at bases  Cardiovascular: S1 S2  Gastrointestinal: soft NT ND +BS  Extremities:  tr edema                                    11.2   22.36 )-----------( 383      ( 15 Feb 2019 07:26 )             34.4     02-15    135  |  101  |  53<H>  ----------------------------<  100<H>  4.1   |  21<L>  |  2.50<H>    Ca    8.2<L>      15 Feb 2019 07:26    TPro  6.9  /  Alb  2.2<L>  /  TBili  6.1<H>  /  DBili  4.69<H>  /  AST  77<H>  /  ALT  33  /  AlkPhos  546<H>  02-15      LIVER FUNCTIONS - ( 15 Feb 2019 07:26 )  Alb: 2.2 g/dL / Pro: 6.9 gm/dL / ALK PHOS: 546 U/L / ALT: 33 U/L / AST: 77 U/L / GGT: x             Assessment and Plan:    PAPO; suspected HRS; precipitated by SBP;  Octreotide and SPA d/c;   Will follow.

## 2019-02-15 NOTE — PROGRESS NOTE ADULT - PROBLEM SELECTOR PLAN 1
.On 1/28 ICU stopped antibiotics as pt was detoriating and had gone into HRS syndrome.  Since then leukocytosis continued to go up and was 43 last week. Pt was restated on Meropenem .previously since admission he was on antibiotics for a week for possible pneumonia and SBP .HIS UA was abnormal last week though both urine and all blood c/s negative.  started on meropenem on 2/7 as wbc was upto 43-->36-->34  Now down to 28   I feel leukocytosis is definetley mutlifactorail with ascites,HRS but a possibility of superimposed SBP ,UTIs etc cant be overruled  leukocytosis seem to be responding to antibiotics   ok to d/c antibiotics today ,received 8 days  all c/s repeatedly negative  no fever

## 2019-02-15 NOTE — PROGRESS NOTE ADULT - SUBJECTIVE AND OBJECTIVE BOX
Patient is a 48y old  Male who presents with a chief complaint of Coffee ground emesis, progressive jaundice, syncopal episodes. (15 Feb 2019 14:42)      HPI:  47 y/o male PMH HTN (not on medication), chronic alcohol abuse and dependence x 15 years (1/2 pint of vodka with cranberry juice daily), hx of alcohol withdrawal 2013 hospitalized at [facility] after which he went to inpatient rehab at [facility] for syncope and melanotic stools. History obtained from ex wife, mother, and patient. Since pt left rehab in 2013 pt has been drinking daily. Last drink 11AM today. Pt state for past one and a half months he has had progressive weight loss with loss of appetite. He has had in increased abdominal distention for the past 1 month and in the past 1 week he has noticed jaundice and scleral icterus. Pt also reports generalized fatigue. Reports coffee ground emesis x1 episode 3 days ago but none since then with melena for about a week. Pt states he has 3 bowel movements a day and now has also been seeing "blood when I pee" also within the last week. No abdominal pain. No CP no SOB. No fevers or chills. Today pt called ex wife after he was unsteady with his gait and "fell", he denies LOC. Denies trauma to head. Ex wife reports while attempting to get pt to the car she noticed he was "very weak" and witnessed pt to have had 3 syncopal episodes lasting a few minutes each before regaining consciousness, but no seizures. Ex wife state that in the car, pt had a bowel movement that was black and tarry.     In ED pt noted to be hypotensive SBP 80-90s. 2L IVF given with 3rd and 4th infusing. SBP post IVF resuscitation 100s to one teens. On labs pt with leukocytosis with 3% bands, Na 123, Cr 2.05, INR 2.05, T bili 6, , ALT 55, Alk phos 799.  Pt seen by critical care and not felt to be ICU candidate. (23 Jan 2019 22:33)      INTERVAL HPI/OVERNIGHT EVENTS:  The patient denies melena, hematochezia, hematemesis, nausea, vomiting, abdominal pain, constipation, diarrhea, or change in bowel movements     MEDICATIONS  (STANDING):  folic acid 1 milliGRAM(s) Oral daily  midodrine 10 milliGRAM(s) Oral every 8 hours  multivitamin 1 Tablet(s) Oral daily  nicotine -  14 mG/24Hr(s) Patch 1 patch Transdermal daily  pantoprazole  Injectable 40 milliGRAM(s) IV Push every 12 hours  rifaximin 550 milliGRAM(s) Oral two times a day  thiamine 100 milliGRAM(s) Oral daily    MEDICATIONS  (PRN):  zinc oxide 40% Ointment 1 Application(s) Topical every 8 hours PRN rash      FAMILY HISTORY:  No pertinent family history in first degree relatives      Allergies    No Known Allergies    Intolerances        PMH/PSH:  ETOH abuse  Psoriasis  History of hypertension  No significant past surgical history        REVIEW OF SYSTEMS:  CONSTITUTIONAL: No fever, weight loss,  EYES: No eye pain, visual disturbances, or discharge  ENMT:  No difficulty hearing, tinnitus, vertigo; No sinus or throat pain  NECK: No pain or stiffness  BREASTS: No pain, masses, or nipple discharge  RESPIRATORY: No cough, wheezing, chills or hemoptysis; No shortness of breath  CARDIOVASCULAR: No chest pain, palpitations, dizziness, or leg swelling  GASTROINTESTINAL: See above  GENITOURINARY: No dysuria, frequency, hematuria, or incontinence  NEUROLOGICAL: No headaches,  numbness, or tremors  SKIN: No itching, burning, rashes, or lesions   LYMPH NODES: No enlarged glands  ENDOCRINE: No heat or cold intolerance; No hair loss  MUSCULOSKELETAL: No joint pain or swelling; No muscle, back, or extremity pain  PSYCHIATRIC: No depression, anxiety, mood swings, or difficulty sleeping  HEME/LYMPH: No easy bruising, or bleeding gums  ALLERGY AND IMMUNOLOGIC: No hives or eczema    Vital Signs Last 24 Hrs  T(C): 36.3 (15 Feb 2019 11:21), Max: 36.8 (14 Feb 2019 23:33)  T(F): 97.4 (15 Feb 2019 11:21), Max: 98.3 (14 Feb 2019 23:33)  HR: 64 (15 Feb 2019 13:56) (59 - 86)  BP: 108/72 (15 Feb 2019 13:56) (98/69 - 125/79)  BP(mean): --  RR: 16 (15 Feb 2019 11:21) (16 - 18)  SpO2: 100% (15 Feb 2019 11:21) (99% - 100%)    PHYSICAL EXAM:  GENERAL: NAD, well-groomed, well-developed  HEAD:  Atraumatic, Normocephalic  EYES: EOMI, PERRLA, conjunctiva and sclera clear  NECK: Supple, No JVD, Normal thyroid  NERVOUS SYSTEM:  Alert & Oriented X3, Good concentration;   CHEST/LUNG: Clear to percussion bilaterally; No rales, rhonchi, wheezing, or rubs  HEART: Regular rate and rhythm; No murmurs, rubs, or gallops  ABDOMEN: Soft, non tender, Mildly distended; Bowel sounds present  EXTREMITIES:  2+ Peripheral Pulses, No clubbing, cyanosis, or edema  LYMPH: No lymphadenopathy noted  SKIN: No rashes or lesions    LAB                          11.2   22.36 )-----------( 383      ( 15 Feb 2019 07:26 )             34.4       CBC:  02-15 @ 07:26  WBC 22.36   Hgb 11.2   Hct 34.4   Plts 383  .6  02-14 @ 07:59  WBC 23.42   Hgb 12.3   Hct 38.2   Plts 287  .7  02-13 @ 07:56  WBC 23.48   Hgb 11.2   Hct 33.8   Plts 356  .6  02-12 @ 07:21  WBC 26.18   Hgb 11.1   Hct 33.1   Plts 372  .4  02-11 @ 08:16  WBC 28.42   Hgb 11.8   Hct 35.4   Plts 356  .1  02-10 @ 09:12  WBC 27.87   Hgb 12.0   Hct 36.3   Plts 338  .9  02-09 @ 08:55  WBC 30.50   Hgb 12.5   Hct 37.5   Plts 317  .6      Chemistry:  02-15 @ 07:26  Na+ 135  K+ 4.1  Cl- 101  CO2 21  BUN 53  Cr 2.50     02-14 @ 10:11  Na+ 133  K+ 4.9  Cl- 100  CO2 21  BUN 58  Cr 2.63     02-13 @ 07:56  Na+ 136  K+ 3.8  Cl- 98  CO2 23  BUN 61  Cr 2.64     02-12 @ 07:21  Na+ 135  K+ 4.0  Cl- 103  CO2 22  BUN 68  Cr 2.73     02-11 @ 08:16  Na+ 135  K+ 4.1  Cl- 102  CO2 24  BUN 73  Cr 2.97     02-10 @ 09:12  Na+ 134  K+ 4.1  Cl- 102  CO2 21  BUN 82  Cr 3.53     02-09 @ 08:55  Na+ 137  K+ 3.9  Cl- 102  CO2 21  BUN 89  Cr 4.02         Glucose, Serum: 100 mg/dL (02-15 @ 07:26)  Glucose, Serum: 197 mg/dL (02-14 @ 10:11)  Glucose, Serum: 116 mg/dL (02-13 @ 07:56)  Glucose, Serum: 117 mg/dL (02-12 @ 07:21)  Glucose, Serum: 90 mg/dL (02-11 @ 08:16)  Glucose, Serum: 99 mg/dL (02-10 @ 09:12)  Glucose, Serum: 94 mg/dL (02-09 @ 08:55)      15 Feb 2019 07:26    135    |  101    |  53     ----------------------------<  100    4.1     |  21     |  2.50   14 Feb 2019 10:11    133    |  100    |  58     ----------------------------<  197    4.9     |  21     |  2.63   13 Feb 2019 07:56    136    |  98     |  61     ----------------------------<  116    3.8     |  23     |  2.64   12 Feb 2019 07:21    135    |  103    |  68     ----------------------------<  117    4.0     |  22     |  2.73   11 Feb 2019 08:16    135    |  102    |  73     ----------------------------<  90     4.1     |  24     |  2.97   10 Feb 2019 09:12    134    |  102    |  82     ----------------------------<  99     4.1     |  21     |  3.53   09 Feb 2019 08:55    137    |  102    |  89     ----------------------------<  94     3.9     |  21     |  4.02     Ca    8.2        15 Feb 2019 07:26  Ca    8.5        14 Feb 2019 10:11  Ca    8.3        13 Feb 2019 07:56  Ca    8.1        12 Feb 2019 07:21  Ca    8.4        11 Feb 2019 08:16  Ca    8.5        10 Feb 2019 09:12  Ca    8.6        09 Feb 2019 08:55  Phos  4.3       11 Feb 2019 08:16  Phos  4.6       10 Feb 2019 09:12  Mg     2.1       11 Feb 2019 08:16  Mg     2.2       10 Feb 2019 09:12    TPro  6.9    /  Alb  2.2    /  TBili  6.1    /  DBili  4.69   /  AST  77     /  ALT  33     /  AlkPhos  546    15 Feb 2019 07:26  TPro  7.5    /  Alb  2.4    /  TBili  7.0    /  DBili  5.33   /  AST  81     /  ALT  33     /  AlkPhos  549    14 Feb 2019 10:11  TPro  6.9    /  Alb  2.3    /  TBili  6.3    /  DBili  4.64   /  AST  65     /  ALT  31     /  AlkPhos  447    13 Feb 2019 07:56  TPro  7.0    /  Alb  2.3    /  TBili  6.6    /  DBili  x      /  AST  73     /  ALT  30     /  AlkPhos  461    12 Feb 2019 07:21  TPro  7.3    /  Alb  2.6    /  TBili  8.2    /  DBili  6.45   /  AST  72     /  ALT  31     /  AlkPhos  434    11 Feb 2019 08:16  TPro  7.3    /  Alb  2.6    /  TBili  9.0    /  DBili  6.72   /  AST  72     /  ALT  31     /  AlkPhos  399    10 Feb 2019 09:12  TPro  7.5    /  Alb  2.7    /  TBili  9.8    /  DBili  x      /  AST  72     /  ALT  31     /  AlkPhos  409    09 Feb 2019 08:55              CAPILLARY BLOOD GLUCOSE              RADIOLOGY & ADDITIONAL TESTS:    Imaging Personally Reviewed:  [ ] YES  [ ] NO    Consultant(s) Notes Reviewed:  [ ] YES  [ ] NO    Care Discussed with Consultants/Other Providers [ ] YES  [ ] NO

## 2019-02-15 NOTE — PROGRESS NOTE ADULT - SUBJECTIVE AND OBJECTIVE BOX
Patient is a 48y old  Male who presents with a chief complaint of Coffee ground emesis, progressive jaundice, syncopal episodes. (15 Feb 2019 08:44)      INTERVAL HPI/OVERNIGHT EVENTS: no events     MEDICATIONS  (STANDING):  folic acid 1 milliGRAM(s) Oral daily  midodrine 10 milliGRAM(s) Oral every 8 hours  multivitamin 1 Tablet(s) Oral daily  nicotine -  14 mG/24Hr(s) Patch 1 patch Transdermal daily  pantoprazole  Injectable 40 milliGRAM(s) IV Push every 12 hours  rifaximin 550 milliGRAM(s) Oral two times a day  thiamine 100 milliGRAM(s) Oral daily    MEDICATIONS  (PRN):  zinc oxide 40% Ointment 1 Application(s) Topical every 8 hours PRN rash      Allergies    No Known Allergies    Intolerances       Vital Signs Last 24 Hrs  T(C): 36.3 (15 Feb 2019 11:21), Max: 36.8 (14 Feb 2019 23:33)  T(F): 97.4 (15 Feb 2019 11:21), Max: 98.3 (14 Feb 2019 23:33)  HR: 64 (15 Feb 2019 13:56) (59 - 86)  BP: 108/72 (15 Feb 2019 13:56) (98/69 - 125/79)  BP(mean): --  RR: 16 (15 Feb 2019 11:21) (16 - 18)  SpO2: 100% (15 Feb 2019 11:21) (99% - 100%)    PHYSICAL EXAM:  GENERAL: NAD, well-groomed, well-developed  HEAD:  Atraumatic, Normocephalic  EYES: icteric sclera   ENMT: No tonsillar erythema, exudates, or enlargement; Moist mucous membranes, Good dentition, No lesions  NECK: Supple, No JVD, Normal thyroid  NERVOUS SYSTEM:  Alert & Oriented X3, Good concentration; Motor Strength 5/5 B/L upper and lower extremities; DTRs 2+ intact and symmetric  CHEST/LUNG: Clear to percussion bilaterally; No rales, rhonchi, wheezing, or rubs  HEART: Regular rate and rhythm; No murmurs, rubs, or gallops  ABDOMEN: Soft, mild tenderness with fluid wave   EXTREMITIES:  2+ Peripheral Pulses, No clubbing, cyanosis, or edema  LYMPH: No lymphadenopathy noted  SKIN: Jaundice   LABS:                        11.2   22.36 )-----------( 383      ( 15 Feb 2019 07:26 )             34.4     02-15    135  |  101  |  53<H>  ----------------------------<  100<H>  4.1   |  21<L>  |  2.50<H>    Ca    8.2<L>      15 Feb 2019 07:26    TPro  6.9  /  Alb  2.2<L>  /  TBili  6.1<H>  /  DBili  4.69<H>  /  AST  77<H>  /  ALT  33  /  AlkPhos  546<H>  02-15        CAPILLARY BLOOD GLUCOSE          RADIOLOGY & ADDITIONAL TESTS:    Imaging Personally Reviewed:  [ X] YES  [ ] NO    Consultant(s) Notes Reviewed:  [ X] YES  [ ] NO    Care Discussed with Consultants/Other Providers [X ] YES  [ ] NO

## 2019-02-15 NOTE — PROGRESS NOTE ADULT - ASSESSMENT
48M PMH HTN, chronic alcohol abuse and dependence x 15 years (1/2 pint of vodka with cranberry juice daily), hx of alcohol withdrawal presents for syncope x3 with generalized weakness, weight loss, loss of appetite, jaundice, melena, coffee ground emesis. Here with hypotension, lactic acidosis, hyponatremia, elevated Cr, jaundice, hyperbilirubinemia, elevated alk phos and AST, melena, coffee ground emesis, elevated INR, cirrhosis with ascites,s/p paracetesis on 2/1-700 ml fluid removed and today 2/11.

## 2019-02-15 NOTE — PROGRESS NOTE ADULT - SUBJECTIVE AND OBJECTIVE BOX
INTERVAL History:  Weak  Ascites  Allergies    No Known Allergies    Intolerances        MEDICATIONS  (STANDING):  folic acid 1 milliGRAM(s) Oral daily  meropenem  IVPB 500 milliGRAM(s) IV Intermittent every 12 hours  midodrine 10 milliGRAM(s) Oral every 8 hours  multivitamin 1 Tablet(s) Oral daily  nicotine -  14 mG/24Hr(s) Patch 1 patch Transdermal daily  pantoprazole  Injectable 40 milliGRAM(s) IV Push every 12 hours  rifaximin 550 milliGRAM(s) Oral two times a day  thiamine 100 milliGRAM(s) Oral daily    MEDICATIONS  (PRN):  zinc oxide 40% Ointment 1 Application(s) Topical every 8 hours PRN rash      Vital Signs Last 24 Hrs  T(C): 36.2 (15 Feb 2019 05:55), Max: 37.1 (14 Feb 2019 12:40)  T(F): 97.2 (15 Feb 2019 05:55), Max: 98.7 (14 Feb 2019 12:40)  HR: 59 (15 Feb 2019 05:55) (59 - 86)  BP: 125/79 (15 Feb 2019 05:55) (98/69 - 125/79)  BP(mean): --  RR: 17 (15 Feb 2019 05:55) (17 - 18)  SpO2: 99% (15 Feb 2019 05:55) (99% - 100%)    PHYSICAL EXAM:      EYES: EOMI, PERRLA, conjunctiva and sclera clear  NECK: Supple, No JVD, Normal thyroid  CHEST/LUNG: Clear to percussion bilaterally; No rales, rhonchi,   HEART: Regular rate and rhythm;   ABDOMEN:   Ascites:- ++      LABS:                        11.2   22.36 )-----------( 383      ( 15 Feb 2019 07:26 )             34.4     02-15    135  |  101  |  53<H>  ----------------------------<  100<H>  4.1   |  21<L>  |  2.50<H>    Ca    8.2<L>      15 Feb 2019 07:26    TPro  6.9  /  Alb  2.2<L>  /  TBili  6.1<H>  /  DBili  4.69<H>  /  AST  77<H>  /  ALT  33  /  AlkPhos  546<H>  02-15            RADIOLOGY & ADDITIONAL STUDIES:    PATHOLOGY:

## 2019-02-15 NOTE — PROGRESS NOTE ADULT - ASSESSMENT
48M PMH HTN, chronic alcohol abuse and dependence x 15 years (1/2 pint of vodka with cranberry juice daily), hx of alcohol withdrawal presents for syncope x3 with generalized weakness, weight loss, loss of appetite, jaundice, melena, coffee ground emesis. Here with hypotension, lactic acidosis, hyponatremia, elevated Cr, jaundice, hyperbilirubinemia, elevated alk phos and AST, melena, coffee ground emesis, elevated INR, cirrhosis with ascites. < from: US Abdomen Limited (02.01.19 @ 12:45) >here is a moderate to large amount of ascites seen in all 4 quadrants. s/p paracetesis on 2/1-700 ml fluid removed. Octreo and Midodrine for now;   Albumin restarted on abx repeat paracentesis denied long discussion with family and patient about prognosis but optimistic about patient being stable enogh for physical rehab will get hematology consult as well for leukcytosis      s/p tap  2/11/19    Awaiting Insurance Auth for Placement

## 2019-02-16 LAB
ALBUMIN SERPL ELPH-MCNC: 2.2 G/DL — LOW (ref 3.3–5)
ALP SERPL-CCNC: 563 U/L — HIGH (ref 40–120)
ALT FLD-CCNC: 32 U/L — SIGNIFICANT CHANGE UP (ref 12–78)
ANION GAP SERPL CALC-SCNC: 12 MMOL/L — SIGNIFICANT CHANGE UP (ref 5–17)
AST SERPL-CCNC: 72 U/L — HIGH (ref 15–37)
BILIRUB SERPL-MCNC: 5.9 MG/DL — HIGH (ref 0.2–1.2)
BUN SERPL-MCNC: 53 MG/DL — HIGH (ref 7–23)
CALCIUM SERPL-MCNC: 8.3 MG/DL — LOW (ref 8.5–10.1)
CHLORIDE SERPL-SCNC: 97 MMOL/L — SIGNIFICANT CHANGE UP (ref 96–108)
CO2 SERPL-SCNC: 21 MMOL/L — LOW (ref 22–31)
CREAT SERPL-MCNC: 2.43 MG/DL — HIGH (ref 0.5–1.3)
GLUCOSE SERPL-MCNC: 94 MG/DL — SIGNIFICANT CHANGE UP (ref 70–99)
HCT VFR BLD CALC: 34.6 % — LOW (ref 39–50)
HGB BLD-MCNC: 11.3 G/DL — LOW (ref 13–17)
MCHC RBC-ENTMCNC: 32.7 GM/DL — SIGNIFICANT CHANGE UP (ref 32–36)
MCHC RBC-ENTMCNC: 34 PG — SIGNIFICANT CHANGE UP (ref 27–34)
MCV RBC AUTO: 104.2 FL — HIGH (ref 80–100)
NRBC # BLD: 0 /100 WBCS — SIGNIFICANT CHANGE UP (ref 0–0)
PLATELET # BLD AUTO: 388 K/UL — SIGNIFICANT CHANGE UP (ref 150–400)
POTASSIUM SERPL-MCNC: 4.3 MMOL/L — SIGNIFICANT CHANGE UP (ref 3.5–5.3)
POTASSIUM SERPL-SCNC: 4.3 MMOL/L — SIGNIFICANT CHANGE UP (ref 3.5–5.3)
PROT SERPL-MCNC: 7.2 GM/DL — SIGNIFICANT CHANGE UP (ref 6–8.3)
RBC # BLD: 3.32 M/UL — LOW (ref 4.2–5.8)
RBC # FLD: 17.4 % — HIGH (ref 10.3–14.5)
SODIUM SERPL-SCNC: 130 MMOL/L — LOW (ref 135–145)
WBC # BLD: 18.89 K/UL — HIGH (ref 3.8–10.5)
WBC # FLD AUTO: 18.89 K/UL — HIGH (ref 3.8–10.5)

## 2019-02-16 PROCEDURE — 99233 SBSQ HOSP IP/OBS HIGH 50: CPT

## 2019-02-16 RX ORDER — PANTOPRAZOLE SODIUM 20 MG/1
40 TABLET, DELAYED RELEASE ORAL
Qty: 0 | Refills: 0 | Status: DISCONTINUED | OUTPATIENT
Start: 2019-02-16 | End: 2019-02-19

## 2019-02-16 RX ORDER — MIDODRINE HYDROCHLORIDE 2.5 MG/1
5 TABLET ORAL EVERY 8 HOURS
Qty: 0 | Refills: 0 | Status: DISCONTINUED | OUTPATIENT
Start: 2019-02-16 | End: 2019-02-19

## 2019-02-16 RX ADMIN — PANTOPRAZOLE SODIUM 40 MILLIGRAM(S): 20 TABLET, DELAYED RELEASE ORAL at 13:25

## 2019-02-16 RX ADMIN — MIDODRINE HYDROCHLORIDE 10 MILLIGRAM(S): 2.5 TABLET ORAL at 13:24

## 2019-02-16 RX ADMIN — MIDODRINE HYDROCHLORIDE 5 MILLIGRAM(S): 2.5 TABLET ORAL at 22:01

## 2019-02-16 RX ADMIN — Medication 1 MILLIGRAM(S): at 11:25

## 2019-02-16 RX ADMIN — PANTOPRAZOLE SODIUM 40 MILLIGRAM(S): 20 TABLET, DELAYED RELEASE ORAL at 05:51

## 2019-02-16 RX ADMIN — Medication 1 TABLET(S): at 11:25

## 2019-02-16 RX ADMIN — Medication 100 MILLIGRAM(S): at 11:25

## 2019-02-16 NOTE — PROGRESS NOTE ADULT - SUBJECTIVE AND OBJECTIVE BOX
INTERVAL History:  Ascites:- ++    Allergies    No Known Allergies    Intolerances        MEDICATIONS  (STANDING):  folic acid 1 milliGRAM(s) Oral daily  midodrine 10 milliGRAM(s) Oral every 8 hours  multivitamin 1 Tablet(s) Oral daily  nicotine -  14 mG/24Hr(s) Patch 1 patch Transdermal daily  pantoprazole  Injectable 40 milliGRAM(s) IV Push every 12 hours  rifaximin 550 milliGRAM(s) Oral two times a day  thiamine 100 milliGRAM(s) Oral daily    MEDICATIONS  (PRN):  zinc oxide 40% Ointment 1 Application(s) Topical every 8 hours PRN rash      Vital Signs Last 24 Hrs  T(C): 36.5 (16 Feb 2019 05:43), Max: 36.9 (15 Feb 2019 17:06)  T(F): 97.7 (16 Feb 2019 05:43), Max: 98.5 (15 Feb 2019 17:06)  HR: 80 (16 Feb 2019 05:43) (62 - 80)  BP: 130/67 (16 Feb 2019 05:43) (104/68 - 130/67)  BP(mean): --  RR: 17 (16 Feb 2019 05:43) (17 - 18)  SpO2: 100% (16 Feb 2019 05:43) (99% - 100%)    PHYSICAL EXAM:      EYES: EOMI, PERRLA, conjunctiva and sclera clear  NECK: Supple, No JVD, Normal thyroid  CHEST/LUNG: Clear to percussion bilaterally; No rales, rhonchi,   HEART: Regular rate and rhythm;   ABDOMEN:  Ascites:-+++      LABS:                        11.3   18.89 )-----------( 388      ( 16 Feb 2019 07:43 )             34.6     02-16    130<L>  |  97  |  53<H>  ----------------------------<  94  4.3   |  21<L>  |  2.43<H>    Ca    8.3<L>      16 Feb 2019 07:43    TPro  7.2  /  Alb  2.2<L>  /  TBili  5.9<H>  /  DBili  x   /  AST  72<H>  /  ALT  32  /  AlkPhos  563<H>  02-16            RADIOLOGY & ADDITIONAL STUDIES:    PATHOLOGY:

## 2019-02-16 NOTE — PROGRESS NOTE ADULT - PROBLEM SELECTOR PLAN 2
-resolving, persistent leukocytosis, patient afebrile.    still with leukocytosis  Meropenem completed  on rifaximin  ID following.

## 2019-02-16 NOTE — PROGRESS NOTE ADULT - SUBJECTIVE AND OBJECTIVE BOX
Patient is a 48y old  Male who presents with a chief complaint of Coffee ground emesis, progressive jaundice, syncopal episodes. (15 Feb 2019 08:44)      INTERVAL HPI/OVERNIGHT EVENTS: patient feels much improved, no complaints.  no events     T(C): 36.1 (02-16-19 @ 11:28), Max: 36.5 (02-16-19 @ 05:43)  HR: 81 (02-16-19 @ 13:23) (76 - 81)  BP: 101/66 (02-16-19 @ 13:23) (101/66 - 130/67)  RR: 16 (02-16-19 @ 13:23) (16 - 17)  SpO2: 100% (02-16-19 @ 13:23) (100% - 100%)    MEDICATIONS  (STANDING):  folic acid 1 milliGRAM(s) Oral daily  midodrine 10 milliGRAM(s) Oral every 8 hours  multivitamin 1 Tablet(s) Oral daily  nicotine -  14 mG/24Hr(s) Patch 1 patch Transdermal daily  pantoprazole   Suspension 40 milliGRAM(s) Oral before lunch  rifaximin 550 milliGRAM(s) Oral two times a day  thiamine 100 milliGRAM(s) Oral daily    MEDICATIONS  (PRN):  zinc oxide 40% Ointment 1 Application(s) Topical every 8 hours PRN rash      PHYSICAL EXAM:  GENERAL: NAD, well-groomed, well-developed  HEAD:  Atraumatic, Normocephalic  EYES: icteric sclera   ENMT: No tonsillar erythema, exudates, or enlargement; Moist mucous membranes, Good dentition, No lesions  NECK: Supple, No JVD, Normal thyroid  NERVOUS SYSTEM:  Alert & Oriented X3, Good concentration; Motor Strength 5/5 B/L upper and lower extremities; DTRs 2+ intact and symmetric  CHEST/LUNG: Clear to percussion bilaterally; No rales, rhonchi, wheezing, or rubs  HEART: Regular rate and rhythm; No murmurs, rubs, or gallops  ABDOMEN: Soft, mild tenderness with fluid wave   EXTREMITIES:  2+ Peripheral Pulses, No clubbing, cyanosis, or edema  LYMPH: No lymphadenopathy noted  SKIN: Jaundice                             11.3   18.89 )-----------( 388      ( 16 Feb 2019 07:43 )             34.6           LIVER FUNCTIONS - ( 16 Feb 2019 07:43 )  Alb: 2.2 g/dL / Pro: 7.2 gm/dL / ALK PHOS: 563 U/L / ALT: 32 U/L / AST: 72 U/L / GGT: x             130|97|53<94  4.3|21|2.43  8.3,--,--  02-16 @ 07:43            RADIOLOGY & ADDITIONAL TESTS:    Imaging Personally Reviewed:  [ X] YES  [ ] NO    Consultant(s) Notes Reviewed:  [ X] YES  [ ] NO    Care Discussed with Consultants/Other Providers [X ] YES  [ ] NO

## 2019-02-17 LAB
ALBUMIN SERPL ELPH-MCNC: 2 G/DL — LOW (ref 3.3–5)
ALP SERPL-CCNC: 568 U/L — HIGH (ref 40–120)
ALT FLD-CCNC: 30 U/L — SIGNIFICANT CHANGE UP (ref 12–78)
ANION GAP SERPL CALC-SCNC: 11 MMOL/L — SIGNIFICANT CHANGE UP (ref 5–17)
AST SERPL-CCNC: 65 U/L — HIGH (ref 15–37)
BILIRUB SERPL-MCNC: 4.7 MG/DL — HIGH (ref 0.2–1.2)
BUN SERPL-MCNC: 50 MG/DL — HIGH (ref 7–23)
CALCIUM SERPL-MCNC: 8.1 MG/DL — LOW (ref 8.5–10.1)
CHLORIDE SERPL-SCNC: 98 MMOL/L — SIGNIFICANT CHANGE UP (ref 96–108)
CO2 SERPL-SCNC: 19 MMOL/L — LOW (ref 22–31)
CREAT SERPL-MCNC: 2.22 MG/DL — HIGH (ref 0.5–1.3)
GLUCOSE SERPL-MCNC: 84 MG/DL — SIGNIFICANT CHANGE UP (ref 70–99)
HCT VFR BLD CALC: 32.1 % — LOW (ref 39–50)
HGB BLD-MCNC: 10.6 G/DL — LOW (ref 13–17)
MCHC RBC-ENTMCNC: 33 GM/DL — SIGNIFICANT CHANGE UP (ref 32–36)
MCHC RBC-ENTMCNC: 34.6 PG — HIGH (ref 27–34)
MCV RBC AUTO: 104.9 FL — HIGH (ref 80–100)
NRBC # BLD: 0 /100 WBCS — SIGNIFICANT CHANGE UP (ref 0–0)
PLATELET # BLD AUTO: 393 K/UL — SIGNIFICANT CHANGE UP (ref 150–400)
POTASSIUM SERPL-MCNC: 4.5 MMOL/L — SIGNIFICANT CHANGE UP (ref 3.5–5.3)
POTASSIUM SERPL-SCNC: 4.5 MMOL/L — SIGNIFICANT CHANGE UP (ref 3.5–5.3)
PROT SERPL-MCNC: 6.8 GM/DL — SIGNIFICANT CHANGE UP (ref 6–8.3)
RBC # BLD: 3.06 M/UL — LOW (ref 4.2–5.8)
RBC # FLD: 17 % — HIGH (ref 10.3–14.5)
SODIUM SERPL-SCNC: 128 MMOL/L — LOW (ref 135–145)
WBC # BLD: 13 K/UL — HIGH (ref 3.8–10.5)
WBC # FLD AUTO: 13 K/UL — HIGH (ref 3.8–10.5)

## 2019-02-17 PROCEDURE — 99239 HOSP IP/OBS DSCHRG MGMT >30: CPT

## 2019-02-17 RX ADMIN — MIDODRINE HYDROCHLORIDE 5 MILLIGRAM(S): 2.5 TABLET ORAL at 22:31

## 2019-02-17 RX ADMIN — Medication 1 TABLET(S): at 13:11

## 2019-02-17 RX ADMIN — MIDODRINE HYDROCHLORIDE 5 MILLIGRAM(S): 2.5 TABLET ORAL at 13:10

## 2019-02-17 RX ADMIN — MIDODRINE HYDROCHLORIDE 5 MILLIGRAM(S): 2.5 TABLET ORAL at 05:41

## 2019-02-17 RX ADMIN — PANTOPRAZOLE SODIUM 40 MILLIGRAM(S): 20 TABLET, DELAYED RELEASE ORAL at 13:11

## 2019-02-17 RX ADMIN — Medication 1 MILLIGRAM(S): at 13:10

## 2019-02-17 RX ADMIN — Medication 100 MILLIGRAM(S): at 13:10

## 2019-02-17 NOTE — PROGRESS NOTE ADULT - PROBLEM SELECTOR PLAN 2
-resolving, persistent leukocytosis, down to 13 from yesterday.   - patient afebrile.    - Meropenem completed  on rifaximin  ID following.

## 2019-02-17 NOTE — PROGRESS NOTE ADULT - ASSESSMENT
ESLD due to ETOH.  PAPO due to HRS improved with therapy.  Cirrhotic hyponatremia.  Educated on 1000 ml fluid restriction.  Spoke with patient's mother at bedside.

## 2019-02-17 NOTE — PROGRESS NOTE ADULT - SUBJECTIVE AND OBJECTIVE BOX
INTERVAL History:  Abdominal Pain.  Ascites.    Allergies    No Known Allergies    Intolerances        MEDICATIONS  (STANDING):  folic acid 1 milliGRAM(s) Oral daily  midodrine 5 milliGRAM(s) Oral every 8 hours  multivitamin 1 Tablet(s) Oral daily  nicotine -  14 mG/24Hr(s) Patch 1 patch Transdermal daily  pantoprazole   Suspension 40 milliGRAM(s) Oral before lunch  rifaximin 550 milliGRAM(s) Oral two times a day  thiamine 100 milliGRAM(s) Oral daily    MEDICATIONS  (PRN):  zinc oxide 40% Ointment 1 Application(s) Topical every 8 hours PRN rash      Vital Signs Last 24 Hrs  T(C): 36.9 (17 Feb 2019 05:28), Max: 36.9 (17 Feb 2019 05:28)  T(F): 98.4 (17 Feb 2019 05:28), Max: 98.4 (17 Feb 2019 05:28)  HR: 74 (17 Feb 2019 05:28) (62 - 99)  BP: 100/63 (17 Feb 2019 05:28) (96/66 - 111/76)  BP(mean): --  RR: 18 (17 Feb 2019 05:28) (16 - 18)  SpO2: 98% (17 Feb 2019 05:28) (98% - 100%)    PHYSICAL EXAM:      EYES: EOMI, PERRLA, conjunctiva and sclera clear  NECK: Supple, No JVD, Normal thyroid  CHEST/LUNG: Clear to percussion bilaterally; No rales, rhonchi,   HEART: Regular rate and rhythm;   ABDOMEN:   Ascites.      LABS:                        10.6   13.00 )-----------( 393      ( 17 Feb 2019 07:25 )             32.1     02-17    128<L>  |  98  |  50<H>  ----------------------------<  84  4.5   |  19<L>  |  2.22<H>    Ca    8.1<L>      17 Feb 2019 07:25    TPro  6.8  /  Alb  2.0<L>  /  TBili  4.7<H>  /  DBili  x   /  AST  65<H>  /  ALT  30  /  AlkPhos  568<H>  02-17            RADIOLOGY & ADDITIONAL STUDIES:    PATHOLOGY:

## 2019-02-17 NOTE — PROGRESS NOTE ADULT - SUBJECTIVE AND OBJECTIVE BOX
Patient seen in follow up for PAPO, hyponatremia. Noted drop in Sodium. Drinks fluids liberally.    ETOH abuse  Psoriasis  History of hypertension    MEDICATIONS  (STANDING):  folic acid 1 milliGRAM(s) Oral daily  midodrine 5 milliGRAM(s) Oral every 8 hours  multivitamin 1 Tablet(s) Oral daily  nicotine -  14 mG/24Hr(s) Patch 1 patch Transdermal daily  pantoprazole   Suspension 40 milliGRAM(s) Oral before lunch  rifaximin 550 milliGRAM(s) Oral two times a day  thiamine 100 milliGRAM(s) Oral daily    MEDICATIONS  (PRN):  zinc oxide 40% Ointment 1 Application(s) Topical every 8 hours PRN rash    T(C): 36.7 (02-17-19 @ 11:31), Max: 36.9 (02-17-19 @ 05:28)  HR: 73 (02-17-19 @ 13:02) (62 - 99)  BP: 101/53 (02-17-19 @ 13:02) (94/59 - 130/67)  RR: 16 (02-17-19 @ 11:31) (16 - 18)  SpO2: 100% (02-17-19 @ 11:31) (98% - 100%)  Wt(kg): --  I&O's Detail    16 Feb 2019 07:01  -  17 Feb 2019 07:00  --------------------------------------------------------  IN:    Oral Fluid: 1100 mL  Total IN: 1100 mL    OUT:  Total OUT: 0 mL    Total NET: 1100 mL      17 Feb 2019 07:01  -  17 Feb 2019 14:03  --------------------------------------------------------  IN:    Oral Fluid: 500 mL  Total IN: 500 mL    OUT:  Total OUT: 0 mL    Total NET: 500 mL      PHYSICAL EXAM:  General: NAD, AAO x3  No JVD  Respiratory: b/l air entry, clear  Cardiovascular: S1 S2 reg  Gastrointestinal: soft, nontense ascites  Extremities: no edema     CBC Full  -  ( 17 Feb 2019 07:25 )  WBC Count : 13.00 K/uL  Hemoglobin : 10.6 g/dL  Hematocrit : 32.1 %  Platelet Count - Automated : 393 K/uL  Mean Cell Volume : 104.9 fl  Mean Cell Hemoglobin : 34.6 pg  Mean Cell Hemoglobin Concentration : 33.0 gm/dL  Auto Neutrophil # : x  Auto Lymphocyte # : x  Auto Monocyte # : x  Auto Eosinophil # : x  Auto Basophil # : x  Auto Neutrophil % : x  Auto Lymphocyte % : x  Auto Monocyte % : x  Auto Eosinophil % : x  Auto Basophil % : x    02-17    128<L>  |  98  |  50<H>  ----------------------------<  84  4.5   |  19<L>  |  2.22<H>    Ca    8.1<L>      17 Feb 2019 07:25    TPro  6.8  /  Alb  2.0<L>  /  TBili  4.7<H>  /  DBili  x   /  AST  65<H>  /  ALT  30  /  AlkPhos  568<H>  02-17        Sodium, Serum: 128 (02-17 @ 07:25)  Sodium, Serum: 130 (02-16 @ 07:43)  Sodium, Serum: 135 (02-15 @ 07:26)    Creatinine, Serum: 2.22 (02-17 @ 07:25)  Creatinine, Serum: 2.43 (02-16 @ 07:43)  Creatinine, Serum: 2.50 (02-15 @ 07:26)    Potassium, Serum: 4.5 (02-17 @ 07:25)  Potassium, Serum: 4.3 (02-16 @ 07:43)  Potassium, Serum: 4.1 (02-15 @ 07:26)    Hemoglobin: 10.6 (02-17 @ 07:25)  Hemoglobin: 11.3 (02-16 @ 07:43)  Hemoglobin: 11.2 (02-15 @ 07:26)

## 2019-02-17 NOTE — PROGRESS NOTE ADULT - SUBJECTIVE AND OBJECTIVE BOX
Patient is a 48y old  Male who presents with a chief complaint of Coffee ground emesis, progressive jaundice, syncopal episodes. (15 Feb 2019 08:44)      INTERVAL HPI/OVERNIGHT EVENTS: patient feels much improved, no complaints.  no events     T(C): 36.9 (02-17-19 @ 05:28), Max: 36.9 (02-17-19 @ 05:28)  HR: 74 (02-17-19 @ 05:28) (62 - 74)  BP: 100/63 (02-17-19 @ 05:28) (100/63 - 111/76)  RR: 18 (02-17-19 @ 05:28) (18 - 18)  SpO2: 98% (02-17-19 @ 05:28) (98% - 99%)    MEDICATIONS  (STANDING):  folic acid 1 milliGRAM(s) Oral daily  midodrine 5 milliGRAM(s) Oral every 8 hours  multivitamin 1 Tablet(s) Oral daily  nicotine -  14 mG/24Hr(s) Patch 1 patch Transdermal daily  pantoprazole   Suspension 40 milliGRAM(s) Oral before lunch  rifaximin 550 milliGRAM(s) Oral two times a day  thiamine 100 milliGRAM(s) Oral daily    MEDICATIONS  (PRN):  zinc oxide 40% Ointment 1 Application(s) Topical every 8 hours PRN rash      PHYSICAL EXAM:  GENERAL: NAD, well-groomed, well-developed  HEAD:  Atraumatic, Normocephalic  EYES: icteric sclera   ENMT: No tonsillar erythema, exudates, or enlargement; Moist mucous membranes, Good dentition, No lesions  NECK: Supple, No JVD, Normal thyroid  NERVOUS SYSTEM:  Alert & Oriented X3, Good concentration; Motor Strength 5/5 B/L upper and lower extremities; DTRs 2+ intact and symmetric  CHEST/LUNG: Clear to percussion bilaterally; No rales, rhonchi, wheezing, or rubs  HEART: Regular rate and rhythm; No murmurs, rubs, or gallops  ABDOMEN: Soft, non tender, distended, bowel sounds present   EXTREMITIES:  2+ Peripheral Pulses, No clubbing, cyanosis, or edema  LYMPH: No lymphadenopathy noted  SKIN: Jaundice                                    10.6   13.00 )-----------( 393      ( 17 Feb 2019 07:25 )             32.1           LIVER FUNCTIONS - ( 17 Feb 2019 07:25 )  Alb: 2.0 g/dL / Pro: 6.8 gm/dL / ALK PHOS: 568 U/L / ALT: 30 U/L / AST: 65 U/L / GGT: x             128|98|50<84  4.5|19|2.22  8.1,--,--  02-17 @ 07:25      RADIOLOGY & ADDITIONAL TESTS:    Imaging Personally Reviewed:  [ X] YES  [ ] NO    Consultant(s) Notes Reviewed:  [ X] YES  [ ] NO    Care Discussed with Consultants/Other Providers [X ] YES  [ ] NO

## 2019-02-17 NOTE — PROGRESS NOTE ADULT - PROBLEM SELECTOR PLAN 5
-resolving, ARF likely from dehydration, pre renal azotemia in the setting of Hepatorenal syndrome.   - would monitor Cr closely    improving

## 2019-02-18 DIAGNOSIS — E87.1 HYPO-OSMOLALITY AND HYPONATREMIA: ICD-10-CM

## 2019-02-18 LAB
ALBUMIN SERPL ELPH-MCNC: 2.1 G/DL — LOW (ref 3.3–5)
ALP SERPL-CCNC: 576 U/L — HIGH (ref 40–120)
ALT FLD-CCNC: 25 U/L — SIGNIFICANT CHANGE UP (ref 12–78)
ANION GAP SERPL CALC-SCNC: 13 MMOL/L — SIGNIFICANT CHANGE UP (ref 5–17)
AST SERPL-CCNC: 62 U/L — HIGH (ref 15–37)
BILIRUB SERPL-MCNC: 4.3 MG/DL — HIGH (ref 0.2–1.2)
BUN SERPL-MCNC: 51 MG/DL — HIGH (ref 7–23)
CALCIUM SERPL-MCNC: 8.2 MG/DL — LOW (ref 8.5–10.1)
CHLORIDE SERPL-SCNC: 99 MMOL/L — SIGNIFICANT CHANGE UP (ref 96–108)
CO2 SERPL-SCNC: 22 MMOL/L — SIGNIFICANT CHANGE UP (ref 22–31)
CREAT SERPL-MCNC: 2.42 MG/DL — HIGH (ref 0.5–1.3)
GLUCOSE SERPL-MCNC: 121 MG/DL — HIGH (ref 70–99)
HCT VFR BLD CALC: 32.5 % — LOW (ref 39–50)
HGB BLD-MCNC: 10.6 G/DL — LOW (ref 13–17)
MCHC RBC-ENTMCNC: 32.6 GM/DL — SIGNIFICANT CHANGE UP (ref 32–36)
MCHC RBC-ENTMCNC: 34.3 PG — HIGH (ref 27–34)
MCV RBC AUTO: 105.2 FL — HIGH (ref 80–100)
NRBC # BLD: 0 /100 WBCS — SIGNIFICANT CHANGE UP (ref 0–0)
PLATELET # BLD AUTO: 420 K/UL — HIGH (ref 150–400)
POTASSIUM SERPL-MCNC: 4.2 MMOL/L — SIGNIFICANT CHANGE UP (ref 3.5–5.3)
POTASSIUM SERPL-SCNC: 4.2 MMOL/L — SIGNIFICANT CHANGE UP (ref 3.5–5.3)
PROT SERPL-MCNC: 6.8 GM/DL — SIGNIFICANT CHANGE UP (ref 6–8.3)
RBC # BLD: 3.09 M/UL — LOW (ref 4.2–5.8)
RBC # FLD: 16.6 % — HIGH (ref 10.3–14.5)
SODIUM SERPL-SCNC: 134 MMOL/L — LOW (ref 135–145)
WBC # BLD: 13.34 K/UL — HIGH (ref 3.8–10.5)
WBC # FLD AUTO: 13.34 K/UL — HIGH (ref 3.8–10.5)

## 2019-02-18 PROCEDURE — 99233 SBSQ HOSP IP/OBS HIGH 50: CPT

## 2019-02-18 RX ORDER — HYDROXYZINE HCL 10 MG
25 TABLET ORAL EVERY 6 HOURS
Qty: 0 | Refills: 0 | Status: DISCONTINUED | OUTPATIENT
Start: 2019-02-18 | End: 2019-02-19

## 2019-02-18 RX ORDER — HYDROCORTISONE 1 %
1 OINTMENT (GRAM) TOPICAL
Qty: 0 | Refills: 0 | Status: DISCONTINUED | OUTPATIENT
Start: 2019-02-18 | End: 2019-02-19

## 2019-02-18 RX ADMIN — MIDODRINE HYDROCHLORIDE 5 MILLIGRAM(S): 2.5 TABLET ORAL at 15:00

## 2019-02-18 RX ADMIN — MIDODRINE HYDROCHLORIDE 5 MILLIGRAM(S): 2.5 TABLET ORAL at 05:51

## 2019-02-18 RX ADMIN — Medication 1 MILLIGRAM(S): at 11:59

## 2019-02-18 RX ADMIN — MIDODRINE HYDROCHLORIDE 5 MILLIGRAM(S): 2.5 TABLET ORAL at 21:41

## 2019-02-18 RX ADMIN — Medication 1 APPLICATION(S): at 17:03

## 2019-02-18 RX ADMIN — PANTOPRAZOLE SODIUM 40 MILLIGRAM(S): 20 TABLET, DELAYED RELEASE ORAL at 12:06

## 2019-02-18 RX ADMIN — Medication 100 MILLIGRAM(S): at 11:59

## 2019-02-18 RX ADMIN — Medication 1 TABLET(S): at 11:59

## 2019-02-18 NOTE — PROGRESS NOTE ADULT - PROBLEM SELECTOR PROBLEM 2
Alcoholic hepatitis with ascites
Alcoholic cirrhosis of liver with ascites
Leukocytosis, unspecified type
Alcoholic hepatitis with ascites
Alcoholic hepatitis with ascites
Leukocytosis, unspecified type
Sepsis, due to unspecified organism
Acute hepatic encephalopathy
Alcoholic hepatitis with ascites
Gastrointestinal hemorrhage with melena
Hepatorenal syndrome
Hepatorenal syndrome
Sepsis, due to unspecified organism
Acute UTI
Alcoholic hepatitis with ascites
Sepsis, due to unspecified organism
Gastrointestinal hemorrhage with melena
Sepsis, due to unspecified organism
Sepsis, due to unspecified organism
Acute hepatic encephalopathy

## 2019-02-18 NOTE — PROGRESS NOTE ADULT - PROBLEM SELECTOR PLAN 10
fluid restriction
on antibiotics but no further signs of sepsis   will get hematology consult
completed abx, monitor cbc
on antibiotics but no further signs of sepsis   will get hematology consult

## 2019-02-18 NOTE — PROGRESS NOTE ADULT - SUBJECTIVE AND OBJECTIVE BOX
Patient seen in follow up for PAPO. Relates adherence to 1000 ml fluid restriction, eating better. No SOB, no pain, no voiding c/o.    ETOH abuse  Psoriasis  History of hypertension    MEDICATIONS  (STANDING):  folic acid 1 milliGRAM(s) Oral daily  hydrocortisone 1% Ointment 1 Application(s) Topical two times a day  midodrine 5 milliGRAM(s) Oral every 8 hours  multivitamin 1 Tablet(s) Oral daily  nicotine -  14 mG/24Hr(s) Patch 1 patch Transdermal daily  pantoprazole   Suspension 40 milliGRAM(s) Oral before lunch  rifaximin 550 milliGRAM(s) Oral two times a day  thiamine 100 milliGRAM(s) Oral daily    MEDICATIONS  (PRN):  hydrOXYzine hydrochloride 25 milliGRAM(s) Oral every 6 hours PRN Itching  zinc oxide 40% Ointment 1 Application(s) Topical every 8 hours PRN rash    T(C): 36.1 (02-18-19 @ 17:12), Max: 37.3 (02-17-19 @ 17:07)  HR: 100 (02-18-19 @ 17:12) (73 - 100)  BP: 105/76 (02-18-19 @ 17:12) (89/53 - 105/76)  RR: 17 (02-18-19 @ 17:12) (14 - 18)  SpO2: 97% (02-18-19 @ 17:12) (94% - 100%)  Wt(kg): --  I&O's Detail    17 Feb 2019 07:01  -  18 Feb 2019 07:00  --------------------------------------------------------  IN:    Oral Fluid: 1500 mL  Total IN: 1500 mL    OUT:  Total OUT: 0 mL    Total NET: 1500 mL      PHYSICAL EXAM:  General: NAD, AAO x3  No JVD  Respiratory: b/l air entry, clear  Cardiovascular: S1 S2 reg  Gastrointestinal: soft, nontense ascites  Extremities: no legs or presacral edema     CBC Full  -  ( 18 Feb 2019 08:28 )  WBC Count : 13.34 K/uL  Hemoglobin : 10.6 g/dL  Hematocrit : 32.5 %  Platelet Count - Automated : 420 K/uL  Mean Cell Volume : 105.2 fl  Mean Cell Hemoglobin : 34.3 pg  Mean Cell Hemoglobin Concentration : 32.6 gm/dL  Auto Neutrophil # : x  Auto Lymphocyte # : x  Auto Monocyte # : x  Auto Eosinophil # : x  Auto Basophil # : x  Auto Neutrophil % : x  Auto Lymphocyte % : x  Auto Monocyte % : x  Auto Eosinophil % : x  Auto Basophil % : x    02-18    134<L>  |  99  |  51<H>  ----------------------------<  121<H>  4.2   |  22  |  2.42<H>    Ca    8.2<L>      18 Feb 2019 08:28    TPro  6.8  /  Alb  2.1<L>  /  TBili  4.3<H>  /  DBili  x   /  AST  62<H>  /  ALT  25  /  AlkPhos  576<H>  02-18        Sodium, Serum: 134 (02-18 @ 08:28)  Sodium, Serum: 128 (02-17 @ 07:25)  Sodium, Serum: 130 (02-16 @ 07:43)    Creatinine, Serum: 2.42 (02-18 @ 08:28)  Creatinine, Serum: 2.22 (02-17 @ 07:25)  Creatinine, Serum: 2.43 (02-16 @ 07:43)    Potassium, Serum: 4.2 (02-18 @ 08:28)  Potassium, Serum: 4.5 (02-17 @ 07:25)  Potassium, Serum: 4.3 (02-16 @ 07:43)    Hemoglobin: 10.6 (02-18 @ 08:28)  Hemoglobin: 10.6 (02-17 @ 07:25)  Hemoglobin: 11.3 (02-16 @ 07:43)

## 2019-02-18 NOTE — PROGRESS NOTE ADULT - PROBLEM SELECTOR PLAN 2
LFTs slightly better today . Meropenem discontinued [id number]. Being treated for hepatorenal. Not a candidate for steroids.  11.2 / 132 / 137 / 403.  - 9.4 / 139 / 37 / 411 - 9.1/114/27/397  - 10.8 / 81 / 23 / 355 - 10.3 / 71 / 25 / 356  -- 9.8 / 7.5 / 25 / 341 - 10.1 / 66 / 25 / 344  - 10.4 / 70 / 29 / 364 - 9.8 / 72 / 31 / 409  --  9.0 / 72 / 31 / 399 - 8.2/72/31/434  -  6.6/73/30/461 --- 6.3 / 65 / 31 / 447  --  7.0 / 81 / 53 / 549  - 6.1 / 77 / 32 /  546 ////// BUN / CRE  551/2.42   Increasing alk phos secondary to ? R/O  meds. Antibiotics d/c'd  [id number]. Stable from GI point of view for discharge

## 2019-02-18 NOTE — PROGRESS NOTE ADULT - MINUTES
55
45
55
25
15
15
25
40
45
55
60
55

## 2019-02-18 NOTE — PROGRESS NOTE ADULT - ASSESSMENT
ESLD due to ETOH.  PAPO due to HRS improved with therapy.  Cirrhotic hyponatremia.  Maintain on 1000 ml fluid restriction.  Midodrine.  No diuretics.

## 2019-02-18 NOTE — PROGRESS NOTE ADULT - PROBLEM SELECTOR PROBLEM 3
Acute GI bleeding
Hepatorenal syndrome
Alcoholic intoxication without complication
Acute hepatic encephalopathy
Hepatorenal syndrome
Acute GI bleeding
Acute hepatic encephalopathy
Alcohol dependence with withdrawal with complication
Hepatic encephalopathy
Hepatic encephalopathy
Hepatorenal syndrome
SBP (spontaneous bacterial peritonitis)
Sepsis, due to unspecified organism
Sepsis, due to unspecified organism
Severe dehydration
Severe dehydration
Hepatorenal syndrome
SBP (spontaneous bacterial peritonitis)
Sepsis, due to unspecified organism
Alcohol dependence with withdrawal with complication
Acute hepatic encephalopathy
Acute hepatic encephalopathy

## 2019-02-18 NOTE — PROGRESS NOTE ADULT - SUBJECTIVE AND OBJECTIVE BOX
HPI:  47 y/o male PMH HTN (not on medication), chronic alcohol abuse and dependence x 15 years (1/2 pint of vodka with cranberry juice daily), hx of alcohol withdrawal 2013 hospitalized at [facility] after which he went to inpatient rehab at [facility] for syncope and melanotic stools. History obtained from ex wife, mother, and patient. Since pt left rehab in 2013 pt has been drinking daily. Last drink 11AM today. Pt state for past one and a half months he has had progressive weight loss with loss of appetite. He has had in increased abdominal distention for the past 1 month and in the past 1 week he has noticed jaundice and scleral icterus. Pt also reports generalized fatigue. Reports coffee ground emesis x1 episode 3 days ago but none since then with melena for about a week. Pt states he has 3 bowel movements a day and now has also been seeing "blood when I pee" also within the last week. No abdominal pain. No CP no SOB. No fevers or chills. Today pt called ex wife after he was unsteady with his gait and "fell", he denies LOC. Denies trauma to head. Ex wife reports while attempting to get pt to the car she noticed he was "very weak" and witnessed pt to have had 3 syncopal episodes lasting a few minutes each before regaining consciousness, but no seizures. Ex wife state that in the car, pt had a bowel movement that was black and tarry.     In ED pt noted to be hypotensive SBP 80-90s. 2L IVF given with 3rd and 4th infusing. SBP post IVF resuscitation 100s to one teens. On labs pt with leukocytosis with 3% bands, Na 123, Cr 2.05, INR 2.05, T bili 6, , ALT 55, Alk phos 799.  Pt seen by critical care and not felt to be ICU candidate. (23 Jan 2019 22:33)    Patient is a 48y old  Male who presents with a chief complaint of Coffee ground emesis, progressive jaundice, syncopal episodes. (18 Feb 2019 09:27)      INTERVAL HPI/OVERNIGHT EVENTS: no acute events overnight complains of itchiness     MEDICATIONS  (STANDING):  folic acid 1 milliGRAM(s) Oral daily  hydrocortisone 1% Ointment 1 Application(s) Topical two times a day  midodrine 5 milliGRAM(s) Oral every 8 hours  multivitamin 1 Tablet(s) Oral daily  nicotine -  14 mG/24Hr(s) Patch 1 patch Transdermal daily  pantoprazole   Suspension 40 milliGRAM(s) Oral before lunch  rifaximin 550 milliGRAM(s) Oral two times a day  thiamine 100 milliGRAM(s) Oral daily    MEDICATIONS  (PRN):  hydrOXYzine hydrochloride 25 milliGRAM(s) Oral every 6 hours PRN Itching  zinc oxide 40% Ointment 1 Application(s) Topical every 8 hours PRN rash      Allergies    No Known Allergies    Intolerances        REVIEW OF SYSTEMS:  CONSTITUTIONAL: No fever, weight loss, or fatigue  EYES: icteric sclera   ENMT:  No difficulty hearing, tinnitus, vertigo; No sinus or throat pain  NECK: No pain or stiffness  RESPIRATORY: No cough, wheezing, chills or hemoptysis; No shortness of breath  CARDIOVASCULAR: No chest pain, palpitations, dizziness, or leg swelling  GASTROINTESTINAL: No abdominal or epigastric pain. No nausea, vomiting, or hematemesis; No diarrhea or constipation. No melena or hematochezia.  GENITOURINARY: No dysuria, frequency, hematuria, or incontinence  NEUROLOGICAL: No headaches, memory loss, loss of strength, numbness, or tremors  SKIN:  Jaundice   LYMPH NODES: No enlarged glands  ENDOCRINE: No heat or cold intolerance; No hair loss  MUSCULOSKELETAL: No joint pain or swelling; No muscle, back, or extremity pain  PSYCHIATRIC: No depression, anxiety, mood swings, or difficulty sleeping  HEME/LYMPH: No easy bruising, or bleeding gums  ALLERGY AND IMMUNOLOGIC: No hives or eczema    Vital Signs Last 24 Hrs  T(C): 37.1 (18 Feb 2019 05:14), Max: 37.3 (17 Feb 2019 17:07)  T(F): 98.8 (18 Feb 2019 05:14), Max: 99.1 (17 Feb 2019 17:07)  HR: 75 (18 Feb 2019 05:14) (73 - 91)  BP: 98/61 (18 Feb 2019 06:30) (89/53 - 101/53)  BP(mean): --  RR: 14 (18 Feb 2019 05:14) (14 - 18)  SpO2: 98% (18 Feb 2019 05:14) (97% - 100%)    PHYSICAL EXAM:  GENERAL: NAD, well-groomed, well-developed  HEAD:  Atraumatic, Normocephalic  EYES: EOMI, PERRLA, conjunctiva and sclera clear  ENMT: No tonsillar erythema, exudates, or enlargement; Moist mucous membranes, Good dentition, No lesions  NECK: Supple, No JVD, Normal thyroid  NERVOUS SYSTEM:  Alert & Oriented X3, Good concentration; Motor Strength 5/5 B/L upper and lower extremities; DTRs 2+ intact and symmetric  CHEST/LUNG: Clear to percussion bilaterally; No rales, rhonchi, wheezing, or rubs  HEART: Regular rate and rhythm; No murmurs, rubs, or gallops  ABDOMEN: Soft,distended EXTREMITIES:  2+ Peripheral Pulses, No clubbing, cyanosis, or edema  LYMPH: No lymphadenopathy noted  SKIN: No rashes or lesions    LABS:                        10.6   13.34 )-----------( 420      ( 18 Feb 2019 08:28 )             32.5     02-18    134<L>  |  99  |  51<H>  ----------------------------<  121<H>  4.2   |  22  |  2.42<H>    Ca    8.2<L>      18 Feb 2019 08:28    TPro  6.8  /  Alb  2.1<L>  /  TBili  4.3<H>  /  DBili  x   /  AST  62<H>  /  ALT  25  /  AlkPhos  576<H>  02-18        CAPILLARY BLOOD GLUCOSE          RADIOLOGY & ADDITIONAL TESTS:  < from: US Paracentesis (02.11.19 @ 12:15) >  EXAM:  [facility]                            PROCEDURE DATE:  02/11/2019          INTERPRETATION:  Procedure: Paracentesis. Images saved to PACS.    Clinical Information: Large volume ascites.     Technique: Informed consent was obtained.The patient was placed supine   on the procedure table.  Using ultrasound guidance, a large pocket of   fluid was localized in the right lower quadrant which was prepped and   draped in the usual sterile fashion. Timeout performed. 1% lidocaine used   for local anesthesia.    Under sonographic guidance, the fluid pocket was accessed with a 5 Italian   Yueh centesis sheath introducer needle with return of serous ascites.   About 3000 cc of fluid were then removed. Intravenous albumin was   administered by the primary team.  Upon completion, the introducer sheath   was removed and a sterile dressing was placed. No complications.    Sedation: None.    Impression: Successful sonographic guided paracentesis.    < end of copied text >    Imaging Personally Reviewed:  [X ] YES  [ ] NO    Consultant(s) Notes Reviewed:  [ X] YES  [ ] NO    Care Discussed with Consultants/Other Providers [ X] YES  [ ] NO

## 2019-02-18 NOTE — PROGRESS NOTE ADULT - ASSESSMENT
HPI:  49 y/o male PMH HTN (not on medication), chronic alcohol abuse and dependence x 15 years (1/2 pint of vodka with cranberry juice daily), hx of alcohol withdrawal 2013 hospitalized at [facility] after which he went to inpatient rehab at [facility] presents for syncope and melanotic stools. History obtained from ex wife, mother, and patient. Since pt left rehab in 2013 pt has been drinking daily. Last drink 11AM today. Pt state for past one and a half months he has had progressive weight loss with loss of appetite. He has had in increased abdominal distention for the past 1 month and in the past 1 week he has noticed jaundice and scleral icterus. Pt also reports generalized fatigue. Reports coffee ground emesis x1 episode 3 days ago but none since then with melena for about a week. Pt states he has 3 bowel movements a day and now has also been seeing "blood when I pee" also within the last week. No abdominal pain. No CP no SOB. No fevers or chills. Today pt called ex wife after he was unsteady with his gait and "fell", he denies LOC. Denies trauma to head. Ex wife reports while attempting to get pt to the car she noticed he was "very weak" and witnessed pt to have had 3 syncopal episodes lasting a few minutes each before regaining consciousness, but no seizures. Ex wife state that in the car, pt had a bowel movement that was black and tarry.     In ED pt noted to be hypotensive SBP 80-90s. 2L IVF given with 3rd and 4th infusing. SBP post IVF resuscitation 100s to one teens. On labs pt with leukocytosis with 3% bands, Na 123, Cr 2.05, INR 2.05, T bili 6, , ALT 55, Alk phos 799.  Pt seen by critical care and not felt to be ICU candidate. (23 Jan 2019 22:33)  ----------------- As Above ------------------------------------------------------  Patient confused. Left message for family member to contact me   ETOH Abuse (+). Coffee ground emesis x 1, melena.   Eleveated LFTs, INR  See labs, CT Scan    Cirrhosis, ascites, fever - 1) Paracentesis 2) ammonia level  3) f/u LFTs 4) treat for impending Dts  - patient does not fit into the criteria for treatment with steroids.

## 2019-02-18 NOTE — PROGRESS NOTE ADULT - PROBLEM SELECTOR PROBLEM 10
Hyponatremia
Other elevated white blood cell (WBC) count

## 2019-02-18 NOTE — PROGRESS NOTE ADULT - PROBLEM SELECTOR PLAN 3
- monitor serial CBC for Hb trend  - protonix  - GI on board
On midodrine / Octreotide. Slowly improving
- monitor mental status
On midodrine / Octreotide d/c'd . Slowly improving
- monitor mental status
- monitor serial CBC for Hb trend  - protonix  - GI on board
- trend lytes
- trend lytes
ON ABX
On midodrine . Slowly improving
On midodrine / Octreotide. Slowly improving
SUDHA monitoring  Thiamine Folic acid
on rifaximin
on rifaximin
plan as above
plan as above
resolved, patient AA0 x 3.
resolved, patient AA0 x 3.
ON ABX
On midodrine / Octreotide. Slowly improving
sec to above
SUDHA monitoring  Thiamine Folic acis
resolved, patient AA0 x 3.
- monitor mental status

## 2019-02-18 NOTE — PROGRESS NOTE ADULT - SUBJECTIVE AND OBJECTIVE BOX
INTERVAL History:    Ascites.  Allergies    No Known Allergies    Intolerances        MEDICATIONS  (STANDING):  folic acid 1 milliGRAM(s) Oral daily  midodrine 5 milliGRAM(s) Oral every 8 hours  multivitamin 1 Tablet(s) Oral daily  nicotine -  14 mG/24Hr(s) Patch 1 patch Transdermal daily  pantoprazole   Suspension 40 milliGRAM(s) Oral before lunch  rifaximin 550 milliGRAM(s) Oral two times a day  thiamine 100 milliGRAM(s) Oral daily    MEDICATIONS  (PRN):  zinc oxide 40% Ointment 1 Application(s) Topical every 8 hours PRN rash      Vital Signs Last 24 Hrs  T(C): 37.1 (18 Feb 2019 05:14), Max: 37.3 (17 Feb 2019 17:07)  T(F): 98.8 (18 Feb 2019 05:14), Max: 99.1 (17 Feb 2019 17:07)  HR: 75 (18 Feb 2019 05:14) (73 - 91)  BP: 98/61 (18 Feb 2019 06:30) (89/53 - 101/53)  BP(mean): --  RR: 14 (18 Feb 2019 05:14) (14 - 18)  SpO2: 98% (18 Feb 2019 05:14) (97% - 100%)    PHYSICAL EXAM:      EYES: EOMI, PERRLA, conjunctiva and sclera clear  NECK: Supple, No JVD, Normal thyroid  CHEST/LUNG: Clear to percussion bilaterally; No rales, rhonchi,   HEART: Regular rate and rhythm;   ABDOMEN: Ascites.  LYMPH: No lymphadenopathy noted        LABS:                        10.6   13.34 )-----------( 420      ( 18 Feb 2019 08:28 )             32.5     02-18    134<L>  |  99  |  51<H>  ----------------------------<  121<H>  4.2   |  22  |  2.42<H>    Ca    8.2<L>      18 Feb 2019 08:28    TPro  6.8  /  Alb  2.1<L>  /  TBili  4.3<H>  /  DBili  x   /  AST  62<H>  /  ALT  25  /  AlkPhos  576<H>  02-18            RADIOLOGY & ADDITIONAL STUDIES:    PATHOLOGY:

## 2019-02-18 NOTE — PROGRESS NOTE ADULT - PROBLEM SELECTOR PLAN 9
pyridium
pyridium
on antibiotics but no further signs of sepsis   will get hematology consult
pyridium

## 2019-02-18 NOTE — PROGRESS NOTE ADULT - PROBLEM SELECTOR PROBLEM 9
Dysuria
Other elevated white blood cell (WBC) count
Dysuria

## 2019-02-18 NOTE — PROGRESS NOTE ADULT - NSHPATTENDINGPLANDISCUSS_GEN_ALL_CORE
pt, rn,
pt, rn,
patient and nursing staff.
pt, rn, brothers, sister
patient and nursing staff, 2 sisters, 2 ex-wife
pt, rn, brothers, sister
pt, rn
pt, rn, brothers, sister
pt, rn, mother
pt, rn,
pt, rn,
pt, rn, mother
pt, rn,
pt, rn, mother

## 2019-02-18 NOTE — PROGRESS NOTE ADULT - SUBJECTIVE AND OBJECTIVE BOX
Patient is a 48y old  Male who presents with a chief complaint of Coffee ground emesis, progressive jaundice, syncopal episodes. (18 Feb 2019 11:53)      HPI:  49 y/o male PMH HTN (not on medication), chronic alcohol abuse and dependence x 15 years (1/2 pint of vodka with cranberry juice daily), hx of alcohol withdrawal 2013 hospitalized at [facility] after which he went to inpatient rehab at [facility] for syncope and melanotic stools. History obtained from ex wife, mother, and patient. Since pt left rehab in 2013 pt has been drinking daily. Last drink 11AM today. Pt state for past one and a half months he has had progressive weight loss with loss of appetite. He has had in increased abdominal distention for the past 1 month and in the past 1 week he has noticed jaundice and scleral icterus. Pt also reports generalized fatigue. Reports coffee ground emesis x1 episode 3 days ago but none since then with melena for about a week. Pt states he has 3 bowel movements a day and now has also been seeing "blood when I pee" also within the last week. No abdominal pain. No CP no SOB. No fevers or chills. Today pt called ex wife after he was unsteady with his gait and "fell", he denies LOC. Denies trauma to head. Ex wife reports while attempting to get pt to the car she noticed he was "very weak" and witnessed pt to have had 3 syncopal episodes lasting a few minutes each before regaining consciousness, but no seizures. Ex wife state that in the car, pt had a bowel movement that was black and tarry.     In ED pt noted to be hypotensive SBP 80-90s. 2L IVF given with 3rd and 4th infusing. SBP post IVF resuscitation 100s to one teens. On labs pt with leukocytosis with 3% bands, Na 123, Cr 2.05, INR 2.05, T bili 6, , ALT 55, Alk phos 799.  Pt seen by critical care and not felt to be ICU candidate. (23 Jan 2019 22:33)      INTERVAL HPI/OVERNIGHT EVENTS:  The patient denies melena, hematochezia, hematemesis, nausea, vomiting, abdominal pain, constipation, diarrhea, or change in bowel movements     MEDICATIONS  (STANDING):  folic acid 1 milliGRAM(s) Oral daily  hydrocortisone 1% Ointment 1 Application(s) Topical two times a day  midodrine 5 milliGRAM(s) Oral every 8 hours  multivitamin 1 Tablet(s) Oral daily  nicotine -  14 mG/24Hr(s) Patch 1 patch Transdermal daily  pantoprazole   Suspension 40 milliGRAM(s) Oral before lunch  rifaximin 550 milliGRAM(s) Oral two times a day  thiamine 100 milliGRAM(s) Oral daily    MEDICATIONS  (PRN):  hydrOXYzine hydrochloride 25 milliGRAM(s) Oral every 6 hours PRN Itching  zinc oxide 40% Ointment 1 Application(s) Topical every 8 hours PRN rash      FAMILY HISTORY:  No pertinent family history in first degree relatives      Allergies    No Known Allergies    Intolerances        PMH/PSH:  ETOH abuse  Psoriasis  History of hypertension  No significant past surgical history        REVIEW OF SYSTEMS:  CONSTITUTIONAL: No fever, weight loss,  EYES: No eye pain, visual disturbances, or discharge  ENMT:  No difficulty hearing, tinnitus, vertigo; No sinus or throat pain  NECK: No pain or stiffness  BREASTS: No pain, masses, or nipple discharge  RESPIRATORY: No cough, wheezing, chills or hemoptysis; No shortness of breath  CARDIOVASCULAR: No chest pain, palpitations, dizziness, or leg swelling  GASTROINTESTINAL: No abdominal or epigastric pain. No nausea, vomiting, or hematemesis; No diarrhea or constipation. No melena or hematochezia.  GENITOURINARY: No dysuria, frequency, hematuria, or incontinence  NEUROLOGICAL: No headaches, memory loss, loss of strength, numbness, or tremors  SKIN: No itching, burning, rashes, or lesions   LYMPH NODES: No enlarged glands  ENDOCRINE: No heat or cold intolerance; No hair loss  MUSCULOSKELETAL: No joint pain or swelling; No muscle, back, or extremity pain  PSYCHIATRIC: No depression, anxiety, mood swings, or difficulty sleeping  HEME/LYMPH: No easy bruising, or bleeding gums  ALLERGY AND IMMUNOLOGIC: No hives or eczema    Vital Signs Last 24 Hrs  T(C): 36 (18 Feb 2019 13:23), Max: 37.3 (17 Feb 2019 17:07)  T(F): 96.8 (18 Feb 2019 13:23), Max: 99.1 (17 Feb 2019 17:07)  HR: 74 (18 Feb 2019 13:23) (73 - 91)  BP: 105/74 (18 Feb 2019 13:23) (89/53 - 105/74)  BP(mean): --  RR: 16 (18 Feb 2019 13:23) (14 - 18)  SpO2: 94% (18 Feb 2019 13:23) (94% - 100%)    PHYSICAL EXAM:  GENERAL: NAD, well-groomed, well-developed  HEAD:  Atraumatic, Normocephalic  EYES: EOMI, PERRLA, conjunctiva and sclera clear  NECK: Supple, No JVD, Normal thyroid  NERVOUS SYSTEM:  Alert & Oriented X3, Good concentration;   CHEST/LUNG: Clear to percussion bilaterally; No rales, rhonchi, wheezing, or rubs  HEART: Regular rate and rhythm; No murmurs, rubs, or gallops  ABDOMEN: Soft, Nontender, Nondistended; Bowel sounds present  EXTREMITIES:  2+ Peripheral Pulses, No clubbing, cyanosis, or edema  LYMPH: No lymphadenopathy noted  SKIN: No rashes or lesions    LAB                          10.6   13.34 )-----------( 420      ( 18 Feb 2019 08:28 )             32.5       CBC:  02-18 @ 08:28  WBC 13.34   Hgb 10.6   Hct 32.5   Plts 420  .2  02-17 @ 07:25  WBC 13.00   Hgb 10.6   Hct 32.1   Plts 393  .9  02-16 @ 07:43  WBC 18.89   Hgb 11.3   Hct 34.6   Plts 388  .2  02-15 @ 07:26  WBC 22.36   Hgb 11.2   Hct 34.4   Plts 383  .6  02-14 @ 07:59  WBC 23.42   Hgb 12.3   Hct 38.2   Plts 287  .7  02-13 @ 07:56  WBC 23.48   Hgb 11.2   Hct 33.8   Plts 356  .6  02-12 @ 07:21  WBC 26.18   Hgb 11.1   Hct 33.1   Plts 372  .4      Chemistry:  02-18 @ 08:28  Na+ 134  K+ 4.2  Cl- 99  CO2 22  BUN 51  Cr 2.42     02-17 @ 07:25  Na+ 128  K+ 4.5  Cl- 98  CO2 19  BUN 50  Cr 2.22     02-16 @ 07:43  Na+ 130  K+ 4.3  Cl- 97  CO2 21  BUN 53  Cr 2.43     02-15 @ 07:26  Na+ 135  K+ 4.1  Cl- 101  CO2 21  BUN 53  Cr 2.50     02-14 @ 10:11  Na+ 133  K+ 4.9  Cl- 100  CO2 21  BUN 58  Cr 2.63     02-13 @ 07:56  Na+ 136  K+ 3.8  Cl- 98  CO2 23  BUN 61  Cr 2.64     02-12 @ 07:21  Na+ 135  K+ 4.0  Cl- 103  CO2 22  BUN 68  Cr 2.73         Glucose, Serum: 121 mg/dL (02-18 @ 08:28)  Glucose, Serum: 84 mg/dL (02-17 @ 07:25)  Glucose, Serum: 94 mg/dL (02-16 @ 07:43)  Glucose, Serum: 100 mg/dL (02-15 @ 07:26)  Glucose, Serum: 197 mg/dL (02-14 @ 10:11)  Glucose, Serum: 116 mg/dL (02-13 @ 07:56)  Glucose, Serum: 117 mg/dL (02-12 @ 07:21)      18 Feb 2019 08:28    134    |  99     |  51     ----------------------------<  121    4.2     |  22     |  2.42   17 Feb 2019 07:25    128    |  98     |  50     ----------------------------<  84     4.5     |  19     |  2.22   16 Feb 2019 07:43    130    |  97     |  53     ----------------------------<  94     4.3     |  21     |  2.43   15 Feb 2019 07:26    135    |  101    |  53     ----------------------------<  100    4.1     |  21     |  2.50   14 Feb 2019 10:11    133    |  100    |  58     ----------------------------<  197    4.9     |  21     |  2.63   13 Feb 2019 07:56    136    |  98     |  61     ----------------------------<  116    3.8     |  23     |  2.64   12 Feb 2019 07:21    135    |  103    |  68     ----------------------------<  117    4.0     |  22     |  2.73     Ca    8.2        18 Feb 2019 08:28  Ca    8.1        17 Feb 2019 07:25  Ca    8.3        16 Feb 2019 07:43  Ca    8.2        15 Feb 2019 07:26  Ca    8.5        14 Feb 2019 10:11  Ca    8.3        13 Feb 2019 07:56  Ca    8.1        12 Feb 2019 07:21    TPro  6.8    /  Alb  2.1    /  TBili  4.3    /  DBili  x      /  AST  62     /  ALT  25     /  AlkPhos  576    18 Feb 2019 08:28  TPro  6.8    /  Alb  2.0    /  TBili  4.7    /  DBili  x      /  AST  65     /  ALT  30     /  AlkPhos  568    17 Feb 2019 07:25  TPro  7.2    /  Alb  2.2    /  TBili  5.9    /  DBili  x      /  AST  72     /  ALT  32     /  AlkPhos  563    16 Feb 2019 07:43  TPro  6.9    /  Alb  2.2    /  TBili  6.1    /  DBili  4.69   /  AST  77     /  ALT  33     /  AlkPhos  546    15 Feb 2019 07:26  TPro  7.5    /  Alb  2.4    /  TBili  7.0    /  DBili  5.33   /  AST  81     /  ALT  33     /  AlkPhos  549    14 Feb 2019 10:11  TPro  6.9    /  Alb  2.3    /  TBili  6.3    /  DBili  4.64   /  AST  65     /  ALT  31     /  AlkPhos  447    13 Feb 2019 07:56  TPro  7.0    /  Alb  2.3    /  TBili  6.6    /  DBili  x      /  AST  73     /  ALT  30     /  AlkPhos  461    12 Feb 2019 07:21              CAPILLARY BLOOD GLUCOSE              RADIOLOGY & ADDITIONAL TESTS:    Imaging Personally Reviewed:  [ ] YES  [ ] NO    Consultant(s) Notes Reviewed:  [ ] YES  [ ] NO    Care Discussed with Consultants/Other Providers [ ] YES  [ ] NO

## 2019-02-19 VITALS
RESPIRATION RATE: 17 BRPM | DIASTOLIC BLOOD PRESSURE: 68 MMHG | OXYGEN SATURATION: 99 % | TEMPERATURE: 98 F | HEART RATE: 82 BPM | SYSTOLIC BLOOD PRESSURE: 115 MMHG

## 2019-02-19 LAB
ALBUMIN SERPL ELPH-MCNC: 2.2 G/DL — LOW (ref 3.3–5)
ALP SERPL-CCNC: 615 U/L — HIGH (ref 40–120)
ALT FLD-CCNC: 31 U/L — SIGNIFICANT CHANGE UP (ref 12–78)
ANION GAP SERPL CALC-SCNC: 12 MMOL/L — SIGNIFICANT CHANGE UP (ref 5–17)
AST SERPL-CCNC: 72 U/L — HIGH (ref 15–37)
BILIRUB DIRECT SERPL-MCNC: 3.08 MG/DL — HIGH (ref 0.05–0.2)
BILIRUB INDIRECT FLD-MCNC: 0.9 MG/DL — SIGNIFICANT CHANGE UP (ref 0.2–1)
BILIRUB SERPL-MCNC: 4 MG/DL — HIGH (ref 0.2–1.2)
BUN SERPL-MCNC: 47 MG/DL — HIGH (ref 7–23)
CALCIUM SERPL-MCNC: 8.2 MG/DL — LOW (ref 8.5–10.1)
CHLORIDE SERPL-SCNC: 99 MMOL/L — SIGNIFICANT CHANGE UP (ref 96–108)
CO2 SERPL-SCNC: 22 MMOL/L — SIGNIFICANT CHANGE UP (ref 22–31)
CREAT SERPL-MCNC: 2.32 MG/DL — HIGH (ref 0.5–1.3)
GLUCOSE SERPL-MCNC: 92 MG/DL — SIGNIFICANT CHANGE UP (ref 70–99)
HCT VFR BLD CALC: 32.6 % — LOW (ref 39–50)
HGB BLD-MCNC: 10.9 G/DL — LOW (ref 13–17)
MAGNESIUM SERPL-MCNC: 1.8 MG/DL — SIGNIFICANT CHANGE UP (ref 1.6–2.6)
MCHC RBC-ENTMCNC: 33.4 GM/DL — SIGNIFICANT CHANGE UP (ref 32–36)
MCHC RBC-ENTMCNC: 34.7 PG — HIGH (ref 27–34)
MCV RBC AUTO: 103.8 FL — HIGH (ref 80–100)
NRBC # BLD: 0 /100 WBCS — SIGNIFICANT CHANGE UP (ref 0–0)
PHOSPHATE SERPL-MCNC: 4.4 MG/DL — SIGNIFICANT CHANGE UP (ref 2.5–4.5)
PLATELET # BLD AUTO: 472 K/UL — HIGH (ref 150–400)
POTASSIUM SERPL-MCNC: 4.8 MMOL/L — SIGNIFICANT CHANGE UP (ref 3.5–5.3)
POTASSIUM SERPL-SCNC: 4.8 MMOL/L — SIGNIFICANT CHANGE UP (ref 3.5–5.3)
PROT SERPL-MCNC: 7.1 GM/DL — SIGNIFICANT CHANGE UP (ref 6–8.3)
RBC # BLD: 3.14 M/UL — LOW (ref 4.2–5.8)
RBC # FLD: 15.9 % — HIGH (ref 10.3–14.5)
SODIUM SERPL-SCNC: 133 MMOL/L — LOW (ref 135–145)
WBC # BLD: 12.9 K/UL — HIGH (ref 3.8–10.5)
WBC # FLD AUTO: 12.9 K/UL — HIGH (ref 3.8–10.5)

## 2019-02-19 PROCEDURE — 99239 HOSP IP/OBS DSCHRG MGMT >30: CPT

## 2019-02-19 RX ORDER — NICOTINE POLACRILEX 2 MG
0 GUM BUCCAL
Qty: 0 | Refills: 0 | COMMUNITY
Start: 2019-02-19

## 2019-02-19 RX ORDER — HYDROCORTISONE 1 %
1 OINTMENT (GRAM) TOPICAL
Qty: 0 | Refills: 0 | COMMUNITY
Start: 2019-02-19

## 2019-02-19 RX ORDER — PANTOPRAZOLE SODIUM 20 MG/1
1 TABLET, DELAYED RELEASE ORAL
Qty: 40 | Refills: 0 | OUTPATIENT
Start: 2019-02-19

## 2019-02-19 RX ORDER — ZINC OXIDE 200 MG/G
1 OINTMENT TOPICAL
Qty: 0 | Refills: 0 | COMMUNITY
Start: 2019-02-19

## 2019-02-19 RX ORDER — THIAMINE MONONITRATE (VIT B1) 100 MG
1 TABLET ORAL
Qty: 0 | Refills: 0 | COMMUNITY
Start: 2019-02-19

## 2019-02-19 RX ORDER — HYDROXYZINE HCL 10 MG
1 TABLET ORAL
Qty: 0 | Refills: 0 | COMMUNITY
Start: 2019-02-19

## 2019-02-19 RX ORDER — MIDODRINE HYDROCHLORIDE 2.5 MG/1
1 TABLET ORAL
Qty: 0 | Refills: 0 | COMMUNITY
Start: 2019-02-19

## 2019-02-19 RX ORDER — FOLIC ACID 0.8 MG
1 TABLET ORAL
Qty: 0 | Refills: 0 | COMMUNITY
Start: 2019-02-19

## 2019-02-19 RX ADMIN — Medication 100 MILLIGRAM(S): at 12:28

## 2019-02-19 RX ADMIN — Medication 1 TABLET(S): at 12:28

## 2019-02-19 RX ADMIN — Medication 1 MILLIGRAM(S): at 12:28

## 2019-02-19 RX ADMIN — Medication 1 APPLICATION(S): at 17:49

## 2019-02-19 RX ADMIN — MIDODRINE HYDROCHLORIDE 5 MILLIGRAM(S): 2.5 TABLET ORAL at 14:51

## 2019-02-19 RX ADMIN — MIDODRINE HYDROCHLORIDE 5 MILLIGRAM(S): 2.5 TABLET ORAL at 05:13

## 2019-02-19 RX ADMIN — Medication 1 APPLICATION(S): at 05:14

## 2019-02-19 RX ADMIN — PANTOPRAZOLE SODIUM 40 MILLIGRAM(S): 20 TABLET, DELAYED RELEASE ORAL at 12:28

## 2019-02-19 NOTE — PROGRESS NOTE ADULT - PROBLEM SELECTOR PROBLEM 1
Alcoholic cirrhosis of liver with ascites
Alcoholic hepatitis with ascites
Gastrointestinal hemorrhage, unspecified gastrointestinal hemorrhage type
Hepatorenal syndrome
Leukocytosis, unspecified type
Alcoholic cirrhosis of liver with ascites
Gastrointestinal hemorrhage, unspecified gastrointestinal hemorrhage type
Hepatorenal syndrome
Leukocytosis, unspecified type
Leukocytosis, unspecified type
Sepsis, due to unspecified organism
Syncope, unspecified syncope type
Gastrointestinal hemorrhage, unspecified gastrointestinal hemorrhage type
SBP (spontaneous bacterial peritonitis)
Syncope, unspecified syncope type
Alcoholic cirrhosis of liver with ascites
Hepatorenal syndrome
Gastrointestinal hemorrhage, unspecified gastrointestinal hemorrhage type
Hepatorenal syndrome
Sepsis, due to unspecified organism

## 2019-02-19 NOTE — PROGRESS NOTE ADULT - PROVIDER SPECIALTY LIST ADULT
Critical Care
Critical Care
Gastroenterology
Heme/Onc
Hospitalist
Infectious Disease
Nephrology
Critical Care
Critical Care
Gastroenterology
Hospitalist
Gastroenterology
Heme/Onc
Hospitalist
Gastroenterology
Hospitalist
Hospitalist

## 2019-02-19 NOTE — PROGRESS NOTE ADULT - REASON FOR ADMISSION
Coffee ground emesis, progressive jaundice, syncopal episodes.
syncope, UGIB with melena, decompensated cirrhosis
Coffee ground emesis, progressive jaundice, syncopal episodes.

## 2019-02-19 NOTE — PROGRESS NOTE ADULT - ASSESSMENT
ESLD due to ETOH.  PAPO due to HRS improved with therapy.  Cirrhotic hyponatremia.  Maintain on 1000 ml fluid restriction.  Midodrine.  No diuretics.  No objection to transfer to rehab.

## 2019-02-19 NOTE — PROGRESS NOTE ADULT - SUBJECTIVE AND OBJECTIVE BOX
Patient seen in follow up for PAPO. Doing better, no pain, no SOB, adheres to 1000 ml fluid restriction.    ETOH abuse  Psoriasis  History of hypertension    MEDICATIONS  (STANDING):  folic acid 1 milliGRAM(s) Oral daily  hydrocortisone 1% Ointment 1 Application(s) Topical two times a day  midodrine 5 milliGRAM(s) Oral every 8 hours  multivitamin 1 Tablet(s) Oral daily  nicotine -  14 mG/24Hr(s) Patch 1 patch Transdermal daily  pantoprazole   Suspension 40 milliGRAM(s) Oral before lunch  rifaximin 550 milliGRAM(s) Oral two times a day  thiamine 100 milliGRAM(s) Oral daily    MEDICATIONS  (PRN):  hydrOXYzine hydrochloride 25 milliGRAM(s) Oral every 6 hours PRN Itching  zinc oxide 40% Ointment 1 Application(s) Topical every 8 hours PRN rash    T(C): 36.9 (02-19-19 @ 17:30), Max: 37.1 (02-18-19 @ 23:21)  HR: 82 (02-19-19 @ 17:30) (62 - 100)  BP: 115/68 (02-19-19 @ 17:30) (96/68 - 115/68)  RR: 17 (02-19-19 @ 17:30) (16 - 17)  SpO2: 99% (02-19-19 @ 17:30) (94% - 100%)  Wt(kg): --  I&O's Detail    18 Feb 2019 07:01  -  19 Feb 2019 07:00  --------------------------------------------------------  IN:    Oral Fluid: 300 mL  Total IN: 300 mL    OUT:  Total OUT: 0 mL    Total NET: 300 mL      19 Feb 2019 07:01  -  19 Feb 2019 18:10  --------------------------------------------------------  IN:    Oral Fluid: 770 mL  Total IN: 770 mL    OUT:  Total OUT: 0 mL    Total NET: 770 mL      PHYSICAL EXAM:  General: NAD, AAO x3  No JVD  Respiratory: b/l air entry, clear  Cardiovascular: S1 S2 reg  Gastrointestinal: soft, nontense ascites  Extremities: no legs or presacral edema     CBC Full  -  ( 19 Feb 2019 07:39 )  WBC Count : 12.90 K/uL  Hemoglobin : 10.9 g/dL  Hematocrit : 32.6 %  Platelet Count - Automated : 472 K/uL  Mean Cell Volume : 103.8 fl  Mean Cell Hemoglobin : 34.7 pg  Mean Cell Hemoglobin Concentration : 33.4 gm/dL  Auto Neutrophil # : x  Auto Lymphocyte # : x  Auto Monocyte # : x  Auto Eosinophil # : x  Auto Basophil # : x  Auto Neutrophil % : x  Auto Lymphocyte % : x  Auto Monocyte % : x  Auto Eosinophil % : x  Auto Basophil % : x    02-19    133<L>  |  99  |  47<H>  ----------------------------<  92  4.8   |  22  |  2.32<H>    Ca    8.2<L>      19 Feb 2019 07:39  Phos  4.4     02-19  Mg     1.8     02-19    TPro  7.1  /  Alb  2.2<L>  /  TBili  4.0<H>  /  DBili  3.08<H>  /  AST  72<H>  /  ALT  31  /  AlkPhos  615<H>  02-19        Sodium, Serum: 133 (02-19 @ 07:39)  Sodium, Serum: 134 (02-18 @ 08:28)  Sodium, Serum: 128 (02-17 @ 07:25)    Creatinine, Serum: 2.32 (02-19 @ 07:39)  Creatinine, Serum: 2.42 (02-18 @ 08:28)  Creatinine, Serum: 2.22 (02-17 @ 07:25)    Potassium, Serum: 4.8 (02-19 @ 07:39)  Potassium, Serum: 4.2 (02-18 @ 08:28)  Potassium, Serum: 4.5 (02-17 @ 07:25)    Hemoglobin: 10.9 (02-19 @ 07:39)  Hemoglobin: 10.6 (02-18 @ 08:28)  Hemoglobin: 10.6 (02-17 @ 07:25)

## 2019-02-19 NOTE — PROGRESS NOTE ADULT - SUBJECTIVE AND OBJECTIVE BOX
INTERVAL History:  Abdominal Distension.    Allergies    No Known Allergies    Intolerances        MEDICATIONS  (STANDING):  folic acid 1 milliGRAM(s) Oral daily  hydrocortisone 1% Ointment 1 Application(s) Topical two times a day  midodrine 5 milliGRAM(s) Oral every 8 hours  multivitamin 1 Tablet(s) Oral daily  nicotine -  14 mG/24Hr(s) Patch 1 patch Transdermal daily  pantoprazole   Suspension 40 milliGRAM(s) Oral before lunch  rifaximin 550 milliGRAM(s) Oral two times a day  thiamine 100 milliGRAM(s) Oral daily    MEDICATIONS  (PRN):  hydrOXYzine hydrochloride 25 milliGRAM(s) Oral every 6 hours PRN Itching  zinc oxide 40% Ointment 1 Application(s) Topical every 8 hours PRN rash      Vital Signs Last 24 Hrs  T(C): 36.7 (19 Feb 2019 05:09), Max: 37.1 (18 Feb 2019 23:21)  T(F): 98.1 (19 Feb 2019 05:09), Max: 98.8 (18 Feb 2019 23:21)  HR: 83 (19 Feb 2019 05:09) (74 - 100)  BP: 106/75 (19 Feb 2019 05:09) (96/68 - 106/75)  BP(mean): --  RR: 16 (19 Feb 2019 05:09) (16 - 17)  SpO2: 100% (19 Feb 2019 05:09) (94% - 100%)    PHYSICAL EXAM:      EYES: EOMI, PERRLA, conjunctiva and sclera clear  NECK: Supple, No JVD, Normal thyroid  CHEST/LUNG: Clear to percussion bilaterally; No rales, rhonchi,   HEART: Regular rate and rhythm;   ABDOMEN:   Ascites:-+++      LABS:                        10.9   12.90 )-----------( 472      ( 19 Feb 2019 07:39 )             32.6     02-19    133<L>  |  99  |  47<H>  ----------------------------<  92  4.8   |  22  |  2.32<H>    Ca    8.2<L>      19 Feb 2019 07:39  Phos  4.4     02-19  Mg     1.8     02-19    TPro  7.1  /  Alb  2.2<L>  /  TBili  4.0<H>  /  DBili  3.08<H>  /  AST  72<H>  /  ALT  31  /  AlkPhos  615<H>  02-19            RADIOLOGY & ADDITIONAL STUDIES:    PATHOLOGY:

## 2019-02-22 DIAGNOSIS — K72.00 ACUTE AND SUBACUTE HEPATIC FAILURE WITHOUT COMA: ICD-10-CM

## 2019-02-22 DIAGNOSIS — K70.31 ALCOHOLIC CIRRHOSIS OF LIVER WITH ASCITES: ICD-10-CM

## 2019-02-22 DIAGNOSIS — N17.0 ACUTE KIDNEY FAILURE WITH TUBULAR NECROSIS: ICD-10-CM

## 2019-02-22 DIAGNOSIS — R65.21 SEVERE SEPSIS WITH SEPTIC SHOCK: ICD-10-CM

## 2019-02-22 DIAGNOSIS — R26.81 UNSTEADINESS ON FEET: ICD-10-CM

## 2019-02-22 DIAGNOSIS — F10.220 ALCOHOL DEPENDENCE WITH INTOXICATION, UNCOMPLICATED: ICD-10-CM

## 2019-02-22 DIAGNOSIS — A41.9 SEPSIS, UNSPECIFIED ORGANISM: ICD-10-CM

## 2019-02-22 DIAGNOSIS — Z66 DO NOT RESUSCITATE: ICD-10-CM

## 2019-02-22 DIAGNOSIS — F17.210 NICOTINE DEPENDENCE, CIGARETTES, UNCOMPLICATED: ICD-10-CM

## 2019-02-22 DIAGNOSIS — H15.89 OTHER DISORDERS OF SCLERA: ICD-10-CM

## 2019-02-22 DIAGNOSIS — K76.7 HEPATORENAL SYNDROME: ICD-10-CM

## 2019-02-22 DIAGNOSIS — E86.0 DEHYDRATION: ICD-10-CM

## 2019-02-22 DIAGNOSIS — L40.9 PSORIASIS, UNSPECIFIED: ICD-10-CM

## 2019-02-22 DIAGNOSIS — F06.31 MOOD DISORDER DUE TO KNOWN PHYSIOLOGICAL CONDITION WITH DEPRESSIVE FEATURES: ICD-10-CM

## 2019-02-22 DIAGNOSIS — E87.1 HYPO-OSMOLALITY AND HYPONATREMIA: ICD-10-CM

## 2019-02-22 DIAGNOSIS — K70.11 ALCOHOLIC HEPATITIS WITH ASCITES: ICD-10-CM

## 2019-02-22 DIAGNOSIS — N39.0 URINARY TRACT INFECTION, SITE NOT SPECIFIED: ICD-10-CM

## 2019-02-22 DIAGNOSIS — D68.9 COAGULATION DEFECT, UNSPECIFIED: ICD-10-CM

## 2019-02-22 DIAGNOSIS — K92.1 MELENA: ICD-10-CM

## 2019-02-22 DIAGNOSIS — K65.0 GENERALIZED (ACUTE) PERITONITIS: ICD-10-CM

## 2019-02-22 DIAGNOSIS — E43 UNSPECIFIED SEVERE PROTEIN-CALORIE MALNUTRITION: ICD-10-CM

## 2019-02-22 DIAGNOSIS — I10 ESSENTIAL (PRIMARY) HYPERTENSION: ICD-10-CM

## 2019-02-22 DIAGNOSIS — E87.2 ACIDOSIS: ICD-10-CM

## 2019-02-22 DIAGNOSIS — F10.230 ALCOHOL DEPENDENCE WITH WITHDRAWAL, UNCOMPLICATED: ICD-10-CM

## 2019-03-18 ENCOUNTER — INPATIENT (INPATIENT)
Facility: HOSPITAL | Age: 49
LOS: 2 days | Discharge: HOME HEALTH | End: 2019-03-21
Attending: INTERNAL MEDICINE | Admitting: INTERNAL MEDICINE
Payer: COMMERCIAL

## 2019-03-18 ENCOUNTER — RESULT REVIEW (OUTPATIENT)
Age: 49
End: 2019-03-18

## 2019-03-18 VITALS
HEIGHT: 65 IN | SYSTOLIC BLOOD PRESSURE: 106 MMHG | DIASTOLIC BLOOD PRESSURE: 83 MMHG | RESPIRATION RATE: 26 BRPM | WEIGHT: 164.91 LBS | HEART RATE: 125 BPM | TEMPERATURE: 98 F | OXYGEN SATURATION: 98 %

## 2019-03-18 DIAGNOSIS — F10.10 ALCOHOL ABUSE, UNCOMPLICATED: ICD-10-CM

## 2019-03-18 DIAGNOSIS — N18.9 CHRONIC KIDNEY DISEASE, UNSPECIFIED: ICD-10-CM

## 2019-03-18 DIAGNOSIS — R18.8 OTHER ASCITES: ICD-10-CM

## 2019-03-18 DIAGNOSIS — E11.22 TYPE 2 DIABETES MELLITUS WITH DIABETIC CHRONIC KIDNEY DISEASE: ICD-10-CM

## 2019-03-18 LAB
ALBUMIN SERPL ELPH-MCNC: 2 G/DL — LOW (ref 3.3–5)
ALP SERPL-CCNC: 873 U/L — HIGH (ref 40–120)
ALT FLD-CCNC: 31 U/L — SIGNIFICANT CHANGE UP (ref 12–78)
AMYLASE P1 CFR SERPL: 34 U/L — SIGNIFICANT CHANGE UP (ref 25–115)
ANION GAP SERPL CALC-SCNC: 11 MMOL/L — SIGNIFICANT CHANGE UP (ref 5–17)
APTT BLD: 36.7 SEC — SIGNIFICANT CHANGE UP (ref 28.5–37)
AST SERPL-CCNC: 49 U/L — HIGH (ref 15–37)
BASE EXCESS BLDA CALC-SCNC: -4.5 MMOL/L — LOW (ref -2–2)
BASOPHILS # BLD AUTO: 0.09 K/UL — SIGNIFICANT CHANGE UP (ref 0–0.2)
BASOPHILS NFR BLD AUTO: 0.4 % — SIGNIFICANT CHANGE UP (ref 0–2)
BILIRUB SERPL-MCNC: 2 MG/DL — HIGH (ref 0.2–1.2)
BLOOD GAS COMMENTS: SIGNIFICANT CHANGE UP
BLOOD GAS COMMENTS: SIGNIFICANT CHANGE UP
BLOOD GAS SOURCE: SIGNIFICANT CHANGE UP
BUN SERPL-MCNC: 39 MG/DL — HIGH (ref 7–23)
CALCIUM SERPL-MCNC: 8.2 MG/DL — LOW (ref 8.5–10.1)
CHLORIDE SERPL-SCNC: 101 MMOL/L — SIGNIFICANT CHANGE UP (ref 96–108)
CK MB BLD-MCNC: 4 % — HIGH (ref 0–3.5)
CK MB CFR SERPL CALC: 1 NG/ML — SIGNIFICANT CHANGE UP (ref 0.5–3.6)
CK SERPL-CCNC: 25 U/L — LOW (ref 26–308)
CO2 SERPL-SCNC: 21 MMOL/L — LOW (ref 22–31)
CREAT SERPL-MCNC: 2.8 MG/DL — HIGH (ref 0.5–1.3)
EOSINOPHIL # BLD AUTO: 0.35 K/UL — SIGNIFICANT CHANGE UP (ref 0–0.5)
EOSINOPHIL NFR BLD AUTO: 1.7 % — SIGNIFICANT CHANGE UP (ref 0–6)
GLUCOSE SERPL-MCNC: 155 MG/DL — HIGH (ref 70–99)
HCO3 BLDA-SCNC: 18 MMOL/L — LOW (ref 21–29)
HCT VFR BLD CALC: 34.9 % — LOW (ref 39–50)
HGB BLD-MCNC: 11.5 G/DL — LOW (ref 13–17)
HOROWITZ INDEX BLDA+IHG-RTO: 21 — SIGNIFICANT CHANGE UP
IMM GRANULOCYTES NFR BLD AUTO: 0.5 % — SIGNIFICANT CHANGE UP (ref 0–1.5)
INR BLD: 1.87 RATIO — HIGH (ref 0.88–1.16)
LACTATE SERPL-SCNC: 4 MMOL/L — CRITICAL HIGH (ref 0.7–2)
LIDOCAIN IGE QN: 117 U/L — SIGNIFICANT CHANGE UP (ref 73–393)
LYMPHOCYTES # BLD AUTO: 1.54 K/UL — SIGNIFICANT CHANGE UP (ref 1–3.3)
LYMPHOCYTES # BLD AUTO: 7.6 % — LOW (ref 13–44)
MCHC RBC-ENTMCNC: 31.5 PG — SIGNIFICANT CHANGE UP (ref 27–34)
MCHC RBC-ENTMCNC: 33 GM/DL — SIGNIFICANT CHANGE UP (ref 32–36)
MCV RBC AUTO: 95.6 FL — SIGNIFICANT CHANGE UP (ref 80–100)
MONOCYTES # BLD AUTO: 1.07 K/UL — HIGH (ref 0–0.9)
MONOCYTES NFR BLD AUTO: 5.2 % — SIGNIFICANT CHANGE UP (ref 2–14)
NEUTROPHILS # BLD AUTO: 17.23 K/UL — HIGH (ref 1.8–7.4)
NEUTROPHILS NFR BLD AUTO: 84.6 % — HIGH (ref 43–77)
NRBC # BLD: 0 /100 WBCS — SIGNIFICANT CHANGE UP (ref 0–0)
NT-PROBNP SERPL-SCNC: 874 PG/ML — HIGH (ref 0–125)
PCO2 BLDA: 28 MMHG — LOW (ref 32–46)
PH BLD: 7.44 — SIGNIFICANT CHANGE UP (ref 7.35–7.45)
PLATELET # BLD AUTO: 512 K/UL — HIGH (ref 150–400)
PO2 BLDA: 55 MMHG — LOW (ref 74–108)
POTASSIUM SERPL-MCNC: 4.2 MMOL/L — SIGNIFICANT CHANGE UP (ref 3.5–5.3)
POTASSIUM SERPL-SCNC: 4.2 MMOL/L — SIGNIFICANT CHANGE UP (ref 3.5–5.3)
PROT SERPL-MCNC: 7.5 GM/DL — SIGNIFICANT CHANGE UP (ref 6–8.3)
PROTHROM AB SERPL-ACNC: 21.4 SEC — HIGH (ref 10–12.9)
RBC # BLD: 3.65 M/UL — LOW (ref 4.2–5.8)
RBC # FLD: 14.1 % — SIGNIFICANT CHANGE UP (ref 10.3–14.5)
SAO2 % BLDA: 86 % — LOW (ref 92–96)
SODIUM SERPL-SCNC: 133 MMOL/L — LOW (ref 135–145)
TROPONIN I SERPL-MCNC: <.015 NG/ML — SIGNIFICANT CHANGE UP (ref 0.01–0.04)
WBC # BLD: 20.39 K/UL — HIGH (ref 3.8–10.5)
WBC # FLD AUTO: 20.39 K/UL — HIGH (ref 3.8–10.5)

## 2019-03-18 PROCEDURE — 88112 CYTOPATH CELL ENHANCE TECH: CPT | Mod: 26

## 2019-03-18 PROCEDURE — 71045 X-RAY EXAM CHEST 1 VIEW: CPT | Mod: 26

## 2019-03-18 PROCEDURE — 99223 1ST HOSP IP/OBS HIGH 75: CPT

## 2019-03-18 PROCEDURE — 88305 TISSUE EXAM BY PATHOLOGIST: CPT | Mod: 26

## 2019-03-18 PROCEDURE — 99285 EMERGENCY DEPT VISIT HI MDM: CPT

## 2019-03-18 RX ORDER — PANTOPRAZOLE SODIUM 20 MG/1
40 TABLET, DELAYED RELEASE ORAL
Qty: 0 | Refills: 0 | Status: DISCONTINUED | OUTPATIENT
Start: 2019-03-18 | End: 2019-03-21

## 2019-03-18 RX ORDER — FOLIC ACID 0.8 MG
1 TABLET ORAL DAILY
Qty: 0 | Refills: 0 | Status: DISCONTINUED | OUTPATIENT
Start: 2019-03-18 | End: 2019-03-21

## 2019-03-18 RX ORDER — PIPERACILLIN AND TAZOBACTAM 4; .5 G/20ML; G/20ML
3.38 INJECTION, POWDER, LYOPHILIZED, FOR SOLUTION INTRAVENOUS ONCE
Qty: 0 | Refills: 0 | Status: COMPLETED | OUTPATIENT
Start: 2019-03-18 | End: 2019-03-18

## 2019-03-18 RX ORDER — MIDODRINE HYDROCHLORIDE 2.5 MG/1
5 TABLET ORAL EVERY 8 HOURS
Qty: 0 | Refills: 0 | Status: DISCONTINUED | OUTPATIENT
Start: 2019-03-18 | End: 2019-03-19

## 2019-03-18 RX ORDER — METRONIDAZOLE 500 MG
500 TABLET ORAL ONCE
Qty: 0 | Refills: 0 | Status: COMPLETED | OUTPATIENT
Start: 2019-03-18 | End: 2019-03-18

## 2019-03-18 RX ORDER — THIAMINE MONONITRATE (VIT B1) 100 MG
100 TABLET ORAL DAILY
Qty: 0 | Refills: 0 | Status: DISCONTINUED | OUTPATIENT
Start: 2019-03-18 | End: 2019-03-21

## 2019-03-18 RX ADMIN — PIPERACILLIN AND TAZOBACTAM 200 GRAM(S): 4; .5 INJECTION, POWDER, LYOPHILIZED, FOR SOLUTION INTRAVENOUS at 21:46

## 2019-03-18 RX ADMIN — Medication 100 MILLIGRAM(S): at 19:47

## 2019-03-18 NOTE — ED ADULT NURSE NOTE - NSIMPLEMENTINTERV_GEN_ALL_ED
Implemented All Universal Safety Interventions:  [place] to call system. Call bell, personal items and telephone within reach. Instruct patient to call for assistance. Room bathroom lighting operational. Non-slip footwear when patient is off stretcher. Physically safe environment: no spills, clutter or unnecessary equipment. Stretcher in lowest position, wheels locked, appropriate side rails in place.

## 2019-03-18 NOTE — H&P ADULT - NSHPLABSRESULTS_GEN_ALL_CORE
LABS:                        11.5   20.39 )-----------( 512      ( 18 Mar 2019 18:26 )             34.9     03-18    133<L>  |  101  |  39<H>  ----------------------------<  155<H>  4.2   |  21<L>  |  2.80<H>    Ca    8.2<L>      18 Mar 2019 18:26    TPro  7.5  /  Alb  2.0<L>  /  TBili  2.0<H>  /  DBili  x   /  AST  49<H>  /  ALT  31  /  AlkPhos  873<H>  03-18    PT/INR - ( 18 Mar 2019 18:26 )   PT: 21.4 sec;   INR: 1.87 ratio         PTT - ( 18 Mar 2019 18:26 )  PTT:36.7 sec    CAPILLARY BLOOD GLUCOSE  Lactate, Blood (03.18.19 @ 18:26)    Lactate, Blood: 4.0: mmol/L  Troponin I, Serum: <.015:  ng/mL (03.18.19 @ 18:26)  Serum Pro-Brain Natriuretic Peptide: 874 pg/mL (03.18.19 @ 18:26)                RADIOLOGY & ADDITIONAL TESTS:  CXR: elevated diaphragms, atelectasis    Imaging Personally Reviewed:  [x ] YES  [ ] NO  EKG: sinus@104 left axis, non spec ST T changes

## 2019-03-18 NOTE — CONSULT NOTE ADULT - SUBJECTIVE AND OBJECTIVE BOX
CC:  Patient is a 48y old  Male who presents with a chief complaint of abdominal ascites     HPI:   47 yo M PMH ETOH abuse with cirrhosis and recurrent abdominal ascites, chronically elevated wbc presents with SOB, increasing abdominal girth since discharge from [facility] two weeks ago. Patient denies fever, chills, diarrhea, cough, nausea, vomiting. ICU c/s called essentially for elevated LA and moderate abdominal ascites requiring a paracentesis. Pt was advised to go to the ER for drainage of adb was examined and assed at the bedside and was in NAD, normotensive, afebrile, otherwise HD stable. Pt c/o epigastric pain upon palpation. and BL LE swelling with notable 3+ pitting edema.  Pt does not require critical care at this time. A paracentesis should be in the plan of care on admission, but is not required urgently. Elevated LA is likely a type B in nature give underlying hepatic cirrhosis and is not a marker of hypoperfusion Pt distal extremities are warm and with adequate capillary refill and normotensive. Pt can be admitted to [facility] with plan for paracentesis by GI team or IR.     PAST MEDICAL & SURGICAL HISTORY:  ETOH abuse  Psoriasis  History of hypertension  No significant past surgical history    Allergies    No Known Allergies    Intolerances      FAMILY HISTORY:  No pertinent family history in first degree relatives      Review of Systems:  CONSTITUTIONAL: No fever, chills, or fatigue  EYES: No eye pain, visual disturbances, or discharge  ENMT:  No difficulty hearing, tinnitus, vertigo; No sinus or throat pain  NECK: No pain or stiffness  RESPIRATORY: No cough, wheezing, chills or hemoptysis; No shortness of breath  CARDIOVASCULAR: No chest pain, palpitations, dizziness, or leg swelling  GASTROINTESTINAL: No abdominal or epigastric pain. No nausea, vomiting, or hematemesis; No diarrhea or constipation. No melena or hematochezia.  GENITOURINARY: No dysuria, frequency, hematuria, or incontinence  NEUROLOGICAL: No headaches, memory loss, loss of strength, numbness, or tremors  SKIN: No itching, burning, rashes, or lesions   MUSCULOSKELETAL: No joint pain or swelling; No muscle, back, or extremity pain  PSYCHIATRIC: No depression, anxiety, mood swings, or difficulty sleeping      Medications:    ICU Vital Signs Last 24 Hrs  T(C): 36.6 (18 Mar 2019 17:20), Max: 36.6 (18 Mar 2019 17:20)  T(F): 97.8 (18 Mar 2019 17:20), Max: 97.8 (18 Mar 2019 17:20)  HR: 125 (18 Mar 2019 17:20) (125 - 125)  BP: 106/83 (18 Mar 2019 17:20) (106/83 - 106/83)  BP(mean): --  ABP: --  ABP(mean): --  RR: 26 (18 Mar 2019 17:20) (26 - 26)  SpO2: 98% (18 Mar 2019 17:20) (98% - 98%)    Vital Signs Last 24 Hrs  T(C): 36.6 (18 Mar 2019 17:20), Max: 36.6 (18 Mar 2019 17:20)  T(F): 97.8 (18 Mar 2019 17:20), Max: 97.8 (18 Mar 2019 17:20)  HR: 125 (18 Mar 2019 17:20) (125 - 125)  BP: 106/83 (18 Mar 2019 17:20) (106/83 - 106/83)  BP(mean): --  RR: 26 (18 Mar 2019 17:20) (26 - 26)  SpO2: 98% (18 Mar 2019 17:20) (98% - 98%)    ABG - ( 18 Mar 2019 18:12 )  pH, Arterial: x     pH, Blood: 7.44  /  pCO2: 28    /  pO2: 55    / HCO3: 18    / Base Excess: -4.5  /  SaO2: 86                  I&O's Detail        LABS:                        11.5   20.39 )-----------( 512      ( 18 Mar 2019 18:26 )             34.9     03-18    133<L>  |  101  |  39<H>  ----------------------------<  155<H>  4.2   |  21<L>  |  2.80<H>    Ca    8.2<L>      18 Mar 2019 18:26    TPro  7.5  /  Alb  2.0<L>  /  TBili  2.0<H>  /  DBili  x   /  AST  49<H>  /  ALT  31  /  AlkPhos  873<H>  03-18      CARDIAC MARKERS ( 18 Mar 2019 18:26 )  <.015 ng/mL / x     / 25 U/L / x     / 1.0 ng/mL      CAPILLARY BLOOD GLUCOSE        PT/INR - ( 18 Mar 2019 18:26 )   PT: 21.4 sec;   INR: 1.87 ratio         PTT - ( 18 Mar 2019 18:26 )  PTT:36.7 sec    CULTURES:        Physical Examination:  General: No acute distress.  Alert, oriented, interactive, nonfocal  NEURO: A&O X3, motor function 5/5 BL UE/LE  HEENT: Pupils equal, reactive to light.  Symmetric.  PULM: CTA BL, no significant sputum production, no wheezes, rales, rhonchi  CVS: Regular rate and rhythm, no murmurs, rubs, or gallops  ABD: Soft, nondistended, nontender, normoactive bowel sounds, no masses  EXT: No edema, nontender  SKIN: Warm and well perfused, no rashes noted      EKG: ***    RADIOLOGY: ***    POCUS:          LUNG:               (+/-) A-line  (+/-) B-line predominantly anteriorly              (+/-) Effusions, (+/-) infiltrate, (+/-) atelectasis, (+/-) B-lines at bases, (+/-) curtain's sign               (+/-)  Lung sliding          CARDIAC:               LV contractility/EF WNL, (+/-)LVH              (+/-) atrial enlargement               parasternal short view concentric w/o wall motion abnormality              (+/-) signs of RV strain, (+/-) septal flattening/D'ing, (+/-) Suarez's sign               (+/-) pericardial effusion               (+/-) valvular dz or notable vegetations               5 chamber VTI ***              Subcostal view IVC ***cm             ABDOMEN:              (+/-) abdominal ascites              (+/-) hydronephrosis          DVT STUDY: (+/-) noted thrombus [X] common femoral [X] deep femoral [X] popliteal       CENTRAL LINE: N          DATE INSERTED:                  GILLESPIE: N                        DATE INSERTED:                  A-LINE: N                       DATE INSERTED:                  GLOBAL ISSUE/BEST PRACTICE:  Analgesia: N/A  Sedation: N/A  HOB elevation: yes  Stress ulcer prophylaxis: protonix   VTE prophylaxis: SCD/Heparin SQ/Lovenox  Glycemic control: N/A  Nutrition: NPO/Diet/TF      CODE STATUS: Full Code  GOC discussion: Y      Critical Care time: 40 mins assessing presenting problems of acute illness that poses high probability of life threatening deterioration or end organ damage/dysfunction.  Medical decision making including Initiating plan of care, reviewing data, reviewing radiology, direct patient bedside evaluation and interpretation of vital signs, any necessary ventilator management , discussion with multidisciplinary team, discussing goals of care with patient/family, all non inclusive of procedures     Care discussed with bedside provider and eICU attending. If any additional assistance in care/management of patient required by bedside team, provider/RN/family member advised to push "e-alert" button in patient's room, and details will be discussed at that time. CC:  Patient is a 48y old  Male who presents with a chief complaint of abdominal ascites     HPI:   49 yo M PMH ETOH abuse with cirrhosis and recurrent abdominal ascites, chronically elevated wbc presents with SOB, increasing abdominal girth since discharge from [facility] two weeks ago. Patient denies fever, chills, diarrhea, cough, nausea, vomiting. ICU c/s called essentially for elevated LA and moderate abdominal ascites requiring a paracentesis. Pt was advised to go to the ER for drainage of adb was examined and assed at the bedside and was in NAD, normotensive, afebrile, otherwise HD stable. Pt c/o epigastric pain upon palpation. and BL LE swelling with notable 3+ pitting edema.  Pt does not require critical care at this time. A paracentesis should be in the plan of care on admission, but is not required urgently. Elevated LA is likely a type B in nature give underlying hepatic cirrhosis and is not a marker of hypoperfusion Pt distal extremities are warm and with adequate capillary refill and normotensive. Pt can be admitted to [facility] with plan for paracentesis by GI team or IR. Emergent paracentesis     PAST MEDICAL & SURGICAL HISTORY:  ETOH abuse  Psoriasis  History of hypertension  No significant past surgical history    Allergies    No Known Allergies    Intolerances      FAMILY HISTORY:  No pertinent family history in first degree relatives      Review of Systems:  CONSTITUTIONAL: No fever, chills, or fatigue  EYES: No eye pain, visual disturbances, or discharge  ENMT:  No difficulty hearing, tinnitus, vertigo; No sinus or throat pain  NECK: No pain or stiffness  RESPIRATORY: No cough, wheezing, chills or hemoptysis; No shortness of breath  CARDIOVASCULAR: No chest pain, palpitations, dizziness, or leg swelling  GASTROINTESTINAL: No abdominal or epigastric pain. No nausea, vomiting, or hematemesis; No diarrhea or constipation. No melena or hematochezia.  GENITOURINARY: No dysuria, frequency, hematuria, or incontinence  NEUROLOGICAL: No headaches, memory loss, loss of strength, numbness, or tremors  SKIN: No itching, burning, rashes, or lesions   MUSCULOSKELETAL: No joint pain or swelling; No muscle, back, or extremity pain  PSYCHIATRIC: No depression, anxiety, mood swings, or difficulty sleeping      Medications:    ICU Vital Signs Last 24 Hrs  T(C): 36.6 (18 Mar 2019 17:20), Max: 36.6 (18 Mar 2019 17:20)  T(F): 97.8 (18 Mar 2019 17:20), Max: 97.8 (18 Mar 2019 17:20)  HR: 125 (18 Mar 2019 17:20) (125 - 125)  BP: 106/83 (18 Mar 2019 17:20) (106/83 - 106/83)  BP(mean): --  ABP: --  ABP(mean): --  RR: 26 (18 Mar 2019 17:20) (26 - 26)  SpO2: 98% (18 Mar 2019 17:20) (98% - 98%)    Vital Signs Last 24 Hrs  T(C): 36.6 (18 Mar 2019 17:20), Max: 36.6 (18 Mar 2019 17:20)  T(F): 97.8 (18 Mar 2019 17:20), Max: 97.8 (18 Mar 2019 17:20)  HR: 125 (18 Mar 2019 17:20) (125 - 125)  BP: 106/83 (18 Mar 2019 17:20) (106/83 - 106/83)  BP(mean): --  RR: 26 (18 Mar 2019 17:20) (26 - 26)  SpO2: 98% (18 Mar 2019 17:20) (98% - 98%)    ABG - ( 18 Mar 2019 18:12 )  pH, Arterial: x     pH, Blood: 7.44  /  pCO2: 28    /  pO2: 55    / HCO3: 18    / Base Excess: -4.5  /  SaO2: 86                  I&O's Detail        LABS:                        11.5   20.39 )-----------( 512      ( 18 Mar 2019 18:26 )             34.9     03-18    133<L>  |  101  |  39<H>  ----------------------------<  155<H>  4.2   |  21<L>  |  2.80<H>    Ca    8.2<L>      18 Mar 2019 18:26    TPro  7.5  /  Alb  2.0<L>  /  TBili  2.0<H>  /  DBili  x   /  AST  49<H>  /  ALT  31  /  AlkPhos  873<H>  03-18      CARDIAC MARKERS ( 18 Mar 2019 18:26 )  <.015 ng/mL / x     / 25 U/L / x     / 1.0 ng/mL      CAPILLARY BLOOD GLUCOSE        PT/INR - ( 18 Mar 2019 18:26 )   PT: 21.4 sec;   INR: 1.87 ratio         PTT - ( 18 Mar 2019 18:26 )  PTT:36.7 sec    CULTURES:        Physical Examination:  General: No acute distress.  Alert, oriented, interactive, nonfocal  NEURO: A&O X3, motor function 5/5 BL UE/LE  HEENT: Pupils equal, reactive to light.  Symmetric.  PULM: CTA BL, no significant sputum production, no wheezes, rales, rhonchi  CVS: Regular rate and rhythm, no murmurs, rubs, or gallops  ABD: Soft, nondistended, nontender, normoactive bowel sounds, no masses  EXT: No edema, nontender  SKIN: Warm and well perfused, no rashes noted      EKG: ***    RADIOLOGY: ***    POCUS:          LUNG:               (+/-) A-line  (+/-) B-line predominantly anteriorly              (+/-) Effusions, (+/-) infiltrate, (+/-) atelectasis, (+/-) B-lines at bases, (+/-) curtain's sign               (+/-)  Lung sliding          CARDIAC:               LV contractility/EF WNL, (+/-)LVH              (+/-) atrial enlargement               parasternal short view concentric w/o wall motion abnormality              (+/-) signs of RV strain, (+/-) septal flattening/D'ing, (+/-) Suarez's sign               (+/-) pericardial effusion               (+/-) valvular dz or notable vegetations               5 chamber VTI ***              Subcostal view IVC ***cm             ABDOMEN:              (+/-) abdominal ascites              (+/-) hydronephrosis          DVT STUDY: (+/-) noted thrombus [X] common femoral [X] deep femoral [X] popliteal       CENTRAL LINE: N          DATE INSERTED:                  GILLESPIE: N                        DATE INSERTED:                  A-LINE: N                       DATE INSERTED:                  GLOBAL ISSUE/BEST PRACTICE:  Analgesia: N/A  Sedation: N/A  HOB elevation: yes  Stress ulcer prophylaxis: protonix   VTE prophylaxis: SCD/Heparin SQ/Lovenox  Glycemic control: N/A  Nutrition: NPO/Diet/TF      CODE STATUS: Full Code  GOC discussion: Y      Critical Care time: 40 mins assessing presenting problems of acute illness that poses high probability of life threatening deterioration or end organ damage/dysfunction.  Medical decision making including Initiating plan of care, reviewing data, reviewing radiology, direct patient bedside evaluation and interpretation of vital signs, any necessary ventilator management , discussion with multidisciplinary team, discussing goals of care with patient/family, all non inclusive of procedures     Care discussed with bedside provider and eICU attending. If any additional assistance in care/management of patient required by bedside team, provider/RN/family member advised to push "e-alert" button in patient's room, and details will be discussed at that time. CC:  Patient is a 48y old  Male who presents with a chief complaint of abdominal ascites     HPI:   47 yo M PMH ETOH abuse with cirrhosis and recurrent abdominal ascites, chronically elevated wbc presents with SOB, increasing abdominal girth since discharge from [facility] two weeks ago. Patient denies fever, chills, diarrhea, cough, nausea, vomiting. ICU c/s called essentially for elevated LA and moderate abdominal ascites requiring a paracentesis. Pt was advised to go to the ER for drainage of adb was examined and assed at the bedside and was in NAD, normotensive, afebrile, otherwise HD stable. Pt c/o epigastric pain upon palpation. and BL LE swelling with notable 3+ pitting edema.  Pt does not require critical care at this time. A paracentesis should be in the plan of care on admission, but is not required urgently. Elevated LA is likely a type B in nature give underlying hepatic cirrhosis and is not a marker of hypoperfusion Pt distal extremities are warm and with adequate capillary refill and normotensive. Pt can be admitted to [facility] with plan for paracentesis by GI team or IR. Emergent paracentesis not required at this time. Would advise against aggressive fluid administration and would recommend diuresis with aldactone and lasix, piggy back'd with intermittent doses  of albumin     PAST MEDICAL & SURGICAL HISTORY:  ETOH abuse  Psoriasis  History of hypertension  No significant past surgical history    Allergies    No Known Allergies    Intolerances      FAMILY HISTORY:  No pertinent family history in first degree relatives      Review of Systems:  CONSTITUTIONAL: No fever, chills, or fatigue  EYES: No eye pain, visual disturbances, or discharge  ENMT:  No difficulty hearing, tinnitus, vertigo; No sinus or throat pain  NECK: No pain or stiffness  RESPIRATORY: No cough, wheezing, chills or hemoptysis; No shortness of breath  CARDIOVASCULAR: No chest pain, palpitations, dizziness, + LE  leg swelling  GASTROINTESTINAL: +epigastric pain. No nausea, vomiting, or hematemesis; No diarrhea or constipation. No melena or hematochezia.  GENITOURINARY: No dysuria, frequency, hematuria, or incontinence  NEUROLOGICAL: No headaches, memory loss, loss of strength, numbness, or tremors  SKIN: No itching, burning, rashes, or lesions   MUSCULOSKELETAL: No joint pain or swelling; No muscle, back, or extremity pain  PSYCHIATRIC: No depression, anxiety, mood swings, or difficulty sleeping      Medications:    ICU Vital Signs Last 24 Hrs  T(C): 36.6 (18 Mar 2019 17:20), Max: 36.6 (18 Mar 2019 17:20)  T(F): 97.8 (18 Mar 2019 17:20), Max: 97.8 (18 Mar 2019 17:20)  HR: 125 (18 Mar 2019 17:20) (125 - 125)  BP: 106/83 (18 Mar 2019 17:20) (106/83 - 106/83)  BP(mean): --  ABP: --  ABP(mean): --  RR: 26 (18 Mar 2019 17:20) (26 - 26)  SpO2: 98% (18 Mar 2019 17:20) (98% - 98%)    Vital Signs Last 24 Hrs  T(C): 36.6 (18 Mar 2019 17:20), Max: 36.6 (18 Mar 2019 17:20)  T(F): 97.8 (18 Mar 2019 17:20), Max: 97.8 (18 Mar 2019 17:20)  HR: 125 (18 Mar 2019 17:20) (125 - 125)  BP: 106/83 (18 Mar 2019 17:20) (106/83 - 106/83)  BP(mean): --  RR: 26 (18 Mar 2019 17:20) (26 - 26)  SpO2: 98% (18 Mar 2019 17:20) (98% - 98%)    ABG - ( 18 Mar 2019 18:12 )  pH, Arterial: x     pH, Blood: 7.44  /  pCO2: 28    /  pO2: 55    / HCO3: 18    / Base Excess: -4.5  /  SaO2: 86                  I&O's Detail        LABS:                        11.5   20.39 )-----------( 512      ( 18 Mar 2019 18:26 )             34.9     03-18    133<L>  |  101  |  39<H>  ----------------------------<  155<H>  4.2   |  21<L>  |  2.80<H>    Ca    8.2<L>      18 Mar 2019 18:26    TPro  7.5  /  Alb  2.0<L>  /  TBili  2.0<H>  /  DBili  x   /  AST  49<H>  /  ALT  31  /  AlkPhos  873<H>  03-18      CARDIAC MARKERS ( 18 Mar 2019 18:26 )  <.015 ng/mL / x     / 25 U/L / x     / 1.0 ng/mL      CAPILLARY BLOOD GLUCOSE        PT/INR - ( 18 Mar 2019 18:26 )   PT: 21.4 sec;   INR: 1.87 ratio         PTT - ( 18 Mar 2019 18:26 )  PTT:36.7 sec    CULTURES:        Physical Examination:  General: No acute distress.  Alert, oriented, interactive, nonfocal  NEURO: A&O X3, motor function 5/5 BL UE/LE  HEENT: Pupils equal, reactive to light.  Symmetric.  PULM: CTA BL, no significant sputum production, no wheezes, rales, rhonchi  CVS: Regular rate and rhythm, no murmurs, rubs, or gallops  ABD: abdominal distended, +tenderness to palpation in epigastric region  normoactive bowel sounds, no masses  EXT: + 3+ BL LE pitting edema   SKIN: Warm and well perfused, no rashes noted      EKG: NSR 65    RADIOLOGY: N/A    CENTRAL LINE: N          DATE INSERTED:                  GILLESPIE: N                        DATE INSERTED:                  A-LINE: N                       DATE INSERTED:                    CODE STATUS: Full Code  GOC discussion: Y      Critical Care time: 40 mins assessing presenting problems of acute illness that poses high probability of life threatening deterioration or end organ damage/dysfunction.  Medical decision making including Initiating plan of care, reviewing data, reviewing radiology, direct patient bedside evaluation and interpretation of vital signs, any necessary ventilator management , discussion with multidisciplinary team, discussing goals of care with patient/family, all non inclusive of procedures     Care discussed with bedside provider and intensivist CC:  Patient is a 48y old  Male who presents with a chief complaint of abdominal ascites     HPI:   49 yo M PMH ETOH abuse with decompensated alcoholic cirrhosis and recurrent abdominal ascites, chronically elevated wbc, CKD presents with SOB, increasing abdominal girth since discharge from [facility] two weeks ago. Patient denies fever, chills, diarrhea, cough, nausea, vomiting. ICU c/s called essentially for elevated LA and moderate abdominal ascites requiring a paracentesis. Pt was advised to go to the ER for drainage of adb was examined and assed at the bedside and was in NAD, normotensive, afebrile, otherwise HD stable. Pt c/o epigastric pain upon palpation. and BL LE swelling with notable 3+ pitting edema.  Pt does not require critical care at this time. A paracentesis should be in the plan of care on admission, but is not required urgently. Elevated LA is likely a type B in nature give underlying hepatic cirrhosis and is not a marker of hypoperfusion Pt distal extremities are warm and with adequate capillary refill and normotensive. Pt can be admitted to [facility] with plan for paracentesis by GI team or IR. Emergent paracentesis not required at this time. Would advise against aggressive fluid administration and would recommend diuresis with aldactone and lasix, piggy back'd with intermittent doses  of albumin     PAST MEDICAL & SURGICAL HISTORY:  ETOH abuse  Psoriasis  History of hypertension  No significant past surgical history    Allergies    No Known Allergies    Intolerances      FAMILY HISTORY:  No pertinent family history in first degree relatives      Review of Systems:  CONSTITUTIONAL: No fever, chills, or fatigue  EYES: No eye pain, visual disturbances, or discharge  ENMT:  No difficulty hearing, tinnitus, vertigo; No sinus or throat pain  NECK: No pain or stiffness  RESPIRATORY: No cough, wheezing, chills or hemoptysis; No shortness of breath  CARDIOVASCULAR: No chest pain, palpitations, dizziness, + LE  leg swelling  GASTROINTESTINAL: +epigastric pain. No nausea, vomiting, or hematemesis; No diarrhea or constipation. No melena or hematochezia.  GENITOURINARY: No dysuria, frequency, hematuria, or incontinence  NEUROLOGICAL: No headaches, memory loss, loss of strength, numbness, or tremors  SKIN: No itching, burning, rashes, or lesions   MUSCULOSKELETAL: No joint pain or swelling; No muscle, back, or extremity pain  PSYCHIATRIC: No depression, anxiety, mood swings, or difficulty sleeping      Medications:    ICU Vital Signs Last 24 Hrs  T(C): 36.6 (18 Mar 2019 17:20), Max: 36.6 (18 Mar 2019 17:20)  T(F): 97.8 (18 Mar 2019 17:20), Max: 97.8 (18 Mar 2019 17:20)  HR: 125 (18 Mar 2019 17:20) (125 - 125)  BP: 106/83 (18 Mar 2019 17:20) (106/83 - 106/83)  BP(mean): --  ABP: --  ABP(mean): --  RR: 26 (18 Mar 2019 17:20) (26 - 26)  SpO2: 98% (18 Mar 2019 17:20) (98% - 98%)    Vital Signs Last 24 Hrs  T(C): 36.6 (18 Mar 2019 17:20), Max: 36.6 (18 Mar 2019 17:20)  T(F): 97.8 (18 Mar 2019 17:20), Max: 97.8 (18 Mar 2019 17:20)  HR: 125 (18 Mar 2019 17:20) (125 - 125)  BP: 106/83 (18 Mar 2019 17:20) (106/83 - 106/83)  BP(mean): --  RR: 26 (18 Mar 2019 17:20) (26 - 26)  SpO2: 98% (18 Mar 2019 17:20) (98% - 98%)    ABG - ( 18 Mar 2019 18:12 )  pH, Arterial: x     pH, Blood: 7.44  /  pCO2: 28    /  pO2: 55    / HCO3: 18    / Base Excess: -4.5  /  SaO2: 86                  I&O's Detail        LABS:                        11.5   20.39 )-----------( 512      ( 18 Mar 2019 18:26 )             34.9     03-18    133<L>  |  101  |  39<H>  ----------------------------<  155<H>  4.2   |  21<L>  |  2.80<H>    Ca    8.2<L>      18 Mar 2019 18:26    TPro  7.5  /  Alb  2.0<L>  /  TBili  2.0<H>  /  DBili  x   /  AST  49<H>  /  ALT  31  /  AlkPhos  873<H>  03-18      CARDIAC MARKERS ( 18 Mar 2019 18:26 )  <.015 ng/mL / x     / 25 U/L / x     / 1.0 ng/mL      CAPILLARY BLOOD GLUCOSE        PT/INR - ( 18 Mar 2019 18:26 )   PT: 21.4 sec;   INR: 1.87 ratio         PTT - ( 18 Mar 2019 18:26 )  PTT:36.7 sec    CULTURES:        Physical Examination:  General: No acute distress.  Alert, oriented, interactive, nonfocal  NEURO: A&O X3, motor function 5/5 BL UE/LE  HEENT: Pupils equal, reactive to light.  Symmetric.  PULM: CTA BL, no significant sputum production, no wheezes, rales, rhonchi  CVS: Regular rate and rhythm, no murmurs, rubs, or gallops  ABD: abdominal distended, +tenderness to palpation in epigastric region  normoactive bowel sounds, no masses  EXT: + 3+ BL LE pitting edema   SKIN: Warm and well perfused, no rashes noted      EKG: NSR 65    RADIOLOGY: N/A    CENTRAL LINE: N          DATE INSERTED:                  GILLESPIE: N                        DATE INSERTED:                  A-LINE: N                       DATE INSERTED:                    CODE STATUS: Full Code  GOC discussion: Y      Critical Care time: 40 mins assessing presenting problems of acute illness that poses high probability of life threatening deterioration or end organ damage/dysfunction.  Medical decision making including Initiating plan of care, reviewing data, reviewing radiology, direct patient bedside evaluation and interpretation of vital signs, any necessary ventilator management , discussion with multidisciplinary team, discussing goals of care with patient/family, all non inclusive of procedures     Care discussed with bedside provider and intensivist CC:  Patient is a 48y old  Male who presents with a chief complaint of abdominal ascites     HPI:   47 yo M PMH ETOH abuse with decompensated alcoholic cirrhosis and recurrent abdominal ascites, chronically elevated wbc, CKD presents with SOB, increasing abdominal girth since discharge from [facility] two weeks ago. Patient denies fever, chills, diarrhea, cough, nausea, vomiting. ICU c/s called essentially for elevated LA and moderate abdominal ascites requiring a therapeutic paracentesis. Pt was advised to go to the ER for a therapeutic drainage of adb was examined and assessed at the bedside and was in NAD, normotensive, afebrile, otherwise HD stable. Pt c/o epigastric pain upon palpation. and BL LE swelling with notable 3+ pitting edema.  Pt does not require critical care at this time. A therapeutic paracentesis should be in the plan of care on admission, but is not required urgently. Elevated LA is likely type B in nature given underlying hepatic cirrhosis and is not a marker of hypoperfusion. Pt distal extremities are warm and with adequate capillary refill and normotensive. Pt can be admitted to [facility] with plan for therapeutic paracentesis by GI team or IR. Emergent paracentesis not required at this time. Would advise against aggressive fluid administration and would recommend diuresis with aldactone and lasix, piggy back'd with intermittent doses  of albumin     PAST MEDICAL & SURGICAL HISTORY:  ETOH abuse  Psoriasis  History of hypertension  No significant past surgical history    Allergies    No Known Allergies    Intolerances      FAMILY HISTORY:  No pertinent family history in first degree relatives      Review of Systems:  CONSTITUTIONAL: No fever, chills, or fatigue  EYES: No eye pain, visual disturbances, or discharge  ENMT:  No difficulty hearing, tinnitus, vertigo; No sinus or throat pain  NECK: No pain or stiffness  RESPIRATORY: No cough, wheezing, chills or hemoptysis; No shortness of breath  CARDIOVASCULAR: No chest pain, palpitations, dizziness, + LE  leg swelling  GASTROINTESTINAL: +epigastric pain. No nausea, vomiting, or hematemesis; No diarrhea or constipation. No melena or hematochezia.  GENITOURINARY: No dysuria, frequency, hematuria, or incontinence  NEUROLOGICAL: No headaches, memory loss, loss of strength, numbness, or tremors  SKIN: No itching, burning, rashes, or lesions   MUSCULOSKELETAL: No joint pain or swelling; No muscle, back, or extremity pain  PSYCHIATRIC: No depression, anxiety, mood swings, or difficulty sleeping      Medications:    ICU Vital Signs Last 24 Hrs  T(C): 36.6 (18 Mar 2019 17:20), Max: 36.6 (18 Mar 2019 17:20)  T(F): 97.8 (18 Mar 2019 17:20), Max: 97.8 (18 Mar 2019 17:20)  HR: 125 (18 Mar 2019 17:20) (125 - 125)  BP: 106/83 (18 Mar 2019 17:20) (106/83 - 106/83)  BP(mean): --  ABP: --  ABP(mean): --  RR: 26 (18 Mar 2019 17:20) (26 - 26)  SpO2: 98% (18 Mar 2019 17:20) (98% - 98%)    Vital Signs Last 24 Hrs  T(C): 36.6 (18 Mar 2019 17:20), Max: 36.6 (18 Mar 2019 17:20)  T(F): 97.8 (18 Mar 2019 17:20), Max: 97.8 (18 Mar 2019 17:20)  HR: 125 (18 Mar 2019 17:20) (125 - 125)  BP: 106/83 (18 Mar 2019 17:20) (106/83 - 106/83)  BP(mean): --  RR: 26 (18 Mar 2019 17:20) (26 - 26)  SpO2: 98% (18 Mar 2019 17:20) (98% - 98%)    ABG - ( 18 Mar 2019 18:12 )  pH, Arterial: x     pH, Blood: 7.44  /  pCO2: 28    /  pO2: 55    / HCO3: 18    / Base Excess: -4.5  /  SaO2: 86                  I&O's Detail        LABS:                        11.5   20.39 )-----------( 512      ( 18 Mar 2019 18:26 )             34.9     03-18    133<L>  |  101  |  39<H>  ----------------------------<  155<H>  4.2   |  21<L>  |  2.80<H>    Ca    8.2<L>      18 Mar 2019 18:26    TPro  7.5  /  Alb  2.0<L>  /  TBili  2.0<H>  /  DBili  x   /  AST  49<H>  /  ALT  31  /  AlkPhos  873<H>  03-18      CARDIAC MARKERS ( 18 Mar 2019 18:26 )  <.015 ng/mL / x     / 25 U/L / x     / 1.0 ng/mL      CAPILLARY BLOOD GLUCOSE        PT/INR - ( 18 Mar 2019 18:26 )   PT: 21.4 sec;   INR: 1.87 ratio         PTT - ( 18 Mar 2019 18:26 )  PTT:36.7 sec    CULTURES:        Physical Examination:  General: No acute distress.  Alert, oriented, interactive, nonfocal  NEURO: A&O X3, motor function 5/5 BL UE/LE  HEENT: Pupils equal, reactive to light.  Symmetric.  PULM: CTA BL, no significant sputum production, no wheezes, rales, rhonchi  CVS: Regular rate and rhythm, no murmurs, rubs, or gallops  ABD: abdominal distended, +tenderness to palpation in epigastric region  normoactive bowel sounds, no masses  EXT: + 3+ BL LE pitting edema   SKIN: Warm and well perfused, no rashes noted      EKG: NSR 65    RADIOLOGY: N/A    CENTRAL LINE: N          DATE INSERTED:                  GILLESPIE: N                        DATE INSERTED:                  A-LINE: N                       DATE INSERTED:                    CODE STATUS: Full Code  GOC discussion: Y      Critical Care time: 40 mins assessing presenting problems of acute illness that poses high probability of life threatening deterioration or end organ damage/dysfunction.  Medical decision making including Initiating plan of care, reviewing data, reviewing radiology, direct patient bedside evaluation and interpretation of vital signs, any necessary ventilator management , discussion with multidisciplinary team, discussing goals of care with patient/family, all non inclusive of procedures     Care discussed with bedside provider and intensivist CC:  Patient is a 48y old  Male who presents with a chief complaint of abdominal ascites     HPI:   49 yo M PMH ETOH abuse with decompensated alcoholic cirrhosis, recurrent abdominal ascites, chronically elevated wbc, CKD presents with SOB, increasing abdominal girth since discharge from [facility] two weeks ago. Patient denies fever, chills, diarrhea, cough, nausea, vomiting. ICU c/s called essentially for elevated LA and moderate abdominal ascites requiring a therapeutic paracentesis. Pt was advised to go to the ER for a therapeutic drainage of adb was examined and assessed at the bedside and was in NAD, normotensive, afebrile, otherwise HD stable. Pt c/o epigastric pain upon palpation. and BL LE swelling with notable 3+ pitting edema.  Pt does not require critical care at this time. A therapeutic paracentesis should be in the plan of care on admission, but is not required urgently. Elevated LA is likely type B in nature given underlying hepatic cirrhosis and is not a marker of hypoperfusion. Pt distal extremities are warm and with adequate capillary refill and normotensive. Pt can be admitted to [facility] with plan for therapeutic paracentesis by GI team or IR. Emergent paracentesis not required at this time. Would advise against aggressive fluid administration and would recommend diuresis with aldactone and lasix, piggy back'd with intermittent doses  of albumin     PAST MEDICAL & SURGICAL HISTORY:  ETOH abuse  Psoriasis  History of hypertension  No significant past surgical history    Allergies    No Known Allergies    Intolerances      FAMILY HISTORY:  No pertinent family history in first degree relatives      Review of Systems:  CONSTITUTIONAL: No fever, chills, or fatigue  EYES: No eye pain, visual disturbances, or discharge  ENMT:  No difficulty hearing, tinnitus, vertigo; No sinus or throat pain  NECK: No pain or stiffness  RESPIRATORY: No cough, wheezing, chills or hemoptysis; No shortness of breath  CARDIOVASCULAR: No chest pain, palpitations, dizziness, + LE  leg swelling  GASTROINTESTINAL: +epigastric pain. No nausea, vomiting, or hematemesis; No diarrhea or constipation. No melena or hematochezia.  GENITOURINARY: No dysuria, frequency, hematuria, or incontinence  NEUROLOGICAL: No headaches, memory loss, loss of strength, numbness, or tremors  SKIN: No itching, burning, rashes, or lesions   MUSCULOSKELETAL: No joint pain or swelling; No muscle, back, or extremity pain  PSYCHIATRIC: No depression, anxiety, mood swings, or difficulty sleeping      Medications:    ICU Vital Signs Last 24 Hrs  T(C): 36.6 (18 Mar 2019 17:20), Max: 36.6 (18 Mar 2019 17:20)  T(F): 97.8 (18 Mar 2019 17:20), Max: 97.8 (18 Mar 2019 17:20)  HR: 125 (18 Mar 2019 17:20) (125 - 125)  BP: 106/83 (18 Mar 2019 17:20) (106/83 - 106/83)  BP(mean): --  ABP: --  ABP(mean): --  RR: 26 (18 Mar 2019 17:20) (26 - 26)  SpO2: 98% (18 Mar 2019 17:20) (98% - 98%)    Vital Signs Last 24 Hrs  T(C): 36.6 (18 Mar 2019 17:20), Max: 36.6 (18 Mar 2019 17:20)  T(F): 97.8 (18 Mar 2019 17:20), Max: 97.8 (18 Mar 2019 17:20)  HR: 125 (18 Mar 2019 17:20) (125 - 125)  BP: 106/83 (18 Mar 2019 17:20) (106/83 - 106/83)  BP(mean): --  RR: 26 (18 Mar 2019 17:20) (26 - 26)  SpO2: 98% (18 Mar 2019 17:20) (98% - 98%)    ABG - ( 18 Mar 2019 18:12 )  pH, Arterial: x     pH, Blood: 7.44  /  pCO2: 28    /  pO2: 55    / HCO3: 18    / Base Excess: -4.5  /  SaO2: 86                  I&O's Detail        LABS:                        11.5   20.39 )-----------( 512      ( 18 Mar 2019 18:26 )             34.9     03-18    133<L>  |  101  |  39<H>  ----------------------------<  155<H>  4.2   |  21<L>  |  2.80<H>    Ca    8.2<L>      18 Mar 2019 18:26    TPro  7.5  /  Alb  2.0<L>  /  TBili  2.0<H>  /  DBili  x   /  AST  49<H>  /  ALT  31  /  AlkPhos  873<H>  03-18      CARDIAC MARKERS ( 18 Mar 2019 18:26 )  <.015 ng/mL / x     / 25 U/L / x     / 1.0 ng/mL      CAPILLARY BLOOD GLUCOSE        PT/INR - ( 18 Mar 2019 18:26 )   PT: 21.4 sec;   INR: 1.87 ratio         PTT - ( 18 Mar 2019 18:26 )  PTT:36.7 sec    CULTURES:        Physical Examination:  General: No acute distress.  Alert, oriented, interactive, nonfocal  NEURO: A&O X3, motor function 5/5 BL UE/LE  HEENT: Pupils equal, reactive to light.  Symmetric.  PULM: CTA BL, no significant sputum production, no wheezes, rales, rhonchi  CVS: Regular rate and rhythm, no murmurs, rubs, or gallops  ABD: abdominal distended, +tenderness to palpation in epigastric region  normoactive bowel sounds, no masses  EXT: + 3+ BL LE pitting edema   SKIN: Warm and well perfused, no rashes noted      EKG: NSR 65    RADIOLOGY: N/A    CENTRAL LINE: N          DATE INSERTED:                  GILLESPIE: N                        DATE INSERTED:                  A-LINE: N                       DATE INSERTED:                    CODE STATUS: Full Code  GOC discussion: Y      Critical Care time: 40 mins assessing presenting problems of acute illness that poses high probability of life threatening deterioration or end organ damage/dysfunction.  Medical decision making including Initiating plan of care, reviewing data, reviewing radiology, direct patient bedside evaluation and interpretation of vital signs, any necessary ventilator management , discussion with multidisciplinary team, discussing goals of care with patient/family, all non inclusive of procedures     Care discussed with bedside provider and intensivist CC:  Patient is a 48y old  Male who presents with a chief complaint of abdominal ascites     HPI:   47 yo M PMH ETOH abuse with decompensated alcoholic cirrhosis, recurrent abdominal ascites, chronically elevated wbc, CKD presents with SOB, increasing abdominal girth since discharge from [facility] two weeks ago. Patient denies fever, chills, diarrhea, cough, nausea, vomiting. ICU c/s called essentially for elevated LA and moderate abdominal ascites requiring a therapeutic paracentesis. Pt was advised by outpt GI physician to go to the ER for a therapeutic drainage of adb. Pt was examined and assessed at the bedside and was in NAD, normotensive, afebrile, otherwise HD stable. Pt c/o epigastric pain upon palpation, and BL LE swelling with notable 3+ pitting edema.      Pt does not require critical care at this time. A therapeutic paracentesis should be in the plan of care on admission, but is not required urgently. Elevated LA is likely type B in nature given underlying hepatic cirrhosis and is not a marker of hypoperfusion. Pt distal extremities are warm and with adequate capillary refill and normotensive. Pt can be admitted to [facility] with plan for therapeutic paracentesis by GI team or IR. Emergent paracentesis not required at this time. Would advise against aggressive fluid administration and would recommend diuresis with aldactone and lasix, piggy back'd with intermittent doses  of albumin     PAST MEDICAL & SURGICAL HISTORY:  ETOH abuse  Psoriasis  History of hypertension  No significant past surgical history    Allergies    No Known Allergies    Intolerances      FAMILY HISTORY:  No pertinent family history in first degree relatives      Review of Systems:  CONSTITUTIONAL: No fever, chills, or fatigue  EYES: No eye pain, visual disturbances, or discharge  ENMT:  No difficulty hearing, tinnitus, vertigo; No sinus or throat pain  NECK: No pain or stiffness  RESPIRATORY: No cough, wheezing, chills or hemoptysis; No shortness of breath  CARDIOVASCULAR: No chest pain, palpitations, dizziness, + LE  leg swelling  GASTROINTESTINAL: +epigastric pain. No nausea, vomiting, or hematemesis; No diarrhea or constipation. No melena or hematochezia.  GENITOURINARY: No dysuria, frequency, hematuria, or incontinence  NEUROLOGICAL: No headaches, memory loss, loss of strength, numbness, or tremors  SKIN: No itching, burning, rashes, or lesions   MUSCULOSKELETAL: No joint pain or swelling; No muscle, back, or extremity pain  PSYCHIATRIC: No depression, anxiety, mood swings, or difficulty sleeping      Medications:    ICU Vital Signs Last 24 Hrs  T(C): 36.6 (18 Mar 2019 17:20), Max: 36.6 (18 Mar 2019 17:20)  T(F): 97.8 (18 Mar 2019 17:20), Max: 97.8 (18 Mar 2019 17:20)  HR: 125 (18 Mar 2019 17:20) (125 - 125)  BP: 106/83 (18 Mar 2019 17:20) (106/83 - 106/83)  BP(mean): --  ABP: --  ABP(mean): --  RR: 26 (18 Mar 2019 17:20) (26 - 26)  SpO2: 98% (18 Mar 2019 17:20) (98% - 98%)    Vital Signs Last 24 Hrs  T(C): 36.6 (18 Mar 2019 17:20), Max: 36.6 (18 Mar 2019 17:20)  T(F): 97.8 (18 Mar 2019 17:20), Max: 97.8 (18 Mar 2019 17:20)  HR: 125 (18 Mar 2019 17:20) (125 - 125)  BP: 106/83 (18 Mar 2019 17:20) (106/83 - 106/83)  BP(mean): --  RR: 26 (18 Mar 2019 17:20) (26 - 26)  SpO2: 98% (18 Mar 2019 17:20) (98% - 98%)    ABG - ( 18 Mar 2019 18:12 )  pH, Arterial: x     pH, Blood: 7.44  /  pCO2: 28    /  pO2: 55    / HCO3: 18    / Base Excess: -4.5  /  SaO2: 86                  I&O's Detail        LABS:                        11.5   20.39 )-----------( 512      ( 18 Mar 2019 18:26 )             34.9     03-18    133<L>  |  101  |  39<H>  ----------------------------<  155<H>  4.2   |  21<L>  |  2.80<H>    Ca    8.2<L>      18 Mar 2019 18:26    TPro  7.5  /  Alb  2.0<L>  /  TBili  2.0<H>  /  DBili  x   /  AST  49<H>  /  ALT  31  /  AlkPhos  873<H>  03-18      CARDIAC MARKERS ( 18 Mar 2019 18:26 )  <.015 ng/mL / x     / 25 U/L / x     / 1.0 ng/mL      CAPILLARY BLOOD GLUCOSE        PT/INR - ( 18 Mar 2019 18:26 )   PT: 21.4 sec;   INR: 1.87 ratio         PTT - ( 18 Mar 2019 18:26 )  PTT:36.7 sec    CULTURES:        Physical Examination:  General: No acute distress.  Alert, oriented, interactive, nonfocal  NEURO: A&O X3, motor function 5/5 BL UE/LE  HEENT: Pupils equal, reactive to light.  Symmetric.  PULM: CTA BL, no significant sputum production, no wheezes, rales, rhonchi  CVS: Regular rate and rhythm, no murmurs, rubs, or gallops  ABD: abdominal distended, +tenderness to palpation in epigastric region  normoactive bowel sounds, no masses  EXT: + 3+ BL LE pitting edema   SKIN: Warm and well perfused, no rashes noted      EKG: NSR 65    RADIOLOGY: N/A    CENTRAL LINE: N          DATE INSERTED:                  GILLESPIE: N                        DATE INSERTED:                  A-LINE: N                       DATE INSERTED:                    CODE STATUS: Full Code  GOC discussion: Y      Critical Care time: 40 mins assessing presenting problems of acute illness that poses high probability of life threatening deterioration or end organ damage/dysfunction.  Medical decision making including Initiating plan of care, reviewing data, reviewing radiology, direct patient bedside evaluation and interpretation of vital signs, any necessary ventilator management , discussion with multidisciplinary team, discussing goals of care with patient/family, all non inclusive of procedures     Care discussed with bedside provider and intensivist

## 2019-03-18 NOTE — H&P ADULT - HISTORY OF PRESENT ILLNESS
49 y/o Male PMH ETOH abuse with decompensated alcoholic cirrhosis, recurrent abdominal ascites, chronically elevated WBC, CKD presents with SOB, increasing abdominal girth since discharge from [facility] two weeks ago. Patient denies fever, chills, diarrhea, cough, nausea, vomiting. Pt sent by PMD for abdominal ascites requiring a therapeutic paracentesis; he saw outpt GI physician to go to the ER for a therapeutic drainage. In ED pt in NAD, normotensive, afebrile, otherwise HD stable. Pt c/o epigastric pain upon palpation, and BL LE swelling with notable 3+ pitting edema. Denies N/V, hematemesis, melena. 49 y/o Male PMH ETOH abuse with decompensated alcoholic cirrhosis, recurrent abdominal ascites, chronically elevated WBC, CKD presents with SOB, increasing abdominal girth since discharge from [facility] two weeks ago. Patient denies fever, chills, diarrhea, cough, nausea, vomiting. Pt sent by PMD for abdominal ascites requiring a therapeutic paracentesis; he saw outpt GI physician to go to the ER for a therapeutic drainage. In ED pt in NAD, normotensive, afebrile, otherwise HD stable. Pt c/o epigastric pain upon palpation, and BL LE swelling with notable 3+ pitting edema. Denies N/V, hematemesis, melena. Pt had been in ICU at last [facility] admission; denies smoking/drinking since Jan, 2019.   .

## 2019-03-18 NOTE — CONSULT NOTE ADULT - ASSESSMENT
47 yo M PMH ETOH abuse with cirrhosis and recurrent abdominal ascites, chronically elevated wbc presents with SOB, increasing abdominal girth since discharge from [facility] two weeks ago. CU c/s called essentially for elevated LA and moderate abdominal ascites requiring a paracentesis. Pt was advised to go to the ER for drainage of adb was examined and assed at the bedside and was in NAD, normotensive, afebrile, otherwise HD stable. Pt c/o epigastric pain upon palpation. and BL LE swelling with notable 3+ pitting edema.  Pt does not require critical care at this time.       Plan   -A paracentesis should be in the plan of care on admission, but is not required urgently.  -Elevated LA is likely a type B in nature give underlying hepatic cirrhosis and is not a marker of hypoperfusion   -Pt distal extremities are warm and with adequate capillary refill and normotensive.   -Pt can be admitted to [facility] with plan for paracentesis by GI team or IR.   -Emergent paracentesis not required at this time.   -Would advise against aggressive fluid administration and would recommend diuresis with aldactone and lasix, piggy back'd with intermittent doses  of albumin   -If pt acutely decompensate will reevaluate 49 yo M PMH ETOH abuse with cirrhosis and recurrent abdominal ascites, chronically elevated wbc presents with SOB, increasing abdominal girth since discharge from [facility] two weeks ago. ICU c/s called essentially for elevated LA and moderate abdominal ascites requiring a paracentesis. Pt was advised to go to the ER for therapeutic drainage of abd was examined and assessed at the bedside and was in NAD, normotensive, afebrile, otherwise HD stable. Pt c/o epigastric pain upon palpation, and BL LE swelling with notable 3+ pitting edema.  Pt does not require critical care at this time.       Plan   -A therapeutic paracentesis should be in the plan of care on admission, but is not required urgently.  -Elevated LA is likely a type B in nature give underlying hepatic cirrhosis and is not a marker of hypoperfusion   -Pt distal extremities are warm and with adequate capillary refill and normotensive.   -Pt can be admitted to [facility] with plan for therapeutic paracentesis by GI team or IR.   -Emergent paracentesis not required at this time.   -Would advise against aggressive fluid administration and would recommend diuresis with aldactone and lasix, piggy back'd with intermittent doses  of albumin   -If pt acutely decompensate will reevaluate   -GI consult   -empiric ABX 49 yo M PMH ETOH abuse with decompensated alcoholic cirrhosis and recurrent abdominal ascites, chronically elevated wbc presents with SOB, increasing abdominal girth since discharge from [facility] two weeks ago. ICU c/s called essentially for elevated LA and moderate abdominal ascites requiring a paracentesis. Pt was advised to go to the ER for therapeutic drainage of abd was examined and assessed at the bedside and was in NAD, normotensive, afebrile, otherwise HD stable. Pt c/o epigastric pain upon palpation, and BL LE swelling with notable 3+ pitting edema.  Pt does not require critical care at this time.       Plan   -A therapeutic paracentesis should be in the plan of care on admission, but is not required urgently.  -Elevated LA is likely a type B in nature give underlying hepatic cirrhosis and is not a marker of hypoperfusion   -Pt distal extremities are warm and with adequate capillary refill and normotensive.   -Pt can be admitted to [facility] with plan for therapeutic paracentesis by GI team or IR.   -Emergent paracentesis not required at this time.   -Would advise against aggressive fluid administration and would recommend diuresis with aldactone and lasix, piggy back'd with intermittent doses  of albumin   -If pt acutely decompensate will reevaluate   -GI consult   -empiric ABX 49 yo M PMH ETOH abuse with decompensated alcoholic cirrhosis, recurrent abdominal ascites, chronically elevated wbc, presents with SOB, increasing abdominal girth since discharge from [facility] two weeks ago. ICU c/s called essentially for elevated LA and moderate abdominal ascites requiring a paracentesis. Pt was advised to go to the ER for therapeutic drainage of abd was examined and assessed at the bedside and was in NAD, normotensive, afebrile, otherwise HD stable. Pt c/o epigastric pain upon palpation, and BL LE swelling with notable 3+ pitting edema.  Pt does not require critical care at this time.       Plan   -A therapeutic paracentesis should be in the plan of care on admission, but is not required urgently.  -Elevated LA is likely a type B in nature give underlying hepatic cirrhosis and is not a marker of hypoperfusion   -Pt distal extremities are warm and with adequate capillary refill and normotensive.   -Pt can be admitted to [facility] with plan for therapeutic paracentesis by GI team or IR.   -Emergent paracentesis not required at this time.   -Would advise against aggressive fluid administration and would recommend diuresis with aldactone and lasix, piggy back'd with intermittent doses  of albumin   -If pt acutely decompensate will reevaluate   -GI consult   -empiric ABX 47 yo M PMH ETOH abuse with decompensated alcoholic cirrhosis, recurrent abdominal ascites, chronically elevated wbc, presents with SOB, increasing abdominal girth since discharge from [facility] two weeks ago. ICU c/s called essentially for elevated LA and moderate abdominal ascites requiring a paracentesis. Pt was advised to go to the ER for therapeutic drainage of abd was examined and assessed at the bedside and was in NAD, normotensive, afebrile, otherwise HD stable. Pt c/o epigastric pain upon palpation, and BL LE swelling with notable 3+ pitting edema.  Pt does not require critical care at this time.       Plan   -A therapeutic paracentesis should be in the plan of care on admission, but is not required urgently.  -Elevated LA is likely a type B in nature given underlying hepatic cirrhosis and is not a marker of hypoperfusion   -Pt distal extremities are warm and with adequate capillary refill and normotensive.   -Pt can be admitted to [facility] with plan for therapeutic paracentesis by GI team or IR.   -Emergent paracentesis not required at this time.   -Would advise against aggressive fluid administration and would recommend diuresis with aldactone and lasix, piggy back'd with intermittent doses  of albumin   -If pt acutely decompensate will reevaluate   -GI consult   -empiric ABX 47 yo M PMH ETOH abuse with decompensated alcoholic cirrhosis, recurrent abdominal ascites, chronically elevated wbc, presents with SOB, increasing abdominal girth since discharge from [facility] two weeks ago. ICU c/s called essentially for elevated LA and moderate abdominal ascites requiring a paracentesis. Pt was advised to go to the ER for therapeutic drainage of abd. Pt was examined and assessed at the bedside and was in NAD, normotensive, afebrile, otherwise HD stable. Pt c/o epigastric pain upon palpation, and BL LE swelling with notable 3+ pitting edema.  Pt does not require critical care at this time.       Plan   -A therapeutic paracentesis should be in the plan of care on admission, but is not required urgently.  -Elevated LA is likely a type B in nature given underlying hepatic cirrhosis and is not a marker of hypoperfusion   -Pt distal extremities are warm and with adequate capillary refill and normotensive.   -Pt can be admitted to [facility] with plan for therapeutic paracentesis by GI team or IR.   -Emergent paracentesis not required at this time.   -Would advise against aggressive fluid administration and would recommend diuresis with aldactone and lasix, piggy back'd with intermittent doses  of albumin   -If pt acutely decompensate will reevaluate   -GI consult   -empiric ABX 49 yo M PMH ETOH abuse with decompensated alcoholic cirrhosis, recurrent abdominal ascites, chronically elevated wbc, presents with SOB, increasing abdominal girth since discharge from [facility] two weeks ago. ICU c/s called essentially for elevated LA and moderate abdominal ascites requiring a paracentesis. Pt was advised to go to the ER for therapeutic drainage of abd. Pt was examined and assessed at the bedside and was in NAD, normotensive, afebrile, otherwise HD stable. Pt c/o epigastric pain upon palpation, and BL LE swelling with notable 3+ pitting edema.  Pt does not require critical care at this time.       Plan   -A therapeutic paracentesis should be in the plan of care on admission, but is not required urgently.  -Elevated LA is likely a type B in nature given underlying hepatic cirrhosis and is not a marker of hypoperfusion   -Pt distal extremities are warm and with adequate capillary refill and normotensive.   -Pt can be admitted to [facility] with plan for therapeutic paracentesis by GI team or IR.   -Emergent paracentesis not required at this time.   -Would advise against aggressive fluid administration and would recommend diuresis with aldactone and lasix, piggy back'd with intermittent doses  of albumin given pt is chronically intravascularly depleted and extravascularly in overload  -Pt on midodrine at home, would consider restarting for BP support as well   -If pt acutely decompensate will reevaluate   -GI consult   -empiric ABX 49 yo M PMH ETOH abuse with decompensated alcoholic cirrhosis, recurrent abdominal ascites, chronically elevated wbc, presents with SOB, increasing abdominal girth since discharge from [facility] two weeks ago. ICU c/s called essentially for elevated LA and moderate abdominal ascites requiring a paracentesis. Pt was advised to go to the ER for therapeutic drainage of abd. Pt was examined and assessed at the bedside and was in NAD, normotensive, afebrile, otherwise HD stable. Pt c/o epigastric pain upon palpation, and BL LE swelling with notable 3+ pitting edema.  Pt does not require critical care at this time.       Plan   -A therapeutic paracentesis should be in the plan of care on admission, but is not required urgently.  -Elevated LA is likely a type B in nature given underlying hepatic cirrhosis and is not a marker of hypoperfusion   -Pt distal extremities are warm and with adequate capillary refill and normotensive.   -Pt can be admitted to [facility] with plan for therapeutic paracentesis by GI team or IR.   -Emergent paracentesis not required at this time.   -Would advise against aggressive fluid administration and would recommend diuresis with aldactone and lasix, piggy back'd with intermittent doses  of albumin given pt is chronically intravascularly depleted and extravascularly in overload  -PO lasix may be more efficient in for maximum diuresis in the setting of poor intravascular absorption   -Pt on midodrine at home, would consider restarting for BP support as well   -GI consult   -empiric ABX   -Continue thiamine   -If pt acutely decompensate will reevaluate 49 yo M PMH ETOH abuse with decompensated alcoholic cirrhosis, recurrent abdominal ascites, chronically elevated wbc, presents with SOB, increasing abdominal girth since discharge from [facility] two weeks ago. ICU c/s called essentially for elevated LA and moderate abdominal ascites requiring a paracentesis. Pt was advised to go to the ER for therapeutic drainage of abd. Pt was examined and assessed at the bedside and was in NAD, normotensive, afebrile, otherwise HD stable. Pt c/o epigastric pain upon palpation, and BL LE swelling with notable 3+ pitting edema.  Pt does not require critical care at this time.           Plan   -A therapeutic paracentesis should be in the plan of care on admission, but is not required urgently.  -Elevated LA is likely a type B in nature given underlying hepatic cirrhosis and is not a marker of hypoperfusion   -Pt distal extremities are warm and with adequate capillary refill and normotensive.   -Pt can be admitted to [facility] with plan for therapeutic paracentesis by GI team or IR.   -Emergent paracentesis not required at this time.   -Would advise against aggressive fluid administration and would recommend diuresis with aldactone and lasix, piggy back'd with intermittent doses  of albumin given pt is chronically intravascularly depleted and extravascularly in overload  -PO lasix may be more efficient in for maximum diuresis in the setting of poor intravascular absorption   -Pt on midodrine at home, would consider restarting for BP support as well   -GI consult   -empiric ABX   -Continue thiamine   -If pt acutely decompensate will reevaluate 49 yo M PMH ETOH abuse with decompensated alcoholic cirrhosis, recurrent abdominal ascites, chronically elevated wbc, presents with SOB, increasing abdominal girth since discharge from [facility] two weeks ago. ICU c/s called essentially for elevated LA and moderate abdominal ascites requiring a paracentesis. Pt was advised to go to the ER for therapeutic drainage of abd. Pt was examined and assessed at the bedside and was in NAD, normotensive, afebrile, otherwise HD stable. Pt c/o epigastric pain upon palpation, and BL LE swelling with notable 3+ pitting edema.  Pt does not require critical care at this time.       1.  Advanced alcoholic cirrhosis with moderate abdominal ascites requiring non-emergent therapeutic paracentesis     Plan   -A therapeutic paracentesis should be in the plan of care on admission, but is not required urgently.  -Elevated LA is likely a type B in nature given underlying hepatic cirrhosis and is not a marker of hypoperfusion   -Pt distal extremities are warm and with adequate capillary refill and normotensive.   -Pt can be admitted to [facility] with plan for therapeutic paracentesis by GI team or IR.   -If pt becomes febrile would consider sending diagnostic study postprocedurally   -Emergent paracentesis not required at this time.   -Would advise against aggressive fluid administration and would recommend diuresis with aldactone and lasix, piggy back'd with intermittent doses  of albumin given pt is chronically in extravascular overload and intravascularly depleted  -PO lasix may be more efficient in for maximum diuresis in the setting of advanced cirrhosis and poor intravascular absorption   -Pt on midodrine at home, would consider restarting for BP support as well   -GI consult   -empiric ABX   -Continue home thiamine   -If begins to develop AMS would consider monitoring ammonia level   -If pt acutely decompensate will reevaluate

## 2019-03-18 NOTE — ED PROVIDER NOTE - AXIS
Subjective:    Patient ID:  Tiffanie Wise is a 76 y.o. female who presents for follow-up of Follow-up      HPI   Mrs. Wise is a 76 year old female with a PMHx of CAD s/p CABG, DM, HTN, HLP, and ischemic/embolic CVA who presents to clinic for follow up. She returns today and states that she is doing ok. Continues to have chest discomfort with exertion which relieves with rest. Denies syncope or near syncope. She denies regular exercise due to chest discomfort with exertion which is a limiting factor. Continues to have neuropathy pain to bilateral feet while on Gabapentin. She moved into an apartment to help with stress but it is limiting her financially at this time. Denies leg swelling or claudication pain. No signs of decompensated HF on exam today. No dizziness or lightheadedness noted today on exam per patient. Compliant with medications and diet. Continues to have expressive aphagia which is stressful to patient. No signs of abnormal bleeding on DAPT. No CNS complaints to suggest TIA or CVA today.       Review of Systems   Constitution: Negative for weakness and malaise/fatigue.   HENT: Negative for hearing loss and hoarse voice.    Eyes: Negative for blurred vision and visual disturbance.   Cardiovascular: Positive for chest pain (with exertion). Negative for claudication, dyspnea on exertion, irregular heartbeat, leg swelling, near-syncope, orthopnea, palpitations, paroxysmal nocturnal dyspnea and syncope.   Respiratory: Negative for cough, hemoptysis, shortness of breath, sleep disturbances due to breathing, snoring and wheezing.    Endocrine: Negative for cold intolerance and heat intolerance.   Hematologic/Lymphatic: Does not bruise/bleed easily.   Skin: Negative for color change, dry skin and nail changes.   Musculoskeletal: Positive for arthritis and back pain. Negative for joint pain and myalgias.   Gastrointestinal: Negative for bloating, abdominal pain, constipation, nausea and vomiting.  "  Genitourinary: Negative for dysuria, flank pain, hematuria and hesitancy.   Neurological: Positive for numbness. Negative for headaches, light-headedness, loss of balance and paresthesias.        +expressive aphasia  +neuropathy   Psychiatric/Behavioral: Negative for altered mental status. The patient is nervous/anxious.    Allergic/Immunologic: Negative for environmental allergies.       BP (!) 142/52 (BP Location: Right arm, Patient Position: Sitting, BP Method: Medium (Automatic))   Pulse 62   Ht 4' 10.5" (1.486 m)   Wt 63 kg (139 lb)   LMP  (LMP Unknown)   BMI 28.56 kg/m²     Objective:    Physical Exam   Constitutional: She is oriented to person, place, and time. She appears well-developed and well-nourished. No distress.   HENT:   Head: Normocephalic and atraumatic.   Eyes: Pupils are equal, round, and reactive to light.   Neck: Normal range of motion and full passive range of motion without pain. Neck supple. No JVD present.   Cardiovascular: Normal rate, regular rhythm, S1 normal, S2 normal and intact distal pulses. PMI is not displaced. Exam reveals no distant heart sounds.   No murmur heard.  Pulses:       Radial pulses are 2+ on the right side, and 2+ on the left side.        Dorsalis pedis pulses are 2+ on the right side, and 2+ on the left side.   Pulmonary/Chest: Effort normal and breath sounds normal. No accessory muscle usage. No respiratory distress. She has no decreased breath sounds. She has no wheezes.   Abdominal: Soft. Bowel sounds are normal. She exhibits no distension. There is no tenderness.   Musculoskeletal: Normal range of motion. She exhibits no edema.        Right ankle: She exhibits no swelling.        Left ankle: She exhibits no swelling.   Neurological: She is alert and oriented to person, place, and time.   +expressive aphasia   Skin: Skin is warm and dry. She is not diaphoretic. No cyanosis. Nails show no clubbing.   Psychiatric: She has a normal mood and affect. Her speech " is normal and behavior is normal. Judgment and thought content normal. Cognition and memory are normal.   Nursing note and vitals reviewed.          Chemistry        Component Value Date/Time     07/22/2018 0334    K 3.2 (L) 07/22/2018 0334    CL 99 07/22/2018 0334    CO2 27 07/22/2018 0334    BUN 16 07/22/2018 0334    CREATININE 0.9 07/22/2018 0334     (H) 07/22/2018 0334        Component Value Date/Time    CALCIUM 9.0 07/22/2018 0334    ALKPHOS 117 07/22/2018 0334    AST 17 07/22/2018 0334    ALT 14 07/22/2018 0334    BILITOT 0.5 07/22/2018 0334    ESTGFRAFRICA >60 07/22/2018 0334    EGFRNONAA >60 07/22/2018 0334        Lab Results   Component Value Date    CHOL 187 07/22/2018    CHOL 116 (L) 12/18/2017    CHOL 145 09/15/2017     Lab Results   Component Value Date    HDL 59 07/22/2018    HDL 43 12/18/2017    HDL 57 09/15/2017     Lab Results   Component Value Date    LDLCALC 104.4 07/22/2018    LDLCALC 50.0 (L) 12/18/2017    LDLCALC 71.4 09/15/2017     Lab Results   Component Value Date    TRIG 118 07/22/2018    TRIG 115 12/18/2017    TRIG 83 09/15/2017     Lab Results   Component Value Date    CHOLHDL 31.6 07/22/2018    CHOLHDL 37.1 12/18/2017    CHOLHDL 39.3 09/15/2017     Lab Results   Component Value Date    HGBA1C 7.8 (H) 07/22/2018     Lab Results   Component Value Date    TSH 3.309 07/22/2018     2D Echo CONCLUSIONS     1 - Biatrial enlargement.     2 - Concentric hypertrophy.     3 - No wall motion abnormalities.     4 - Normal left ventricular systolic function (EF 60-65%).     5 - Impaired LV relaxation, elevated LAP (grade 2 diastolic dysfunction).     6 - Right ventricular hypertrophy.     7 - Normal right ventricular systolic function .     8 - The estimated PA systolic pressure is 30 mmHg.             This document has been electronically    SIGNED BY: Romeo Epps MD On: 07/22/2018 18:56    Assessment:       1. AP (angina pectoris)    2. Hyperlipidemia associated with type 2 diabetes  mellitus    3. Left ventricular diastolic dysfunction with preserved systolic function    4. Coronary artery disease with stable angina pectoris, unspecified vessel or lesion type, unspecified whether native or transplanted heart    5. Essential hypertension    6. Sinus bradycardia    7. Aortic arch atherosclerosis    8. Acquired hypothyroidism    9. Type 2 diabetes mellitus with hyperglycemia, with long-term current use of insulin    10. Gastroesophageal reflux disease without esophagitis    11. NAWAF on CPAP      Doing clinically well today.  BP look ok today in clinic.  Lipids could use some improvement today.  Compliance with medications and diet and physical activity encouraged  No SOB, palpitations or leg swelling  No CNS complaints to suggest TIA or CVA today.  No signs of abnormal bleeding on DAPT.  Plan:     Needs MPI stress test scheduled soon with phone review  Follow up in 3 months with CMP and Lipids    DASH diet, 2 gm sodium restriction compliance  Encourage physical activity as tolerated  Needs regular exercise compliance  Diabetes needs better control, A1C 6.5 or less  Continue CPAP use  Continue same meds for now.            Left Deviation

## 2019-03-18 NOTE — H&P ADULT - NSICDXPROBLEM_GEN_ALL_CORE_FT
PROBLEM DIAGNOSES  Problem: Ascites  Assessment and Plan: diagnostic and therapeutic thoracentesis, diuresisi +/- albumin  GI consult PROBLEM DIAGNOSES  Problem: Ascites  Assessment and Plan: diagnostic and therapeutic thoracentesis, diuresisi +/- albumin  GI consult    Problem: Chronic kidney disease, unspecified CKD stage  Assessment and Plan: avoid overdiuresis, nephrotoxic medications    Problem: ETOH abuse  Assessment and Plan: claims no further alcohol consumption, vitamin replacement

## 2019-03-18 NOTE — ED PROVIDER NOTE - CLINICAL SUMMARY MEDICAL DECISION MAKING FREE TEXT BOX
Patient seen and evaluated by icu, not icu candidate at this time; will hold ivf and provide iv antibiotics, admit

## 2019-03-18 NOTE — PATIENT PROFILE ADULT - NSPROGENDIFFINTUB_GEN_A_NUR
never intubated
I personally saw the patinet with the NP, and completed the key components of the history and physical exam.  I have reviewed the NP note and agree with the history, exam, and plan of care, except as noted   gen in nad resp clear cardiac no murmur head lac left parietal region msk + ttp mid clavicle with decreased rom left shoulder   rice therapy wound care precautions given. ortho follow up provided ambulating in nad

## 2019-03-18 NOTE — H&P ADULT - NSHPPHYSICALEXAM_GEN_ALL_CORE
T(C): 36.7 (18 Mar 2019 20:54), Max: 36.7 (18 Mar 2019 20:54)  T(F): 98 (18 Mar 2019 20:54), Max: 98 (18 Mar 2019 20:54)  HR: 105 (18 Mar 2019 20:54) (105 - 125)  BP: 99/70 (18 Mar 2019 20:54) (99/70 - 106/83)  BP(mean): --  RR: 18 (18 Mar 2019 20:54) (18 - 26)  SpO2: 98% (18 Mar 2019 20:54) (98% - 98%)    PHYSICAL EXAM:  GENERAL: NAD, well-groomed, well-developed  HEAD:  Atraumatic, Normocephalic  EYES: EOMI, PERRLA, conjunctiva and sclera clear  ENMT: No tonsillar erythema, exudates, or enlargement; Moist mucous membranes, No lesions  NECK: Supple, No JVD, Normal thyroid  NERVOUS SYSTEM:  Alert & Oriented X3, CN 2-12 intact, no focal deficits, no asterixis  CHEST/LUNG: Clear to percussion bilaterally; No rales, rhonchi, wheezing, or rubs  HEART: Regular rate and rhythm; No murmurs, rubs, or gallops  ABDOMEN: Soft, mild epigastric tenderness, +ascites; Bowel sounds present  EXTREMITIES:  + Peripheral Pulses, No clubbing, cyanosis, or edema  LYMPH: No lymphadenopathy noted  SKIN: No rashes or lesions

## 2019-03-18 NOTE — H&P ADULT - NSHPREVIEWOFSYSTEMS_GEN_ALL_CORE
Review of Systems:  CONSTITUTIONAL: No fever, chills, or fatigue  EYES: No eye pain, visual disturbances, or discharge  ENMT:  No difficulty hearing, tinnitus, vertigo; No sinus or throat pain  NECK: No pain or stiffness  RESPIRATORY: No cough, wheezing, chills or hemoptysis; +/- shortness of breath  CARDIOVASCULAR: No chest pain, palpitations, dizziness, + LE  leg swelling  GASTROINTESTINAL: +epigastric pain. No nausea, vomiting, or hematemesis; No diarrhea or constipation. No melena or hematochezia.  GENITOURINARY: No dysuria, frequency, hematuria, or incontinence  NEUROLOGICAL: No headaches, memory loss, loss of strength, numbness, or tremors  SKIN: No itching, burning, rashes, or lesions   MUSCULOSKELETAL: No joint pain or swelling; No muscle, back, or extremity pain  PSYCHIATRIC: No depression, anxiety, mood swings, occasional difficulty sleeping

## 2019-03-18 NOTE — ED PROVIDER NOTE - OBJECTIVE STATEMENT
49 yo male with history of alcohol abuse, ascites, elevated wbc presents with sob, increasing abdominal girth since discharge from [facility] two weeks ago. Patient denies fever, chills, diarrhea, cough, nausea, vomiting. Patient had been admitted to icu at this facility.

## 2019-03-18 NOTE — CONSULT NOTE ADULT - ATTENDING COMMENTS
I have seen and examined the patient. I agree with the above history, physical exam, and plan of care except for as detailed below.    49 y/o M w/decompensated alcoholic cirrhosis, CKD sent from PMD for therapeutic paracentesis. Currently no acute distress. Patient with elevated lactate, which is persisent and lower than at discharge from prior hospitalization likely secondary to cirrhosis. Elevated WBC count, however afebrile. Not toxic appearing.    - Agree with empiric antibitoics  - GI consult  - Would not give any IV fluids, recommend diuresis  - No need for ICU level of care at this time I have seen and examined the patient. I agree with the above history, physical exam, and plan of care except for as detailed below.    47 y/o M w/decompensated alcoholic cirrhosis, CKD sent from PMD for therapeutic paracentesis. Currently no acute distress. Patient with elevated lactate, which is persistent  and lower than at discharge from prior hospitalization likely secondary to cirrhosis. Elevated WBC count, however afebrile. Not toxic appearing.    - Agree with empiric antibiotics  - GI consult  - Would not give any IV fluids, recommend diuresis  - No need for ICU level of care at this time

## 2019-03-18 NOTE — H&P ADULT - ASSESSMENT
47 y/o Male PMH ETOH abuse with decompensated alcoholic cirrhosis, recurrent abdominal ascites, chronically elevated WBC, CKD presents with SOB, increasing abdominal girth since discharge from [facility] two weeks ago. Patient denies fever, chills, diarrhea, cough, nausea, vomiting. Pt sent by PMD for abdominal ascites requiring a therapeutic paracentesis; he saw outpt GI physician to go to the ER for a therapeutic drainage. In ED pt in NAD, normotensive, afebrile, otherwise HD stable. Pt c/o epigastric pain upon palpation, and BL LE swelling with notable 3+ pitting edema. Denies N/V, hematemesis, melena. Pt had been in ICU at last [facility] admission; denies smoking/drinking since Jan, 2019.   Pt evaluated by critical care who felt pt does not require critical care at this time. A therapeutic paracentesis should be in the plan of care on admission, but is not required urgently. Elevated LA is likely type B in nature given underlying hepatic cirrhosis and is not a marker of hypoperfusion. Pt distal extremities are warm and with adequate capillary refill and normotensive. Pt [facility] be [facility] with plan for therapeutic paracentesis by GI team or IR. Emergent paracentesis not required at this time. Would advise against aggressive fluid administration and would recommend diuresis with aldactone and lasix, piggy back'd with intermittent doses  of albumin. 49 y/o Male PMH ETOH abuse with decompensated alcoholic cirrhosis, recurrent abdominal ascites, chronically elevated WBC, CKD presents with SOB, increasing abdominal girth since discharge from [facility] two weeks ago. Patient denies fever, chills, diarrhea, cough, nausea, vomiting. Pt sent by PMD for abdominal ascites requiring a therapeutic paracentesis; he saw outpt GI physician to go to the ER for a therapeutic drainage. In ED pt in NAD, normotensive, afebrile, otherwise HD stable. Pt c/o epigastric pain upon palpation, and BL LE swelling with notable 3+ pitting edema. Denies N/V, hematemesis, melena. Pt had been in ICU at last [facility] admission; denies smoking/drinking since Jan, 2019.   Pt evaluated by critical care who felt pt does not require critical care at this time. A therapeutic paracentesis should be in the plan of care on admission, but is not required urgently. Elevated LA is likely type B in nature given underlying hepatic cirrhosis and is not a marker of hypoperfusion. Pt distal extremities are warm and with adequate capillary refill and normotensive. Pt can be admitted to [facility] with plan for therapeutic paracentesis by GI team or IR. Emergent paracentesis not required at this time. Would advise against aggressive fluid administration and would recommend diuresis with aldactone and lasix, piggy back'd with intermittent doses  of albumin.   IMPROVE VTE Individual Risk Assessment          RISK                                                          Points    [  ] Previous VTE                                                3    [  ] Thrombophilia                                             2    [  ] Lower limb paralysis                                    2        (unable to hold up >15 seconds)      [  ] Current Cancer                                             2         (within 6 months)    [  ] Immobilization > 24 hrs                              1    [  ] ICU/CCU stay > 24 hours                            1    [  ] Age > 60                                                    1    IMPROVE VTE Score ___0______

## 2019-03-18 NOTE — ED ADULT TRIAGE NOTE - CHIEF COMPLAINT QUOTE
pt states, " I have to be drained from my stomach , I was drained last 3 weeks , I have trouble breathing

## 2019-03-19 PROBLEM — F10.10 ALCOHOL ABUSE, UNCOMPLICATED: Chronic | Status: ACTIVE | Noted: 2019-01-23

## 2019-03-19 LAB — OB PNL STL: NEGATIVE — SIGNIFICANT CHANGE UP

## 2019-03-19 PROCEDURE — 99233 SBSQ HOSP IP/OBS HIGH 50: CPT

## 2019-03-19 RX ORDER — LACTULOSE 10 G/15ML
20 SOLUTION ORAL THREE TIMES A DAY
Qty: 0 | Refills: 0 | Status: DISCONTINUED | OUTPATIENT
Start: 2019-03-19 | End: 2019-03-20

## 2019-03-19 RX ORDER — MIDODRINE HYDROCHLORIDE 2.5 MG/1
5 TABLET ORAL EVERY 8 HOURS
Qty: 0 | Refills: 0 | Status: DISCONTINUED | OUTPATIENT
Start: 2019-03-19 | End: 2019-03-20

## 2019-03-19 RX ORDER — MIDODRINE HYDROCHLORIDE 2.5 MG/1
10 TABLET ORAL EVERY 8 HOURS
Qty: 0 | Refills: 0 | Status: DISCONTINUED | OUTPATIENT
Start: 2019-03-19 | End: 2019-03-19

## 2019-03-19 RX ORDER — OCTREOTIDE ACETATE 200 UG/ML
100 INJECTION, SOLUTION INTRAVENOUS; SUBCUTANEOUS EVERY 8 HOURS
Qty: 0 | Refills: 0 | Status: DISCONTINUED | OUTPATIENT
Start: 2019-03-19 | End: 2019-03-21

## 2019-03-19 RX ORDER — ALBUMIN HUMAN 25 %
50 VIAL (ML) INTRAVENOUS
Qty: 0 | Refills: 0 | Status: COMPLETED | OUTPATIENT
Start: 2019-03-19 | End: 2019-03-19

## 2019-03-19 RX ADMIN — OCTREOTIDE ACETATE 100 MICROGRAM(S): 200 INJECTION, SOLUTION INTRAVENOUS; SUBCUTANEOUS at 22:40

## 2019-03-19 RX ADMIN — Medication 1 MILLIGRAM(S): at 11:26

## 2019-03-19 RX ADMIN — PANTOPRAZOLE SODIUM 40 MILLIGRAM(S): 20 TABLET, DELAYED RELEASE ORAL at 05:42

## 2019-03-19 RX ADMIN — Medication 30 MILLILITER(S): at 20:14

## 2019-03-19 RX ADMIN — MIDODRINE HYDROCHLORIDE 5 MILLIGRAM(S): 2.5 TABLET ORAL at 22:40

## 2019-03-19 RX ADMIN — Medication 1 TABLET(S): at 11:26

## 2019-03-19 RX ADMIN — Medication 100 MILLIGRAM(S): at 11:26

## 2019-03-19 RX ADMIN — Medication 30 MILLILITER(S): at 22:39

## 2019-03-19 RX ADMIN — Medication 30 MILLILITER(S): at 18:17

## 2019-03-19 NOTE — PROGRESS NOTE ADULT - ASSESSMENT
47 y/o Male w/ history of HTN, Psoriasis, ETOH abuse with decompensated alcoholic cirrhosis & recurrent abdominal ascites, Hepatorenal syndrome w/ chronic leukocytosis and CKD III-IV presented with SOB & worsening ascites. Labs showed PAPO.     Elevated LA is likely type B in nature given underlying hepatic cirrhosis and is not a marker of hypoperfusion.       Alcoholic cirrhosis w/ worsening ascites  - diagnostic and therapeutic thoracentesis  - c/w diuresisi +/- albumin  - GI consult  - elevated LA is likely due to underlying hepatic cirrhosis and is not a marker of hypoperfusion  - patient was on midodrine at home, will wean off  - c/w Rifaximin    ETOH abuse  - pt denied recent alcohol consumption   - watch for withdrawal  - c/w folate, thiamine and MVN    PAPO on CKDIII-IV      Prophylaxis:    DVT: SCD  GI: Protonix 47 y/o Male w/ history of HTN, Psoriasis, ETOH abuse with decompensated alcoholic cirrhosis & recurrent abdominal ascites, Hepatorenal syndrome w/ chronic leukocytosis and CKD III-IV presented with SOB & worsening ascites. Labs showed PAPO.     Alcoholic cirrhosis w/ worsening ascites  - diagnostic and therapeutic thoracentesis  - c/w diuresisi +/- albumin  - GI consult  - elevated LA is likely due to underlying hepatic cirrhosis and is not a marker of hypoperfusion  - patient was on midodrine at home, will wean off  - c/w Rifaximin & add lactulose    ETOH abuse  - pt denied recent alcohol consumption   - watch for withdrawal  - c/w folate, thiamine and MVN    PAPO on CKDIII-IV  - renal consulted     Prophylaxis:    DVT: SCD  GI: Protonix

## 2019-03-19 NOTE — PROGRESS NOTE ADULT - SUBJECTIVE AND OBJECTIVE BOX
Patient is a 48y old  Male who presents with a chief complaint of Sent by PMD for ascites. (19 Mar 2019 14:28)      INTERVAL HPI/OVERNIGHT EVENTS:    MEDICATIONS  (STANDING):  folic acid 1 milliGRAM(s) Oral daily  multivitamin 1 Tablet(s) Oral daily  pantoprazole    Tablet 40 milliGRAM(s) Oral before breakfast  rifaximin 550 milliGRAM(s) Oral two times a day  thiamine 100 milliGRAM(s) Oral daily    MEDICATIONS  (PRN):  midodrine 5 milliGRAM(s) Oral every 8 hours PRN BP systolic < 95      Allergies    No Known Allergies    Intolerances        Vital Signs Last 24 Hrs  T(C): 36.8 (19 Mar 2019 11:14), Max: 37.3 (18 Mar 2019 22:16)  T(F): 98.2 (19 Mar 2019 11:14), Max: 99.1 (18 Mar 2019 22:16)  HR: 98 (19 Mar 2019 11:14) (94 - 125)  BP: 105/75 (19 Mar 2019 11:14) (99/70 - 113/81)  BP(mean): --  RR: 18 (19 Mar 2019 11:14) (18 - 26)  SpO2: 98% (19 Mar 2019 11:14) (98% - 98%)    PHYSICAL EXAM:  GENERAL: NAD, well-groomed, well-developed  HEAD:  Atraumatic, Normocephalic  EYES: EOMI, PERRLA, conjunctiva and sclera clear  ENMT: No tonsillar erythema, exudates, or enlargement; Moist mucous membranes, Good dentition, No lesions  NECK: Supple, No JVD, Normal thyroid  NERVOUS SYSTEM:  Alert & Oriented X3, Good concentration; Motor Strength 5/5 B/L upper and lower extremities; DTRs 2+ intact and symmetric  CHEST/LUNG: Clear to auscultation bilaterally; No rales, rhonchi, wheezing, or rubs  HEART: Regular rate and rhythm; No murmurs, rubs, or gallops  ABDOMEN: Soft, Nontender, Nondistended; Bowel sounds present  EXTREMITIES:  2+ Peripheral Pulses, No clubbing, cyanosis, or edema  LYMPH: No lymphadenopathy noted  SKIN: No rashes or lesions    LABS:                        11.5   20.39 )-----------( 512      ( 18 Mar 2019 18:26 )             34.9     03-18    133<L>  |  101  |  39<H>  ----------------------------<  155<H>  4.2   |  21<L>  |  2.80<H>    Ca    8.2<L>      18 Mar 2019 18:26    TPro  7.5  /  Alb  2.0<L>  /  TBili  2.0<H>  /  DBili  x   /  AST  49<H>  /  ALT  31  /  AlkPhos  873<H>  03-18    PT/INR - ( 18 Mar 2019 18:26 )   PT: 21.4 sec;   INR: 1.87 ratio         PTT - ( 18 Mar 2019 18:26 )  PTT:36.7 sec    CAPILLARY BLOOD GLUCOSE          RADIOLOGY & ADDITIONAL TESTS:    03-18-19 @ 07:01  -  03-19-19 @ 07:00  --------------------------------------------------------  IN:  Total IN: 0 mL    OUT:    Voided: 250 mL  Total OUT: 250 mL    Total NET: -250 mL 49 y/o Male w/ history of HTN, Psoriasis, ETOH abuse with decompensated alcoholic cirrhosis & recurrent abdominal ascites, Hepatorenal syndrome w/ chronic leukocytosis and CKD III-IV presented with SOB & worsening ascites. Labs showed PAPO.    MEDICATIONS  (STANDING):  folic acid 1 milliGRAM(s) Oral daily  multivitamin 1 Tablet(s) Oral daily  pantoprazole    Tablet 40 milliGRAM(s) Oral before breakfast  rifaximin 550 milliGRAM(s) Oral two times a day  thiamine 100 milliGRAM(s) Oral daily    MEDICATIONS  (PRN):  midodrine 5 milliGRAM(s) Oral every 8 hours PRN BP systolic < 95      Allergies    No Known Allergies    Intolerances        Vital Signs Last 24 Hrs  T(C): 36.8 (19 Mar 2019 11:14), Max: 37.3 (18 Mar 2019 22:16)  T(F): 98.2 (19 Mar 2019 11:14), Max: 99.1 (18 Mar 2019 22:16)  HR: 98 (19 Mar 2019 11:14) (94 - 125)  BP: 105/75 (19 Mar 2019 11:14) (99/70 - 113/81)  BP(mean): --  RR: 18 (19 Mar 2019 11:14) (18 - 26)  SpO2: 98% (19 Mar 2019 11:14) (98% - 98%)    PHYSICAL EXAM:  GENERAL: NAD, well-groomed, well-developed  HEAD:  Atraumatic, Normocephalic  EYES: EOMI, PERRLA, conjunctiva and sclera clear  ENMT: No tonsillar erythema, exudates, or enlargement; Moist mucous membranes, Good dentition, No lesions  NECK: Supple, No JVD, Normal thyroid  NERVOUS SYSTEM:  Alert & Oriented X3, Good concentration   CHEST/LUNG: Clear to auscultation bilaterally; No rales, rhonchi, wheezing, or rubs  HEART: Regular rate and rhythm; No murmurs, rubs, or gallops  ABDOMEN: firm, nontender, ascitic and distended; Bowel sounds present  EXTREMITIES: B/L LE edema      LABS:                        11.5   20.39 )-----------( 512      ( 18 Mar 2019 18:26 )             34.9     03-18    133<L>  |  101  |  39<H>  ----------------------------<  155<H>  4.2   |  21<L>  |  2.80<H>    Ca    8.2<L>      18 Mar 2019 18:26    TPro  7.5  /  Alb  2.0<L>  /  TBili  2.0<H>  /  DBili  x   /  AST  49<H>  /  ALT  31  /  AlkPhos  873<H>  03-18    PT/INR - ( 18 Mar 2019 18:26 )   PT: 21.4 sec;   INR: 1.87 ratio         PTT - ( 18 Mar 2019 18:26 )  PTT:36.7 sec    CAPILLARY BLOOD GLUCOSE         RADIOLOGY & ADDITIONAL TESTS:    03-18-19 @ 07:01  -  03-19-19 @ 07:00  --------------------------------------------------------  IN:  Total IN: 0 mL    OUT:    Voided: 250 mL  Total OUT: 250 mL    Total NET: -250 mL 47 y/o Male w/ history of HTN, Psoriasis, ETOH abuse with decompensated alcoholic cirrhosis & recurrent abdominal ascites, Hepatorenal syndrome w/ chronic leukocytosis and CKD III-IV presented with SOB & worsening ascites. Labs showed PAPO. He is lying in bed in NAD.    MEDICATIONS  (STANDING):  folic acid 1 milliGRAM(s) Oral daily  multivitamin 1 Tablet(s) Oral daily  pantoprazole    Tablet 40 milliGRAM(s) Oral before breakfast  rifaximin 550 milliGRAM(s) Oral two times a day  thiamine 100 milliGRAM(s) Oral daily    MEDICATIONS  (PRN):  midodrine 5 milliGRAM(s) Oral every 8 hours PRN BP systolic < 95      Allergies    No Known Allergies    Intolerances        Vital Signs Last 24 Hrs  T(C): 36.8 (19 Mar 2019 11:14), Max: 37.3 (18 Mar 2019 22:16)  T(F): 98.2 (19 Mar 2019 11:14), Max: 99.1 (18 Mar 2019 22:16)  HR: 98 (19 Mar 2019 11:14) (94 - 125)  BP: 105/75 (19 Mar 2019 11:14) (99/70 - 113/81)  BP(mean): --  RR: 18 (19 Mar 2019 11:14) (18 - 26)  SpO2: 98% (19 Mar 2019 11:14) (98% - 98%)    PHYSICAL EXAM:  GENERAL: NAD, well-groomed, well-developed  HEAD:  Atraumatic, Normocephalic  EYES: EOMI, PERRLA, conjunctiva and sclera clear  ENMT: No tonsillar erythema, exudates, or enlargement; Moist mucous membranes, Good dentition, No lesions  NECK: Supple, No JVD, Normal thyroid  NERVOUS SYSTEM:  Alert & Oriented X3, Good concentration   CHEST/LUNG: Clear to auscultation bilaterally; No rales, rhonchi, wheezing, or rubs  HEART: Regular rate and rhythm; No murmurs, rubs, or gallops  ABDOMEN: firm, nontender, ascitic and distended; Bowel sounds present  EXTREMITIES: B/L LE edema      LABS:                        11.5   20.39 )-----------( 512      ( 18 Mar 2019 18:26 )             34.9     03-18    133<L>  |  101  |  39<H>  ----------------------------<  155<H>  4.2   |  21<L>  |  2.80<H>    Ca    8.2<L>      18 Mar 2019 18:26    TPro  7.5  /  Alb  2.0<L>  /  TBili  2.0<H>  /  DBili  x   /  AST  49<H>  /  ALT  31  /  AlkPhos  873<H>  03-18    PT/INR - ( 18 Mar 2019 18:26 )   PT: 21.4 sec;   INR: 1.87 ratio         PTT - ( 18 Mar 2019 18:26 )  PTT:36.7 sec    CAPILLARY BLOOD GLUCOSE         RADIOLOGY & ADDITIONAL TESTS:    03-18-19 @ 07:01  -  03-19-19 @ 07:00  --------------------------------------------------------  IN:  Total IN: 0 mL    OUT:    Voided: 250 mL  Total OUT: 250 mL    Total NET: -250 mL

## 2019-03-19 NOTE — CONSULT NOTE ADULT - SUBJECTIVE AND OBJECTIVE BOX
HPI:  47 y/o Male PMH ETOH abuse with decompensated alcoholic cirrhosis, recurrent abdominal ascites, chronically elevated WBC, CKD presents with SOB, increasing abdominal girth since discharge from [facility] two weeks ago. Patient denies fever, chills, diarrhea, cough, nausea, vomiting. Pt sent by PMD for abdominal ascites requiring a therapeutic paracentesis; he saw outpt GI physician to go to the ER for a therapeutic drainage. In ED pt in NAD, normotensive, afebrile, otherwise HD stable. Pt c/o epigastric pain upon palpation, and BL LE swelling with notable 3+ pitting edema. Denies N/V, hematemesis, melena. Pt had been in ICU at last [facility] admission; denies smoking/drinking since Jan, 2019.   . (18 Mar 2019 21:18)  -------------------- As Above ---------------------------  The patient presents with worsenin abdominal girth, pedal edema and abdomainal pain. The patient is known to my service from the previous admission. patient was admitted with alcoholic hepatitis and developed hepatorenal syndrome. Hospital stay also complicated by diarrhea and leucocytosis. Patient eventually did well and was transferred to [facility]. He was subsequently discharged home.   Patient was nephrology but was never started.       PAST MEDICAL & SURGICAL HISTORY:  ETOH abuse  Psoriasis  History of hypertension  No significant past surgical history      MEDICATIONS  (STANDING):  folic acid 1 milliGRAM(s) Oral daily  multivitamin 1 Tablet(s) Oral daily  pantoprazole    Tablet 40 milliGRAM(s) Oral before breakfast  rifaximin 550 milliGRAM(s) Oral two times a day  thiamine 100 milliGRAM(s) Oral daily    MEDICATIONS  (PRN):  midodrine 5 milliGRAM(s) Oral every 8 hours PRN BP systolic < 95      Allergies    No Known Allergies    Intolerances        FAMILY HISTORY:  No pertinent family history in first degree relatives      REVIEW OF SYSTEMS:    CONSTITUTIONAL: No fever, weight loss, or fatigue  EYES: No eye pain, visual disturbances, or discharge  ENMT:  No difficulty hearing, tinnitus, vertigo; No sinus or throat pain  NECK: No pain or stiffness  BREASTS: No pain, masses, or nipple discharge  RESPIRATORY: No cough, wheezing, chills or hemoptysis; No shortness of breath  CARDIOVASCULAR: No chest pain, palpitations, dizziness, or leg swelling  GASTROINTESTINAL: No abdominal or epigastric pain. No nausea, vomiting, or hematemesis; No diarrhea or constipation. No melena or hematochezia.  GENITOURINARY: No dysuria, frequency, hematuria, or incontinence  NEUROLOGICAL: No headaches, memory loss, loss of strength, numbness, or tremors  SKIN: No itching, burning, rashes, or lesions   LYMPH NODES: No enlarged glands  ENDOCRINE: No heat or cold intolerance; No hair loss  MUSCULOSKELETAL: No joint pain or swelling; No muscle, back, or extremity pain  PSYCHIATRIC: No depression, anxiety, mood swings, or difficulty sleeping  HEME/LYMPH: No easy bruising, or bleeding gums  ALLERGY AND IMMUNOLOGIC: No hives or eczema          SOCIAL HISTORY:    FAMILY HISTORY:  No pertinent family history in first degree relatives      Vital Signs Last 24 Hrs  T(C): 36.8 (19 Mar 2019 11:14), Max: 37.3 (18 Mar 2019 22:16)  T(F): 98.2 (19 Mar 2019 11:14), Max: 99.1 (18 Mar 2019 22:16)  HR: 98 (19 Mar 2019 11:14) (94 - 125)  BP: 105/75 (19 Mar 2019 11:14) (99/70 - 113/81)  BP(mean): --  RR: 18 (19 Mar 2019 11:14) (18 - 26)  SpO2: 98% (19 Mar 2019 11:14) (98% - 98%)    PHYSICAL EXAM:    GENERAL: NAD, well-groomed, well-developed  HEAD:  Atraumatic, Normocephalic  EYES: EOMI, PERRLA, conjunctiva and sclera clear  ENMT: No tonsillar erythema, exudates, or enlargement; Moist mucous membranes, Good dentition, No lesions  NECK: Supple, No JVD, Normal thyroid  NERVOUS SYSTEM:  Alert & Oriented X3, Good concentration; Motor Strength 5/5 B/L upper and lower extremities; DTRs 2+ intact and symmetric  CHEST/LUNG: Clear to percussion bilaterally; No rales, rhonchi, wheezing, or rubs  HEART: Regular rate and rhythm; No murmurs, rubs, or gallops  ABDOMEN: Soft, Nontender, Nondistended; Bowel sounds present  EXTREMITIES:  2+ Peripheral Pulses, No clubbing, cyanosis, or edema  LYMPH: No lymphadenopathy noted   RECTAL: No masses, prostate normal size and smooth, Guaiaci negative   BREAST: No palpable masses, skin no lesions no retractions, no discharges. adnexal no palpable masses noted   GYN: uterus normal size, adnexal, no palpable masses, no CMT, no uterine discharge   SKIN: No rashes or lesions    LABS:                        11.5   20.39 )-----------( 512      ( 18 Mar 2019 18:26 )             34.9       CBC:  03-18 @ 18:26  WBC  20.39  HGB 11.5  HCT 34.9 Plate 512  MCV 95.6           18 Mar 2019 18:26    133    |  101    |  39     ----------------------------<  155    4.2     |  21     |  2.80     Ca    8.2        18 Mar 2019 18:26    TPro  7.5    /  Alb  2.0    /  TBili  2.0    /  DBili  x      /  AST  49     /  ALT  31     /  AlkPhos  873    18 Mar 2019 18:26    PT/INR - ( 18 Mar 2019 18:26 )   PT: 21.4 sec;   INR: 1.87 ratio         PTT - ( 18 Mar 2019 18:26 )  PTT:36.7 sec        RADIOLOGY & ADDITIONAL STUDIES: HPI:  49 y/o Male PMH ETOH abuse with decompensated alcoholic cirrhosis, recurrent abdominal ascites, chronically elevated WBC, CKD presents with SOB, increasing abdominal girth since discharge from [facility] two weeks ago. Patient denies fever, chills, diarrhea, cough, nausea, vomiting. Pt sent by PMD for abdominal ascites requiring a therapeutic paracentesis; he saw outpt GI physician to go to the ER for a therapeutic drainage. In ED pt in NAD, normotensive, afebrile, otherwise HD stable. Pt c/o epigastric pain upon palpation, and BL LE swelling with notable 3+ pitting edema. Denies N/V, hematemesis, melena. Pt had been in ICU at last [facility] admission; denies smoking/drinking since Jan, 2019.   . (18 Mar 2019 21:18)  -------------------- As Above ---------------------------  The patient presents with worsenin abdominal girth, pedal edema and abdomainal pain. The patient is known to my service from the previous admission. patient was admitted with alcoholic hepatitis and developed hepatorenal syndrome. Hospital stay also complicated by diarrhea and leucocytosis. Patient eventually did well and was transferred to [facility]. He was subsequently discharged home.   Patient saw nephrology but was never started on diuretics. Patient denies taking ETOH since admission. Patient never saw hepatology. Patient noted increasing abdominal girth and pedal edema. However, he was never       PAST MEDICAL & SURGICAL HISTORY:  ETOH abuse  Psoriasis  History of hypertension  No significant past surgical history      MEDICATIONS  (STANDING):  folic acid 1 milliGRAM(s) Oral daily  multivitamin 1 Tablet(s) Oral daily  pantoprazole    Tablet 40 milliGRAM(s) Oral before breakfast  rifaximin 550 milliGRAM(s) Oral two times a day  thiamine 100 milliGRAM(s) Oral daily    MEDICATIONS  (PRN):  midodrine 5 milliGRAM(s) Oral every 8 hours PRN BP systolic < 95      Allergies    No Known Allergies    Intolerances        FAMILY HISTORY:  No pertinent family history in first degree relatives      REVIEW OF SYSTEMS:    CONSTITUTIONAL: No fever, weight loss, or fatigue  EYES: No eye pain, visual disturbances, or discharge  ENMT:  No difficulty hearing, tinnitus, vertigo; No sinus or throat pain  NECK: No pain or stiffness  BREASTS: No pain, masses, or nipple discharge  RESPIRATORY: No cough, wheezing, chills or hemoptysis; No shortness of breath  CARDIOVASCULAR: No chest pain, palpitations, dizziness, or leg swelling  GASTROINTESTINAL: No abdominal or epigastric pain. No nausea, vomiting, or hematemesis; No diarrhea or constipation. No melena or hematochezia.  GENITOURINARY: No dysuria, frequency, hematuria, or incontinence  NEUROLOGICAL: No headaches, memory loss, loss of strength, numbness, or tremors  SKIN: No itching, burning, rashes, or lesions   LYMPH NODES: No enlarged glands  ENDOCRINE: No heat or cold intolerance; No hair loss  MUSCULOSKELETAL: No joint pain or swelling; No muscle, back, or extremity pain  PSYCHIATRIC: No depression, anxiety, mood swings, or difficulty sleeping  HEME/LYMPH: No easy bruising, or bleeding gums  ALLERGY AND IMMUNOLOGIC: No hives or eczema          SOCIAL HISTORY:    FAMILY HISTORY:  No pertinent family history in first degree relatives      Vital Signs Last 24 Hrs  T(C): 36.8 (19 Mar 2019 11:14), Max: 37.3 (18 Mar 2019 22:16)  T(F): 98.2 (19 Mar 2019 11:14), Max: 99.1 (18 Mar 2019 22:16)  HR: 98 (19 Mar 2019 11:14) (94 - 125)  BP: 105/75 (19 Mar 2019 11:14) (99/70 - 113/81)  BP(mean): --  RR: 18 (19 Mar 2019 11:14) (18 - 26)  SpO2: 98% (19 Mar 2019 11:14) (98% - 98%)    PHYSICAL EXAM:    GENERAL: NAD, well-groomed, well-developed  HEAD:  Atraumatic, Normocephalic  EYES: EOMI, PERRLA, conjunctiva and sclera clear  ENMT: No tonsillar erythema, exudates, or enlargement; Moist mucous membranes, Good dentition, No lesions  NECK: Supple, No JVD, Normal thyroid  NERVOUS SYSTEM:  Alert & Oriented X3, Good concentration; Motor Strength 5/5 B/L upper and lower extremities; DTRs 2+ intact and symmetric  CHEST/LUNG: Clear to percussion bilaterally; No rales, rhonchi, wheezing, or rubs  HEART: Regular rate and rhythm; No murmurs, rubs, or gallops  ABDOMEN: Soft, Nontender, Nondistended; Bowel sounds present  EXTREMITIES:  2+ Peripheral Pulses, No clubbing, cyanosis, or edema  LYMPH: No lymphadenopathy noted   RECTAL: No masses, prostate normal size and smooth, Guaiaci negative   BREAST: No palpable masses, skin no lesions no retractions, no discharges. adnexal no palpable masses noted   GYN: uterus normal size, adnexal, no palpable masses, no CMT, no uterine discharge   SKIN: No rashes or lesions    LABS:                        11.5   20.39 )-----------( 512      ( 18 Mar 2019 18:26 )             34.9       CBC:  03-18 @ 18:26  WBC  20.39  HGB 11.5  HCT 34.9 Plate 512  MCV 95.6           18 Mar 2019 18:26    133    |  101    |  39     ----------------------------<  155    4.2     |  21     |  2.80     Ca    8.2        18 Mar 2019 18:26    TPro  7.5    /  Alb  2.0    /  TBili  2.0    /  DBili  x      /  AST  49     /  ALT  31     /  AlkPhos  873    18 Mar 2019 18:26    PT/INR - ( 18 Mar 2019 18:26 )   PT: 21.4 sec;   INR: 1.87 ratio         PTT - ( 18 Mar 2019 18:26 )  PTT:36.7 sec        RADIOLOGY & ADDITIONAL STUDIES: HPI:  49 y/o Male PMH ETOH abuse with decompensated alcoholic cirrhosis, recurrent abdominal ascites, chronically elevated WBC, CKD presents with SOB, increasing abdominal girth since discharge from [facility] two weeks ago. Patient denies fever, chills, diarrhea, cough, nausea, vomiting. Pt sent by PMD for abdominal ascites requiring a therapeutic paracentesis; he saw outpt GI physician to go to the ER for a therapeutic drainage. In ED pt in NAD, normotensive, afebrile, otherwise HD stable. Pt c/o epigastric pain upon palpation, and BL LE swelling with notable 3+ pitting edema. Denies N/V, hematemesis, melena. Pt had been in ICU at last [facility] admission; denies smoking/drinking since Jan, 2019.   . (18 Mar 2019 21:18)  -------------------- As Above ---------------------------  The patient presents with worsenin abdominal girth, pedal edema and abdomainal pain. The patient is known to my service from the previous admission. Patient was admitted with alcoholic hepatitis and developed hepatorenal syndrome. Hospital stay also complicated by diarrhea and leucocytosis. Patient eventually did well and was transferred to [facility]. He was subsequently discharged home.   Patient saw nephrology but was never started on diuretics. Patient denies taking ETOH since admission. Patient never saw hepatology. Patient noted increasing abdominal girth and pedal edema over the past few weeks.. SOB with exertion. However, he was never started on any diuretics. Patient still on midrodine.  Patient has significant elevation of alk phos compared to other LFTs secondary to (?).      PAST MEDICAL & SURGICAL HISTORY:  ETOH abuse  Psoriasis  History of hypertension  No significant past surgical history      MEDICATIONS  (STANDING):  folic acid 1 milliGRAM(s) Oral daily  multivitamin 1 Tablet(s) Oral daily  pantoprazole    Tablet 40 milliGRAM(s) Oral before breakfast  rifaximin 550 milliGRAM(s) Oral two times a day  thiamine 100 milliGRAM(s) Oral daily    MEDICATIONS  (PRN):  midodrine 5 milliGRAM(s) Oral every 8 hours PRN BP systolic < 95      Allergies    No Known Allergies    Intolerances        FAMILY HISTORY:  No pertinent family history in first degree relatives      REVIEW OF SYSTEMS:    CONSTITUTIONAL: No fever, weight loss, or fatigue  EYES: No eye pain, visual disturbances, or discharge  ENMT:  No difficulty hearing, tinnitus, vertigo; No sinus or throat pain  NECK: No pain or stiffness  BREASTS: No pain, masses, or nipple discharge  RESPIRATORY: No cough, wheezing, chills or hemoptysis; Shortness of breath with exertion  CARDIOVASCULAR: No chest pain, palpitations, dizziness, or leg swelling  GASTROINTESTINAL:  See above  GENITOURINARY: No dysuria, frequency, hematuria, or incontinence  NEUROLOGICAL: No headaches, memory loss, , numbness, or tremors  SKIN: No itching, burning, rashes, or lesions   LYMPH NODES: No enlarged glands  ENDOCRINE: No heat or cold intolerance; No hair loss  MUSCULOSKELETAL: No joint pain or swelling; No muscle, back, or extremity pain  PSYCHIATRIC: No depression, anxiety, mood swings, or difficulty sleeping  HEME/LYMPH: No easy bruising, or bleeding gums  ALLERGY AND IMMUNOLOGIC: No hives or eczema          SOCIAL HISTORY:    FAMILY HISTORY:  No pertinent family history in first degree relatives      Vital Signs Last 24 Hrs  T(C): 36.8 (19 Mar 2019 11:14), Max: 37.3 (18 Mar 2019 22:16)  T(F): 98.2 (19 Mar 2019 11:14), Max: 99.1 (18 Mar 2019 22:16)  HR: 98 (19 Mar 2019 11:14) (94 - 125)  BP: 105/75 (19 Mar 2019 11:14) (99/70 - 113/81)  BP(mean): --  RR: 18 (19 Mar 2019 11:14) (18 - 26)  SpO2: 98% (19 Mar 2019 11:14) (98% - 98%)    PHYSICAL EXAM:    GENERAL: NAD, well-groomed, well-developed  HEAD:  Atraumatic, Normocephalic  EYES: EOMI, PERRLA, conjunctiva and sclera clear  NECK: Supple, No JVD, Normal thyroid  NERVOUS SYSTEM:  Alert & Oriented X3, Good concentration; No asterixis  CHEST/LUNG: Clear to percussion bilaterally; No rales, rhonchi, wheezing, or rubs  HEART: Regular rate and rhythm; No murmurs, rubs, or gallops  ABDOMEN: Soft, Nontender, Distended; Bowel sounds present  EXTREMITIES:  2+ Peripheral Pulses, No clubbing, cyanosis, or edema  LYMPH: No lymphadenopathy noted   RECTAL: Deferred   SKIN: No rashes or lesions    LABS:                        11.5   20.39 )-----------( 512      ( 18 Mar 2019 18:26 )             34.9       CBC:  03-18 @ 18:26  WBC  20.39  HGB 11.5  HCT 34.9 Plate 512  MCV 95.6           18 Mar 2019 18:26    133    |  101    |  39     ----------------------------<  155    4.2     |  21     |  2.80     Ca    8.2        18 Mar 2019 18:26    TPro  7.5    /  Alb  2.0    /  TBili  2.0    /  DBili  x      /  AST  49     /  ALT  31     /  AlkPhos  873    18 Mar 2019 18:26    PT/INR - ( 18 Mar 2019 18:26 )   PT: 21.4 sec;   INR: 1.87 ratio         PTT - ( 18 Mar 2019 18:26 )  PTT:36.7 sec        RADIOLOGY & ADDITIONAL STUDIES:

## 2019-03-19 NOTE — CONSULT NOTE ADULT - ASSESSMENT
HPI:  47 y/o Male PMH ETOH abuse with decompensated alcoholic cirrhosis, recurrent abdominal ascites, chronically elevated WBC, CKD presents with SOB, increasing abdominal girth since discharge from [facility] two weeks ago. Patient denies fever, chills, diarrhea, cough, nausea, vomiting. Pt sent by PMD for abdominal ascites requiring a therapeutic paracentesis; he saw outpt GI physician to go to the ER for a therapeutic drainage. In ED pt in NAD, normotensive, afebrile, otherwise HD stable. Pt c/o epigastric pain upon palpation, and BL LE swelling with notable 3+ pitting edema. Denies N/V, hematemesis, melena. Pt had been in ICU at last [facility] admission; denies smoking/drinking since Jan, 2019.   . (18 Mar 2019 21:18)  -------------------- As Above ---------------------------  The patient presents with worsenin abdominal girth, pedal edema and abdomainal pain. The patient is known to my service from the previous admission. Patient was admitted with alcoholic hepatitis and developed hepatorenal syndrome. Hospital stay also complicated by diarrhea and leucocytosis. Patient eventually did well and was transferred to [facility]. He was subsequently discharged home.   Patient saw nephrology but was never started on diuretics. Patient denies taking ETOH since admission. Patient never saw hepatology. Patient noted increasing abdominal girth and pedal edema over the past few weeks.. SOB with exertion. However, he was never started on any diuretics. Patient still on midrodine.  Patient has significant elevation of alk phos compared to other LFTs secondary to (?).    Worsening abdominal girth secondary to cirrhosis 1) abdominal x ray  2) abdominal sonogram 3) Paracentesis ( already ordered ) 4) Renal consult ( can patient start on diuretics   Increased alk phos compared to other LFTs - 1) labs

## 2019-03-19 NOTE — CONSULT NOTE ADULT - SUBJECTIVE AND OBJECTIVE BOX
Information from chart:  "Patient is a 48y old  Male who presents with a chief complaint of Sent by PMD for ascites. (19 Mar 2019 14:58)    HPI:  49 y/o Male PMH ETOH abuse with decompensated alcoholic cirrhosis, recurrent abdominal ascites, chronically elevated WBC, CKD presents with SOB, increasing abdominal girth since discharge from [facility] two weeks ago. Patient denies fever, chills, diarrhea, cough, nausea, vomiting. Pt sent by PMD for abdominal ascites requiring a therapeutic paracentesis; he saw outpt GI physician to go to the ER for a therapeutic drainage. In ED pt in NAD, normotensive, afebrile, otherwise HD stable. Pt c/o epigastric pain upon palpation, and BL LE swelling with notable 3+ pitting edema. Denies N/V, hematemesis, melena. Pt had been in ICU at last [facility] admission; denies smoking/drinking since 2019.   . (18 Mar 2019 21:18)   "    Patient with increasing abd girth and LE edema   HX PAPO ATN possible HRS; responded slowly to supportive care; SPA; Alb and midodrine  WBC increasing;       PAST MEDICAL & SURGICAL HISTORY:  ETOH abuse  Psoriasis  History of hypertension  No significant past surgical history    FAMILY HISTORY:  No pertinent family history in first degree relatives    Allergies    No Known Allergies    Intolerances      Home Medications:  folic acid 1 mg oral tablet: 1 tab(s) orally once a day (18 Mar 2019 18:27)  hydrocortisone 1% topical ointment: 1 application topically 2 times a day (18 Mar 2019 18:27)  hydrOXYzine hydrochloride 25 mg oral tablet: 1 tab(s) orally every 6 hours, As needed, Itching (18 Mar 2019 18:27)  midodrine 5 mg oral tablet: 1 tab(s) orally every 8 hours (18 Mar 2019 18:27)  Multiple Vitamins oral tablet: 1 tab(s) orally once a day (2019 14:55)  nicotine 14 mg/24 hr transdermal film, extended release:  transdermal  (2019 14:55)  rifAXIMin 550 mg oral tablet: 1 tab(s) orally 2 times a day (2019 14:55)  thiamine 100 mg oral tablet: 1 tab(s) orally once a day (2019 14:55)  zinc oxide 40% topical ointment: 1 application topically every 8 hours, As needed, rash (2019 14:55)    MEDICATIONS  (STANDING):  folic acid 1 milliGRAM(s) Oral daily  multivitamin 1 Tablet(s) Oral daily  pantoprazole    Tablet 40 milliGRAM(s) Oral before breakfast  rifaximin 550 milliGRAM(s) Oral two times a day  thiamine 100 milliGRAM(s) Oral daily    MEDICATIONS  (PRN):  midodrine 5 milliGRAM(s) Oral every 8 hours PRN BP systolic < 95    Vital Signs Last 24 Hrs  T(C): 36.4 (19 Mar 2019 16:54), Max: 37.3 (18 Mar 2019 22:16)  T(F): 97.6 (19 Mar 2019 16:54), Max: 99.1 (18 Mar 2019 22:16)  HR: 96 (19 Mar 2019 16:54) (94 - 105)  BP: 120/90 (19 Mar 2019 16:54) (99/70 - 120/90)  BP(mean): --  RR: 18 (19 Mar 2019 16:54) (18 - 20)  SpO2: 98% (19 Mar 2019 16:54) (98% - 98%)    Daily     Daily Weight in k.2 (19 Mar 2019 04:59)    19 @ 07:01  -  19 @ 07:00  --------------------------------------------------------  IN: 0 mL / OUT: 250 mL / NET: -250 mL      CAPILLARY BLOOD GLUCOSE        PHYSICAL EXAM:      T(C): 36.4 (19 @ 16:54), Max: 37.3 (19 @ 22:16)  HR: 96 (19 @ 16:54) (94 - 105)  BP: 120/90 (19 @ 16:54) (99/70 - 120/90)  RR: 18 (19 @ 16:54) (18 - 20)  SpO2: 98% (19 @ 16:54) (98% - 98%)  Wt(kg): --  Respiratory: clear anteriorly, decreased BS at bases  Cardiovascular: S1 S2  Gastrointestinal: soft distended +BS  Extremities:  2-3  edema                  133<L>  |  101  |  39<H>  ----------------------------<  155<H>  4.2   |  21<L>  |  2.80<H>    Ca    8.2<L>      18 Mar 2019 18:26    TPro  7.5  /  Alb  2.0<L>  /  TBili  2.0<H>  /  DBili  x   /  AST  49<H>  /  ALT  31  /  AlkPhos  873<H>                            11.5   20.39 )-----------( 512      ( 18 Mar 2019 18:26 )             34.9       ABG - ( 18 Mar 2019 18:12 )  pH, Arterial: x     pH, Blood: 7.44  /  pCO2: 28    /  pO2: 55    / HCO3: 18    / Base Excess: -4.5  /  SaO2: 86                Assessment and Plan  PAPO CKD3 cirrhosis; ascites; r/o SBP; HRS risk type 2 to 1;   SPA; Midodrine and Octreotide protocol  IV abx   For therapeutic paracentesis;   Will follow.

## 2019-03-20 DIAGNOSIS — K70.11 ALCOHOLIC HEPATITIS WITH ASCITES: ICD-10-CM

## 2019-03-20 DIAGNOSIS — R94.5 ABNORMAL RESULTS OF LIVER FUNCTION STUDIES: ICD-10-CM

## 2019-03-20 LAB
ALBUMIN FLD-MCNC: 0.6 G/DL — SIGNIFICANT CHANGE UP
ALBUMIN SERPL ELPH-MCNC: 2 G/DL — LOW (ref 3.3–5)
ALP SERPL-CCNC: 690 U/L — HIGH (ref 40–120)
ALT FLD-CCNC: 23 U/L — SIGNIFICANT CHANGE UP (ref 12–78)
AMMONIA BLD-MCNC: 38 UMOL/L — HIGH (ref 11–32)
ANION GAP SERPL CALC-SCNC: 10 MMOL/L — SIGNIFICANT CHANGE UP (ref 5–17)
AST SERPL-CCNC: 52 U/L — HIGH (ref 15–37)
B PERT IGG+IGM PNL SER: CLEAR — SIGNIFICANT CHANGE UP
BILIRUB SERPL-MCNC: 2.1 MG/DL — HIGH (ref 0.2–1.2)
BUN SERPL-MCNC: 34 MG/DL — HIGH (ref 7–23)
CALCIUM SERPL-MCNC: 7.9 MG/DL — LOW (ref 8.5–10.1)
CHLORIDE SERPL-SCNC: 103 MMOL/L — SIGNIFICANT CHANGE UP (ref 96–108)
CO2 SERPL-SCNC: 19 MMOL/L — LOW (ref 22–31)
COLOR FLD: YELLOW — SIGNIFICANT CHANGE UP
CREAT SERPL-MCNC: 2.27 MG/DL — HIGH (ref 0.5–1.3)
EOSINOPHIL # FLD: 0 % — SIGNIFICANT CHANGE UP
FERRITIN SERPL-MCNC: 1269 NG/ML — HIGH (ref 30–400)
FLUID INTAKE SUBSTANCE CLASS: SIGNIFICANT CHANGE UP
FLUID SEGMENTED GRANULOCYTES: 8 % — SIGNIFICANT CHANGE UP
FOLATE+VIT B12 SERBLD-IMP: 0 % — SIGNIFICANT CHANGE UP
GLUCOSE FLD-MCNC: 107 MG/DL — SIGNIFICANT CHANGE UP
GLUCOSE SERPL-MCNC: 114 MG/DL — HIGH (ref 70–99)
GRAM STN FLD: SIGNIFICANT CHANGE UP
HAV IGM SER-ACNC: SIGNIFICANT CHANGE UP
HBV CORE IGM SER-ACNC: SIGNIFICANT CHANGE UP
HBV SURFACE AG SER-ACNC: SIGNIFICANT CHANGE UP
HCT VFR BLD CALC: 32 % — LOW (ref 39–50)
HCV AB S/CO SERPL IA: 0.34 S/CO — SIGNIFICANT CHANGE UP (ref 0–0.79)
HCV AB SERPL-IMP: SIGNIFICANT CHANGE UP
HGB BLD-MCNC: 10.6 G/DL — LOW (ref 13–17)
IRON SATN MFR SERPL: 39 % — SIGNIFICANT CHANGE UP (ref 16–55)
IRON SATN MFR SERPL: 44 UG/DL — LOW (ref 45–165)
LDH SERPL L TO P-CCNC: 54 U/L — SIGNIFICANT CHANGE UP
LYMPHOCYTES # FLD: 15 % — SIGNIFICANT CHANGE UP
MAGNESIUM SERPL-MCNC: 2 MG/DL — SIGNIFICANT CHANGE UP (ref 1.6–2.6)
MCHC RBC-ENTMCNC: 31.5 PG — SIGNIFICANT CHANGE UP (ref 27–34)
MCHC RBC-ENTMCNC: 33.1 GM/DL — SIGNIFICANT CHANGE UP (ref 32–36)
MCV RBC AUTO: 95.2 FL — SIGNIFICANT CHANGE UP (ref 80–100)
MESOTHL CELL # FLD: 0 % — SIGNIFICANT CHANGE UP
MONOS+MACROS # FLD: 77 % — SIGNIFICANT CHANGE UP
NRBC # BLD: 0 /100 WBCS — SIGNIFICANT CHANGE UP (ref 0–0)
OTHER CELLS FLD MANUAL: 0 % — SIGNIFICANT CHANGE UP
PHOSPHATE SERPL-MCNC: 3.8 MG/DL — SIGNIFICANT CHANGE UP (ref 2.5–4.5)
PLATELET # BLD AUTO: 439 K/UL — HIGH (ref 150–400)
POTASSIUM SERPL-MCNC: 4.6 MMOL/L — SIGNIFICANT CHANGE UP (ref 3.5–5.3)
POTASSIUM SERPL-SCNC: 4.6 MMOL/L — SIGNIFICANT CHANGE UP (ref 3.5–5.3)
PROT FLD-MCNC: 1.7 G/DL — SIGNIFICANT CHANGE UP
PROT SERPL-MCNC: 6.5 GM/DL — SIGNIFICANT CHANGE UP (ref 6–8.3)
RBC # BLD: 3.36 M/UL — LOW (ref 4.2–5.8)
RBC # FLD: 14.1 % — SIGNIFICANT CHANGE UP (ref 10.3–14.5)
RCV VOL RI: 12 /UL — HIGH (ref 0–0)
SODIUM SERPL-SCNC: 132 MMOL/L — LOW (ref 135–145)
SPECIMEN SOURCE FLD: SIGNIFICANT CHANGE UP
SPECIMEN SOURCE: SIGNIFICANT CHANGE UP
TIBC SERPL-MCNC: 113 UG/DL — LOW (ref 220–430)
TOTAL NUCLEATED CELL COUNT, BODY FLUID: 55 /UL — SIGNIFICANT CHANGE UP
TUBE TYPE: SIGNIFICANT CHANGE UP
UIBC SERPL-MCNC: 69 UG/DL — LOW (ref 110–370)
WBC # BLD: 18.76 K/UL — HIGH (ref 3.8–10.5)
WBC # FLD AUTO: 18.76 K/UL — HIGH (ref 3.8–10.5)

## 2019-03-20 PROCEDURE — 99233 SBSQ HOSP IP/OBS HIGH 50: CPT

## 2019-03-20 PROCEDURE — 74019 RADEX ABDOMEN 2 VIEWS: CPT | Mod: 26

## 2019-03-20 PROCEDURE — 49083 ABD PARACENTESIS W/IMAGING: CPT

## 2019-03-20 RX ORDER — MIDODRINE HYDROCHLORIDE 2.5 MG/1
2.5 TABLET ORAL EVERY 8 HOURS
Qty: 0 | Refills: 0 | Status: DISCONTINUED | OUTPATIENT
Start: 2019-03-20 | End: 2019-03-21

## 2019-03-20 RX ORDER — ALBUMIN HUMAN 25 %
100 VIAL (ML) INTRAVENOUS ONCE
Qty: 0 | Refills: 0 | Status: COMPLETED | OUTPATIENT
Start: 2019-03-20 | End: 2019-03-20

## 2019-03-20 RX ORDER — LACTULOSE 10 G/15ML
10 SOLUTION ORAL
Qty: 0 | Refills: 0 | Status: DISCONTINUED | OUTPATIENT
Start: 2019-03-20 | End: 2019-03-21

## 2019-03-20 RX ADMIN — Medication 1 MILLIGRAM(S): at 14:28

## 2019-03-20 RX ADMIN — OCTREOTIDE ACETATE 100 MICROGRAM(S): 200 INJECTION, SOLUTION INTRAVENOUS; SUBCUTANEOUS at 05:35

## 2019-03-20 RX ADMIN — OCTREOTIDE ACETATE 100 MICROGRAM(S): 200 INJECTION, SOLUTION INTRAVENOUS; SUBCUTANEOUS at 21:09

## 2019-03-20 RX ADMIN — LACTULOSE 10 GRAM(S): 10 SOLUTION ORAL at 17:40

## 2019-03-20 RX ADMIN — OCTREOTIDE ACETATE 100 MICROGRAM(S): 200 INJECTION, SOLUTION INTRAVENOUS; SUBCUTANEOUS at 14:29

## 2019-03-20 RX ADMIN — PANTOPRAZOLE SODIUM 40 MILLIGRAM(S): 20 TABLET, DELAYED RELEASE ORAL at 05:35

## 2019-03-20 RX ADMIN — Medication 1 TABLET(S): at 14:28

## 2019-03-20 RX ADMIN — MIDODRINE HYDROCHLORIDE 5 MILLIGRAM(S): 2.5 TABLET ORAL at 05:35

## 2019-03-20 RX ADMIN — Medication 50 MILLILITER(S): at 14:22

## 2019-03-20 RX ADMIN — MIDODRINE HYDROCHLORIDE 5 MILLIGRAM(S): 2.5 TABLET ORAL at 14:29

## 2019-03-20 RX ADMIN — MIDODRINE HYDROCHLORIDE 2.5 MILLIGRAM(S): 2.5 TABLET ORAL at 21:09

## 2019-03-20 RX ADMIN — Medication 100 MILLIGRAM(S): at 14:29

## 2019-03-20 NOTE — PROGRESS NOTE ADULT - SUBJECTIVE AND OBJECTIVE BOX
Patient is a 48y old  Male who presents with a chief complaint of Sent by PMD for ascites. (19 Mar 2019 17:28)      HPI:  49 y/o Male PMH ETOH abuse with decompensated alcoholic cirrhosis, recurrent abdominal ascites, chronically elevated WBC, CKD presents with SOB, increasing abdominal girth since discharge from [facility] two weeks ago. Patient denies fever, chills, diarrhea, cough, nausea, vomiting. Pt sent by PMD for abdominal ascites requiring a therapeutic paracentesis; he saw outpt GI physician to go to the ER for a therapeutic drainage. In ED pt in NAD, normotensive, afebrile, otherwise HD stable. Pt c/o epigastric pain upon palpation, and BL LE swelling with notable 3+ pitting edema. Denies N/V, hematemesis, melena. Pt had been in ICU at last [facility] admission; denies smoking/drinking since Jan, 2019.   . (18 Mar 2019 21:18)      INTERVAL HPI/OVERNIGHT EVENTS:  Patient  feels significantly better after 11 liters of fluid removal during paracentesis. Minimal if any SOB.     MEDICATIONS  (STANDING):  folic acid 1 milliGRAM(s) Oral daily  lactulose Syrup 20 Gram(s) Oral three times a day  midodrine 5 milliGRAM(s) Oral every 8 hours  multivitamin 1 Tablet(s) Oral daily  octreotide  Injectable 100 MICROGram(s) SubCutaneous every 8 hours  pantoprazole    Tablet 40 milliGRAM(s) Oral before breakfast  rifaximin 550 milliGRAM(s) Oral two times a day  thiamine 100 milliGRAM(s) Oral daily    MEDICATIONS  (PRN):      FAMILY HISTORY:  No pertinent family history in first degree relatives      Allergies    No Known Allergies    Intolerances        PMH/PSH:  ETOH abuse  Psoriasis  History of hypertension  No significant past surgical history        REVIEW OF SYSTEMS:  CONSTITUTIONAL: No fever, weight loss, or fatigue  EYES: No eye pain, visual disturbances, or discharge  ENMT:  No difficulty hearing, tinnitus, vertigo; No sinus or throat pain  NECK: No pain or stiffness  BREASTS: No pain, masses, or nipple discharge  RESPIRATORY: No cough, wheezing, chills or hemoptysis; No shortness of breath  CARDIOVASCULAR: No chest pain, palpitations, dizziness, or leg swelling  GASTROINTESTINAL: No abdominal or epigastric pain. No nausea, vomiting, or hematemesis; No diarrhea or constipation. No melena or hematochezia.  GENITOURINARY: No dysuria, frequency, hematuria, or incontinence  NEUROLOGICAL: No headaches, memory loss, loss of strength, numbness, or tremors  SKIN: No itching, burning, rashes, or lesions   LYMPH NODES: No enlarged glands  ENDOCRINE: No heat or cold intolerance; No hair loss  MUSCULOSKELETAL: No joint pain or swelling; No muscle, back, or extremity pain  PSYCHIATRIC: No depression, anxiety, mood swings, or difficulty sleeping  HEME/LYMPH: No easy bruising, or bleeding gums  ALLERGY AND IMMUNOLOGIC: No hives or eczema    Vital Signs Last 24 Hrs  T(C): 36.6 (20 Mar 2019 14:13), Max: 36.6 (20 Mar 2019 14:13)  T(F): 97.8 (20 Mar 2019 14:13), Max: 97.8 (20 Mar 2019 14:13)  HR: 76 (20 Mar 2019 14:13) (76 - 104)  BP: 115/74 (20 Mar 2019 14:13) (106/76 - 120/90)  BP(mean): --  RR: 20 (20 Mar 2019 14:13) (18 - 20)  SpO2: 97% (20 Mar 2019 14:13) (92% - 98%)    PHYSICAL EXAM:  GENERAL: NAD, well-groomed, well-developed  HEAD:  Atraumatic, Normocephalic  EYES: EOMI, PERRLA, conjunctiva and sclera clear  ENMT: No tonsillar erythema, exudates, or enlargement; Moist mucous membranes, Good dentition, No lesions  NECK: Supple, No JVD, Normal thyroid  NERVOUS SYSTEM:  Alert & Oriented X3, Good concentration; Motor Strength 5/5 B/L upper and lower extremities; DTRs 2+ intact and symmetric  CHEST/LUNG: Clear to percussion bilaterally; No rales, rhonchi, wheezing, or rubs  HEART: Regular rate and rhythm; No murmurs, rubs, or gallops  ABDOMEN: Soft, Nontender, Nondistended; Bowel sounds present  EXTREMITIES:  2+ Peripheral Pulses, No clubbing, cyanosis, or edema  LYMPH: No lymphadenopathy noted  SKIN: No rashes or lesions    LAB  03-18 @ 18:26  amylase 34   lipase 117                           10.6   18.76 )-----------( 439      ( 20 Mar 2019 07:56 )             32.0       CBC:  03-20 @ 07:56  WBC 18.76   Hgb 10.6   Hct 32.0   Plts 439  MCV 95.2  03-18 @ 18:26  WBC 20.39   Hgb 11.5   Hct 34.9   Plts 512  MCV 95.6      Chemistry:  03-20 @ 07:56  Na+ 132  K+ 4.6  Cl- 103  CO2 19  BUN 34  Cr 2.27     03-18 @ 18:26  Na+ 133  K+ 4.2  Cl- 101  CO2 21  BUN 39  Cr 2.80         Glucose, Serum: 114 mg/dL (03-20 @ 07:56)  Glucose, Serum: 155 mg/dL (03-18 @ 18:26)      20 Mar 2019 07:56    132    |  103    |  34     ----------------------------<  114    4.6     |  19     |  2.27   18 Mar 2019 18:26    133    |  101    |  39     ----------------------------<  155    4.2     |  21     |  2.80     Ca    7.9        20 Mar 2019 07:56  Ca    8.2        18 Mar 2019 18:26  Phos  3.8       20 Mar 2019 07:56  Mg     2.0       20 Mar 2019 07:56    TPro  6.5    /  Alb  2.0    /  TBili  2.1    /  DBili  x      /  AST  52     /  ALT  23     /  AlkPhos  690    20 Mar 2019 07:56  TPro  7.5    /  Alb  2.0    /  TBili  2.0    /  DBili  x      /  AST  49     /  ALT  31     /  AlkPhos  873    18 Mar 2019 18:26      PT/INR - ( 18 Mar 2019 18:26 )   PT: 21.4 sec;   INR: 1.87 ratio         PTT - ( 18 Mar 2019 18:26 )  PTT:36.7 sec        CAPILLARY BLOOD GLUCOSE              RADIOLOGY & ADDITIONAL TESTS:    Imaging Personally Reviewed:  [ ] YES  [ ] NO    Consultant(s) Notes Reviewed:  [ ] YES  [ ] NO    Care Discussed with Consultants/Other Providers [ ] YES  [ ] NO Patient is a 48y old  Male who presents with a chief complaint of Sent by PMD for ascites. (19 Mar 2019 17:28)      HPI:  49 y/o Male PMH ETOH abuse with decompensated alcoholic cirrhosis, recurrent abdominal ascites, chronically elevated WBC, CKD presents with SOB, increasing abdominal girth since discharge from [facility] two weeks ago. Patient denies fever, chills, diarrhea, cough, nausea, vomiting. Pt sent by PMD for abdominal ascites requiring a therapeutic paracentesis; he saw outpt GI physician to go to the ER for a therapeutic drainage. In ED pt in NAD, normotensive, afebrile, otherwise HD stable. Pt c/o epigastric pain upon palpation, and BL LE swelling with notable 3+ pitting edema. Denies N/V, hematemesis, melena. Pt had been in ICU at last [facility] admission; denies smoking/drinking since Jan, 2019.   . (18 Mar 2019 21:18)      INTERVAL HPI/OVERNIGHT EVENTS:  Patient  feels significantly better after 11 liters of fluid removal during paracentesis. Minimal if any SOB.     MEDICATIONS  (STANDING):  folic acid 1 milliGRAM(s) Oral daily  lactulose Syrup 20 Gram(s) Oral three times a day  midodrine 5 milliGRAM(s) Oral every 8 hours  multivitamin 1 Tablet(s) Oral daily  octreotide  Injectable 100 MICROGram(s) SubCutaneous every 8 hours  pantoprazole    Tablet 40 milliGRAM(s) Oral before breakfast  rifaximin 550 milliGRAM(s) Oral two times a day  thiamine 100 milliGRAM(s) Oral daily    MEDICATIONS  (PRN):      FAMILY HISTORY:  No pertinent family history in first degree relatives      Allergies    No Known Allergies    Intolerances        PMH/PSH:  ETOH abuse  Psoriasis  History of hypertension  No significant past surgical history        REVIEW OF SYSTEMS:  CONSTITUTIONAL: No fever, weight loss, or fatigue  EYES: No eye pain, visual disturbances, or discharge  ENMT:  No difficulty hearing, tinnitus, vertigo; No sinus or throat pain  NECK: No pain or stiffness  BREASTS: No pain, masses, or nipple discharge  RESPIRATORY: No cough, wheezing, chills or hemoptysis; No shortness of breath  CARDIOVASCULAR: No chest pain, palpitations, dizziness, or leg swelling  GASTROINTESTINAL: No abdominal or epigastric pain. No nausea, vomiting, or hematemesis; No diarrhea or constipation. No melena or hematochezia.  GENITOURINARY: No dysuria, frequency, hematuria, or incontinence  NEUROLOGICAL: No headaches, memory loss, loss of strength, numbness, or tremors  SKIN: No itching, burning, rashes, or lesions   LYMPH NODES: No enlarged glands  ENDOCRINE: No heat or cold intolerance; No hair loss  MUSCULOSKELETAL: No joint pain or swelling; No muscle, back, or extremity pain  PSYCHIATRIC: No depression, anxiety, mood swings, or difficulty sleeping  HEME/LYMPH: No easy bruising, or bleeding gums  ALLERGY AND IMMUNOLOGIC: No hives or eczema    Vital Signs Last 24 Hrs  T(C): 36.6 (20 Mar 2019 14:13), Max: 36.6 (20 Mar 2019 14:13)  T(F): 97.8 (20 Mar 2019 14:13), Max: 97.8 (20 Mar 2019 14:13)  HR: 76 (20 Mar 2019 14:13) (76 - 104)  BP: 115/74 (20 Mar 2019 14:13) (106/76 - 120/90)  BP(mean): --  RR: 20 (20 Mar 2019 14:13) (18 - 20)  SpO2: 97% (20 Mar 2019 14:13) (92% - 98%)    PHYSICAL EXAM:  GENERAL: NAD, well-groomed, well-developed  HEAD:  Atraumatic, Normocephalic  EYES: EOMI, PERRLA, conjunctiva and sclera clear  NECK: Supple, No JVD, Normal thyroid  NERVOUS SYSTEM:  Alert & Oriented X3, Good concentration;   CHEST/LUNG: Clear to percussion bilaterally; No rales, rhonchi, wheezing, or rubs  HEART: Regular rate and rhythm; No murmurs, rubs, or gallops  ABDOMEN: Soft, Nontender, Nondistended !; Bowel sounds present  EXTREMITIES:  2+ Peripheral Pulses, No clubbing, cyanosis, or edema  LYMPH: No lymphadenopathy noted  SKIN: No rashes or lesions    LAB  03-18 @ 18:26  amylase 34   lipase 117                           10.6   18.76 )-----------( 439      ( 20 Mar 2019 07:56 )             32.0       CBC:  03-20 @ 07:56  WBC 18.76   Hgb 10.6   Hct 32.0   Plts 439  MCV 95.2  03-18 @ 18:26  WBC 20.39   Hgb 11.5   Hct 34.9   Plts 512  MCV 95.6      Chemistry:  03-20 @ 07:56  Na+ 132  K+ 4.6  Cl- 103  CO2 19  BUN 34  Cr 2.27     03-18 @ 18:26  Na+ 133  K+ 4.2  Cl- 101  CO2 21  BUN 39  Cr 2.80         Glucose, Serum: 114 mg/dL (03-20 @ 07:56)  Glucose, Serum: 155 mg/dL (03-18 @ 18:26)      20 Mar 2019 07:56    132    |  103    |  34     ----------------------------<  114    4.6     |  19     |  2.27   18 Mar 2019 18:26    133    |  101    |  39     ----------------------------<  155    4.2     |  21     |  2.80     Ca    7.9        20 Mar 2019 07:56  Ca    8.2        18 Mar 2019 18:26  Phos  3.8       20 Mar 2019 07:56  Mg     2.0       20 Mar 2019 07:56    TPro  6.5    /  Alb  2.0    /  TBili  2.1    /  DBili  x      /  AST  52     /  ALT  23     /  AlkPhos  690    20 Mar 2019 07:56  TPro  7.5    /  Alb  2.0    /  TBili  2.0    /  DBili  x      /  AST  49     /  ALT  31     /  AlkPhos  873    18 Mar 2019 18:26      PT/INR - ( 18 Mar 2019 18:26 )   PT: 21.4 sec;   INR: 1.87 ratio         PTT - ( 18 Mar 2019 18:26 )  PTT:36.7 sec        CAPILLARY BLOOD GLUCOSE              RADIOLOGY & ADDITIONAL TESTS:    Imaging Personally Reviewed:  [ ] YES  [ ] NO    Consultant(s) Notes Reviewed:  [ ] YES  [ ] NO    Care Discussed with Consultants/Other Providers [ ] YES  [ ] NO Patient is a 48y old  Male who presents with a chief complaint of Sent by PMD for ascites. (19 Mar 2019 17:28)      HPI:  47 y/o Male PMH ETOH abuse with decompensated alcoholic cirrhosis, recurrent abdominal ascites, chronically elevated WBC, CKD presents with SOB, increasing abdominal girth since discharge from [facility] two weeks ago. Patient denies fever, chills, diarrhea, cough, nausea, vomiting. Pt sent by PMD for abdominal ascites requiring a therapeutic paracentesis; he saw outpt GI physician to go to the ER for a therapeutic drainage. In ED pt in NAD, normotensive, afebrile, otherwise HD stable. Pt c/o epigastric pain upon palpation, and BL LE swelling with notable 3+ pitting edema. Denies N/V, hematemesis, melena. Pt had been in ICU at last [facility] admission; denies smoking/drinking since Jan, 2019.   . (18 Mar 2019 21:18)      INTERVAL HPI/OVERNIGHT EVENTS:  Patient  feels significantly better after 11 liters of fluid removal during paracentesis. Minimal if any SOB. No abdominal pain.    MEDICATIONS  (STANDING):  folic acid 1 milliGRAM(s) Oral daily  lactulose Syrup 20 Gram(s) Oral three times a day  midodrine 5 milliGRAM(s) Oral every 8 hours  multivitamin 1 Tablet(s) Oral daily  octreotide  Injectable 100 MICROGram(s) SubCutaneous every 8 hours  pantoprazole    Tablet 40 milliGRAM(s) Oral before breakfast  rifaximin 550 milliGRAM(s) Oral two times a day  thiamine 100 milliGRAM(s) Oral daily    MEDICATIONS  (PRN):      FAMILY HISTORY:  No pertinent family history in first degree relatives      Allergies    No Known Allergies    Intolerances        PMH/PSH:  ETOH abuse  Psoriasis  History of hypertension  No significant past surgical history        REVIEW OF SYSTEMS:  CONSTITUTIONAL: No fever, weight loss, or fatigue  EYES: No eye pain, visual disturbances, or discharge  ENMT:  No difficulty hearing, tinnitus, vertigo; No sinus or throat pain  NECK: No pain or stiffness  BREASTS: No pain, masses, or nipple discharge  RESPIRATORY: No cough, wheezing, chills or hemoptysis; No shortness of breath  CARDIOVASCULAR: No chest pain, palpitations, dizziness, or leg swelling  GASTROINTESTINAL: No abdominal or epigastric pain. No nausea, vomiting, or hematemesis; No diarrhea or constipation. No melena or hematochezia.  GENITOURINARY: No dysuria, frequency, hematuria, or incontinence  NEUROLOGICAL: No headaches, memory loss, loss of strength, numbness, or tremors  SKIN: No itching, burning, rashes, or lesions   LYMPH NODES: No enlarged glands  ENDOCRINE: No heat or cold intolerance; No hair loss  MUSCULOSKELETAL: No joint pain or swelling; No muscle, back, or extremity pain  PSYCHIATRIC: No depression, anxiety, mood swings, or difficulty sleeping  HEME/LYMPH: No easy bruising, or bleeding gums  ALLERGY AND IMMUNOLOGIC: No hives or eczema    Vital Signs Last 24 Hrs  T(C): 36.6 (20 Mar 2019 14:13), Max: 36.6 (20 Mar 2019 14:13)  T(F): 97.8 (20 Mar 2019 14:13), Max: 97.8 (20 Mar 2019 14:13)  HR: 76 (20 Mar 2019 14:13) (76 - 104)  BP: 115/74 (20 Mar 2019 14:13) (106/76 - 120/90)  BP(mean): --  RR: 20 (20 Mar 2019 14:13) (18 - 20)  SpO2: 97% (20 Mar 2019 14:13) (92% - 98%)    PHYSICAL EXAM:  GENERAL: NAD, well-groomed, well-developed  HEAD:  Atraumatic, Normocephalic  EYES: EOMI, PERRLA, conjunctiva and sclera clear  NECK: Supple, No JVD, Normal thyroid  NERVOUS SYSTEM:  Alert & Oriented X3, Good concentration;   CHEST/LUNG: Clear to percussion bilaterally; No rales, rhonchi, wheezing, or rubs  HEART: Regular rate and rhythm; No murmurs, rubs, or gallops  ABDOMEN: Soft, Nontender, Nondistended !; Bowel sounds present  EXTREMITIES:  2+ Peripheral Pulses, No clubbing, cyanosis, or edema  LYMPH: No lymphadenopathy noted  SKIN: No rashes or lesions    LAB  03-18 @ 18:26  amylase 34   lipase 117                           10.6   18.76 )-----------( 439      ( 20 Mar 2019 07:56 )             32.0       CBC:  03-20 @ 07:56  WBC 18.76   Hgb 10.6   Hct 32.0   Plts 439  MCV 95.2  03-18 @ 18:26  WBC 20.39   Hgb 11.5   Hct 34.9   Plts 512  MCV 95.6      Chemistry:  03-20 @ 07:56  Na+ 132  K+ 4.6  Cl- 103  CO2 19  BUN 34  Cr 2.27     03-18 @ 18:26  Na+ 133  K+ 4.2  Cl- 101  CO2 21  BUN 39  Cr 2.80         Glucose, Serum: 114 mg/dL (03-20 @ 07:56)  Glucose, Serum: 155 mg/dL (03-18 @ 18:26)      20 Mar 2019 07:56    132    |  103    |  34     ----------------------------<  114    4.6     |  19     |  2.27   18 Mar 2019 18:26    133    |  101    |  39     ----------------------------<  155    4.2     |  21     |  2.80     Ca    7.9        20 Mar 2019 07:56  Ca    8.2        18 Mar 2019 18:26  Phos  3.8       20 Mar 2019 07:56  Mg     2.0       20 Mar 2019 07:56    TPro  6.5    /  Alb  2.0    /  TBili  2.1    /  DBili  x      /  AST  52     /  ALT  23     /  AlkPhos  690    20 Mar 2019 07:56  TPro  7.5    /  Alb  2.0    /  TBili  2.0    /  DBili  x      /  AST  49     /  ALT  31     /  AlkPhos  873    18 Mar 2019 18:26      PT/INR - ( 18 Mar 2019 18:26 )   PT: 21.4 sec;   INR: 1.87 ratio         PTT - ( 18 Mar 2019 18:26 )  PTT:36.7 sec        CAPILLARY BLOOD GLUCOSE              RADIOLOGY & ADDITIONAL TESTS:    Imaging Personally Reviewed:  [ ] YES  [ ] NO    Consultant(s) Notes Reviewed:  [ ] YES  [ ] NO    Care Discussed with Consultants/Other Providers [ ] YES  [ ] NO

## 2019-03-20 NOTE — PROGRESS NOTE ADULT - ASSESSMENT
49 y/o Male w/ history of HTN, Psoriasis, ETOH abuse with decompensated alcoholic cirrhosis & recurrent abdominal ascites, Hepatorenal syndrome w/ chronic leukocytosis and CKD III-IV presented with SOB & worsening ascites. Labs showed PAPO.     Alcoholic cirrhosis w/ worsening ascites  - diagnostic and therapeutic thoracentesis done today -   - GI consult  - elevated LA is likely due to underlying hepatic cirrhosis and is not a marker of hypoperfusion  - patient was on midodrine at home, will wean off  - c/w Rifaximin & add lactulose    ETOH abuse  - pt denied recent alcohol consumption   - watch for withdrawal  - c/w folate, thiamine and MVN    PAPO on CKDIII-IV  - renal consulted     Prophylaxis:    DVT: SCD  GI: Protonix 49 y/o Male w/ history of HTN, Psoriasis, ETOH abuse with decompensated alcoholic cirrhosis & recurrent abdominal ascites, Hepatorenal syndrome w/ chronic leukocytosis and CKD III-IV presented with SOB & worsening ascites. Labs showed PAPO.     Alcoholic cirrhosis w/ worsening ascites  - diagnostic and therapeutic paracentesis done today - 11 Liters removed  - GI consult  - elevated LA is likely due to underlying hepatic cirrhosis and is not a marker of hypoperfusion  - patient was on midodrine at home, will wean off  - will add Lasix and aldactone if ok w/ renal   - c/w Rifaximin & lactulose    ETOH abuse  - pt denied recent alcohol consumption   - watch for withdrawal  - c/w folate, thiamine and MVN    PAPO on CKDIII-IV  - renal consulted     Prophylaxis:    DVT: SCD  GI: Protonix

## 2019-03-20 NOTE — PROCEDURE NOTE - ADDITIONAL PROCEDURE DETAILS
pt s/p large volume therapeutic/diagnostic paracentesis with 11 Liters of serous fluid removed.  Fluid sent to lab for analysis.  pt has a h/o ETOH cirrhosis with recurrent ascites and CKD.  Pt tolerated procedure well.  Vitals remained stable.  Albumin ordered  -f/u ascitic fluid study results  -please infuse 25% albumin 50 mL within 1 hours of procedure

## 2019-03-20 NOTE — PROGRESS NOTE ADULT - PROBLEM SELECTOR PLAN 2
Alk phos decreased to 690 ( 2.1 / 52 / 23 / 690) Work up in progress. ( Can be continued as out patient ) Alk phos decreased to 690 ( 2.1 / 52 / 23 / 690) Work up in progress. ( Can be continued as out patient ). Patient has appointment with hepatologist.

## 2019-03-20 NOTE — PROGRESS NOTE ADULT - PROBLEM SELECTOR PLAN 1
S/P removal of 11 liters. Patient stable from GI point of view for discharge as long as 1) cell count is checked  2) Patient seen again by renal regarding diuretics. No need for high dose lactulose ( Ammonia 38 ) S/P removal of 11 liters. Patient stable from GI point of view for discharge as long as 1) cell count is checked  2) Patient seen again by renal regarding diuretics. No need for high dose lactulose or Rifaxamin ( Ammonia 38 )

## 2019-03-20 NOTE — PROGRESS NOTE ADULT - SUBJECTIVE AND OBJECTIVE BOX
Patient feels better; post 11 liters removal paracentesis;     MEDICATIONS  (STANDING):  folic acid 1 milliGRAM(s) Oral daily  lactulose Syrup 10 Gram(s) Oral two times a day  midodrine 5 milliGRAM(s) Oral every 8 hours  multivitamin 1 Tablet(s) Oral daily  octreotide  Injectable 100 MICROGram(s) SubCutaneous every 8 hours  pantoprazole    Tablet 40 milliGRAM(s) Oral before breakfast  thiamine 100 milliGRAM(s) Oral daily    MEDICATIONS  (PRN):      03-19-19 @ 07:01  -  03-20-19 @ 07:00  --------------------------------------------------------  IN: 470 mL / OUT: 0 mL / NET: 470 mL    03-20-19 @ 07:01  -  03-20-19 @ 17:20  --------------------------------------------------------  IN: 300 mL / OUT: 42844 mL / NET: -40802 mL      PHYSICAL EXAM:      T(C): 36.6 (03-20-19 @ 14:13), Max: 36.6 (03-20-19 @ 14:13)  HR: 76 (03-20-19 @ 14:13) (76 - 104)  BP: 115/74 (03-20-19 @ 14:13) (106/76 - 117/82)  RR: 20 (03-20-19 @ 14:13) (18 - 20)  SpO2: 97% (03-20-19 @ 14:13) (92% - 98%)  Wt(kg): --  Respiratory: clear anteriorly, decreased BS at bases  Cardiovascular: S1 S2  Gastrointestinal: soft NT mild distention +BS  Extremities:  1 edema                                    10.6   18.76 )-----------( 439      ( 20 Mar 2019 07:56 )             32.0     03-20    132<L>  |  103  |  34<H>  ----------------------------<  114<H>  4.6   |  19<L>  |  2.27<H>    Ca    7.9<L>      20 Mar 2019 07:56  Phos  3.8     03-20  Mg     2.0     03-20    TPro  6.5  /  Alb  2.0<L>  /  TBili  2.1<H>  /  DBili  x   /  AST  52<H>  /  ALT  23  /  AlkPhos  690<H>  03-20    ABG - ( 18 Mar 2019 18:12 )  pH, Arterial: x     pH, Blood: 7.44  /  pCO2: 28    /  pO2: 55    / HCO3: 18    / Base Excess: -4.5  /  SaO2: 86                LIVER FUNCTIONS - ( 20 Mar 2019 07:56 )  Alb: 2.0 g/dL / Pro: 6.5 gm/dL / ALK PHOS: 690 U/L / ALT: 23 U/L / AST: 52 U/L / GGT: x             Assessment and Plan:  PAPO CKD3 cirrhosis; ascites; r/o SBP; HRS risk type 2 to 1;   SPA; Midodrine and Octreotide protocol  IV abx   Post therapeutic paracentesis;   Will follow course

## 2019-03-20 NOTE — PROGRESS NOTE ADULT - SUBJECTIVE AND OBJECTIVE BOX
47 y/o Male w/ history of HTN, Psoriasis, ETOH abuse with decompensated alcoholic cirrhosis & recurrent abdominal ascites, Hepatorenal syndrome w/ chronic leukocytosis and CKD III-IV presented with SOB & worsening ascites. Labs showed PAPO. He is lying in bed in NAD.    MEDICATIONS  (STANDING):  folic acid 1 milliGRAM(s) Oral daily  lactulose Syrup 10 Gram(s) Oral two times a day  midodrine 5 milliGRAM(s) Oral every 8 hours  multivitamin 1 Tablet(s) Oral daily  octreotide  Injectable 100 MICROGram(s) SubCutaneous every 8 hours  pantoprazole    Tablet 40 milliGRAM(s) Oral before breakfast  thiamine 100 milliGRAM(s) Oral daily    MEDICATIONS  (PRN):      Allergies    No Known Allergies    Intolerances        Vital Signs Last 24 Hrs  T(C): 36.6 (20 Mar 2019 14:13), Max: 36.6 (20 Mar 2019 14:13)  T(F): 97.8 (20 Mar 2019 14:13), Max: 97.8 (20 Mar 2019 14:13)  HR: 76 (20 Mar 2019 14:13) (76 - 104)  BP: 115/74 (20 Mar 2019 14:13) (106/76 - 117/82)  BP(mean): --  RR: 20 (20 Mar 2019 14:13) (18 - 20)  SpO2: 97% (20 Mar 2019 14:13) (92% - 98%)    PHYSICAL EXAM:  GENERAL: NAD, well-groomed, well-developed  HEAD:  Atraumatic, Normocephalic  EYES: EOMI, PERRLA, conjunctiva and sclera clear  ENMT: No tonsillar erythema, exudates, or enlargement; Moist mucous membranes, Good dentition, No lesions  NECK: Supple, No JVD, Normal thyroid  NERVOUS SYSTEM:  Alert & Oriented X3, Good concentration   CHEST/LUNG: Clear to auscultation bilaterally; No rales, rhonchi, wheezing, or rubs  HEART: Regular rate and rhythm; No murmurs, rubs, or gallops  ABDOMEN: firm, nontender, ascitic and distended; Bowel sounds present  EXTREMITIES: B/L LE edema      LABS:                        10.6   18.76 )-----------( 439      ( 20 Mar 2019 07:56 )             32.0     03-20    132<L>  |  103  |  34<H>  ----------------------------<  114<H>  4.6   |  19<L>  |  2.27<H>    Ca    7.9<L>      20 Mar 2019 07:56  Phos  3.8     03-20  Mg     2.0     03-20    TPro  6.5  /  Alb  2.0<L>  /  TBili  2.1<H>  /  DBili  x   /  AST  52<H>  /  ALT  23  /  AlkPhos  690<H>  03-20    PT/INR - ( 18 Mar 2019 18:26 )   PT: 21.4 sec;   INR: 1.87 ratio         PTT - ( 18 Mar 2019 18:26 )  PTT:36.7 sec    CAPILLARY BLOOD GLUCOSE          Culture - Blood (collected 19 Mar 2019 00:40)  Source: .Blood  Preliminary Report (20 Mar 2019 01:01):    No growth to date.    Culture - Blood (collected 19 Mar 2019 00:40)  Source: .Blood  Preliminary Report (20 Mar 2019 01:01):    No growth to date.      RADIOLOGY & ADDITIONAL TESTS:    03-19-19 @ 07:01  -  03-20-19 @ 07:00  --------------------------------------------------------  IN:    Oral Fluid: 470 mL  Total IN: 470 mL    OUT:  Total OUT: 0 mL    Total NET: 470 mL      03-20-19 @ 07:01  -  03-20-19 @ 17:19  --------------------------------------------------------  IN:    Oral Fluid: 300 mL  Total IN: 300 mL    OUT:    Other: 98925 mL  Total OUT: 89752 mL    Total NET: -82299 mL

## 2019-03-20 NOTE — PROGRESS NOTE ADULT - ASSESSMENT
HPI:  49 y/o Male PMH ETOH abuse with decompensated alcoholic cirrhosis, recurrent abdominal ascites, chronically elevated WBC, CKD presents with SOB, increasing abdominal girth since discharge from [facility] two weeks ago. Patient denies fever, chills, diarrhea, cough, nausea, vomiting. Pt sent by PMD for abdominal ascites requiring a therapeutic paracentesis; he saw outpt GI physician to go to the ER for a therapeutic drainage. In ED pt in NAD, normotensive, afebrile, otherwise HD stable. Pt c/o epigastric pain upon palpation, and BL LE swelling with notable 3+ pitting edema. Denies N/V, hematemesis, melena. Pt had been in ICU at last [facility] admission; denies smoking/drinking since Jan, 2019.   . (18 Mar 2019 21:18)  -------------------- As Above ---------------------------  The patient presents with worsenin abdominal girth, pedal edema and abdomainal pain. The patient is known to my service from the previous admission. Patient was admitted with alcoholic hepatitis and developed hepatorenal syndrome. Hospital stay also complicated by diarrhea and leucocytosis. Patient eventually did well and was transferred to [facility]. He was subsequently discharged home.   Patient saw nephrology but was never started on diuretics. Patient denies taking ETOH since admission. Patient never saw hepatology. Patient noted increasing abdominal girth and pedal edema over the past few weeks.. SOB with exertion. However, he was never started on any diuretics. Patient still on midrodine.  Patient has significant elevation of alk phos compared to other LFTs secondary to (?).    Worsening abdominal girth secondary to cirrhosis 1) abdominal x ray  2) abdominal sonogram 3) Paracentesis ( already ordered ) 4) Renal consult ( can patient start on diuretics   Increased alk phos compared to other LFTs - 1) labs

## 2019-03-21 ENCOUNTER — TRANSCRIPTION ENCOUNTER (OUTPATIENT)
Age: 49
End: 2019-03-21

## 2019-03-21 VITALS — OXYGEN SATURATION: 95 % | DIASTOLIC BLOOD PRESSURE: 77 MMHG | SYSTOLIC BLOOD PRESSURE: 97 MMHG | HEART RATE: 89 BPM

## 2019-03-21 DIAGNOSIS — R19.7 DIARRHEA, UNSPECIFIED: ICD-10-CM

## 2019-03-21 LAB
ANA TITR SER: NEGATIVE — SIGNIFICANT CHANGE UP
ANION GAP SERPL CALC-SCNC: 11 MMOL/L — SIGNIFICANT CHANGE UP (ref 5–17)
BASOPHILS # BLD AUTO: 0.19 K/UL — SIGNIFICANT CHANGE UP (ref 0–0.2)
BASOPHILS NFR BLD AUTO: 1 % — SIGNIFICANT CHANGE UP (ref 0–2)
BUN SERPL-MCNC: 28 MG/DL — HIGH (ref 7–23)
CALCIUM SERPL-MCNC: 7.8 MG/DL — LOW (ref 8.5–10.1)
CHLORIDE SERPL-SCNC: 101 MMOL/L — SIGNIFICANT CHANGE UP (ref 96–108)
CO2 SERPL-SCNC: 21 MMOL/L — LOW (ref 22–31)
CREAT SERPL-MCNC: 2.29 MG/DL — HIGH (ref 0.5–1.3)
EOSINOPHIL # BLD AUTO: 0.8 K/UL — HIGH (ref 0–0.5)
EOSINOPHIL NFR BLD AUTO: 4.4 % — SIGNIFICANT CHANGE UP (ref 0–6)
GLUCOSE SERPL-MCNC: 125 MG/DL — HIGH (ref 70–99)
HCT VFR BLD CALC: 38.9 % — LOW (ref 39–50)
HGB BLD-MCNC: 12.8 G/DL — LOW (ref 13–17)
IMM GRANULOCYTES NFR BLD AUTO: 0.5 % — SIGNIFICANT CHANGE UP (ref 0–1.5)
LYMPHOCYTES # BLD AUTO: 14.1 % — SIGNIFICANT CHANGE UP (ref 13–44)
LYMPHOCYTES # BLD AUTO: 2.59 K/UL — SIGNIFICANT CHANGE UP (ref 1–3.3)
MAGNESIUM SERPL-MCNC: 1.9 MG/DL — SIGNIFICANT CHANGE UP (ref 1.6–2.6)
MCHC RBC-ENTMCNC: 31.6 PG — SIGNIFICANT CHANGE UP (ref 27–34)
MCHC RBC-ENTMCNC: 32.9 GM/DL — SIGNIFICANT CHANGE UP (ref 32–36)
MCV RBC AUTO: 96 FL — SIGNIFICANT CHANGE UP (ref 80–100)
MONOCYTES # BLD AUTO: 1.18 K/UL — HIGH (ref 0–0.9)
MONOCYTES NFR BLD AUTO: 6.4 % — SIGNIFICANT CHANGE UP (ref 2–14)
NEUTROPHILS # BLD AUTO: 13.49 K/UL — HIGH (ref 1.8–7.4)
NEUTROPHILS NFR BLD AUTO: 73.6 % — SIGNIFICANT CHANGE UP (ref 43–77)
NRBC # BLD: 0 /100 WBCS — SIGNIFICANT CHANGE UP (ref 0–0)
PHOSPHATE SERPL-MCNC: 2.4 MG/DL — LOW (ref 2.5–4.5)
PLATELET # BLD AUTO: 535 K/UL — HIGH (ref 150–400)
POTASSIUM SERPL-MCNC: 4.2 MMOL/L — SIGNIFICANT CHANGE UP (ref 3.5–5.3)
POTASSIUM SERPL-SCNC: 4.2 MMOL/L — SIGNIFICANT CHANGE UP (ref 3.5–5.3)
RBC # BLD: 4.05 M/UL — LOW (ref 4.2–5.8)
RBC # FLD: 14.1 % — SIGNIFICANT CHANGE UP (ref 10.3–14.5)
SODIUM SERPL-SCNC: 133 MMOL/L — LOW (ref 135–145)
WBC # BLD: 18.34 K/UL — HIGH (ref 3.8–10.5)
WBC # FLD AUTO: 18.34 K/UL — HIGH (ref 3.8–10.5)

## 2019-03-21 PROCEDURE — 99239 HOSP IP/OBS DSCHRG MGMT >30: CPT

## 2019-03-21 RX ORDER — LACTULOSE 10 G/15ML
10 SOLUTION ORAL
Qty: 0 | Refills: 0 | Status: CANCELLED | OUTPATIENT
Start: 2019-03-23 | End: 2019-03-21

## 2019-03-21 RX ORDER — SODIUM,POTASSIUM PHOSPHATES 278-250MG
1 POWDER IN PACKET (EA) ORAL
Qty: 0 | Refills: 0 | Status: DISCONTINUED | OUTPATIENT
Start: 2019-03-21 | End: 2019-03-21

## 2019-03-21 RX ORDER — FUROSEMIDE 40 MG
1 TABLET ORAL
Qty: 21 | Refills: 0 | OUTPATIENT
Start: 2019-03-21 | End: 2019-04-10

## 2019-03-21 RX ORDER — LACTULOSE 10 G/15ML
10 SOLUTION ORAL ONCE
Qty: 0 | Refills: 0 | Status: DISCONTINUED | OUTPATIENT
Start: 2019-03-22 | End: 2019-03-21

## 2019-03-21 RX ORDER — LACTULOSE 10 G/15ML
15 SOLUTION ORAL
Qty: 600 | Refills: 0 | OUTPATIENT
Start: 2019-03-21

## 2019-03-21 RX ORDER — SPIRONOLACTONE 25 MG/1
1 TABLET, FILM COATED ORAL
Qty: 21 | Refills: 0 | OUTPATIENT
Start: 2019-03-21 | End: 2019-04-10

## 2019-03-21 RX ADMIN — LACTULOSE 10 GRAM(S): 10 SOLUTION ORAL at 06:02

## 2019-03-21 RX ADMIN — Medication 1 TABLET(S): at 12:10

## 2019-03-21 RX ADMIN — Medication 100 MILLIGRAM(S): at 12:10

## 2019-03-21 RX ADMIN — MIDODRINE HYDROCHLORIDE 2.5 MILLIGRAM(S): 2.5 TABLET ORAL at 06:02

## 2019-03-21 RX ADMIN — Medication 1 MILLIGRAM(S): at 12:10

## 2019-03-21 RX ADMIN — PANTOPRAZOLE SODIUM 40 MILLIGRAM(S): 20 TABLET, DELAYED RELEASE ORAL at 12:10

## 2019-03-21 NOTE — DISCHARGE NOTE PROVIDER - HOSPITAL COURSE
49 y/o Male w/ history of HTN, Psoriasis, ETOH abuse with decompensated alcoholic cirrhosis & recurrent abdominal ascites, history of hepatorenal syndrome w/ chronic leukocytosis and CKD III who presented with SOB & worsening ascites. Labs showed PAPO on CKD III. Pt had a therapeutic paracentesis that removed 11 Liters. As per renal and GI, he was weaned off octreotide and midodrine. He did not show any signs of withdrawal and his Cr improved to baseline. As per renal and GI he was cleared for discharge by Kevin Felipe and Nettie on Lasix and aldactone.  Of note, patient has a history of persistent leukocytosis and will f/u w/ his PMD to work it up. He will also follow up w/ a hepatologist and nephrologist.        Discharge time: 43 minutes

## 2019-03-21 NOTE — DIETITIAN INITIAL EVALUATION ADULT. - ENERGY NEEDS
Height (cm): 165.1 (03-18)  Weight (kg): 62.7 (03-18)  BMI (kg/m2): 23 (03-18)  IBW:  61.6 kg        % IBW: 101%          UBW:  73.4 kg            %UBW: 85% c ascites & edema

## 2019-03-21 NOTE — DISCHARGE NOTE NURSING/CASE MANAGEMENT/SOCIAL WORK - NSDCPEEMAIL_GEN_ALL_CORE
[facility] for Tobacco Control email [email or web address]

## 2019-03-21 NOTE — PROGRESS NOTE ADULT - ASSESSMENT
HPI:  47 y/o Male PMH ETOH abuse with decompensated alcoholic cirrhosis, recurrent abdominal ascites, chronically elevated WBC, CKD presents with SOB, increasing abdominal girth since discharge from [facility] two weeks ago. Patient denies fever, chills, diarrhea, cough, nausea, vomiting. Pt sent by PMD for abdominal ascites requiring a therapeutic paracentesis; he saw outpt GI physician to go to the ER for a therapeutic drainage. In ED pt in NAD, normotensive, afebrile, otherwise HD stable. Pt c/o epigastric pain upon palpation, and BL LE swelling with notable 3+ pitting edema. Denies N/V, hematemesis, melena. Pt had been in ICU at last [facility] admission; denies smoking/drinking since Jan, 2019.   . (18 Mar 2019 21:18)  -------------------- As Above ---------------------------  The patient presents with worsenin abdominal girth, pedal edema and abdomainal pain. The patient is known to my service from the previous admission. Patient was admitted with alcoholic hepatitis and developed hepatorenal syndrome. Hospital stay also complicated by diarrhea and leucocytosis. Patient eventually did well and was transferred to [facility]. He was subsequently discharged home.   Patient saw nephrology but was never started on diuretics. Patient denies taking ETOH since admission. Patient never saw hepatology. Patient noted increasing abdominal girth and pedal edema over the past few weeks.. SOB with exertion. However, he was never started on any diuretics. Patient still on midrodine.  Patient has significant elevation of alk phos compared to other LFTs secondary to (?).    Worsening abdominal girth secondary to cirrhosis 1) abdominal x ray  2) abdominal sonogram 3) Paracentesis ( already ordered ) 4) Renal consult ( can patient start on diuretics   Increased alk phos compared to other LFTs - 1) labs

## 2019-03-21 NOTE — PHYSICAL THERAPY INITIAL EVALUATION ADULT - ACTIVE RANGE OF MOTION EXAMINATION, REHAB EVAL
bilateral lower extremity Active ROM was WNL (within normal limits)/kandice. upper extremity Active ROM was WNL (within normal limits)

## 2019-03-21 NOTE — PHYSICAL THERAPY INITIAL EVALUATION ADULT - CRITERIA FOR SKILLED THERAPEUTIC INTERVENTIONS
risk reduction/prevention/therapy frequency/rehab potential/predicted duration of therapy intervention/impairments found/functional limitations in following categories

## 2019-03-21 NOTE — DISCHARGE NOTE PROVIDER - NSDCCPCAREPLAN_GEN_ALL_CORE_FT
PRINCIPAL DISCHARGE DIAGNOSIS  Diagnosis: Ascites  Assessment and Plan of Treatment:       SECONDARY DISCHARGE DIAGNOSES  Diagnosis: SOB (shortness of breath)  Assessment and Plan of Treatment:

## 2019-03-21 NOTE — PROGRESS NOTE ADULT - PROBLEM SELECTOR PLAN 1
S/P removal of 11 liters. Patient stable from GI point of view for discharge. No signs of SBP. Patient needs to see again by renal regarding diuretics. No need for high dose lactulose or Rifaxamin ( Ammonia 38 )

## 2019-03-21 NOTE — DISCHARGE NOTE NURSING/CASE MANAGEMENT/SOCIAL WORK - NSDCPEWEB_GEN_ALL_CORE
[place] website --- www.[employer]/[facility] for Tobacco Control website --- [email or web address]

## 2019-03-21 NOTE — CHART NOTE - NSCHARTNOTEFT_GEN_A_CORE
Upon Nutritional Assessment by the Registered Dietitian your patient was determined to meet criteria / has evidence of the following diagnosis/diagnoses:          [ ]  Mild Protein Calorie Malnutrition        [ ]  Moderate Protein Calorie Malnutrition        [x ] Severe Protein Calorie Malnutrition        [ ] Unspecified Protein Calorie Malnutrition        [ ] Underweight / BMI <19        [ ] Morbid Obesity / BMI > 40      Findings as based on:  •  Comprehensive nutrition assessment and consultation  •  Calorie counts (nutrient intake analysis)  •  Food acceptance and intake status from observations by staff  •  Follow up  •  Patient education  •  Intervention secondary to interdisciplinary rounds  •   concerns      Treatment:    The following diet has been recommended:  Low sodium , low fiber, lactose restricted, Ensure Clear 8 oz 3x/day (720 travis, 24 gm pro)       PROVIDER Section:     By signing this assessment you are acknowledging and agree with the diagnosis/diagnoses assigned by the Registered Dietitian    Comments:

## 2019-03-21 NOTE — PROGRESS NOTE ADULT - SUBJECTIVE AND OBJECTIVE BOX
Subjective: no new complaints. Had 11L paracentesis yest.       MEDICATIONS  (STANDING):  folic acid 1 milliGRAM(s) Oral daily  midodrine 2.5 milliGRAM(s) Oral every 8 hours  multivitamin 1 Tablet(s) Oral daily  octreotide  Injectable 100 MICROGram(s) SubCutaneous every 8 hours  pantoprazole    Tablet 40 milliGRAM(s) Oral before breakfast  potassium acid phosphate/sodium acid phosphate tablet (K-PHOS No. 2) 1 Tablet(s) Oral four times a day with meals  thiamine 100 milliGRAM(s) Oral daily    MEDICATIONS  (PRN):          T(C): 36.4 (03-21-19 @ 04:48), Max: 36.6 (03-20-19 @ 14:13)  HR: 67 (03-21-19 @ 04:48) (67 - 85)  BP: 102/68 (03-21-19 @ 04:48) (100/76 - 115/74)  RR: 19 (03-21-19 @ 04:48) (18 - 29)  SpO2: 96% (03-21-19 @ 04:48) (96% - 98%)  Wt(kg): --        I&O's Detail    20 Mar 2019 07:01  -  21 Mar 2019 07:00  --------------------------------------------------------  IN:    Oral Fluid: 740 mL  Total IN: 740 mL    OUT:    Other: 07238 mL  Total OUT: 04416 mL    Total NET: -47614 mL               PHYSICAL EXAM:    GENERAL: anxious  EYES: EOMI, PERRLA, conjunctiva and sclera clear  NECK: Supple, no inc in JVP  CHEST/LUNG: Clear  HEART: S1S2  ABDOMEN: Soft, Nontender, Nondistended; Bowel sounds present  EXTREMITIES:  min edema  NEURO: no asterixis      LABS:  CBC Full  -  ( 21 Mar 2019 07:53 )  WBC Count : 18.34 K/uL  Hemoglobin : 12.8 g/dL  Hematocrit : 38.9 %  Platelet Count - Automated : 535 K/uL  Mean Cell Volume : 96.0 fl  Mean Cell Hemoglobin : 31.6 pg  Mean Cell Hemoglobin Concentration : 32.9 gm/dL  Auto Neutrophil # : 13.49 K/uL  Auto Lymphocyte # : 2.59 K/uL  Auto Monocyte # : 1.18 K/uL  Auto Eosinophil # : 0.80 K/uL  Auto Basophil # : 0.19 K/uL  Auto Neutrophil % : 73.6 %  Auto Lymphocyte % : 14.1 %  Auto Monocyte % : 6.4 %  Auto Eosinophil % : 4.4 %  Auto Basophil % : 1.0 %    03-21    133<L>  |  101  |  28<H>  ----------------------------<  125<H>  4.2   |  21<L>  |  2.29<H>    Ca    7.8<L>      21 Mar 2019 07:53  Phos  2.4     03-21  Mg     1.9     03-21    TPro  6.5  /  Alb  2.0<L>  /  TBili  2.1<H>  /  DBili  x   /  AST  52<H>  /  ALT  23  /  AlkPhos  690<H>  03-20        Culture Results:   No growth to date. (03-19 @ 00:40)  Culture Results:   No growth to date. (03-19 @ 00:40)        Impression:  * PAPO -- ATN vs Type 1 HRS. Resolved  * CKD -- baseline Cr 2  * S/p 11L of LVP on 3/20  * Alcoholic hepatitis  * GI bleed.     Recommendations:   * D/c Midodrine, Octreotide.   * Lasix 40mg, Aldactone 25mg on dc  * May dc to rehab

## 2019-03-21 NOTE — PHYSICAL THERAPY INITIAL EVALUATION ADULT - GENERAL OBSERVATIONS, REHAB EVAL
Received supine c HOB elevated, NAD. +cardiac monitor donned. Alert. Ox4. Able to follow multistep commands/directions.

## 2019-03-21 NOTE — DIETITIAN INITIAL EVALUATION ADULT. - PHYSICAL APPEARANCE
BMI=23( 03/18), adm c ascites, c 2+ edema of legs, ? dry wt. Nutrition focused physical exam conducted; Subcutaneous fat Exam;  [  Severe ]  Orbital fat pads region,  [  Moderate ] Buccal fat region,  [  Severe ]triceps region, [  Severe ]ribs region.  Muscle Exam; [  Severe ]temples region, [  Severe ]clavicle region, [ Severe ] shoulder region, [  Severe ]Scapula region, [ Moderate ]Interosseous region, [  Severe ]thigh region, [ Severe  ]Calf region/underweight/other (specify)/emaciated

## 2019-03-21 NOTE — DISCHARGE NOTE PROVIDER - PROVIDER TOKENS
PROVIDER:[TOKEN:[[id number]]],PROVIDER:[TOKEN:[[id number]]],PROVIDER:[TOKEN:[[id number]]],PROVIDER:[TOKEN:[[id number]]]

## 2019-03-21 NOTE — PHYSICAL THERAPY INITIAL EVALUATION ADULT - LEVEL OF INDEPENDENCE: SUPINE/SIT, REHAB EVAL
Patient Seen in: [facility]    History   Patient presents with:  Urinary Symptoms (urologic)    Stated Complaint: UTI    HPI    Patient complains of urinary frequency, urgency and dysuria that began today.   Patient denies sclera non icteric, conjunctiva non-injected  NECK: supple,  LUNGS:no resp distress  CARDIO:regular rate  EXTREMITIES: no cyanosis, clubbing or edema  BACK: no CVA tenderness  GI:nl     ED Course     Labs Reviewed   EMH POCT URINALYSIS DIPSTICK - Abnormal; independent

## 2019-03-21 NOTE — PROGRESS NOTE ADULT - PROBLEM SELECTOR PLAN 3
Probably from high dose lactulose which had been decreased. Will hold lactulose for 24 hours then restart at 15 ml BID

## 2019-03-21 NOTE — DIETITIAN INITIAL EVALUATION ADULT. - OTHER INFO
Pt seen due to unintentional wt. loss, pt request for Ensure.  Pt reported diarrhea, and in view of renal indices, Ensure Clear recommended at present.  As per diet hx provided, pt was eating <75% usual intake.

## 2019-03-21 NOTE — PHYSICAL THERAPY INITIAL EVALUATION ADULT - ADDITIONAL COMMENTS
Patient lives alone in the 1st floor apt, no steps to use. Independent c most ADL's and household ambulation with rolling walker. Friends and family comes over sometimes and help him.

## 2019-03-21 NOTE — PROGRESS NOTE ADULT - REASON FOR ADMISSION
Sent by PMD for ascites.

## 2019-03-21 NOTE — DIETITIAN INITIAL EVALUATION ADULT. - PERTINENT LABORATORY DATA
03-21 Hgb 12.8 g/dL<L> Hct 38.9 %<L>03-21 Na133 mmol/L<L> Glu 125 mg/dL<H> K+ 4.2 mmol/L Cr  2.29 mg/dL<H> BUN 28 mg/dL<H> 03-21 Phos 2.4 mg/dL<L> 03-20 Alb 2.0 g/dL<L>03-20 ALT 23 U/L AST 52 U/L<H> Alkaline Phosphatase 690 U/L<H>

## 2019-03-21 NOTE — PROGRESS NOTE ADULT - SUBJECTIVE AND OBJECTIVE BOX
Patient is a 48y old  Male who presents with a chief complaint of Sent by PMD for ascites. (20 Mar 2019 17:20)      HPI:  49 y/o Male PMH ETOH abuse with decompensated alcoholic cirrhosis, recurrent abdominal ascites, chronically elevated WBC, CKD presents with SOB, increasing abdominal girth since discharge from [facility] two weeks ago. Patient denies fever, chills, diarrhea, cough, nausea, vomiting. Pt sent by PMD for abdominal ascites requiring a therapeutic paracentesis; he saw outpt GI physician to go to the ER for a therapeutic drainage. In ED pt in NAD, normotensive, afebrile, otherwise HD stable. Pt c/o epigastric pain upon palpation, and BL LE swelling with notable 3+ pitting edema. Denies N/V, hematemesis, melena. Pt had been in ICU at last [facility] admission; denies smoking/drinking since Jan, 2019.   . (18 Mar 2019 21:18)      INTERVAL HPI/OVERNIGHT EVENTS:  feeling weak but better. Had diarrhea yesterday and this AM. No other GI symptoms    MEDICATIONS  (STANDING):  folic acid 1 milliGRAM(s) Oral daily  lactulose Syrup 10 Gram(s) Oral two times a day  midodrine 2.5 milliGRAM(s) Oral every 8 hours  multivitamin 1 Tablet(s) Oral daily  octreotide  Injectable 100 MICROGram(s) SubCutaneous every 8 hours  pantoprazole    Tablet 40 milliGRAM(s) Oral before breakfast  potassium acid phosphate/sodium acid phosphate tablet (K-PHOS No. 2) 1 Tablet(s) Oral four times a day with meals  thiamine 100 milliGRAM(s) Oral daily    MEDICATIONS  (PRN):      FAMILY HISTORY:  No pertinent family history in first degree relatives      Allergies    No Known Allergies    Intolerances        PMH/PSH:  ETOH abuse  Psoriasis  History of hypertension  No significant past surgical history        REVIEW OF SYSTEMS:  CONSTITUTIONAL: No fever, weight loss, or fatigue  EYES: No eye pain, visual disturbances, or discharge  ENMT:  No difficulty hearing, tinnitus, vertigo; No sinus or throat pain  NECK: No pain or stiffness  BREASTS: No pain, masses, or nipple discharge  RESPIRATORY: No cough, wheezing, chills or hemoptysis; No shortness of breath  CARDIOVASCULAR: No chest pain, palpitations, dizziness, or leg swelling  GASTROINTESTINAL: See above  GENITOURINARY: No dysuria, frequency, hematuria, or incontinence  NEUROLOGICAL: No headaches, memory loss, loss of strength, numbness, or tremors  SKIN: No itching, burning, rashes, or lesions   LYMPH NODES: No enlarged glands  ENDOCRINE: No heat or cold intolerance; No hair loss  MUSCULOSKELETAL: No joint pain or swelling; No muscle, back, or extremity pain  PSYCHIATRIC: No depression, anxiety, mood swings, or difficulty sleeping  HEME/LYMPH: No easy bruising, or bleeding gums  ALLERGY AND IMMUNOLOGIC: No hives or eczema    Vital Signs Last 24 Hrs  T(C): 36.4 (21 Mar 2019 04:48), Max: 36.6 (20 Mar 2019 14:13)  T(F): 97.5 (21 Mar 2019 04:48), Max: 97.8 (20 Mar 2019 14:13)  HR: 67 (21 Mar 2019 04:48) (67 - 85)  BP: 102/68 (21 Mar 2019 04:48) (100/76 - 115/74)  BP(mean): --  RR: 19 (21 Mar 2019 04:48) (18 - 29)  SpO2: 96% (21 Mar 2019 04:48) (96% - 98%)    PHYSICAL EXAM:  GENERAL: NAD, well-groomed, well-developed  HEAD:  Atraumatic, Normocephalic  EYES: EOMI, PERRLA, conjunctiva and sclera clear  NECK: Supple, No JVD, Normal thyroid  NERVOUS SYSTEM:  Alert & Oriented X3, Good concentration;   CHEST/LUNG: Clear to percussion bilaterally; No rales, rhonchi, wheezing, or rubs  HEART: Regular rate and rhythm; No murmurs, rubs, or gallops  ABDOMEN: Soft, Nontender, Nondistended; Bowel sounds present  EXTREMITIES:  2+ Peripheral Pulses, No clubbing, cyanosis, or edema  LYMPH: No lymphadenopathy noted  SKIN: No rashes or lesions    LAB  03-18 @ 18:26  amylase 34   lipase 117                           12.8   18.34 )-----------( 535      ( 21 Mar 2019 07:53 )             38.9       CBC:  03-21 @ 07:53  WBC 18.34   Hgb 12.8   Hct 38.9   Plts 535  MCV 96.0  03-20 @ 07:56  WBC 18.76   Hgb 10.6   Hct 32.0   Plts 439  MCV 95.2  03-18 @ 18:26  WBC 20.39   Hgb 11.5   Hct 34.9   Plts 512  MCV 95.6      Chemistry:  03-21 @ 07:53  Na+ 133  K+ 4.2  Cl- 101  CO2 21  BUN 28  Cr 2.29     03-20 @ 07:56  Na+ 132  K+ 4.6  Cl- 103  CO2 19  BUN 34  Cr 2.27     03-18 @ 18:26  Na+ 133  K+ 4.2  Cl- 101  CO2 21  BUN 39  Cr 2.80         Glucose, Serum: 125 mg/dL (03-21 @ 07:53)  Glucose, Serum: 114 mg/dL (03-20 @ 07:56)  Glucose, Serum: 155 mg/dL (03-18 @ 18:26)      21 Mar 2019 07:53    133    |  101    |  28     ----------------------------<  125    4.2     |  21     |  2.29   20 Mar 2019 07:56    132    |  103    |  34     ----------------------------<  114    4.6     |  19     |  2.27   18 Mar 2019 18:26    133    |  101    |  39     ----------------------------<  155    4.2     |  21     |  2.80     Ca    7.8        21 Mar 2019 07:53  Ca    7.9        20 Mar 2019 07:56  Ca    8.2        18 Mar 2019 18:26  Phos  2.4       21 Mar 2019 07:53  Phos  3.8       20 Mar 2019 07:56  Mg     1.9       21 Mar 2019 07:53  Mg     2.0       20 Mar 2019 07:56    TPro  6.5    /  Alb  2.0    /  TBili  2.1    /  DBili  x      /  AST  52     /  ALT  23     /  AlkPhos  690    20 Mar 2019 07:56  TPro  7.5    /  Alb  2.0    /  TBili  2.0    /  DBili  x      /  AST  49     /  ALT  31     /  AlkPhos  873    18 Mar 2019 18:26              CAPILLARY BLOOD GLUCOSE              RADIOLOGY & ADDITIONAL TESTS:    Imaging Personally Reviewed:  [ ] YES  [ ] NO    Consultant(s) Notes Reviewed:  [ ] YES  [ ] NO    Care Discussed with Consultants/Other Providers [ ] YES  [ ] NO

## 2019-03-21 NOTE — DIETITIAN INITIAL EVALUATION ADULT. - PERTINENT MEDS FT
MEDICATIONS  (STANDING):  folic acid 1 milliGRAM(s) Oral daily  multivitamin 1 Tablet(s) Oral daily  pantoprazole    Tablet 40 milliGRAM(s) Oral before breakfast  thiamine 100 milliGRAM(s) Oral daily    MEDICATIONS  (PRN):

## 2019-03-21 NOTE — DISCHARGE NOTE NURSING/CASE MANAGEMENT/SOCIAL WORK - NSDCDPATPORTLINK_GEN_ALL_CORE
You can access the [employer] [facility] Patient Portal, offered by [facility], by registering with the following website: [email or web address]

## 2019-03-21 NOTE — PHYSICAL THERAPY INITIAL EVALUATION ADULT - PERTINENT HX OF CURRENT PROBLEM, REHAB EVAL
Patient is a 47 y/o male admitted to [facility] due to SOB, Ascites. Patient underwent S/P thoracenthesis (1100 ml) on 3/20/19.

## 2019-03-21 NOTE — PROGRESS NOTE ADULT - PROBLEM SELECTOR PLAN 2
Alk phos decreased to 690 ( 2.1 / 52 / 23 / 690) Work up in progress. ( Can be continued as out patient ). Patient has appointment with hepatologist.

## 2019-03-21 NOTE — DIETITIAN INITIAL EVALUATION ADULT. - NUTRITION INTERVENTION
Medical Food Supplements/Meals and Snack Meals and Snack/Nutrition Education/Medical Food Supplements

## 2019-03-21 NOTE — DISCHARGE NOTE PROVIDER - CARE PROVIDER_API CALL
Hector Felipe ()  Nephrology  [address] [facility] [address] [facility], [address]  [place], [place] [address]  Phone: [phone]  Fax: [phone]  Follow Up Time:     Sage Ortiz)  Medicine  [address] [place] [address]  Phone: [phone]  Fax: [phone]  Follow Up Time:     Leandro Sheffield)  Medicine  [address] [place] [address]  Phone: [phone]  Fax: [phone]  Follow Up Time:     Juan Loving)  Gastroenterology; Internal Medicine  [address] [place] [address]  Phone: [phone]  Fax: [phone]  Follow Up Time:

## 2019-03-22 LAB
MITOCHONDRIA AB SER-ACNC: SIGNIFICANT CHANGE UP
NON-GYNECOLOGICAL CYTOLOGY STUDY: SIGNIFICANT CHANGE UP
SMOOTH MUSCLE AB SER-ACNC: ABNORMAL

## 2019-03-24 LAB
ALKALINE PHOSPHATASE INTERPRETATION: SIGNIFICANT CHANGE UP
ALP BONE SERPL-MCNC: 39 % — SIGNIFICANT CHANGE UP (ref 28–66)
ALP FLD-CCNC: 581 U/L — HIGH (ref 40–115)
ALP INTEST CFR SERPL: 0 % — LOW (ref 1–24)
ALP LIVER SERPL-CCNC: 61 % — SIGNIFICANT CHANGE UP (ref 25–69)
ALP MACRO CFR SERPL: 0 % — SIGNIFICANT CHANGE UP
ALP PLAC SERPL-CCNC: 0 % — SIGNIFICANT CHANGE UP
CULTURE RESULTS: SIGNIFICANT CHANGE UP
CULTURE RESULTS: SIGNIFICANT CHANGE UP
SPECIMEN SOURCE: SIGNIFICANT CHANGE UP
SPECIMEN SOURCE: SIGNIFICANT CHANGE UP

## 2019-03-25 LAB
CULTURE RESULTS: SIGNIFICANT CHANGE UP
GRAM STN FLD: SIGNIFICANT CHANGE UP
SPECIMEN SOURCE: SIGNIFICANT CHANGE UP

## 2019-03-27 DIAGNOSIS — E43 UNSPECIFIED SEVERE PROTEIN-CALORIE MALNUTRITION: ICD-10-CM

## 2019-03-27 DIAGNOSIS — R06.02 SHORTNESS OF BREATH: ICD-10-CM

## 2019-03-27 DIAGNOSIS — F10.21 ALCOHOL DEPENDENCE, IN REMISSION: ICD-10-CM

## 2019-03-27 DIAGNOSIS — Z79.899 OTHER LONG TERM (CURRENT) DRUG THERAPY: ICD-10-CM

## 2019-03-27 DIAGNOSIS — K70.31 ALCOHOLIC CIRRHOSIS OF LIVER WITH ASCITES: ICD-10-CM

## 2019-03-27 DIAGNOSIS — N18.3 CHRONIC KIDNEY DISEASE, STAGE 3 (MODERATE): ICD-10-CM

## 2019-03-27 DIAGNOSIS — R19.7 DIARRHEA, UNSPECIFIED: ICD-10-CM

## 2019-03-27 DIAGNOSIS — N17.9 ACUTE KIDNEY FAILURE, UNSPECIFIED: ICD-10-CM

## 2019-03-27 DIAGNOSIS — I12.9 HYPERTENSIVE CHRONIC KIDNEY DISEASE WITH STAGE 1 THROUGH STAGE 4 CHRONIC KIDNEY DISEASE, OR UNSPECIFIED CHRONIC KIDNEY DISEASE: ICD-10-CM

## 2019-03-27 DIAGNOSIS — K70.11 ALCOHOLIC HEPATITIS WITH ASCITES: ICD-10-CM

## 2019-07-03 ENCOUNTER — INBOUND DOCUMENT (OUTPATIENT)
Age: 49
End: 2019-07-03

## 2020-06-01 NOTE — BEHAVIORAL HEALTH ASSESSMENT NOTE - GAIT / STATION
Addended by: BHASKAR PENA on: 10/16/2019 10:30 AM     Modules accepted: Orders    
CT/CV Surgery Follow Up Office Visit      Patient's Name/Date of Birth: Chrissy Norris / 1941 (20 y.o.)      PCP: Adrian Patel,     Date: June 1, 2020    We had the pleasure of seeing Chrissy Norris in the office today, as you know this is a very pleasant 77y.o. year old female with a history of subclavian steal syndrome and AAA who underwent a carotid subclavian bypass on 04/11/18. She is here for follow up US and CTA. She has multiple complaints. She is complaining of same LUE symptoms as prior to carotid subclavian bypass. Left arm pain and fatigue with actions. She also complains of right leg pain that occurred for the first time yesterday. The pt denies chest pressure, SOB, fever, chills, N/V/D. PastMedical History:  Emma Bryan  has a past medical history of Anemia, Aneurysm of abdominal aorta (HCC), Arthritis, Blood circulation, collateral, Bursitis, trochanteric, CAD (coronary artery disease), Cerebral artery occlusion with cerebral infarction [facility]), Chronic back pain, Depression, Diabetes mellitus ([facility] [address] GERD (gastroesophageal reflux disease), Headache(784.0), History of basal cell cancer, Hyperlipidemia, Hypertension, Irritable bowel, Movement disorder, PVD (peripheral vascular disease) ([facility] [address] S/P CABG (coronary artery bypass graft), and Sciatica. Past Surgical History:  The patient  has a past surgical history that includes Colonoscopy; Hysterectomy; Upper gastrointestinal endoscopy; Cholecystectomy (yrs ago); Tonsillectomy; laryngoscopy (10/29/2012 Dr Keon Lora); Appendectomy; Endoscopy, colon, diagnostic; skin biopsy; hiatal hernia repair; Cardiac catheterization (2/3/2016); Abdomen surgery; eye surgery; hernia repair; Coronary artery bypass graft (2-18-16); and pr vein bypass graft,carot-subcl/ subcl-carotd (Left, 4/11/2018). Allergies: The patient is allergic to ciprofloxacin and darvon [propoxyphene hcl].     Medications:    Current Outpatient Medications:     simvastatin (ZOCOR)
Abnormal gait / station

## 2020-09-08 NOTE — BEHAVIORAL HEALTH ASSESSMENT NOTE - NSBHHPIREASONCL_PSY_A_CORE
SPOKE WITH PATIENT ABOUT REACH 5 TEAM ARRIVING AT [facility] . PT STATED HE 
WOULD TRY TO HANG OUT FOR THE FLIGHT , BUT NOT MUCH LONGER. REASSURED PT THAT I 
SPOKE ANNI NGUYỄN TRANSPORTING PATIENT depression/alcohol

## 2023-07-27 NOTE — H&P ADULT - NSHPSOCIALHISTORY_GEN_ALL_CORE
What Is The Reason For Today's Visit?: Full Body Skin Examination
What Is The Reason For Today's Visit? (Being Monitored For X): concerning skin lesions on an annual basis
single  former drinker (vodka), smoker (cigarettes)

## 2023-11-07 NOTE — DISCHARGE NOTE ADULT - NS MD DC FALL RISK RISK
For information on Fall & Injury Prevention, visit [email or web address]

## 2024-11-01 ENCOUNTER — TRANSCRIPTION ENCOUNTER (OUTPATIENT)
Age: 54
End: 2024-11-01

## 2024-11-01 ENCOUNTER — EMERGENCY (EMERGENCY)
Facility: HOSPITAL | Age: 54
LOS: 0 days | Discharge: HOME | End: 2024-11-01
Attending: STUDENT IN AN ORGANIZED HEALTH CARE EDUCATION/TRAINING PROGRAM

## 2024-11-01 VITALS
HEART RATE: 49 BPM | SYSTOLIC BLOOD PRESSURE: 148 MMHG | DIASTOLIC BLOOD PRESSURE: 84 MMHG | TEMPERATURE: 98 F | OXYGEN SATURATION: 98 % | RESPIRATION RATE: 18 BRPM

## 2024-11-01 VITALS
RESPIRATION RATE: 18 BRPM | HEIGHT: 65 IN | WEIGHT: 153 LBS | OXYGEN SATURATION: 99 % | TEMPERATURE: 98 F | SYSTOLIC BLOOD PRESSURE: 142 MMHG | DIASTOLIC BLOOD PRESSURE: 94 MMHG | HEART RATE: 56 BPM

## 2024-11-01 LAB
ALBUMIN SERPL ELPH-MCNC: 3.9 G/DL — SIGNIFICANT CHANGE UP (ref 3.3–5)
ALP SERPL-CCNC: 249 U/L — HIGH (ref 40–120)
ALT FLD-CCNC: 54 U/L — SIGNIFICANT CHANGE UP (ref 12–78)
ANION GAP SERPL CALC-SCNC: 7 MMOL/L — SIGNIFICANT CHANGE UP (ref 5–17)
AST SERPL-CCNC: 31 U/L — SIGNIFICANT CHANGE UP (ref 15–37)
BASOPHILS # BLD AUTO: 0.07 K/UL — SIGNIFICANT CHANGE UP (ref 0–0.2)
BASOPHILS NFR BLD AUTO: 0.5 % — SIGNIFICANT CHANGE UP (ref 0–2)
BILIRUB SERPL-MCNC: 0.5 MG/DL — SIGNIFICANT CHANGE UP (ref 0.2–1.2)
BUN SERPL-MCNC: 29 MG/DL — HIGH (ref 7–23)
CALCIUM SERPL-MCNC: 9.7 MG/DL — SIGNIFICANT CHANGE UP (ref 8.5–10.1)
CHLORIDE SERPL-SCNC: 110 MMOL/L — HIGH (ref 96–108)
CO2 SERPL-SCNC: 24 MMOL/L — SIGNIFICANT CHANGE UP (ref 22–31)
CREAT SERPL-MCNC: 1.97 MG/DL — HIGH (ref 0.5–1.3)
EGFR: 39 ML/MIN/1.73M2 — LOW
EOSINOPHIL # BLD AUTO: 0.2 K/UL — SIGNIFICANT CHANGE UP (ref 0–0.5)
EOSINOPHIL NFR BLD AUTO: 1.3 % — SIGNIFICANT CHANGE UP (ref 0–6)
GLUCOSE SERPL-MCNC: 88 MG/DL — SIGNIFICANT CHANGE UP (ref 70–99)
HCT VFR BLD CALC: 48.1 % — SIGNIFICANT CHANGE UP (ref 39–50)
HGB BLD-MCNC: 16.1 G/DL — SIGNIFICANT CHANGE UP (ref 13–17)
IMM GRANULOCYTES NFR BLD AUTO: 0.3 % — SIGNIFICANT CHANGE UP (ref 0–0.9)
LYMPHOCYTES # BLD AUTO: 16.7 % — SIGNIFICANT CHANGE UP (ref 13–44)
LYMPHOCYTES # BLD AUTO: 2.56 K/UL — SIGNIFICANT CHANGE UP (ref 1–3.3)
MCHC RBC-ENTMCNC: 30.4 PG — SIGNIFICANT CHANGE UP (ref 27–34)
MCHC RBC-ENTMCNC: 33.5 G/DL — SIGNIFICANT CHANGE UP (ref 32–36)
MCV RBC AUTO: 90.8 FL — SIGNIFICANT CHANGE UP (ref 80–100)
MONOCYTES # BLD AUTO: 1.15 K/UL — HIGH (ref 0–0.9)
MONOCYTES NFR BLD AUTO: 7.5 % — SIGNIFICANT CHANGE UP (ref 2–14)
NEUTROPHILS # BLD AUTO: 11.27 K/UL — HIGH (ref 1.8–7.4)
NEUTROPHILS NFR BLD AUTO: 73.7 % — SIGNIFICANT CHANGE UP (ref 43–77)
NRBC # BLD: 0 /100 WBCS — SIGNIFICANT CHANGE UP (ref 0–0)
PLATELET # BLD AUTO: 364 K/UL — SIGNIFICANT CHANGE UP (ref 150–400)
POTASSIUM SERPL-MCNC: 4.7 MMOL/L — SIGNIFICANT CHANGE UP (ref 3.5–5.3)
POTASSIUM SERPL-SCNC: 4.7 MMOL/L — SIGNIFICANT CHANGE UP (ref 3.5–5.3)
PROT SERPL-MCNC: 8.3 GM/DL — SIGNIFICANT CHANGE UP (ref 6–8.3)
RBC # BLD: 5.3 M/UL — SIGNIFICANT CHANGE UP (ref 4.2–5.8)
RBC # FLD: 13.2 % — SIGNIFICANT CHANGE UP (ref 10.3–14.5)
SODIUM SERPL-SCNC: 141 MMOL/L — SIGNIFICANT CHANGE UP (ref 135–145)
WBC # BLD: 15.3 K/UL — HIGH (ref 3.8–10.5)
WBC # FLD AUTO: 15.3 K/UL — HIGH (ref 3.8–10.5)

## 2024-11-01 PROCEDURE — 70450 CT HEAD/BRAIN W/O DYE: CPT | Mod: 26,MC

## 2024-11-01 PROCEDURE — 99284 EMERGENCY DEPT VISIT MOD MDM: CPT | Mod: 25

## 2024-11-01 RX ORDER — METOCLOPRAMIDE HCL 5 MG
10 TABLET ORAL ONCE
Refills: 0 | Status: COMPLETED | OUTPATIENT
Start: 2024-11-01 | End: 2024-11-01

## 2024-11-01 RX ORDER — SODIUM CHLORIDE 0.9 % (FLUSH) 0.9 %
1000 SYRINGE (ML) INJECTION ONCE
Refills: 0 | Status: COMPLETED | OUTPATIENT
Start: 2024-11-01 | End: 2024-11-01

## 2024-11-01 RX ADMIN — Medication 1 TABLET(S): at 10:19

## 2024-11-01 RX ADMIN — Medication 10 MILLIGRAM(S): at 10:19

## 2024-11-01 RX ADMIN — Medication 1000 MILLILITER(S): at 10:19

## 2024-11-01 NOTE — ED ADULT NURSE NOTE - SUICIDE SCREENING QUESTION 3
----- Message from Chica Villasenor MA sent at 2/3/2023 11:33 AM CST -----  Regarding: FW: miss call    ----- Message -----  From: Abraham Frias  Sent: 2/3/2023  10:37 AM CST  To: Nakia RODRIGUEZ Staff  Subject: miss call                                        Pt returning Fahad miss call    Call        No

## 2024-11-01 NOTE — ED PROVIDER NOTE - CLINICAL SUMMARY MEDICAL DECISION MAKING FREE TEXT BOX
- 54-year-old male past medical history alcoholic cirrhosis 6 years sober, CKD 3, presenting to ED complaining of 1 week of persistent headache.  Patient states began with left-sided yellow nasal drainage, endorses pressure to left side of face and forehead.  States pain improves with Tylenol but never fully subsides.  Denies any fevers/chills.  States drainage has resolved but still experience headache and facial pain/pressure.  Patient had televisit with his PMD prescribed him nasal sprays, patient states is not helping so came to ED.  Patient denies any recent falls/trauma, vision changes, nausea/vomiting, no lightheadedness/dizziness, no difficulty walking or speaking, chest pain, palpitations, cough, shortness of breath, abdominal pain.  - Most likely a sinusitis given facial pressure, headache, tenderness over the left frontal sinus.  Patient is well-appearing, no fevers or chills, nontoxic-will order antibiotics for persistent symptoms CT imaging not necessary at this time.  Low suspicion for ICH no recent fall/trauma, neurological exam.  Differential includes migraines, patient has history of cirrhosis and CKD, states has been taking " Tylenol like it is water."  Will obtain CMP to assess liver/renal function/electrolyte abnormalities, CBC to assess leukocytosis.  Will order fluids and Reglan for symptom relief.  Dispo pending reassessment, likely DC with outpatient follow-up.

## 2024-11-01 NOTE — ED PROVIDER NOTE - PATIENT PORTAL LINK FT
You can access the FollowMyHealth Patient Portal offered by [facility] by registering at the following website: [email or web address]. By joining [employer]’s FollowMyHealth portal, you will also be able to view your health information using other applications (apps) compatible with our system.

## 2024-11-01 NOTE — ED ADULT TRIAGE NOTE - CHIEF COMPLAINT QUOTE
Headaches x 7 days, denies h/o migraines, saw his pcp via tele conference on Wednesday and was given nasal spray and advised otc tylenols. Pain was the worst last night.  HX liver cirrhosis, stage 3 kidney disease

## 2024-11-01 NOTE — ED ADULT TRIAGE NOTE - TEMPERATURE IN CELSIUS (DEGREES C)
Ther Exercise 45 3   []  Manual Therapy     []  Ther Activities     []  Aquatics     []  Vasocompression     []  Other     Total Treatment time 45 3       Assessment: [x] Progressing toward goals. .     [] No change. [x] Other: Able to progress pt with adding in 3 way rebounder, balance board, and green foam for 4 way hip with good tolerance. Pt noting no pain with progressions this date, just some minor soreness in L hip. SHORT TERM GOALS ( 8 visits)  Hip pain = 0  Hip ROM = WNL  Hip strength = 4+/5  Hip function: sit, walk, squat, bend w/o pain     LONG TERM GOALS ( 12 visits)  Independent Home Exercise program  Return to normal activity     PATIENT GOAL  Increased motion      Pt. Education:  [x] Yes  [] No  [x] Reviewed Prior HEP/Ed  Method of Education: [] Verbal  [x] Demo  [] Written  Comprehension of Education:  [] Verbalizes understanding. [x] Demonstrates understanding. [] Needs review. [] Demonstrates/verbalizes HEP/Ed previously given. Plan: [x] Continue per plan of care.    [] Other:      Time In: 7:05 am           Time Out: 8:05 am    Electronically signed by:  Coy Jimenez PTA
36.5

## 2024-11-01 NOTE — ED PROVIDER NOTE - ATTENDING APP SHARED VISIT CONTRIBUTION OF CARE
54M pmhx cirrhoisis, ckd, prior etoh dependence now 6 yrs sober, pw congestion/frontal dull pressure like headache duration x 1 week, reportedly gradual in onset, moderate to severe in intensity, not relieved with fluticason or azelastine nasal spray as prescribed by pmd. Pt denies numbness, weakness, fever, chest pain, sob  On eval Gen: aox3, nad,   Head: NCAT  ENT: Airway patent, moist mucous membranes, nasal passageways clear,, + ttp over maxillary and frontal sinus without edema/ swelling   Cardiac: Normal rate, normal rhythm   Respiratory: Lungs CTA B/L  Gastrointestinal: Abdomen soft, nontender, nondistended, no rebound, no guarding  MSK: No gross abnormalities, FROM of all four extremities, no edema  HEME: Extremities warm, pulses intact and symmetrical in all four extremities  Skin: No rashes, no lesions  Neuro: No gross neurologic deficits, CN II-XII intact, no facial asymmetry, no dysarthria, no dysmetria, no drift, strength equal in all four extremities, no gait abnormality   plan - tx with abx for possible bacterial sinusitis as symptoms not improving for over a week, check labs eval for leukocytosis or thrombocytopenia, eval renal fcn, analgesia, ct head rule out any large space occupying lesions/mass given persistent severe headache, symptom onset/duration/features not suggestive of SAH

## 2024-11-01 NOTE — ED PROVIDER NOTE - NSFOLLOWUPINSTRUCTIONS_ED_ALL_ED_FT
[facility] HEART CARE   [address], [place] [address]   [email or web address]   OFFICE: [phone]     CARDIOLOGY CLINIC NOTE     Thank you, Juhi Foss, for asking the [facility] Heart Care team to see Ms. ALEXA Luu to  Follow Up (Afib, LE swelling)         Assessment/Recommendations   Assessment:    1. CVA - possibly embolic in etiology based on MRI. She has a history of multiple small infarct areas since 2015 when she was diagnosed with Parkinson's disease  2. Paroxysmal atrial fibrillation - identified on implantable loop recorder. Asymptomatic. In sinus rhythm today. With low burden of afib   3. Non-ischemic cardiomyopathy - with recovered LVEF. No symptoms of heart failure  4. Parkinson's disease - with gait instability and limited mobility. Currently living in assisted living.  5. Lower extremity edema - no swelling today. Likely venous insufficiency and related to the heat/humidity.       Plan:  1. Continue medications without changes.  2. Encouraged walking as able.  3. Follow up in 1 year or sooner if needed.         History of Present Illness   Ms. ALEXA Luu is a 89 year old female with a significant past history of parkinson's disease, multiple falls, and prior CVA who presents for cardiology follow-up.     Mrs. Janelle Luu was hospitalized for a CVA in fall 2019.  MRI of the brain demonstrated multiple infarct areas suggestive of embolic phenomenon.  An implantable loop recorder was placed. Atrial fibrillation was identified on the implantable loop recorder and due to fall risk and high risk for bleeding complications on anticoagulation due to Parkinson's disease she was referred for Watchman THANIA closure implantation, which was placed in April, 2020.    Carmen is feeling generally well today. She is now living in assisted living due to her Parkinson's disease. She has noted some intermittent swelling in her ankles  over the past few weeks that generally resolves overnight. On one occasion it took a couple days to resolve. She denies any shortness of breath or other heart failure symptoms. She is walking only about 15 minutes per day with assistance and is unable to walk more than that due to balance issues. No symptoms of afib.    Other than noted above, Ms. Janelle Luu denies any chest pain/pressure/tightness, shortness of breath at rest or with exertion, light headedness/dizziness, pre-syncope, syncope, lower extremity swelling, palpitations, paroxysmal nocturnal dyspnea (PND), or orthopnea.     Cardiac Problems and Cardiac Diagnostics     Most Recent Cardiac testing:  ECG dated 3/17/22 (personaly reviewed and interpreted): sinus rhythm with left axis deviation and LVH.    ILR interrogation 3/29/22  Type: routine remote loop recorder transmission.  Presenting rhythm: ventricular sensing, appears sinus 100 bpm.  Battery status: OK.  Arrhythmias: since 12/10/21; 4 AF episodes, longest lasting 4 minutes, recordings appear to be sinus tachy 130-140 bpm with intermittent W-wave undersensing.   Comments: normal loop recorder function. ANIVAL Churchill, Device Specialist    MARITA 5/15/2020    Normal left ventricular size and systolic function.    Left ventricle ejection fraction is normal. The estimated left ventricular ejection fraction is 60%.    Normal right ventricular size and systolic function.    No hemodynamically significant valve disease is identified.    Watchman left atrial appendage occluder device is present with no thrombosis on device and no significant Doppler color flow (<4mm).    When compared to the previous study dated 2/20/2020, no significant change.    ECHO (report reviewed):   TTE 10/6/19    Normal left ventricular size.The estimated left ventricular ejection fraction is 45%. This represents a mildly decreased ejection fraction. Mild hypertrophy noted.    Normal right ventricular size and systolic function.     Abnormal septal motion consistent with left bundle branch block.    Estimated central venous pressure equal to 3 mmHg.    No pulmonary hypertension present. The estimated systolic pulmonary artery pressure is 23 mmhg.    When compared to the previous study dated 4/14/2014, no significant change.    Nuclear stress test 11/29/2019     The nuclear stress test is negative for inducible myocardial ischemia or infarction.     The left ventricular ejection fraction at stress is 66%.     The patient is at a low risk of future cardiac ischemic events.     There is no prior study for comparison.       Medications  Allergies   Current Outpatient Medications   Medication Sig Dispense Refill     acetaminophen (TYLENOL) 500 MG tablet Take 500 mg by mouth every 6 hours as needed (Occasionally)       Ascorbic Acid (VITAMIN C) 100 MG CHEW Take 1 tablet by mouth daily       aspirin (ASA) 81 MG EC tablet 81mg tab by mouth daily @8am 30 tablet 0     calcium carbonate 500 MG CHEW 1 tums by mouth nightly @ 10/11pm       calcium carbonate 600 mg-vitamin D 400 units (CALTRATE) 600-400 MG-UNIT per tablet 1 tab by mouth daily at 8am       carbidopa-levodopa (SINEMET CR)  MG CR tablet Take 1 tablet by mouth At Bedtime 90 tablet 3     carbidopa-levodopa (SINEMET)  MG tablet Take 2 tabs by mouth at 7 - 8 AM, 12 -12:30 PM, AND 1.5 tabs at 5-5:30 PM = 5.5 Tabs 495 tablet 3     cyanocobalamin (CYANOCOBALAMIN) 1000 MCG SUBL sublingual tablet Place 1,000 mcg under the tongue daily       gabapentin (NEURONTIN) 600 MG tablet Take 1-2 tablets (600-1,200 mg) by mouth 3 times daily 270 tablet 3     ibuprofen (ADVIL/MOTRIN) 200 MG tablet 2 x 200mg tab by mouth nightly at 9/10pm       metoprolol succinate ER (TOPROL XL) 25 MG 24 hr tablet TAKE 1 TABLET EVERY DAY AT 8AM 60 tablet 3     pravastatin (PRAVACHOL) 20 MG tablet TAKE 1 TABLET AT BEDTIME 90 tablet 1     psyllium (METAMUCIL/KONSYL) Packet By mouth in liquid daily as needed for  "constipation       sertraline (ZOLOFT) 25 MG tablet Take 1 tablet (25 mg) by mouth daily 30 tablet 3     vitamin D3 (CHOLECALCIFEROL) 50 mcg (2000 units) tablet Vitamin d3 by mouth daily at 8am       zinc gluconate 50 MG tablet 50mg tab by mouth every morning at 8am        Allergies   Allergen Reactions     Codeine Other (See Comments)     Morphine Other (See Comments)     Made me feel really wierd     Penicillins Other (See Comments)     Tongue and throat tingling  Other reaction(s): Paresthesias  Tongue and throat tingling  Tingling on lips  Other reaction(s): Paresthesias  Tongue and throat tingling  Tingling on lips  Tongue and throat \"tingling\" when in 20s       Vicodin [Hydrocodone-Acetaminophen] Other (See Comments)     Weird feeling        Physical Examination Review of Systems   Vitals: /62 (BP Location: Left arm, Patient Position: Sitting, Cuff Size: Adult Regular)   Pulse 68   Resp 16   Ht 1.626 m (5' 4\")   Wt 56.1 kg (123 lb 9.6 oz)   BMI 21.22 kg/m    BMI= Body mass index is 21.22 kg/m .  Wt Readings from Last 3 Encounters:   07/05/22 56.1 kg (123 lb 9.6 oz)   06/11/22 57 kg (125 lb 11.2 oz)   03/17/22 55.3 kg (122 lb)       General Appearance:   Pleasant female, appears stated age. no acute distress, normal body habitus   ENT/Mouth: membranes moist, no apparent gingival bleeding.      EYES:  no scleral icterus, normal conjunctivae   Neck: no carotid bruits. supple   Respiratory:   lungs are clear to auscultation, no rales or wheezing, equal chest wall expansion    Cardiovascular:   Regular rhythm, normal rate. Normal first and second heart sounds with no murmurs, rubs, or gallops; Jugular venous pressure normal, no edema bilaterally    Abdomen/GI:  Soft, non-tender   Extremities: no cyanosis or clubbing   Skin: no xanthelasma, warm.    Heme/lymph/ Immunology No apparent bleeding noted.   Neurologic: Alert and oriented. normal gait, no tremors   Psychiatric: Pleasant, calm, appropriate " affect.         Please refer above for cardiac ROS details.       Past History   Past Medical History:   Past Medical History:   Diagnosis Date     Abnormal brain MRI 3/11/2019    [facility] Other Result Information This result has an attachment that is not available. Result Narrative 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     Breast cyst      CVA (cerebral vascular accident) (H)     per H&P     Finger fracture, left 02/27/2019    ring finger      Multiple rib fractures 02/27/2019     Parkinson disease (H) 1/20/2016        Past Surgical History:   Past Surgical History:   Procedure Laterality Date     ANKLE SURGERY Left     fracture     APPENDECTOMY       BREAST CYST ASPIRATION       EP THANIA CLOSURE N/A 2/20/2020    Procedure: EP THANIA Closure;  Surgeon: Javier Smith MD;  Location: [facility] Cath Lab;  Service: Cardiology     EP LOOP RECORDER IMPLANT N/A 10/8/2019    Procedure: EP Loop Recorder Insertion;  Surgeon: Angel Hurd MD;  Location: [facility] Cath Lab;  Service: Cardiology     EYE SURGERY Bilateral     cataract surgery 2000     HYSTERECTOMY      tahbso     HYSTERECTOMY  1982     KNEE SURGERY Right     staph infection     TONSILLECTOMY       ZZC TOTAL ABDOM HYSTERECTOMY      Description: Hysterectomy;  Proc Date: 01/01/1988;  Comments: couldn't find her ovaries        Family History:   Family History   Problem Relation Age of Onset     Cerebrovascular Disease Mother      Heart Disease Mother      Other - See Comments Mother         Divehi Northern Irish     Dementia Father         10 yrs.     Other - See Comments Father         Northern Irish     Paranoid behavior Father       Other - See Comments Sister sean     Other - See Comments [place] armendariz     Other - See Comments Daughter         leonides lew     Other - See Comments Son         [place]     Other - See Comments Son         [place]ae, [place]     Dementia Paternal Aunt      Dementia Paternal Aunt      Dementia Paternal Uncle      Breast Cancer Maternal Aunt         Social History:   Social History     Socioeconomic History     Marital status:      Spouse name: Not on file     Number of children: Not on file     Years of education: Not on file     Highest education level: Not on file   Occupational History     Not on file   Tobacco Use     Smoking status: Never Smoker     Smokeless tobacco: Never Used   Substance and Sexual Activity     Alcohol use: No     Drug use: Never     Sexual activity: Not on file   Other Topics Concern     Not on file   Social History Narrative    . lives in [place].     Anisha Little daughter    Retired nurse.     Various hospital    Gyn, intensive care,     School system    [place], [place]    Moved here in      x 2    First    = was 42 yrs old and had melanoma    Second   2013 had a stroke and  Was 86 yrs old.      Social Determinants of Health     Financial Resource Strain: Not on file   Food Insecurity: Not on file   Transportation Needs: Not on file   Physical Activity: Not on file   Stress: Not on file   Social Connections: Not on file   Intimate Partner Violence: Not on file   Housing Stability: Not on file            Lab Results    Chemistry/lipid CBC Cardiac Enzymes/BNP/TSH/INR   Lab Results   Component Value Date    CHOL 162 2021    HDL 51 2021    TRIG 128 2021    BUN 24 2022     2022    CO2 24 2022    Lab Results   Component Value Date    WBC 12.3 (H) 2022    HGB 12.7 2022    HCT 38.7 2022    MCV 91 2022     2022     Lab Results   Component Value Date    TROPONINI <0.01 03/17/2022    INR 1.15 (H) 02/21/2020             - You were seen in the Emergency Department Today for head ache and facial pressure  - Your CT shows sinusitis. Please take the Augmentin 1 pill twice a day for 7 days.   - Please follow up with your primary care doctor AND ENT as discussed as discussed  - Return to the Emergency Department IMMEDIATELY if you experience Worsening pain, purulent discharge, laryngitis, fever/chills, feel lightheaded/dizzy, pass out.      Log Out.  [employer] CareNotes®  :  [facility]        SINUSITIS - General Information    Sinusitis    WHAT YOU NEED TO KNOW:    What is sinusitis? Sinusitis is inflammation or infection of your sinuses. Sinusitis is most often caused by a virus. Acute sinusitis may last up to 12 weeks. Chronic sinusitis lasts longer than 12 weeks. Recurrent sinusitis means you have 4 or more infections in 1 year.  Sinuses    What increases my risk for sinusitis?    Medical conditions, such as an upper respiratory infection, allergies, asthma, or cystic fibrosis    Dental infections or procedures, such as gum infections, tooth decay, or a root canal    Smoking or exposure to secondhand smoke    Abnormal sinus structure, such as nasal growths, swollen tonsils, or a deviated septum    A weak immune system, from diseases such as diabetes or HIV  What are the signs and symptoms of sinusitis?    Fever    Pain, pressure, redness, or swelling around the forehead, cheeks, or eyes    Thick yellow or green discharge from your nose    Tenderness when you touch your face over your sinuses    Dry cough that happens mostly at night or when you lie down    Headache and face pain that is worse when you lean forward    Tooth pain, or pain when you chew  How is sinusitis diagnosed? Your healthcare provider will examine you and ask about your symptoms. He or she may check inside your nose using a nasal speculum. You may need any of the following tests:    A sample of mucus from your nose may show what germ is causing your infection.    A CT or MRI of your head may show the mucous lining of your sinuses. You may be given contrast liquid to help your sinuses show up better in the pictures. Tell your healthcare provider if you have ever had an allergic reaction to contrast liquid. Do not enter the MRI room with anything metal. Metal can cause serious injury. Tell your healthcare provider if you have any metal in or on your body.  How is sinusitis treated? Your symptoms may go away on their own. Your healthcare provider may recommend watchful waiting for up to 10 days before starting antibiotics. You may need any of the following:    Acetaminophen decreases pain and fever. It is available without a doctor's order. Ask how much to take and how often to take it. Follow directions. Read the labels of all other medicines you are using to see if they also contain acetaminophen, or ask your doctor or pharmacist. Acetaminophen can cause liver damage if not taken correctly.    NSAIDs, such as ibuprofen, help decrease swelling, pain, and fever. This medicine is available with or without a doctor's order. NSAIDs can cause stomach bleeding or kidney problems in certain people. If you take blood thinner medicine, always ask your healthcare provider if NSAIDs are safe for you. Always read the medicine label and follow directions.    Nasal steroid sprays may help decrease inflammation in your nose and sinuses.    Decongestants help reduce swelling and drain mucus in the nose and sinuses. They may help you breathe easier.    Antihistamines help dry mucus in the nose and relieve sneezing.    Antibiotics help treat or prevent a bacterial infection.  How can I manage my symptoms?    Rinse your sinuses as directed. Use a sinus rinse device to rinse your nasal passages with a saline (salt water) solution or distilled water. Do not use tap water. This will help thin the mucus in your nose and rinse away pollen and dirt. It will also help reduce swelling so you can breathe normally.    Use a humidifier to increase air moisture in your home. This may make it easier for you to breathe and help decrease your cough.  Humidifier      Sleep with your head elevated. Place an extra pillow under your head before you go to sleep to help your sinuses drain.  Elevate Head (Adult)      Drink liquids as directed. Ask your healthcare provider how much liquid to drink each day and which liquids are best for you. Liquids will thin the mucus in your nose and help it drain. Avoid drinks that contain alcohol or caffeine.    Do not smoke, and avoid secondhand smoke. Nicotine and other chemicals in cigarettes and cigars can make your symptoms worse. Ask your healthcare provider for information if you currently smoke and need help to quit. E-cigarettes or smokeless tobacco still contain nicotine. Talk to your healthcare provider before you use these products.  How can I help prevent the spread of germs?    Wash your hands often with soap and water. Wash your hands after you use the bathroom, change a child's diaper, or sneeze. Wash your hands before you prepare or eat food.  Handwashing      Stay away from people who are sick. Some germs spread easily and quickly through contact.  When should I seek immediate care?    You have trouble breathing or wheezing that is getting worse.    You have a stiff neck, a fever, or a bad headache.    You cannot open your eye.    Your eyeball bulges out or you cannot move your eye.    You are more sleepy than normal, or you notice changes in your ability to think, move, or talk.    You have swelling of your forehead or scalp.  When should I call my doctor?    You have vision changes, such as double vision.    Your eye and eyelid are red, swollen, and painful.    Your symptoms do not improve or go away after 10 days.    You have nausea and are vomiting.    Your nose is bleeding.    You have questions or concerns about your condition or care.

## 2024-11-01 NOTE — ED ADULT NURSE NOTE - OBJECTIVE STATEMENT
A&OX4 patient C/O frontal HA denies dizziness/N/V /fevers or head trauma . patient  states Myers after flu- shot . PMH liver cirrhosis/ CKD

## 2024-11-01 NOTE — ED ADULT TRIAGE NOTE - RESPIRATORY RATE (BREATHS/MIN)
Home care RN staff will help you to dress wounds. Dressing changes:    Wound 1 - Right buttock. Wash and dry wound with soap and water. Please pack with wet to dry kerlix. Cover with ABD and either light tape or mesh underwear. Change packing daily.   Wound 2 - Left inner labia. Irrigate wound with saline as able. Please pack with 1/2\" packing strip. Change packing daily.    If the dressing becomes soiled, ok to remove and soak in bath until able to be replaced. You can temporarily cover with guaze/ABD dressing as needed until able to be repacked.     Follow up with Dr. Brown 11/10/2020 at 9:45 AM. Please call if you have any new questions or concerns.             Discharge Instructions for Cellulitis   You have been diagnosed with cellulitis. This is an infection in the deepest layer of the skin and tissue beneath the skin. In some cases, the infection also affects the muscle. Cellulitis is caused by bacteria. The bacteria can enter the body through broken skin. This can happen with a cut, scratch, animal bite, or an insect bite that has been scratched. You may have been treated in the hospital with antibiotics and fluids. You will likely be given a prescription for antibiotics to take at home. This sheet will help you take care of yourself at home.  Home care  When you are home:  · Take the prescribed antibiotic medicine you are given as directed until it is gone. Take it even if you feel better. It treats the infection and stops it from returning. Not taking all the medicine can make future infections hard to treat.  · Keep the infected area clean.  · When possible, raise the infected area above the level of your heart. This helps keep swelling down.  · Talk with your healthcare provider if you are in pain. Ask what kind of over-the-counter medicine you can take for pain.  · Apply clean bandages as advised.  · Take your temperature once a day for a week.  · Wash your hands often to prevent spreading the  infection.  In the future, wash your hands before and after you touch cuts, scratches, or bandages. This will help prevent infection.   When to call your healthcare provider  Call your healthcare provider right away if you have any of the following:  · Trouble or pain when moving the joints above or below the infected area  · Discharge or pus draining from the area  · Fever of 100.4°F (38°C) or higher, or as directed by your healthcare provider  · Pain that gets worse in or around the infected   · Redness that gets worse in or around the infected area, particularly if the area of redness expands to a wider area  · Shaking chills  · Swelling of the infected area  · Vomiting  [employer] last reviewed this educational content on 11/1/2019 © 2000-2020 [employer]. [address] [place] [address], [place] [address]. All rights reserved. This information is not intended as a substitute for professional medical care. Always follow your healthcare professional's instructions.        Abscess (Incision & Drainage)  An abscess is sometimes called a boil. It happens when bacteria get trapped under the skin and start to grow. Pus forms inside the abscess as the body responds to the bacteria. An abscess can happen with an insect bite, ingrown hair, blocked oil gland, pimple, cyst, or puncture wound.  Your healthcare provider has drained the pus from your abscess. If the abscess pocket was large, your healthcare provider may have put in gauze packing. Your provider will need to remove it on your next visit. He or she may also replace it at that time. You may not need antibiotics to treat a simple abscess, unless the infection is spreading into the skin around the wound (cellulitis).  The wound will take about 1 to 2 weeks to heal, depending on the size of the abscess. Healthy tissue will grow from the bottom and sides of the opening until it seals over.  Home care  These tips can help your wound heal:  · The wound may drain  for the first 2 days. Cover the wound with a clean dry dressing. Change the dressing if it becomes soaked with blood or pus.  · If a gauze packing was placed inside the abscess pocket, you may be told to remove it yourself. You may do this in the shower. Once the packing is removed, you should wash the area in the shower, or clean the area as directed by your provider. Continue to do this until the skin opening has closed. Make sure you wash your hands after changing the packing or cleaning the wound.  · If you were prescribed antibiotics, take them as directed until they are all gone.  · You may use acetaminophen or ibuprofen to control pain, unless another pain medicine was prescribed. If you have liver disease or ever had a stomach ulcer, talk with your doctor before using these medicines.  Follow-up care  Follow up with your healthcare provider, or as advised. If a gauze packing was put in your wound, it should be removed in 1 to 2 days. Check your wound every day for any signs that the infection is getting worse. The signs are listed below.  When to seek medical advice  Call your healthcare provider right away if any of these occur:  · Increasing redness or swelling  · Red streaks in the skin leading away from the wound  · Increasing local pain or swelling  · Continued pus draining from the wound 2 days after treatment  · Fever of 100.4ºF (38ºC) or higher, or as directed by your healthcare provider  · Boil returns when you are at home  Date Last Reviewed: 9/1/2016  © 1513-8680 [employer]. [address] [place] [address], [place] [address]. All rights reserved. This information is not intended as a substitute for professional medical care. Always follow your healthcare professional's instructions.         18

## 2024-11-01 NOTE — ED PROVIDER NOTE - PHYSICAL EXAMINATION
CONSTITUTIONAL: Appears well, in no apparent distress  HEAD: Normocephalic, no obvious signs of trauma. Tenderness over left frontal sinus.   EENT: PERRL, EOMI w/o pain, no nystagmus, nares patent no drainage/congestion, no pharyngeal erythema, swelling, or exudates  NECK: Trachea midline, no goiter  RESP: L/S equal clr, bilat, apices and bases, no accessory muscle use, speaking full sentences  CARDIC: RRR, +S1/S2, no peripheral edema  GI: ABD soft, nondistended, nontender on palpation, no palpable masses  : No CVA Tenderness  MSK: 5/5 strength extremities x 4, full ROM without pain, no midline spinal tenderness on palpation  SKIN: No rashes, normal color/condition   NEURO: A&OX4, CN intact, No focal motor deficits/weakness, no sensory deficits, no dysmetria, no slurred speech, no facial droop, normal gait

## 2024-11-01 NOTE — ED PROVIDER NOTE - OBJECTIVE STATEMENT
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  Patient states began with left-sided yellow nasal drainage, endorses pressure to left side of face and forehead.  States pain improves with Tylenol but never fully subsides.  Denies any fevers/chills.  States drainage has resolved but still experience headache and facial pain/pressure.  Patient had televisit with his PMD prescribed him nasal sprays, patient states is not helping so came to ED.  Patient denies any recent falls/trauma, vision changes, nausea/vomiting, no lightheadedness/dizziness, no difficulty walking or speaking, chest pain, palpitations, cough, shortness of breath, abdominal pain.

## 2024-11-01 NOTE — ED PROVIDER NOTE - NSFOLLOWUPCLINICS_GEN_ALL_ED_FT
[facility] - ENT  Otolaryngology (ENT)  [address] [place] [address]  Phone: [phone]  Fax:

## 2024-11-01 NOTE — ED PROVIDER NOTE - PROGRESS NOTE DETAILS
NOA Hua NP: CT reads Left maxillary, left sphenoid, left ethmoid and left frontal sinus mucosal thickening is seen as well as left frontal sinus mucosal thickening. Will DC patietn with abx and outpatient ENT reguloo josselyn. NOA Hua NP: CT reads Left maxillary, left sphenoid, left ethmoid and left frontal sinus mucosal thickening is seen as well as left frontal sinus mucosal thickening. Will DC patient with abx and outpatient ENT follow up. Patient updated on results and is agreeable to plan. Questions answered, patient verbalizes understanding. Return precautions given.

## 2025-06-18 NOTE — PROGRESS NOTE BEHAVIORAL HEALTH - NSBHADMITCOORDCARE_PSY_A_CORE
Render Note In Bullet Format When Appropriate: No Application Tool (Optional): Cry-AC Show Applicator Variable?: Yes Consent: The patient's consent was obtained including but not limited to risks of crusting, scabbing, blistering, scarring, darker or lighter pigmentary change, recurrence, incomplete removal and infection. Detail Level: Detailed Number Of Freeze-Thaw Cycles: 2 freeze-thaw cycles Post-Care Instructions: I reviewed with the patient in detail post-care instructions. Patient is to wear sunprotection, and avoid picking at any of the treated lesions. Pt may apply Vaseline to crusted or scabbing areas. Duration Of Freeze Thaw-Cycle (Seconds): 3 yes